# Patient Record
Sex: FEMALE | Race: WHITE | NOT HISPANIC OR LATINO
[De-identification: names, ages, dates, MRNs, and addresses within clinical notes are randomized per-mention and may not be internally consistent; named-entity substitution may affect disease eponyms.]

---

## 2017-03-07 ENCOUNTER — APPOINTMENT (OUTPATIENT)
Dept: NEPHROLOGY | Facility: CLINIC | Age: 82
End: 2017-03-07

## 2017-03-07 VITALS
SYSTOLIC BLOOD PRESSURE: 122 MMHG | RESPIRATION RATE: 14 BRPM | HEART RATE: 61 BPM | HEIGHT: 60 IN | DIASTOLIC BLOOD PRESSURE: 52 MMHG | BODY MASS INDEX: 31.61 KG/M2 | WEIGHT: 161 LBS

## 2017-03-07 RX ORDER — CALCITRIOL 0.5 UG/1
0.5 CAPSULE, LIQUID FILLED ORAL
Qty: 30 | Refills: 0 | Status: DISCONTINUED | COMMUNITY
Start: 2016-08-04 | End: 2017-03-07

## 2018-03-15 ENCOUNTER — OTHER (OUTPATIENT)
Age: 83
End: 2018-03-15

## 2018-05-01 ENCOUNTER — APPOINTMENT (OUTPATIENT)
Dept: NEPHROLOGY | Facility: CLINIC | Age: 83
End: 2018-05-01
Payer: MEDICARE

## 2018-05-01 VITALS — DIASTOLIC BLOOD PRESSURE: 60 MMHG | HEART RATE: 60 BPM | SYSTOLIC BLOOD PRESSURE: 120 MMHG

## 2018-05-01 DIAGNOSIS — E11.21 TYPE 2 DIABETES MELLITUS WITH DIABETIC NEPHROPATHY: ICD-10-CM

## 2018-05-01 PROCEDURE — 99214 OFFICE O/P EST MOD 30 MIN: CPT

## 2018-08-01 ENCOUNTER — APPOINTMENT (OUTPATIENT)
Dept: NEPHROLOGY | Facility: CLINIC | Age: 83
End: 2018-08-01
Payer: MEDICARE

## 2018-08-01 VITALS — SYSTOLIC BLOOD PRESSURE: 148 MMHG | HEART RATE: 60 BPM | DIASTOLIC BLOOD PRESSURE: 70 MMHG

## 2018-08-01 DIAGNOSIS — E55.9 VITAMIN D DEFICIENCY, UNSPECIFIED: ICD-10-CM

## 2018-08-01 DIAGNOSIS — Z00.00 ENCOUNTER FOR GENERAL ADULT MEDICAL EXAMINATION W/OUT ABNORMAL FINDINGS: ICD-10-CM

## 2018-08-01 PROCEDURE — 99214 OFFICE O/P EST MOD 30 MIN: CPT

## 2018-08-01 RX ORDER — CALCITRIOL 0.5 UG/1
0.5 CAPSULE, LIQUID FILLED ORAL
Qty: 30 | Refills: 5 | Status: DISCONTINUED | COMMUNITY
Start: 2018-05-01 | End: 2018-08-01

## 2018-11-02 ENCOUNTER — APPOINTMENT (OUTPATIENT)
Dept: NEPHROLOGY | Facility: CLINIC | Age: 83
End: 2018-11-02
Payer: MEDICARE

## 2018-11-02 VITALS
DIASTOLIC BLOOD PRESSURE: 68 MMHG | BODY MASS INDEX: 31.44 KG/M2 | HEART RATE: 64 BPM | WEIGHT: 161 LBS | SYSTOLIC BLOOD PRESSURE: 150 MMHG

## 2018-11-02 PROCEDURE — 99214 OFFICE O/P EST MOD 30 MIN: CPT

## 2019-02-04 ENCOUNTER — APPOINTMENT (OUTPATIENT)
Dept: NEPHROLOGY | Facility: CLINIC | Age: 84
End: 2019-02-04
Payer: MEDICARE

## 2019-02-04 VITALS
DIASTOLIC BLOOD PRESSURE: 64 MMHG | WEIGHT: 165 LBS | BODY MASS INDEX: 32.22 KG/M2 | SYSTOLIC BLOOD PRESSURE: 160 MMHG | HEART RATE: 64 BPM

## 2019-02-04 PROCEDURE — 99214 OFFICE O/P EST MOD 30 MIN: CPT

## 2019-02-10 NOTE — PHYSICAL EXAM
[General Appearance - Alert] : alert [General Appearance - In No Acute Distress] : in no acute distress [Sclera] : the sclera and conjunctiva were normal [Heart Rate And Rhythm] : heart rate was normal and rhythm regular [Heart Sounds Gallop] : no gallops [Murmurs] : no murmurs [Heart Sounds Pericardial Friction Rub] : no pericardial rub [___ +] : bilateral [unfilled]+ pretibial pitting edema [Abdomen Soft] : soft [Abdomen Tenderness] : non-tender [No CVA Tenderness] : no ~M costovertebral angle tenderness [Involuntary Movements] : no involuntary movements were seen [] : no rash [No Focal Deficits] : no focal deficits [Oriented To Time, Place, And Person] : oriented to person, place, and time [Affect] : the affect was normal [FreeTextEntry1] : crackles bases

## 2019-02-10 NOTE — HISTORY OF PRESENT ILLNESS
[FreeTextEntry1] : f/u CKD \par reports had fall in Dec at home  and hit head - w/u neg CNS bleed- had headaches much improved now\par no dizziness \par reports off lasix TIW - per cardiologist\par BPs can be 160s systolic in am before meds -- better after \par appetite ok, no nausea, no SOB\par missed couple dose MONISHA and hgb lower -- back on now \par PCP Dr Vincent \par

## 2019-02-10 NOTE — ASSESSMENT
[FreeTextEntry1] : off diuretics but fluid overloaded and hypertensive -- unclear why taken off but I suspect was due to increase in creatinine. However I suspect needs diuretics for volume and BP control \par consider restart lasix at least 40 qod -- or daily and may need to tolerate some increase in creatinine. will confirm first that not stopped for other cause - eg related to pts fall (though denies any dizziness or syncope) \par will d/w Dr Vincent \par back on MONISHA for anemia - follow -- consider fe IV \par check PTH, phos\par f/u 2-3 mos\par

## 2019-04-14 LAB
ALBUMIN SERPL ELPH-MCNC: 3.9 G/DL
ALP BLD-CCNC: 70 U/L
ALT SERPL-CCNC: 8 U/L
ANION GAP SERPL CALC-SCNC: 13 MMOL/L
AST SERPL-CCNC: 12 U/L
BILIRUB SERPL-MCNC: 0.2 MG/DL
BUN SERPL-MCNC: 37 MG/DL
CALCIUM SERPL-MCNC: 9 MG/DL
CALCIUM SERPL-MCNC: 9.5 MG/DL
CHLORIDE SERPL-SCNC: 107 MMOL/L
CO2 SERPL-SCNC: 22 MMOL/L
CREAT SERPL-MCNC: 2.81 MG/DL
GLUCOSE SERPL-MCNC: 82 MG/DL
PARATHYROID HORMONE INTACT: 104 PG/ML
PHOSPHATE SERPL-MCNC: 4.5 MG/DL
POTASSIUM SERPL-SCNC: 5.7 MMOL/L
PROT SERPL-MCNC: 6.2 G/DL
SODIUM SERPL-SCNC: 142 MMOL/L

## 2019-05-14 ENCOUNTER — APPOINTMENT (OUTPATIENT)
Dept: NEPHROLOGY | Facility: CLINIC | Age: 84
End: 2019-05-14
Payer: MEDICARE

## 2019-05-14 VITALS
HEART RATE: 70 BPM | BODY MASS INDEX: 32.2 KG/M2 | DIASTOLIC BLOOD PRESSURE: 60 MMHG | WEIGHT: 164 LBS | HEIGHT: 60 IN | SYSTOLIC BLOOD PRESSURE: 140 MMHG

## 2019-05-14 PROCEDURE — 99214 OFFICE O/P EST MOD 30 MIN: CPT

## 2019-05-14 RX ORDER — VALACYCLOVIR 500 MG/1
500 TABLET, FILM COATED ORAL
Qty: 6 | Refills: 0 | Status: DISCONTINUED | COMMUNITY
Start: 2017-01-31 | End: 2019-05-14

## 2019-05-14 RX ORDER — OMEGA-3S/DHA/EPA/FISH OIL 900-110 MG
1 CAPSULE ORAL DAILY
Refills: 0 | Status: DISCONTINUED | COMMUNITY
Start: 2018-05-01 | End: 2019-05-14

## 2019-05-14 RX ORDER — FOLIC ACID 1 MG/1
1 TABLET ORAL
Refills: 0 | Status: DISCONTINUED | COMMUNITY
Start: 2017-03-07 | End: 2019-05-14

## 2019-05-14 RX ORDER — HYDROCORTISONE 2.5% 25 MG/G
2.5 CREAM TOPICAL
Qty: 30 | Refills: 0 | Status: DISCONTINUED | COMMUNITY
Start: 2017-01-27 | End: 2019-05-14

## 2019-05-14 RX ORDER — GLIPIZIDE 2.5 MG/1
2.5 TABLET, EXTENDED RELEASE ORAL DAILY
Refills: 0 | Status: DISCONTINUED | COMMUNITY
Start: 2018-05-01 | End: 2019-05-14

## 2019-05-15 NOTE — ASSESSMENT
[FreeTextEntry1] : CKD 4 --stable renal fxn . volume status and BP improved on low dose diuretic\par anemia -- getting MONISHA, fe stores acceptable\par HTN-- BP acceptable\par last PTH ok \par \par would cont lasix 20 qod as she is doing \par cont coreg\par contr MONISHA and folow hgb\par f/u 3 mos -- check labs incl phos, PTH, vit D  for next visit \par \par \par \par

## 2019-05-15 NOTE — HISTORY OF PRESENT ILLNESS
[FreeTextEntry1] : f/u CKD stage IV\par Patient has been taking Furosemide 20 mg po every other day due to hypertension and leg swelling during  last visit in Feb 2019.\par \par She checks her blood pressure at home. Bp has been around 140s-160s at home which is sig improved and feels better \par She is on procrit 10,000 units q2 wkly for anemia\par \par No more  falls at home , no dizziness\par \par PCP Dr Vincent \par

## 2019-05-15 NOTE — PHYSICAL EXAM
[General Appearance - In No Acute Distress] : in no acute distress [General Appearance - Alert] : alert [Jugular Venous Distention Increased] : there was no jugular-venous distention [Auscultation Breath Sounds / Voice Sounds] : lungs were clear to auscultation bilaterally [Heart Sounds] : normal S1 and S2 [Heart Rate And Rhythm] : heart rate was normal and rhythm regular [Heart Sounds Gallop] : no gallops [Heart Sounds Pericardial Friction Rub] : no pericardial rub [Murmurs] : no murmurs [Abdomen Soft] : soft [Abdomen Tenderness] : non-tender [No CVA Tenderness] : no ~M costovertebral angle tenderness [Edema] : there was no peripheral edema [Sclera] : the sclera and conjunctiva were normal [] : no rash [Involuntary Movements] : no involuntary movements were seen [Affect] : the affect was normal [Oriented To Time, Place, And Person] : oriented to person, place, and time [No Focal Deficits] : no focal deficits

## 2019-09-17 ENCOUNTER — APPOINTMENT (OUTPATIENT)
Dept: NEPHROLOGY | Facility: CLINIC | Age: 84
End: 2019-09-17
Payer: MEDICARE

## 2019-09-17 VITALS
WEIGHT: 154 LBS | SYSTOLIC BLOOD PRESSURE: 140 MMHG | BODY MASS INDEX: 30.08 KG/M2 | DIASTOLIC BLOOD PRESSURE: 60 MMHG | HEART RATE: 60 BPM

## 2019-09-17 VITALS — HEART RATE: 64 BPM | SYSTOLIC BLOOD PRESSURE: 102 MMHG | DIASTOLIC BLOOD PRESSURE: 58 MMHG
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
## 2019-09-17 DIAGNOSIS — N19 UNSPECIFIED KIDNEY FAILURE: ICD-10-CM

## 2019-09-17 DIAGNOSIS — R42 DIZZINESS AND GIDDINESS: ICD-10-CM

## 2019-09-17 PROCEDURE — 99215 OFFICE O/P EST HI 40 MIN: CPT

## 2019-09-18 NOTE — PHYSICAL EXAM
[General Appearance - Alert] : alert [General Appearance - In No Acute Distress] : in no acute distress [Sclera] : the sclera and conjunctiva were normal [Jugular Venous Distention Increased] : there was no jugular-venous distention [Auscultation Breath Sounds / Voice Sounds] : lungs were clear to auscultation bilaterally [Edema] : there was no peripheral edema [Abdomen Soft] : soft [Abdomen Tenderness] : non-tender [No CVA Tenderness] : no ~M costovertebral angle tenderness [Involuntary Movements] : no involuntary movements were seen [] : no rash [No Focal Deficits] : no focal deficits [Oriented To Time, Place, And Person] : oriented to person, place, and time [Affect] : the affect was normal [FreeTextEntry1] : no asterixis

## 2019-09-18 NOTE — HISTORY OF PRESENT ILLNESS
[FreeTextEntry1] : f/u CKD\par co couple weeks feeling off balance when walks -- had two falls at night when going to [place]\par denies dizziness or vertigo but feels off balance \par appetitie not great - lost 10 lbs past couple mos, occ nausea (not daily) \par no sob\par occ tremor she notes \par

## 2019-09-18 NOTE — REVIEW OF SYSTEMS
[Recent Weight Loss (___ Lbs)] : recent [unfilled] ~Ulb weight loss [As Noted in HPI] : as noted in HPI [Dizziness] : dizziness [Difficulty Walking] : difficulty walking [Negative] : Heme/Lymph [Vomiting] : no vomiting

## 2019-09-18 NOTE — ASSESSMENT
[FreeTextEntry1] : advanced CKD -- fatigue/balance symptoms associated wit poor intake, nausea, weight loss-- suspect may have uremic sx. also orthostasis on exam. no edema in pt with hx edema. BP ok \par will dc lasix \par encouraged increased PO intake\par discussed with pt and her daughter (over phone--[phone])  re possibilty of uremia as dx and consider admission and dialysis--antony if no improvement. discussed risks vs benefits and expected/possible course of dialysis\par also disciussed pts falls at night and may be unsafe for her to be alone at night -- advised have someone with her (or go to hospital) \par consider neuro eval as well \par above also d/w dr Vincent \par >50% visit spent on counseling  \par \par \par

## 2019-10-15 ENCOUNTER — APPOINTMENT (OUTPATIENT)
Dept: NEUROLOGY | Facility: CLINIC | Age: 84
End: 2019-10-15
Payer: MEDICARE

## 2019-10-15 VITALS
HEART RATE: 60 BPM | TEMPERATURE: 98.1 F | SYSTOLIC BLOOD PRESSURE: 135 MMHG | HEIGHT: 60 IN | BODY MASS INDEX: 30.24 KG/M2 | OXYGEN SATURATION: 97 % | WEIGHT: 154.03 LBS | DIASTOLIC BLOOD PRESSURE: 61 MMHG

## 2019-10-15 DIAGNOSIS — R41.3 OTHER AMNESIA: ICD-10-CM

## 2019-10-15 PROCEDURE — 99204 OFFICE O/P NEW MOD 45 MIN: CPT

## 2019-10-15 NOTE — REVIEW OF SYSTEMS
[Numbness] : numbness [Memory Lapses or Loss] : memory loss [Abnormal Sensation] : an abnormal sensation [Dizziness] : dizziness [Tingling] : no tingling

## 2019-10-15 NOTE — DISCUSSION/SUMMARY
[FreeTextEntry1] : Dizziness    likely   related  to    anemia,  however     I  can  not   r/o    other   etiologies\par She   will  need    CD   to  r/o  stenosis\par MRI  and    MRA of  the   brain can  not  be     done    due   to   pacemaker    placement. 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   Would  defer  Aricept   for  now.

## 2019-10-15 NOTE — HISTORY OF PRESENT ILLNESS
[FreeTextEntry1] : Presented     for     evaluation    of  falls   and   dizziness. She    is   also   c/o   memory   issues. She   was  Rx   Aricept   5   mg,  but  not   taking  it.  She    needs  an assistance     with    all  daily  activities.   Pt   is    known  to  have   DM.  She    denies   vertigo. She    has  a   h/o   Bell's  palsy (recurrent), last    episode   5  years    ago   with   residual  L   facial   weakness.   She    reports   about   4-5  falls.   She    denies   LOC.  It  appears     that   all  falls    were    mechanical.  Pt  is   [place]i   speaking

## 2019-10-15 NOTE — PHYSICAL EXAM
[General Appearance - Alert] : alert [General Appearance - In No Acute Distress] : in no acute distress [Impaired Insight] : insight and judgment were intact [Oriented To Time, Place, And Person] : oriented to person, place, and time [Affect] : the affect was normal [Place] : oriented to place [Person] : oriented to person [Time] : oriented to time [Concentration Intact] : normal concentrating ability [Visual Intact] : visual attention was ~T not ~L decreased [Naming Objects] : no difficulty naming common objects [Repeating Phrases] : no difficulty repeating a phrase [Writing A Sentence] : no difficulty writing a sentence [Fluency] : fluency intact [Comprehension] : comprehension intact [Reading] : reading intact [Past History] : adequate knowledge of personal past history [Cranial Nerves Optic (II)] : visual acuity intact bilaterally,  visual fields full to confrontation, pupils equal round and reactive to light [Cranial Nerves Trigeminal (V)] : facial sensation intact symmetrically [Cranial Nerves Oculomotor (III)] : extraocular motion intact [Cranial Nerves Vestibulocochlear (VIII)] : hearing was intact bilaterally [Cranial Nerves Accessory (XI - Cranial And Spinal)] : head turning and shoulder shrug symmetric [Cranial Nerves Glossopharyngeal (IX)] : tongue and palate midline [Motor Tone] : muscle tone was normal in all four extremities [Cranial Nerves Hypoglossal (XII)] : there was no tongue deviation with protrusion [Motor Strength] : muscle strength was normal in all four extremities [No Muscle Atrophy] : normal bulk in all four extremities [1+] : Brachioradialis left 1+ [0] : Ankle jerk left 0 [Past-pointing] : there was no past-pointing [Tremor] : no tremor present [Plantar Reflex Right Only] : normal on the right [Plantar Reflex Left Only] : normal on the left [FreeTextEntry5] : Peripheral  facial   nerve   palsy on the    left [FreeTextEntry7] : decreased   peripherally   to  all  modalities.  [FreeTextEntry8] : Pt   uses   a  cane

## 2019-10-16 ENCOUNTER — OTHER (OUTPATIENT)
Age: 84
End: 2019-10-16

## 2019-10-29 ENCOUNTER — APPOINTMENT (OUTPATIENT)
Dept: NEUROLOGY | Facility: CLINIC | Age: 84
End: 2019-10-29

## 2019-12-02 ENCOUNTER — APPOINTMENT (OUTPATIENT)
Dept: NEPHROLOGY | Facility: CLINIC | Age: 84
End: 2019-12-02

## 2019-12-16 ENCOUNTER — APPOINTMENT (OUTPATIENT)
Dept: NEUROLOGY | Facility: CLINIC | Age: 84
End: 2019-12-16

## 2019-12-24 ENCOUNTER — APPOINTMENT (OUTPATIENT)
Dept: NEPHROLOGY | Facility: CLINIC | Age: 84
End: 2019-12-24

## 2020-02-03 ENCOUNTER — LABORATORY RESULT (OUTPATIENT)
Age: 85
End: 2020-02-03

## 2020-02-11 ENCOUNTER — APPOINTMENT (OUTPATIENT)
Dept: NEPHROLOGY | Facility: CLINIC | Age: 85
End: 2020-02-11
Payer: MEDICARE

## 2020-02-11 VITALS — HEART RATE: 62 BPM | DIASTOLIC BLOOD PRESSURE: 65 MMHG | SYSTOLIC BLOOD PRESSURE: 170 MMHG

## 2020-02-11 VITALS
HEART RATE: 60 BPM | DIASTOLIC BLOOD PRESSURE: 65 MMHG | WEIGHT: 144 LBS | BODY MASS INDEX: 28.12 KG/M2 | SYSTOLIC BLOOD PRESSURE: 165 MMHG

## 2020-02-11 DIAGNOSIS — E87.5 HYPERKALEMIA: ICD-10-CM

## 2020-02-11 DIAGNOSIS — N18.4 CHRONIC KIDNEY DISEASE, STAGE 4 (SEVERE): ICD-10-CM

## 2020-02-11 PROCEDURE — 99214 OFFICE O/P EST MOD 30 MIN: CPT

## 2020-02-11 RX ORDER — RANOLAZINE 500 MG/1
500 TABLET, FILM COATED, EXTENDED RELEASE ORAL
Refills: 0 | Status: DISCONTINUED | COMMUNITY
Start: 2018-05-01 | End: 2020-02-11

## 2020-02-12 PROBLEM — E87.5 HYPERKALEMIA: Status: ACTIVE | Noted: 2020-02-11

## 2020-02-12 LAB
ALBUMIN SERPL ELPH-MCNC: 4.1 G/DL
ALP BLD-CCNC: 67 U/L
ALT SERPL-CCNC: <5 U/L
ANION GAP SERPL CALC-SCNC: 12 MMOL/L
AST SERPL-CCNC: 11 U/L
BASOPHILS # BLD AUTO: 0.21 K/UL
BASOPHILS NFR BLD AUTO: 3.5 %
BILIRUB SERPL-MCNC: 0.2 MG/DL
BUN SERPL-MCNC: 43 MG/DL
CALCIUM SERPL-MCNC: 9.5 MG/DL
CALCIUM SERPL-MCNC: 9.5 MG/DL
CHLORIDE SERPL-SCNC: 107 MMOL/L
CO2 SERPL-SCNC: 19 MMOL/L
CREAT SERPL-MCNC: 2.97 MG/DL
EOSINOPHIL # BLD AUTO: 0.32 K/UL
EOSINOPHIL NFR BLD AUTO: 5.2 %
GLUCOSE SERPL-MCNC: 116 MG/DL
HCT VFR BLD CALC: 30.6 %
HGB BLD-MCNC: 9.2 G/DL
LYMPHOCYTES # BLD AUTO: 1.59 K/UL
LYMPHOCYTES NFR BLD AUTO: 26.1 %
MAGNESIUM SERPL-MCNC: 2.3 MG/DL
MAN DIFF?: NORMAL
MCHC RBC-ENTMCNC: 30.1 GM/DL
MCHC RBC-ENTMCNC: 36.1 PG
MCV RBC AUTO: 120 FL
MONOCYTES # BLD AUTO: 0.69 K/UL
MONOCYTES NFR BLD AUTO: 11.3 %
NEUTROPHILS # BLD AUTO: 3.29 K/UL
NEUTROPHILS NFR BLD AUTO: 53 %
PARATHYROID HORMONE INTACT: 72 PG/ML
PHOSPHATE SERPL-MCNC: 4.3 MG/DL
PLATELET # BLD AUTO: 231 K/UL
POTASSIUM SERPL-SCNC: 5.9 MMOL/L
PROT SERPL-MCNC: 6.6 G/DL
RBC # BLD: 2.55 M/UL
RBC # FLD: 19.8 %
SODIUM SERPL-SCNC: 138 MMOL/L
URATE SERPL-MCNC: 9.9 MG/DL
WBC # FLD AUTO: 6.1 K/UL

## 2020-02-12 NOTE — HISTORY OF PRESENT ILLNESS
[FreeTextEntry1] : f/u CKD, \par feeling much better since last visit , denies falls, syncopal episodes, no more dizziness, denies SOB/ dyspnea or LE edema. appetite also better (though still not great), no n/v\par was admitted in the hospital in Dec for anemia requiring blood transfusion, had about 10 lbs weight loss around that time but since then weight has been stable\par has not been taking jkayexelate for some time-- caused GI sx, constipation\par taking lasix several times a wekk only \par \par PCP Dr Vincent \par

## 2020-02-12 NOTE — HISTORY OF PRESENT ILLNESS
[FreeTextEntry1] : f/u CKD, \par feeling much better since last visit , denies falls, syncopal episodes, no more dizziness, denies SOB/ dyspnea or LE edema. appetite also better (though still not great), no n/v\par was admitted in the hospital in Dec for anemia requiring blood transfusion, had about 10 lbs weight loss around that time but since then weight has been stable\par has not been taking jkayexelate for some time-- caused GI sx, constipation\par taking lasix several times a week only \par \par PCP Dr Vincent \par

## 2020-02-12 NOTE — PHYSICAL EXAM
[General Appearance - Alert] : alert [General Appearance - In No Acute Distress] : in no acute distress [General Appearance - Well-Appearing] : healthy appearing [Sclera] : the sclera and conjunctiva were normal [Oropharynx] : the oropharynx was normal [Jugular Venous Distention Increased] : there was no jugular-venous distention [Auscultation Breath Sounds / Voice Sounds] : lungs were clear to auscultation bilaterally [Heart Rate And Rhythm] : heart rate was normal and rhythm regular [Heart Sounds Gallop] : no gallops [Murmurs] : no murmurs [Heart Sounds] : normal S1 and S2 [Edema] : there was no peripheral edema [Heart Sounds Pericardial Friction Rub] : no pericardial rub [Abdomen Tenderness] : non-tender [No CVA Tenderness] : no ~M costovertebral angle tenderness [Abdomen Soft] : soft [Abnormal Walk] : normal gait [Involuntary Movements] : no involuntary movements were seen [] : no rash [No Focal Deficits] : no focal deficits [Oriented To Time, Place, And Person] : oriented to person, place, and time [Affect] : the affect was normal [Mood] : the mood was normal [FreeTextEntry1] : except mild left basilar crackles

## 2020-02-12 NOTE — PHYSICAL EXAM
[General Appearance - In No Acute Distress] : in no acute distress [General Appearance - Well-Appearing] : healthy appearing [Oropharynx] : the oropharynx was normal [Jugular Venous Distention Increased] : there was no jugular-venous distention [Auscultation Breath Sounds / Voice Sounds] : lungs were clear to auscultation bilaterally [Heart Sounds Gallop] : no gallops [Heart Rate And Rhythm] : heart rate was normal and rhythm regular [Heart Sounds] : normal S1 and S2 [Heart Sounds Pericardial Friction Rub] : no pericardial rub [Murmurs] : no murmurs [Abdomen Tenderness] : non-tender [Abdomen Soft] : soft [Edema] : there was no peripheral edema [Abnormal Walk] : normal gait [Involuntary Movements] : no involuntary movements were seen [No Focal Deficits] : no focal deficits [Affect] : the affect was normal [Oriented To Time, Place, And Person] : oriented to person, place, and time [Mood] : the mood was normal [General Appearance - Alert] : alert [Sclera] : the sclera and conjunctiva were normal [No CVA Tenderness] : no ~M costovertebral angle tenderness [] : no rash [FreeTextEntry1] : except mild left basilar crackles

## 2020-02-12 NOTE — ASSESSMENT
[FreeTextEntry1] : CKD (chronic kidney disease), stage V improved function since last visit back to baseline\par appears non uremic, euvolemic --previous sx incl balance issues and dizziness resolved \par SBP high \par Hyperkalemia, off Kayexalate, had sx in past with it -- seems predominantly constipation\par \par - plan to order Kayexalate liquid solution 15 gram every other day\par - CKD-MBD, phos, ca, PTH noted--> at goal \par - AOCD: on Epo weekly per PCP\par HTN, bp above goal, systolic ranging in 140-165 mmhg, has same readings when checked at home \par - cw coreg 25 mg in am and 50 mg in pm\par - lasix 20 mg PO 3 times weekly \par - would consider to increase diltiazem to 240 mg daily from 180 mg for better bp control\par \par RTC in 2-3 months

## 2020-02-12 NOTE — ASSESSMENT
[FreeTextEntry1] : CKD (chronic kidney disease), stage V improved function since last visit back to baseline\par appears non uremic, euvolemic --previous sx incl balance issues and dizziness resolved \par SBP high \par Hyperkalemia, off Kayexalate, had sx in past with it -- seems predominantly constipation\par \par -will try  Kayexalate liquid solution (instead of powder ) with sorbitol 15 gram every other day to see if tolerates- can also try Veltassa if covered by insurance (and not clear would tolerate better) \par Epo weekly per PCP-- follow hgb--goal 9-11\par HTN, bp above goal, systolic ranging in 140-165 mmhg, has same readings when checked at home \par - cw coreg 25 mg in am and 50 mg in pm\par -cont  lasix 20 mg PO 3-4  times weekly \par - consider to increase diltiazem to 240 mg daily from 180 mg for better bp control\par \par f/u n 2-3 months

## 2020-04-20 ENCOUNTER — APPOINTMENT (OUTPATIENT)
Dept: NEPHROLOGY | Facility: CLINIC | Age: 85
End: 2020-04-20

## 2020-06-02 RX ORDER — SODIUM POLYSTYRENE SULFONATE 15 G/60ML
15 SUSPENSION ORAL; RECTAL
Qty: 2 | Refills: 3 | Status: DISCONTINUED | COMMUNITY
Start: 2020-02-11 | End: 2020-06-02

## 2020-06-02 RX ORDER — SODIUM POLYSTYRENE SULFONATE 4.1 MEQ/G
POWDER, FOR SUSPENSION ORAL; RECTAL DAILY
Qty: 1 | Refills: 1 | Status: DISCONTINUED | COMMUNITY
Start: 2019-09-17 | End: 2020-06-02

## 2020-06-30 ENCOUNTER — LABORATORY RESULT (OUTPATIENT)
Age: 85
End: 2020-06-30

## 2020-07-01 LAB
25(OH)D3 SERPL-MCNC: 40.8 NG/ML
ALBUMIN SERPL ELPH-MCNC: 4.3 G/DL
ALP BLD-CCNC: 44 U/L
ALT SERPL-CCNC: 10 U/L
ANION GAP SERPL CALC-SCNC: 18 MMOL/L
AST SERPL-CCNC: 9 U/L
BASOPHILS # BLD AUTO: 0.15 K/UL
BASOPHILS NFR BLD AUTO: 3 %
BILIRUB SERPL-MCNC: 0.7 MG/DL
BUN SERPL-MCNC: 68 MG/DL
CALCIUM SERPL-MCNC: 9.7 MG/DL
CALCIUM SERPL-MCNC: 9.7 MG/DL
CHLORIDE SERPL-SCNC: 97 MMOL/L
CO2 SERPL-SCNC: 24 MMOL/L
CREAT SERPL-MCNC: 3.49 MG/DL
EOSINOPHIL # BLD AUTO: 0.48 K/UL
EOSINOPHIL NFR BLD AUTO: 9.6 %
FERRITIN SERPL-MCNC: 962 NG/ML
GLUCOSE SERPL-MCNC: 124 MG/DL
HCT VFR BLD CALC: 21.6 %
HGB BLD-MCNC: 6.8 G/DL
IMM GRANULOCYTES NFR BLD AUTO: 4 %
IRON SATN MFR SERPL: 39 %
IRON SERPL-MCNC: 76 UG/DL
LYMPHOCYTES # BLD AUTO: 0.94 K/UL
LYMPHOCYTES NFR BLD AUTO: 18.8 %
MAGNESIUM SERPL-MCNC: 1.9 MG/DL
MAN DIFF?: NORMAL
MCHC RBC-ENTMCNC: 31.5 GM/DL
MCHC RBC-ENTMCNC: 37 PG
MCV RBC AUTO: 117.4 FL
MONOCYTES # BLD AUTO: 0.91 K/UL
MONOCYTES NFR BLD AUTO: 18.2 %
NEUTROPHILS # BLD AUTO: 2.32 K/UL
NEUTROPHILS NFR BLD AUTO: 46.4 %
PARATHYROID HORMONE INTACT: 95 PG/ML
PHOSPHATE SERPL-MCNC: 4.4 MG/DL
PLATELET # BLD AUTO: 238 K/UL
POTASSIUM SERPL-SCNC: 5 MMOL/L
PROT SERPL-MCNC: 6.3 G/DL
RBC # BLD: 1.84 M/UL
RBC # FLD: 20.3 %
SODIUM SERPL-SCNC: 138 MMOL/L
TIBC SERPL-MCNC: 196 UG/DL
UIBC SERPL-MCNC: 120 UG/DL
URATE SERPL-MCNC: 8.5 MG/DL
WBC # FLD AUTO: 5 K/UL

## 2021-06-10 ENCOUNTER — INPATIENT (INPATIENT)
Facility: HOSPITAL | Age: 86
LOS: 4 days | Discharge: HOME HEALTH | DRG: 253 | End: 2021-06-15
Attending: STUDENT IN AN ORGANIZED HEALTH CARE EDUCATION/TRAINING PROGRAM | Admitting: STUDENT IN AN ORGANIZED HEALTH CARE EDUCATION/TRAINING PROGRAM
Payer: MEDICARE

## 2021-06-10 ENCOUNTER — TRANSCRIPTION ENCOUNTER (OUTPATIENT)
Age: 86
End: 2021-06-10

## 2021-06-10 ENCOUNTER — APPOINTMENT (OUTPATIENT)
Dept: VASCULAR SURGERY | Facility: CLINIC | Age: 86
End: 2021-06-10
Payer: MEDICARE

## 2021-06-10 VITALS
OXYGEN SATURATION: 98 % | HEIGHT: 61 IN | DIASTOLIC BLOOD PRESSURE: 68 MMHG | HEART RATE: 60 BPM | TEMPERATURE: 98 F | WEIGHT: 132.94 LBS | RESPIRATION RATE: 18 BRPM | SYSTOLIC BLOOD PRESSURE: 145 MMHG

## 2021-06-10 DIAGNOSIS — Z95.0 PRESENCE OF CARDIAC PACEMAKER: Chronic | ICD-10-CM

## 2021-06-10 LAB
A1C WITH ESTIMATED AVERAGE GLUCOSE RESULT: 4.9 % — SIGNIFICANT CHANGE UP (ref 4–5.6)
ALBUMIN SERPL ELPH-MCNC: 3.8 G/DL — SIGNIFICANT CHANGE UP (ref 3.3–5)
ALP SERPL-CCNC: 45 U/L — SIGNIFICANT CHANGE UP (ref 40–120)
ALT FLD-CCNC: 9 U/L — LOW (ref 10–45)
ANION GAP SERPL CALC-SCNC: 17 MMOL/L — SIGNIFICANT CHANGE UP (ref 5–17)
ANISOCYTOSIS BLD QL: SLIGHT — SIGNIFICANT CHANGE UP
APTT BLD: 32.4 SEC — SIGNIFICANT CHANGE UP (ref 27.5–35.5)
AST SERPL-CCNC: 13 U/L — SIGNIFICANT CHANGE UP (ref 10–40)
BASOPHILS # BLD AUTO: 0.12 K/UL — SIGNIFICANT CHANGE UP (ref 0–0.2)
BASOPHILS NFR BLD AUTO: 4.4 % — HIGH (ref 0–2)
BILIRUB SERPL-MCNC: 0.5 MG/DL — SIGNIFICANT CHANGE UP (ref 0.2–1.2)
BLD GP AB SCN SERPL QL: POSITIVE — SIGNIFICANT CHANGE UP
BUN SERPL-MCNC: 93 MG/DL — HIGH (ref 7–23)
BURR CELLS BLD QL SMEAR: PRESENT — SIGNIFICANT CHANGE UP
CALCIUM SERPL-MCNC: 10.8 MG/DL — HIGH (ref 8.4–10.5)
CHLORIDE SERPL-SCNC: 95 MMOL/L — LOW (ref 96–108)
CO2 SERPL-SCNC: 20 MMOL/L — LOW (ref 22–31)
CREAT SERPL-MCNC: 4.32 MG/DL — HIGH (ref 0.5–1.3)
DACRYOCYTES BLD QL SMEAR: SLIGHT — SIGNIFICANT CHANGE UP
EOSINOPHIL # BLD AUTO: 0.24 K/UL — SIGNIFICANT CHANGE UP (ref 0–0.5)
EOSINOPHIL NFR BLD AUTO: 8.8 % — HIGH (ref 0–6)
ESTIMATED AVERAGE GLUCOSE: 94 MG/DL — SIGNIFICANT CHANGE UP (ref 68–114)
GIANT PLATELETS BLD QL SMEAR: PRESENT — SIGNIFICANT CHANGE UP
GLUCOSE BLDC GLUCOMTR-MCNC: 119 MG/DL — HIGH (ref 70–99)
GLUCOSE SERPL-MCNC: 87 MG/DL — SIGNIFICANT CHANGE UP (ref 70–99)
HCT VFR BLD CALC: 28 % — LOW (ref 34.5–45)
HGB BLD-MCNC: 8.9 G/DL — LOW (ref 11.5–15.5)
HYPOCHROMIA BLD QL: SLIGHT — SIGNIFICANT CHANGE UP
INR BLD: 1.33 — HIGH (ref 0.88–1.16)
LYMPHOCYTES # BLD AUTO: 1.01 K/UL — SIGNIFICANT CHANGE UP (ref 1–3.3)
LYMPHOCYTES # BLD AUTO: 37.7 % — SIGNIFICANT CHANGE UP (ref 13–44)
MACROCYTES BLD QL: SIGNIFICANT CHANGE UP
MANUAL SMEAR VERIFICATION: SIGNIFICANT CHANGE UP
MCHC RBC-ENTMCNC: 31.8 GM/DL — LOW (ref 32–36)
MCHC RBC-ENTMCNC: 34.5 PG — HIGH (ref 27–34)
MCV RBC AUTO: 108.5 FL — HIGH (ref 80–100)
MONOCYTES # BLD AUTO: 0.31 K/UL — SIGNIFICANT CHANGE UP (ref 0–0.9)
MONOCYTES NFR BLD AUTO: 11.4 % — SIGNIFICANT CHANGE UP (ref 2–14)
NEUTROPHILS # BLD AUTO: 0.94 K/UL — LOW (ref 1.8–7.4)
NEUTROPHILS NFR BLD AUTO: 34.2 % — LOW (ref 43–77)
NEUTS BAND # BLD: 0.9 % — SIGNIFICANT CHANGE UP (ref 0–8)
OVALOCYTES BLD QL SMEAR: SLIGHT — SIGNIFICANT CHANGE UP
PLAT MORPH BLD: NORMAL — SIGNIFICANT CHANGE UP
PLATELET # BLD AUTO: 84 K/UL — LOW (ref 150–400)
POIKILOCYTOSIS BLD QL AUTO: SLIGHT — SIGNIFICANT CHANGE UP
POLYCHROMASIA BLD QL SMEAR: SLIGHT — SIGNIFICANT CHANGE UP
POTASSIUM SERPL-MCNC: 4 MMOL/L — SIGNIFICANT CHANGE UP (ref 3.5–5.3)
POTASSIUM SERPL-SCNC: 4 MMOL/L — SIGNIFICANT CHANGE UP (ref 3.5–5.3)
PROT SERPL-MCNC: 7.2 G/DL — SIGNIFICANT CHANGE UP (ref 6–8.3)
PROTHROM AB SERPL-ACNC: 15.7 SEC — HIGH (ref 10.6–13.6)
RBC # BLD: 2.58 M/UL — LOW (ref 3.8–5.2)
RBC # FLD: 20 % — HIGH (ref 10.3–14.5)
RBC BLD AUTO: ABNORMAL
RH IG SCN BLD-IMP: POSITIVE — SIGNIFICANT CHANGE UP
SARS-COV-2 RNA SPEC QL NAA+PROBE: NEGATIVE — SIGNIFICANT CHANGE UP
SCHISTOCYTES BLD QL AUTO: SLIGHT — SIGNIFICANT CHANGE UP
SODIUM SERPL-SCNC: 132 MMOL/L — LOW (ref 135–145)
SPHEROCYTES BLD QL SMEAR: SLIGHT — SIGNIFICANT CHANGE UP
VARIANT LYMPHS # BLD: 2.6 % — SIGNIFICANT CHANGE UP (ref 0–6)
WBC # BLD: 2.68 K/UL — LOW (ref 3.8–10.5)
WBC # FLD AUTO: 2.68 K/UL — LOW (ref 3.8–10.5)

## 2021-06-10 PROCEDURE — 86077 PHYS BLOOD BANK SERV XMATCH: CPT

## 2021-06-10 PROCEDURE — 99203 OFFICE O/P NEW LOW 30 MIN: CPT

## 2021-06-10 PROCEDURE — 99285 EMERGENCY DEPT VISIT HI MDM: CPT

## 2021-06-10 PROCEDURE — 71045 X-RAY EXAM CHEST 1 VIEW: CPT | Mod: 26

## 2021-06-10 PROCEDURE — 93923 UPR/LXTR ART STDY 3+ LVLS: CPT

## 2021-06-10 RX ORDER — REPAGLINIDE 1 MG/1
0.5 TABLET ORAL
Refills: 0 | Status: DISCONTINUED | OUTPATIENT
Start: 2021-06-10 | End: 2021-06-13

## 2021-06-10 RX ORDER — DEXTROSE 50 % IN WATER 50 %
12.5 SYRINGE (ML) INTRAVENOUS ONCE
Refills: 0 | Status: DISCONTINUED | OUTPATIENT
Start: 2021-06-10 | End: 2021-06-15

## 2021-06-10 RX ORDER — HEPARIN SODIUM 5000 [USP'U]/ML
INJECTION INTRAVENOUS; SUBCUTANEOUS
Qty: 25000 | Refills: 0 | Status: DISCONTINUED | OUTPATIENT
Start: 2021-06-10 | End: 2021-06-10

## 2021-06-10 RX ORDER — CARVEDILOL PHOSPHATE 80 MG/1
25 CAPSULE, EXTENDED RELEASE ORAL EVERY 12 HOURS
Refills: 0 | Status: DISCONTINUED | OUTPATIENT
Start: 2021-06-11 | End: 2021-06-15

## 2021-06-10 RX ORDER — NIFEDIPINE 30 MG
60 TABLET, EXTENDED RELEASE 24 HR ORAL DAILY
Refills: 0 | Status: DISCONTINUED | OUTPATIENT
Start: 2021-06-11 | End: 2021-06-15

## 2021-06-10 RX ORDER — HEPARIN SODIUM 5000 [USP'U]/ML
1000 INJECTION INTRAVENOUS; SUBCUTANEOUS
Qty: 25000 | Refills: 0 | Status: DISCONTINUED | OUTPATIENT
Start: 2021-06-10 | End: 2021-06-11

## 2021-06-10 RX ORDER — DEXTROSE 50 % IN WATER 50 %
25 SYRINGE (ML) INTRAVENOUS ONCE
Refills: 0 | Status: DISCONTINUED | OUTPATIENT
Start: 2021-06-10 | End: 2021-06-15

## 2021-06-10 RX ORDER — CEFAZOLIN SODIUM 1 G
1000 VIAL (EA) INJECTION EVERY 8 HOURS
Refills: 0 | Status: DISCONTINUED | OUTPATIENT
Start: 2021-06-10 | End: 2021-06-15

## 2021-06-10 RX ORDER — HEPARIN SODIUM 5000 [USP'U]/ML
4000 INJECTION INTRAVENOUS; SUBCUTANEOUS ONCE
Refills: 0 | Status: COMPLETED | OUTPATIENT
Start: 2021-06-10 | End: 2021-06-10

## 2021-06-10 RX ORDER — FAMOTIDINE 10 MG/ML
20 INJECTION INTRAVENOUS DAILY
Refills: 0 | Status: DISCONTINUED | OUTPATIENT
Start: 2021-06-11 | End: 2021-06-15

## 2021-06-10 RX ORDER — ASPIRIN/CALCIUM CARB/MAGNESIUM 324 MG
81 TABLET ORAL DAILY
Refills: 0 | Status: DISCONTINUED | OUTPATIENT
Start: 2021-06-10 | End: 2021-06-15

## 2021-06-10 RX ORDER — HYDRALAZINE HCL 50 MG
10 TABLET ORAL ONCE
Refills: 0 | Status: COMPLETED | OUTPATIENT
Start: 2021-06-10 | End: 2021-06-10

## 2021-06-10 RX ORDER — SODIUM CHLORIDE 9 MG/ML
1000 INJECTION, SOLUTION INTRAVENOUS
Refills: 0 | Status: DISCONTINUED | OUTPATIENT
Start: 2021-06-10 | End: 2021-06-15

## 2021-06-10 RX ORDER — CELECOXIB 200 MG/1
200 CAPSULE ORAL AT BEDTIME
Refills: 0 | Status: DISCONTINUED | OUTPATIENT
Start: 2021-06-11 | End: 2021-06-11

## 2021-06-10 RX ORDER — CALCITRIOL 0.5 UG/1
0.25 CAPSULE ORAL DAILY
Refills: 0 | Status: DISCONTINUED | OUTPATIENT
Start: 2021-06-10 | End: 2021-06-11

## 2021-06-10 RX ORDER — SENNA PLUS 8.6 MG/1
2 TABLET ORAL AT BEDTIME
Refills: 0 | Status: DISCONTINUED | OUTPATIENT
Start: 2021-06-10 | End: 2021-06-15

## 2021-06-10 RX ORDER — DEXTROSE 50 % IN WATER 50 %
15 SYRINGE (ML) INTRAVENOUS ONCE
Refills: 0 | Status: DISCONTINUED | OUTPATIENT
Start: 2021-06-10 | End: 2021-06-15

## 2021-06-10 RX ORDER — GLUCAGON INJECTION, SOLUTION 0.5 MG/.1ML
1 INJECTION, SOLUTION SUBCUTANEOUS ONCE
Refills: 0 | Status: DISCONTINUED | OUTPATIENT
Start: 2021-06-10 | End: 2021-06-15

## 2021-06-10 RX ORDER — ATORVASTATIN CALCIUM 80 MG/1
20 TABLET, FILM COATED ORAL AT BEDTIME
Refills: 0 | Status: DISCONTINUED | OUTPATIENT
Start: 2021-06-10 | End: 2021-06-14

## 2021-06-10 RX ORDER — INSULIN LISPRO 100/ML
VIAL (ML) SUBCUTANEOUS
Refills: 0 | Status: DISCONTINUED | OUTPATIENT
Start: 2021-06-10 | End: 2021-06-15

## 2021-06-10 RX ORDER — HEPARIN SODIUM 5000 [USP'U]/ML
4500 INJECTION INTRAVENOUS; SUBCUTANEOUS ONCE
Refills: 0 | Status: DISCONTINUED | OUTPATIENT
Start: 2021-06-10 | End: 2021-06-10

## 2021-06-10 RX ADMIN — Medication 100 MILLIGRAM(S): at 22:52

## 2021-06-10 RX ADMIN — SENNA PLUS 2 TABLET(S): 8.6 TABLET ORAL at 22:53

## 2021-06-10 RX ADMIN — HEPARIN SODIUM 4000 UNIT(S): 5000 INJECTION INTRAVENOUS; SUBCUTANEOUS at 17:51

## 2021-06-10 RX ADMIN — HEPARIN SODIUM 10 UNIT(S)/HR: 5000 INJECTION INTRAVENOUS; SUBCUTANEOUS at 17:52

## 2021-06-10 RX ADMIN — Medication 10 MILLIGRAM(S): at 22:52

## 2021-06-10 NOTE — H&P ADULT - HISTORY OF PRESENT ILLNESS
This is a 88 yo Russ[place] speaking female with anemia of chronic disease, CKD stage V, Diabetes, Diabetic nephropathy, Chronic diastolic CHF, hypertension, hyperlipidemia, CAD, hypothyroidism presented to Dr. Pagan’s office complaining of right lower extremity pain.  Patient states it started 2 weeks ago while walking less than 1 block and now occurs at rest.  She states she also has cramping of right buttocks.  Patient referred to ER for admission to vascular surgery with plan for heparin drip and RLE angiogram tomorrow.

## 2021-06-10 NOTE — H&P ADULT - ASSESSMENT
This is a 88 yo Russ[place] speaking female with anemia of chronic disease, CKD stage V, Diabetes, Diabetic nephropathy, Chronic diastolic CHF, hypertension, hyperlipidemia, CAD, hypothyroidism presented to Dr. Pagan’s office complaining of right lower extremity pain.  Patient states it started 2 weeks ago while walking less than 1 block and now occurs at rest.  She states she also has cramping of right buttocks.  Patient referred to ER for admission to vascular surgery with plan for heparin drip and RLE angiogram tomorrow.    ==== Neurovascular ====  -No delirium, pain well managed on current regimen  -C/w PRNs for Pain control  -Monitor neuro status    ==== Respiratory ====  -Saturates well on RA     -Encourage IS 10x/hour while awake, Cough and deep breathing exercises  -Monitor respiratory status via SpO2    ==== Cardiovascular ====  -Monitor on Tele  -Obtain home meds  -Obtain cardiology consult    ==== GI ====   -Tolerating PO  -Prophylaxis: Protonix  -C/w bowel regimen    ==== /Renal ====  -BUN/Cr: 93/4.32  -Trend Cr on AM labs  -Replete electrolytes as needed  -Renal consult  -Daily weights  -Strict I/Os    ==== ID ====   Afebrile, asymptomatic  -WBC: 2.23  -Continue to monitor for SIRS/Sepsis syndrome while inpatient    ==== Endocrine ====   -A1C: pending , no h/o DM    ==== Hematologic ====   -H/H: 8.9  -CBC, chem in AM  -DVT ppx: HSQ 5000u q8h and SCDs    ==== Disposition Planning ====   Telemetry, pending OR tomorrow for RLE angio, patient consent in ED

## 2021-06-10 NOTE — ED PROVIDER NOTE - ATTENDING CONTRIBUTION TO CARE
89 year old f w hx of vascular disease presents to ED at vascular surgeon's request for necrotic right toe, sent in for admission.  On exam, patient with HRRR, lungs clear.  + Discoloration to right 2nd toe, no extension into other toes or foot, no palpable dorsalis pedis pulses to affected RLE.  Palpable pulses to LLE.  Will heparinize as per vascular surgery and admit to vascular, tele.

## 2021-06-10 NOTE — ED PROVIDER NOTE - OBJECTIVE STATEMENT
The pt is a 89 y/o F, who presents to ED c/o R toe pain x 2 wks, has been having intermittent pain to leg x yrs. Denies fevers, chills, trauma, fall, cp, sob, n/v/d, abd pain

## 2021-06-10 NOTE — H&P ADULT - NSICDXPASTMEDICALHX_GEN_ALL_CORE_FT
PAST MEDICAL HISTORY:  Anemia of chronic disease     CAD (coronary artery disease)     Chronic diastolic congestive heart failure     Diabetes     Diabetes mellitus with diabetic neuropathy     Hyperlipidemia     Hypertension     Hypothyroidism     Stage 5 chronic kidney disease not on chronic dialysis

## 2021-06-10 NOTE — H&P ADULT - NSHPPHYSICALEXAM_GEN_ALL_CORE
T97.7 HR 60 /68 RR 18 O2 98%  GEN: NAD, looks comfortable  Psych: Mood appropriate  Neuro: A&Ox3.  No focal deficits.  Moving all extremities.   HEENT: No obvious abnormalities  CV: S1S2, regular, no murmurs appreciated.  No carotid bruits.  No JVD  Lungs: Clear B/L.  No wheezing, rales or rhonchi  ABD: Soft, non-tender, non-distended.  +Bowel sounds  EXT: Warm and well perfused.  No peripheral edema noted  Musculoskeletal: Moving all extremities with normal ROM, no joint swelling  PV: Right: monophasic popiteal, monophasic AT, monophasic PT, unable to doppler DP  Left: Biphasic T97.7 HR 60 /68 RR 18 O2 98%  GEN: NAD, looks comfortable  Psych: Mood appropriate  Neuro: A&Ox3.  No focal deficits.  Moving all extremities.   HEENT: No obvious abnormalities  CV: S1S2, paced  Lungs: Clear B/L.  No wheezing, rales or rhonchi  ABD: Soft, non-tender, non-distended.  +Bowel sounds  EXT: Warm and well perfused.  No peripheral edema noted  Musculoskeletal: Moving all extremities with normal ROM, no joint swelling  PV: Right: monophasic popiteal, monophasic AT, monophasic PT, unable to doppler DP  Left: Biphasic popliteal, biphasic AT, biphasic DP, biphasic PT T97.7 HR 60 /68 RR 18 O2 98%  GEN: NAD, looks comfortable  Psych: Mood appropriate  Neuro: A&Ox3.  No focal deficits.  Moving all extremities.   HEENT: No obvious abnormalities  CV: S1S2, paced  Lungs: Clear B/L.  No wheezing, rales or rhonchi  ABD: Soft, non-tender, non-distended.  +Bowel sounds  EXT: Warm and well perfused.  No peripheral edema noted  Musculoskeletal: Moving all extremities with normal ROM, no joint swelling  PV: Right: monophasic popiteal, monophasic AT, monophasic PT, unable to doppler DP  Left: Biphasic popliteal, biphasic AT, unable to doppler DP, biphasic PT

## 2021-06-10 NOTE — ED PROVIDER NOTE - CLINICAL SUMMARY MEDICAL DECISION MAKING FREE TEXT BOX
pt sent for admission for ischemic toe, pre op labs and covid sent, heparin started as per vascular, will admit to vascular tele

## 2021-06-10 NOTE — PROGRESS NOTE ADULT - SUBJECTIVE AND OBJECTIVE BOX
PRE OPERATIVE NOTE    Pre-op Diagnosis: RLE rest pain  Procedure: RLE angio  Surgeon: Latasha    Consent in chart                          8.9    2.68  )-----------( 84       ( 10 Diego 2021 17:42 )             28.0     06-10    132<L>  |  95<L>  |  93<H>  ----------------------------<  87  4.0   |  20<L>  |  4.32<H>    Ca    10.8<H>      10 Diego 2021 17:42    TPro  7.2  /  Alb  3.8  /  TBili  0.5  /  DBili  x   /  AST  13  /  ALT  9<L>  /  AlkPhos  45  06-10    PT/INR - ( 10 Diego 2021 17:42 )   PT: 15.7 sec;   INR: 1.33          PTT - ( 10 Diego 2021 17:42 )  PTT:32.4 sec      Type & Screen: pending  CXR: Clear lungs  EKG: Paced        A/P: 89yFemale planned for above procedure  1. NPO past midnight, except medications  2. IVF  3. labs

## 2021-06-10 NOTE — ED ADULT TRIAGE NOTE - CHIEF COMPLAINT QUOTE
sent in by MD azeb Pagan for angiogram tomorrow due to necrotic R 1st and 2nd toes. PMH of DM, HTN, anemia, pacemaker.

## 2021-06-10 NOTE — H&P ADULT - NSHPREVIEWOFSYSTEMS_GEN_ALL_CORE
Review of Systems  CONSTITUTIONAL:  Denies Fevers / chills, sweats, fatigue, weight loss, weight gain                                      NEURO:  Denies parethesias, seizures, syncope, confusion                                                                                EYES:  Denies Blurry vision, discharge, pain, loss of vision                                                                                    ENMT:  Denies Difficulty hearing, vertigo, dysphagia, epistaxis, recent dental work                                       CV:  Denies Chest pain, palpitations, ROSE, orthopnea                                                                                          RESPIRATORY:  Denies Wheezing, SOB, cough / sputum, hemoptysis                                                                GI:  Denies Nausea, vomiting, diarrhea, constipation, melena, difficulty swallowing                                               : Denies Hematuria, dysuria, urgency, incontinence                                                                                         MUSCULOSKELETAL:  +rest pain of RLE, Denies arthritis, joint swelling, muscle weakness                                                             SKIN/BREAST:  Denies rash, itching, hair loss, masses                                                                                            PSYCH:  Denies depression, anxiety, suicidal ideation                                                                                               HEME/LYMPH:  Denies bruises easily, enlarged lymph nodes, tender lymph nodes                                        ENDOCRINE:  Denies cold intolerance, heat intolerance, polydipsia

## 2021-06-10 NOTE — ED ADULT NURSE NOTE - OBJECTIVE STATEMENT
Pt presents to ED c/o toe infection. Pt reports sent in by PCP for angiogram r/t right 1st and 2nd toe necrosis. Right 1st and 2nd toe noted to be reddened, reports hard to bend/pain with bending toes. A&Ox4, speaking in clear and full sentences, no acute distress. Vascular team at bedside on arrival.

## 2021-06-11 ENCOUNTER — TRANSCRIPTION ENCOUNTER (OUTPATIENT)
Age: 86
End: 2021-06-11

## 2021-06-11 LAB
A1C WITH ESTIMATED AVERAGE GLUCOSE RESULT: 4.9 % — SIGNIFICANT CHANGE UP (ref 4–5.6)
ALBUMIN SERPL ELPH-MCNC: 3.6 G/DL — SIGNIFICANT CHANGE UP (ref 3.3–5)
ALP SERPL-CCNC: 46 U/L — SIGNIFICANT CHANGE UP (ref 40–120)
ALT FLD-CCNC: 9 U/L — LOW (ref 10–45)
ANION GAP SERPL CALC-SCNC: 19 MMOL/L — HIGH (ref 5–17)
ANISOCYTOSIS BLD QL: SLIGHT — SIGNIFICANT CHANGE UP
APTT BLD: 175.5 SEC — CRITICAL HIGH (ref 27.5–35.5)
AST SERPL-CCNC: 14 U/L — SIGNIFICANT CHANGE UP (ref 10–40)
BASOPHILS # BLD AUTO: 0.08 K/UL — SIGNIFICANT CHANGE UP (ref 0–0.2)
BASOPHILS NFR BLD AUTO: 2.6 % — HIGH (ref 0–2)
BILIRUB SERPL-MCNC: 0.3 MG/DL — SIGNIFICANT CHANGE UP (ref 0.2–1.2)
BUN SERPL-MCNC: 91 MG/DL — HIGH (ref 7–23)
CALCIUM SERPL-MCNC: 10.6 MG/DL — HIGH (ref 8.4–10.5)
CHLORIDE SERPL-SCNC: 100 MMOL/L — SIGNIFICANT CHANGE UP (ref 96–108)
CO2 SERPL-SCNC: 19 MMOL/L — LOW (ref 22–31)
COVID-19 SPIKE DOMAIN AB INTERP: POSITIVE
COVID-19 SPIKE DOMAIN ANTIBODY RESULT: >250 U/ML — HIGH
CREAT SERPL-MCNC: 4.38 MG/DL — HIGH (ref 0.5–1.3)
DACRYOCYTES BLD QL SMEAR: SLIGHT — SIGNIFICANT CHANGE UP
EOSINOPHIL # BLD AUTO: 0.35 K/UL — SIGNIFICANT CHANGE UP (ref 0–0.5)
EOSINOPHIL NFR BLD AUTO: 11.4 % — HIGH (ref 0–6)
ESTIMATED AVERAGE GLUCOSE: 94 MG/DL — SIGNIFICANT CHANGE UP (ref 68–114)
GIANT PLATELETS BLD QL SMEAR: PRESENT — SIGNIFICANT CHANGE UP
GLUCOSE BLDC GLUCOMTR-MCNC: 100 MG/DL — HIGH (ref 70–99)
GLUCOSE BLDC GLUCOMTR-MCNC: 124 MG/DL — HIGH (ref 70–99)
GLUCOSE BLDC GLUCOMTR-MCNC: 125 MG/DL — HIGH (ref 70–99)
GLUCOSE BLDC GLUCOMTR-MCNC: 135 MG/DL — HIGH (ref 70–99)
GLUCOSE SERPL-MCNC: 131 MG/DL — HIGH (ref 70–99)
HCT VFR BLD CALC: 27.8 % — LOW (ref 34.5–45)
HGB BLD-MCNC: 8.7 G/DL — LOW (ref 11.5–15.5)
HYPOCHROMIA BLD QL: SLIGHT — SIGNIFICANT CHANGE UP
INR BLD: 1.41 — HIGH (ref 0.88–1.16)
LYMPHOCYTES # BLD AUTO: 1.25 K/UL — SIGNIFICANT CHANGE UP (ref 1–3.3)
LYMPHOCYTES # BLD AUTO: 41.2 % — SIGNIFICANT CHANGE UP (ref 13–44)
MACROCYTES BLD QL: SLIGHT — SIGNIFICANT CHANGE UP
MAGNESIUM SERPL-MCNC: 2.6 MG/DL — SIGNIFICANT CHANGE UP (ref 1.6–2.6)
MANUAL SMEAR VERIFICATION: SIGNIFICANT CHANGE UP
MCHC RBC-ENTMCNC: 31.3 GM/DL — LOW (ref 32–36)
MCHC RBC-ENTMCNC: 34.1 PG — HIGH (ref 27–34)
MCV RBC AUTO: 109 FL — HIGH (ref 80–100)
MONOCYTES # BLD AUTO: 0.21 K/UL — SIGNIFICANT CHANGE UP (ref 0–0.9)
MONOCYTES NFR BLD AUTO: 7 % — SIGNIFICANT CHANGE UP (ref 2–14)
NEUTROPHILS # BLD AUTO: 1.15 K/UL — LOW (ref 1.8–7.4)
NEUTROPHILS NFR BLD AUTO: 36.9 % — LOW (ref 43–77)
NEUTS BAND # BLD: 0.9 % — SIGNIFICANT CHANGE UP (ref 0–8)
NRBC # BLD: 1 /100 — HIGH (ref 0–0)
NRBC # BLD: SIGNIFICANT CHANGE UP /100 WBCS (ref 0–0)
OVALOCYTES BLD QL SMEAR: SLIGHT — SIGNIFICANT CHANGE UP
PLAT MORPH BLD: ABNORMAL
PLATELET # BLD AUTO: 89 K/UL — LOW (ref 150–400)
POLYCHROMASIA BLD QL SMEAR: SLIGHT — SIGNIFICANT CHANGE UP
POTASSIUM SERPL-MCNC: 3.6 MMOL/L — SIGNIFICANT CHANGE UP (ref 3.5–5.3)
POTASSIUM SERPL-SCNC: 3.6 MMOL/L — SIGNIFICANT CHANGE UP (ref 3.5–5.3)
PROT SERPL-MCNC: 7.3 G/DL — SIGNIFICANT CHANGE UP (ref 6–8.3)
PROTHROM AB SERPL-ACNC: 16.6 SEC — HIGH (ref 10.6–13.6)
RBC # BLD: 2.55 M/UL — LOW (ref 3.8–5.2)
RBC # FLD: 20.1 % — HIGH (ref 10.3–14.5)
RBC BLD AUTO: ABNORMAL
SARS-COV-2 IGG+IGM SERPL QL IA: >250 U/ML — HIGH
SARS-COV-2 IGG+IGM SERPL QL IA: POSITIVE
SMUDGE CELLS # BLD: PRESENT — SIGNIFICANT CHANGE UP
SODIUM SERPL-SCNC: 138 MMOL/L — SIGNIFICANT CHANGE UP (ref 135–145)
SPHEROCYTES BLD QL SMEAR: SLIGHT — SIGNIFICANT CHANGE UP
WBC # BLD: 3.04 K/UL — LOW (ref 3.8–10.5)
WBC # FLD AUTO: 3.04 K/UL — LOW (ref 3.8–10.5)

## 2021-06-11 PROCEDURE — 76937 US GUIDE VASCULAR ACCESS: CPT | Mod: 26,GC

## 2021-06-11 PROCEDURE — 37226: CPT | Mod: GC

## 2021-06-11 PROCEDURE — 71045 X-RAY EXAM CHEST 1 VIEW: CPT | Mod: 26

## 2021-06-11 PROCEDURE — 99223 1ST HOSP IP/OBS HIGH 75: CPT

## 2021-06-11 PROCEDURE — 99232 SBSQ HOSP IP/OBS MODERATE 35: CPT

## 2021-06-11 PROCEDURE — 99223 1ST HOSP IP/OBS HIGH 75: CPT | Mod: GC

## 2021-06-11 PROCEDURE — 93306 TTE W/DOPPLER COMPLETE: CPT | Mod: 26

## 2021-06-11 RX ORDER — ASPIRIN/CALCIUM CARB/MAGNESIUM 324 MG
81 TABLET ORAL DAILY
Refills: 0 | Status: DISCONTINUED | OUTPATIENT
Start: 2021-06-11 | End: 2021-06-11

## 2021-06-11 RX ORDER — POTASSIUM CHLORIDE 20 MEQ
10 PACKET (EA) ORAL
Refills: 0 | Status: COMPLETED | OUTPATIENT
Start: 2021-06-11 | End: 2021-06-11

## 2021-06-11 RX ORDER — CLOPIDOGREL BISULFATE 75 MG/1
75 TABLET, FILM COATED ORAL DAILY
Refills: 0 | Status: COMPLETED | OUTPATIENT
Start: 2021-06-11 | End: 2021-06-11

## 2021-06-11 RX ORDER — SODIUM CHLORIDE 9 MG/ML
1000 INJECTION INTRAMUSCULAR; INTRAVENOUS; SUBCUTANEOUS
Refills: 0 | Status: DISCONTINUED | OUTPATIENT
Start: 2021-06-11 | End: 2021-06-12

## 2021-06-11 RX ORDER — HEPARIN SODIUM 5000 [USP'U]/ML
800 INJECTION INTRAVENOUS; SUBCUTANEOUS
Qty: 25000 | Refills: 0 | Status: DISCONTINUED | OUTPATIENT
Start: 2021-06-11 | End: 2021-06-11

## 2021-06-11 RX ADMIN — FAMOTIDINE 20 MILLIGRAM(S): 10 INJECTION INTRAVENOUS at 12:19

## 2021-06-11 RX ADMIN — CARVEDILOL PHOSPHATE 25 MILLIGRAM(S): 80 CAPSULE, EXTENDED RELEASE ORAL at 12:19

## 2021-06-11 RX ADMIN — Medication 100 MILLIEQUIVALENT(S): at 21:54

## 2021-06-11 RX ADMIN — SODIUM CHLORIDE 40 MILLILITER(S): 9 INJECTION INTRAMUSCULAR; INTRAVENOUS; SUBCUTANEOUS at 06:29

## 2021-06-11 RX ADMIN — Medication 60 MILLIGRAM(S): at 02:51

## 2021-06-11 RX ADMIN — Medication 100 MILLIEQUIVALENT(S): at 12:19

## 2021-06-11 RX ADMIN — Medication 81 MILLIGRAM(S): at 12:21

## 2021-06-11 RX ADMIN — HEPARIN SODIUM 8 UNIT(S)/HR: 5000 INJECTION INTRAVENOUS; SUBCUTANEOUS at 06:30

## 2021-06-11 RX ADMIN — CLOPIDOGREL BISULFATE 75 MILLIGRAM(S): 75 TABLET, FILM COATED ORAL at 18:25

## 2021-06-11 RX ADMIN — CALCITRIOL 0.25 MICROGRAM(S): 0.5 CAPSULE ORAL at 12:19

## 2021-06-11 RX ADMIN — SENNA PLUS 2 TABLET(S): 8.6 TABLET ORAL at 21:54

## 2021-06-11 RX ADMIN — ATORVASTATIN CALCIUM 20 MILLIGRAM(S): 80 TABLET, FILM COATED ORAL at 21:54

## 2021-06-11 RX ADMIN — Medication 100 MILLIEQUIVALENT(S): at 23:03

## 2021-06-11 RX ADMIN — Medication 100 MILLIGRAM(S): at 06:58

## 2021-06-11 RX ADMIN — Medication 100 MILLIGRAM(S): at 20:46

## 2021-06-11 NOTE — DISCHARGE NOTE PROVIDER - CARE PROVIDERS DIRECT ADDRESSES
,[email or web address]

## 2021-06-11 NOTE — DISCHARGE NOTE PROVIDER - HOSPITAL COURSE
This is a 88 yo Russain speaking female with anemia of chronic disease, CKD stage V, Diabetes, Diabetic nephropathy, Chronic diastolic CHF, hypertension, hyperlipidemia, CAD, hypothyroidism presented to Dr. Pagan’s office complaining of right lower extremity pain.  Patient states it started 2 weeks ago while walking less than 1 block and now occurs at rest.  She states she also has cramping of right buttocks. This is a 88 yo Russain speaking female with anemia of chronic disease, CKD stage V, Diabetes, Diabetic nephropathy, Chronic diastolic CHF, hypertension, hyperlipidemia, CAD, hypothyroidism presented to Dr. Pagan’s office complaining of right lower extremity pain.  Patient states it started 2 weeks ago while walking less than 1 block and now occurs at rest.  She states she also has cramping of right buttocks.      Patient was admitted and started on Heparin gtt. Hematology and Nephrology were consulted. Patient underwent echocardiogram which showed an EF of 75%. IVF were started for hydration prior to angiogram. on 6/11 she underwent angiogram of RLE and balloon angioplasty. In recovery room she was noted to have a small left groin hematoma which was stable throughout. on post-op day 1 she was noted to have a rishing creatinine. She was give 1 unit PRBC for anemia and renal US was done. Her creatinine was monitored closely. at time of discharge her Creatinine had stabilized to 5.62 and she will be seeing her PCP on 6/17to recheck.  she will be discharged on sevelamer and sodium bicarb. We will hold her home Torsemide on discharge.     She was discharged on ASA and Plavix for PAD. she will go on a 5 day course of Keflex for cellulitis

## 2021-06-11 NOTE — CHART NOTE - NSCHARTNOTEFT_GEN_A_CORE
at 7:30PM. Pulled L groin CFA 6Fr sheath at 745, manual compression held for 25 minutes with hemostasis. Patient with ecchymosis but no hematoma, groin soft, confirmed by nursing. q15 groin checks for 1h, q1h groin checks for 4h.

## 2021-06-11 NOTE — PACU DISCHARGE NOTE - PAIN:
[FreeTextEntry1] : Patient with above hx\par \par left sided chest pain , with associated symptoms  started a day after physical activity  atypical for angina ,with new abnormal EKG RBBB  resolved , normal nuclear stress test \par \par Abnormal EKG   new RBBB compared old ekg done in 2013 ,Normal ventricular function , normal stress test  , stable \par \par MVP , moderate MR , mild to moderate AI clinically compensated , will continue to monitor severity with periodic echoes , \par \par \par Pure hypercholesteremia    HDL 68  would recommend diet restriction , will consider starting her on statin  if there any evidence of atherosclerosis ( carotid doppler ),\par \par will follow up after 6 months 
Controlled with current regime

## 2021-06-11 NOTE — CONSULT NOTE ADULT - ASSESSMENT
89 year old female with PMH of DM, HTN, HLD, CKD stage V, hypothyroidism, h/o pacemaker placement, CHF, CAD, CLL and MDS (w/ 5q deletion) presented with complaints of RLE pain, going for RLE angiogram. Hematology consulted for h/o MDS on Revlimid and CLL.     CLL   - Absolute lymphocyte count is normal   - currently not on treatment for CLL  - anemia is likely related to MDS   - thrombocytopenia and neutropenia is likely due to Revlimid  - follows with Dr. Marco Antonio Moore. Last seen on 6/3/21. Outpatient labs from 6/3: WBC 2.6 (, ALC 1200). Records obtained from Dr. Moore's office.     MDS with 5q deletion   - was on Revlimid, which was placed on hold on 6/3/21 for neutropenia and thrombocytopenia.   - Recommend holding Revlimid for now.   - on admission, labs with leukopenia and neutropenia (ANC 1150)     Anemia   - check iron panel, B12 and folate level   - check EPO level   - check SPEP, Serum CHRISTIANO, Free light chain assay and Quantitative immunoglobulins.     Discussed with Dr. Hanson

## 2021-06-11 NOTE — DISCHARGE NOTE PROVIDER - NSDCFUADDINST_GEN_ALL_CORE_FT
FOLLOW UP:  Dr. Pagan in 1 week. Your appointment has been made for _______.   CALL OFFICE FOR CONERNS:   [phone] with any questions.  Call the office if you experience increasing pain, redness, swelling from groin access site for angio or temperature >101.4F.    WOUND CARE:  Clean groin access site daily with soap and water or peroxide.  You may shower. Allow soap and water to run over incision sites. Do not scrub. Pat dry. No dressing needed.  ACTIVITY:  Ambulate as tolerated, but no heavy lifting (>10lbs) or strenuous exercise.  DIET:   Renal diabetic diet  NEW MEDICATIONS:    ADDITIONAL FOLLOW UP AFTER DISCHARGE:    DISCHARGE DESTINATION:   FOLLOW UP:  Dr. Pagan in 1 week. Your appointment has been made for _______.     Please follow up with your cardiologist, Dr. Newton, in 1 week after discharge for management of your heart conditions.        CALL OFFICE FOR CONERNS:   [phone] with any questions.  Call the office if you experience increasing pain, redness, swelling from groin access site for angio or temperature >101.4F.    WOUND CARE:  Clean groin access site daily with soap and water or peroxide.  You may shower. Allow soap and water to run over incision sites. Do not scrub. Pat dry. No dressing needed.  ACTIVITY:  Ambulate as tolerated, but no heavy lifting (>10lbs) or strenuous exercise.  DIET:   Renal diabetic diet  NEW MEDICATIONS:    ADDITIONAL FOLLOW UP AFTER DISCHARGE:    DISCHARGE DESTINATION:   FOLLOW UP:  Dr. Pagan in 1 week. Your appointment has been made for 6/24/21 12pm    Please follow up with your cardiologist, Dr. Newton, in 1 week after discharge for management of your heart conditions.        CALL OFFICE FOR CONERNS:   [phone] with any questions.  Call the office if you experience increasing pain, redness, swelling from groin access site for angio or temperature >101.4F.    WOUND CARE:  Clean groin access site daily with soap and water or peroxide.  You may shower. Allow soap and water to run over incision sites. Do not scrub. Pat dry. No dressing needed.  ACTIVITY:  Ambulate as tolerated, but no heavy lifting (>10lbs) or strenuous exercise.  DIET:   Renal diabetic diet    NEW MEDICATIONS:  Keflex 500mg twice daily for 5 days   Sevelamer Carbonate 800mg three times daily   Sodium Bicarbonate 1300mg twice daily     STOP:  Please stop taking Torsemide 40mg in the morning and 20mg in the evening until you are seen by Dr. Lerma   ADDITIONAL FOLLOW UP AFTER DISCHARGE:  -Please follow up with your Primary Care Doctor Dr. Vincent on 6/17/21 for blood work to check your hemoglobin and Creatinine   -Please follow up with Dr. Lerma (nephrology) in one week. Please call for an appointment     -Please follow up with Dr. Newton (Cardiologist) in one week. Please call for an appointment   DISCHARGE DESTINATION:  Home with Home PT

## 2021-06-11 NOTE — CONSULT NOTE ADULT - SUBJECTIVE AND OBJECTIVE BOX
89 yo [place]isian speaking female with PMHx CKD stage 5, diabetic nephropathy, anemia of chronic disease, chronic diastolic CHF, hypertension, hyperlipidemia, CAD, hypothyroidism admitted for right lower extremity pain with plan for heparin drip and RLE angiogram. Nephrology consulted for recommendations.       PAST MEDICAL & SURGICAL HISTORY:  Anemia of chronic disease    Stage 5 chronic kidney disease not on chronic dialysis    Diabetes    Diabetes mellitus with diabetic neuropathy    Chronic diastolic congestive heart failure    Hypertension    Hyperlipidemia    CAD (coronary artery disease)    Hypothyroidism    Pacemaker        Allergies    No Known Allergies    Intolerances        FAMILY HISTORY:  na    SOCIAL HISTORY:  denies tobacco     MEDICATIONS  (STANDING):  aspirin  chewable 81 milliGRAM(s) Oral daily  atorvastatin 20 milliGRAM(s) Oral at bedtime  calcitriol   Capsule 0.25 MICROGram(s) Oral daily  carvedilol 25 milliGRAM(s) Oral every 12 hours  ceFAZolin   IVPB 1000 milliGRAM(s) IV Intermittent every 8 hours  celecoxib 200 milliGRAM(s) Oral at bedtime  dextrose 40% Gel 15 Gram(s) Oral once  dextrose 5%. 1000 milliLiter(s) (50 mL/Hr) IV Continuous <Continuous>  dextrose 5%. 1000 milliLiter(s) (100 mL/Hr) IV Continuous <Continuous>  dextrose 50% Injectable 25 Gram(s) IV Push once  dextrose 50% Injectable 12.5 Gram(s) IV Push once  dextrose 50% Injectable 25 Gram(s) IV Push once  famotidine    Tablet 20 milliGRAM(s) Oral daily  glucagon  Injectable 1 milliGRAM(s) IntraMuscular once  insulin lispro (ADMELOG) corrective regimen sliding scale   SubCutaneous Before meals and at bedtime  NIFEdipine XL 60 milliGRAM(s) Oral daily  potassium chloride  10 mEq/100 mL IVPB 10 milliEquivalent(s) IV Intermittent every 1 hour  repaglinide 0.5 milliGRAM(s) Oral two times a day before meals  senna 2 Tablet(s) Oral at bedtime  sodium chloride 0.9%. 1000 milliLiter(s) (40 mL/Hr) IV Continuous <Continuous>  torsemide 40 milliGRAM(s) Oral every 24 hours  torsemide 20 milliGRAM(s) Oral every 24 hours    MEDICATIONS  (PRN):      Vital Signs Last 24 Hrs  T(C): 36.6 (11 Jun 2021 09:29), Max: 36.8 (10 Diego 2021 22:47)  T(F): 97.8 (11 Jun 2021 09:29), Max: 98.2 (10 Diego 2021 22:47)  HR: 73 (11 Jun 2021 08:40) (60 - 74)  BP: 124/58 (11 Jun 2021 08:40) (100/44 - 200/64)  BP(mean): --  RR: 18 (11 Jun 2021 08:40) (17 - 18)  SpO2: 98% (11 Jun 2021 08:40) (96% - 100%)      REVIEW OF SYSTEMS:  Gen: No fever  CVS: No chest pain  Resp: No shortness of breath  Abd: No nausea/ No vomiting/No abdominal pain  CNS: No headache      PHYSICAL EXAM:  GENERAL: alert, no acute distress at present  NECK: supple, No JVD  CHEST/LUNG: Clear to auscultation bilaterally  HEART: normal S1S2, RRR  ABDOMEN: Soft, Nontender, +BS, No flank tenderness  EXTREMITIES: No peripheral edema  Neurology: AAOx3, no focal neurological deficit  SKIN: No rashes    CAPILLARY BLOOD GLUCOSE      POCT Blood Glucose.: 124 mg/dL (11 Jun 2021 12:23)  POCT Blood Glucose.: 135 mg/dL (11 Jun 2021 06:34)  POCT Blood Glucose.: 119 mg/dL (10 Diego 2021 22:23)      I&O's Summary    10 Diego 2021 07:01  -  11 Jun 2021 07:00  --------------------------------------------------------  IN: 254 mL / OUT: 0 mL / NET: 254 mL    11 Jun 2021 07:01  -  11 Jun 2021 12:44  --------------------------------------------------------  IN: 48 mL / OUT: 0 mL / NET: 48 mL          LABS:                            8.7    3.04  )-----------( 89       ( 11 Jun 2021 09:12 )             27.8       06-11    138  |  100  |  91<H>  ----------------------------<  131<H>  3.6   |  19<L>  |  4.38<H>    Ca    10.6<H>      11 Jun 2021 09:12  Mg     2.6     06-11    TPro  7.3  /  Alb  3.6  /  TBili  0.3  /  DBili  x   /  AST  14  /  ALT  9<L>  /  AlkPhos  46  06-11      PT/INR - ( 11 Jun 2021 09:12 )   PT: 16.6 sec;   INR: 1.41          PTT - ( 11 Jun 2021 01:23 )  PTT:175.5 sec                  RADIOLOGY & ADDITIONAL TESTS:

## 2021-06-11 NOTE — PROGRESS NOTE ADULT - SUBJECTIVE AND OBJECTIVE BOX
Medicine Consult Initial Note:     HPI:  This is a 88 yo Russain speaking female with anemia of chronic disease, MDS and CLL, CKD stage V, Type II DM c/b nephropathy, HFpEF, hypertension, hyperlipidemia, CAD, hypothyroidism who presented to office with worsening rest pain and exertional RLE pain, admitted for angio 6/11.  Started on hepgtt in ED and cards and renal consulted     When seen by me endorsing largely exertional RLE pain but otherwise asymptomatic, no chest pain/sob, no n/v/diarrhea/f/c     12 point ROS reviewed and negative except as otherwise stated in HPI and below    PAST MEDICAL & SURGICAL HISTORY:    Anemia of chronic disease (extensive workup outpt) and CLL and MDS- followed here at [facility] by Dr. Alcantara recent visit 6/3, extensive outpt GI workup in past    Stage 5 chronic kidney disease not on chronic dialysis- follows with liss here     Diabetes    Diabetes mellitus with diabetic neuropathy    Chronic diastolic congestive heart failure    Hypertension    Hyperlipidemia    CAD (coronary artery disease)    Hypothyroidism    Pacemaker      SOCIAL HISTORY:  Lives alone  HHA 10h/d   No toxic habits    FAMILY HISTORY: no hx of CVA/MI     ALLERGIES:  No Known Allergies      HOME MEDICATIONS:  Med list obtained from patient by primary team    Meds here:   aspirin  chewable 81 milliGRAM(s) Oral daily  atorvastatin 20 milliGRAM(s) Oral at bedtime  calcitriol   Capsule 0.25 MICROGram(s) Oral daily  ceFAZolin   IVPB 1000 milliGRAM(s) IV Intermittent every 8 hours  dextrose 40% Gel 15 Gram(s) Oral once  dextrose 5%. 1000 milliLiter(s) IV Continuous <Continuous>  dextrose 5%. 1000 milliLiter(s) IV Continuous <Continuous>  dextrose 50% Injectable 25 Gram(s) IV Push once  dextrose 50% Injectable 12.5 Gram(s) IV Push once  dextrose 50% Injectable 25 Gram(s) IV Push once  glucagon  Injectable 1 milliGRAM(s) IntraMuscular once  heparin  Infusion 1000 Unit(s)/Hr IV Continuous <Continuous>  insulin lispro (ADMELOG) corrective regimen sliding scale   SubCutaneous Before meals and at bedtime  repaglinide 0.5 milliGRAM(s) Oral two times a day before meals  senna 2 Tablet(s) Oral at bedtime      Vital Signs Last 24 Hrs  T(F): 97.8 (11 Jun 2021 09:29), Max: 98.2 (10 Diego 2021 22:47)  HR: 73 (11 Jun 2021 08:40) (60 - 74)  BP: 124/58 (11 Jun 2021 08:40) (100/44 - 200/64)  RR: 18 (11 Jun 2021 08:40) (17 - 18)  SpO2: 98% (11 Jun 2021 08:40) (96% - 100%)    PHYSICAL EXAM:  GENERAL: pleasant, NAD  NEURO: AOx3, intact strength and sensation UE and LE  HEAD:  Atraumatic, Normocephalic  EYES: EOMI, conjunctiva and sclera clear  ENMT: Clear op, good dentition   CHEST/LUNG: Clear to auscultation bilaterally; No rales, rhonchi, wheezing, or rubs  HEART: Regular rate and rhythm; S1/S2, No murmurs, rubs, or gallops  ABDOMEN: Soft, Nontender, Nondistended, Normal BS  EXT: No sig le edema, wwp     LABS:                        8.7    3.04  )-----------( 89       ( 11 Jun 2021 09:12 )             27.8     06-11    138  |  100  |  91  ----------------------------<  131  3.6   |  19  |  4.38    Ca    10.6      11 Jun 2021 09:12  Mg     2.6     06-11    TPro  7.3  /  Alb  3.6  /  TBili  0.3  /  DBili  x   /  AST  14  /  ALT  9   /  AlkPhos  46  06-11    PT/INR - ( 11 Jun 2021 09:12 )   PT: 16.6 sec;   INR: 1.41          PTT - ( 11 Jun 2021 01:23 )  PTT:175.5 sec      COVID-19 PCR: Negative (06-10-21 @ 17:42)      RADIOLOGY & ADDITIONAL TESTS:  EKG NSR qtc 497  CXR largely wnl    ASSESSMENT AND PLAN:  88 yo Russain speaking female with anemia of chronic disease, MDS and CLL previously on Revlimid, CKD stage V, Type II DM c/b nephropathy, HFpEF, hypertension, hyperlipidemia, CAD, hypothyroidism who presented with worsening rest pain and exertional RLE pain, admitted for angio 6/11, renal and cards following periop  #Critical leg ischemia RLE- hepgtt per primary team, pending angio 6/11, c/w asa/statin, periop abx per vascular   #Stage IV CKD- f/u renal recs- holding torsemide today, gentle IVF to prevent contrast KYMBERLY, may also need po bicarb, monitor creat/uop postop  #CLL and MDS hx, previously on Revlimid c/b low plts in 80's- f/u any heme recs, trend Hg/plts (stable)  #Type II DM a1c 4.9- favor ISS    #HFpEF- appreciate cards recs, euvolemic today but monitor s/p fluids periop, restart diuresis postop  #HTN- softer BP s/p hydral overnight- c/w home coreg, nefidipine with hold parameters     #DVT px: hepgtt  #Diet: DM diet   #Dispo: Pending angio 6/11, monitoring creat postop, PT eval sat (though stable with walker per nursing here)- possible dc Sun/next week    Patient was seen and examined by me at bedside    Greater than 110 minutes spent on total encounter; more than 50% of the visit was spent counseling and/or coordinating care by the attending physician.    Sandro Chen MD [phone]           Medicine Consult Initial Note:     HPI:  This is a 88 yo Russain speaking female with anemia of chronic disease, MDS and CLL, CKD stage V, Type II DM c/b nephropathy, HFpEF, hypertension, hyperlipidemia, CAD, hypothyroidism who presented to office with worsening rest pain and exertional RLE pain, admitted for angio 6/11.  Started on hepgtt in ED and cards and renal consulted     When seen by me endorsing largely exertional RLE pain but otherwise asymptomatic, no chest pain/sob, no n/v/diarrhea/f/c     12 point ROS reviewed and negative except as otherwise stated in HPI and below    PAST MEDICAL & SURGICAL HISTORY:    Anemia of chronic disease (extensive workup outpt) and CLL and MDS- followed here at [facility] by Dr. Alcantara recent visit 6/3, extensive outpt GI workup in past    Stage 5 chronic kidney disease not on chronic dialysis- follows with liss here     Diabetes    Diabetes mellitus with diabetic neuropathy    Chronic diastolic congestive heart failure    Hypertension    Hyperlipidemia    CAD (coronary artery disease)    Hypothyroidism    Pacemaker      SOCIAL HISTORY:  Lives alone  HHA 10h/d   No toxic habits    FAMILY HISTORY: no hx of CVA/MI     ALLERGIES:  No Known Allergies      HOME MEDICATIONS:  Med list obtained from patient by primary team    Meds here:   aspirin  chewable 81 milliGRAM(s) Oral daily  atorvastatin 20 milliGRAM(s) Oral at bedtime  calcitriol   Capsule 0.25 MICROGram(s) Oral daily  ceFAZolin   IVPB 1000 milliGRAM(s) IV Intermittent every 8 hours  dextrose 40% Gel 15 Gram(s) Oral once  dextrose 5%. 1000 milliLiter(s) IV Continuous <Continuous>  dextrose 5%. 1000 milliLiter(s) IV Continuous <Continuous>  dextrose 50% Injectable 25 Gram(s) IV Push once  dextrose 50% Injectable 12.5 Gram(s) IV Push once  dextrose 50% Injectable 25 Gram(s) IV Push once  glucagon  Injectable 1 milliGRAM(s) IntraMuscular once  heparin  Infusion 1000 Unit(s)/Hr IV Continuous <Continuous>  insulin lispro (ADMELOG) corrective regimen sliding scale   SubCutaneous Before meals and at bedtime  repaglinide 0.5 milliGRAM(s) Oral two times a day before meals  senna 2 Tablet(s) Oral at bedtime      Vital Signs Last 24 Hrs  T(F): 97.8 (11 Jun 2021 09:29), Max: 98.2 (10 Diego 2021 22:47)  HR: 73 (11 Jun 2021 08:40) (60 - 74)  BP: 124/58 (11 Jun 2021 08:40) (100/44 - 200/64)  RR: 18 (11 Jun 2021 08:40) (17 - 18)  SpO2: 98% (11 Jun 2021 08:40) (96% - 100%)    PHYSICAL EXAM:  GENERAL: pleasant, NAD  NEURO: AOx3, intact strength and sensation UE and LE  HEAD:  Atraumatic, Normocephalic  EYES: EOMI, conjunctiva and sclera clear  ENMT: Clear op, good dentition   CHEST/LUNG: Clear to auscultation bilaterally; No rales, rhonchi, wheezing, or rubs  HEART: Regular rate and rhythm; S1/S2, No murmurs, rubs, or gallops  ABDOMEN: Soft, Nontender, Nondistended, Normal BS  EXT: No sig le edema, wwp     LABS:                        8.7    3.04  )-----------( 89       ( 11 Jun 2021 09:12 )             27.8     06-11    138  |  100  |  91  ----------------------------<  131  3.6   |  19  |  4.38    Ca    10.6      11 Jun 2021 09:12  Mg     2.6     06-11    TPro  7.3  /  Alb  3.6  /  TBili  0.3  /  DBili  x   /  AST  14  /  ALT  9   /  AlkPhos  46  06-11    PT/INR - ( 11 Jun 2021 09:12 )   PT: 16.6 sec;   INR: 1.41          PTT - ( 11 Jun 2021 01:23 )  PTT:175.5 sec      COVID-19 PCR: Negative (06-10-21 @ 17:42)      RADIOLOGY & ADDITIONAL TESTS:  EKG NSR qtc 497  CXR largely wnl    ASSESSMENT AND PLAN:  88 yo Russain speaking female with anemia of chronic disease, MDS and CLL previously on Revlimid, CKD stage V, Type II DM c/b nephropathy, HFpEF, hypertension, hyperlipidemia, CAD, hypothyroidism who presented with worsening rest pain and exertional RLE pain, admitted for angio 6/11, renal and cards following periop  #Critical leg ischemia RLE- hepgtt per primary team, pending angio 6/11, c/w asa/statin, periop abx per vascular   #Stage IV CKD- f/u renal recs- holding torsemide today, gentle IVF to prevent contrast KYMBERLY, confirm if on lokelma/patiromer for hyperk, monitor creat/uop postop  #CLL and MDS hx, previously on Revlimid c/b low plts in 80's- f/u any heme recs, trend Hg/plts (stable)  #Type II DM a1c 4.9- favor ISS    #HFpEF- appreciate cards recs, euvolemic today but monitor s/p fluids periop, restart diuresis postop  #HTN- softer BP s/p hydral overnight- c/w home coreg, nefidipine with hold parameters   Hypothyroidism- c/w home synthroid 75mcg    #DVT px: hepgtt  #Diet: DM diet   #Dispo: Pending angio 6/11, monitoring creat postop, PT eval sat (though stable with walker per nursing here)- possible dc Sun/next week    Patient was seen and examined by me at bedside    Greater than 110 minutes spent on total encounter; more than 50% of the visit was spent counseling and/or coordinating care by the attending physician.    Sandro Chen MD [phone]

## 2021-06-11 NOTE — CONSULT NOTE ADULT - SUBJECTIVE AND OBJECTIVE BOX
Hematology Oncology Consult Note (Dr. Hanson)  Discussed with Dr. Hanson and recommendations reviewed with the primary team.    HPI: 89 year old female with PMH of DM     The patient denies chest pain or SOB.  No nausea/vomiting/fevers/chills/night sweats.  No fatigue, headaches/dizziness.  Appetite is stable without weight loss.  No abdominal pain/diarrhea/constipation.  No melena or hematochezia.    No dysuria/hematuria.  No history of easy bruising/bleeding.  No gingival bleeding or epistaxis.  No leg pain or leg swelling.    ROS is otherwise negative.    HEALTH MAINTENANCE:  Breast: last mammogram was performed on ....    Cervical: last pap smear was performed on ....  Colon: last colonoscopy was performed on ....  Prostate: last PSA was ....  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    Diabetes    Diabetes mellitus with diabetic neuropathy    Chronic diastolic congestive heart failure    Hypertension    Hyperlipidemia    CAD (coronary artery disease)    Hypothyroidism    Pacemaker        Allergies:      Medications:  aspirin  chewable 81 milliGRAM(s) Oral daily        Social History:    FAMILY HISTORY:      PHYSICAL EXAM:    T(F): 97.8 (21 @ 09:29), Max: 98.2 (06-10-21 @ 22:47)  HR: 70 (21 @ 12:05) (60 - 74)  BP: 123/57 (21 @ 12:05) (100/44 - 200/64)  RR: 18 (21 @ 12:05) (17 - 18)  SpO2: 98% (21 @ 12:05) (96% - 100%)  Wt(kg): --    Daily Height in cm: 154.94 (2021 04:24)    Daily Weight in k.2 (2021 04:30)    Gen: well developed, well nourished, comfortable  HEENT: normocephalic/atraumatic, no conjunctival pallor, no scleral icterus, no oral thrush/mucosal bleeding/mucositis  Neck: supple, no masses, no JVD  Breasts: deferred  Back: nontender  Cardiovascular: RR, nl S1S2, no murmurs/rubs/gallops  Respiratory: clear air entry b/l  Gastrointestinal: BS+, soft, NT/ND, no masses, no splenomegaly, no hepatomegaly, no evidence for ascites  Genitourinary: deferred  Rectal: deferred  Extremities: no clubbing/cyanosis, no edema, no calf tenderness  Vascular:  DP/PT 2+ b/l  Neurological: CN 2-12 grossly intact, no focal deficits  Skin: no rash on visible skin  Lymph Nodes:  no cervical/supraclavicular LAD, no axillary/groin LAD  Musculoskeletal:  full ROM  Psychiatric:  mood stable            Labs:                          8.7    3.04  )-----------( 89       ( 2021 09:12 )             27.8     CBC Full  -  ( 2021 09:12 )  WBC Count : 3.04 K/uL  RBC Count : 2.55 M/uL  Hemoglobin : 8.7 g/dL  Hematocrit : 27.8 %  Platelet Count - Automated : 89 K/uL  Mean Cell Volume : 109.0 fl  Mean Cell Hemoglobin : 34.1 pg  Mean Cell Hemoglobin Concentration : 31.3 gm/dL  Auto Neutrophil # : 1.15 K/uL  Auto Lymphocyte # : 1.25 K/uL  Auto Monocyte # : 0.21 K/uL  Auto Eosinophil # : 0.35 K/uL  Auto Basophil # : 0.08 K/uL  Auto Neutrophil % : 36.9 %  Auto Lymphocyte % : 41.2 %  Auto Monocyte % : 7.0 %  Auto Eosinophil % : 11.4 %  Auto Basophil % : 2.6 %    PT/INR - ( 2021 09:12 )   PT: 16.6 sec;   INR: 1.41          PTT - ( 2021 01:23 )  PTT:175.5 sec        138  |  100  |  91<H>  ----------------------------<  131<H>  3.6   |  19<L>  |  4.38<H>    Ca    10.6<H>      2021 09:12  Mg     2.6     -    TPro  7.3  /  Alb  3.6  /  TBili  0.3  /  DBili  x   /  AST  14  /  ALT  9<L>  /  AlkPhos  46  -11          Other Labs:    Cultures:    Pathology:    Imaging Studies:       Hematology Oncology Consult Note (Dr. Hanson)  Discussed with Dr. Hanson and recommendations reviewed with the primary team.    HPI: 89 year old female with PMH of DM, HTN, HLD, CKD stage V, hypothyroidism, h/o pacemaker placement, CHF, CAD, CLL and MDS (w/ 5q deletion) presented with complaints of RLE pain, started 2 weeks ago while walking and now occurs at rest. She is going for RLE angiogram today. Hematology consulted for history of CLL and MDS. She follows with Dr. Marco Antonio Moore at [facility]. Last seen 6/3/21. She is on Revlimid for MDS with 5q deletion, which has been on hold due to neutropenia and thrombocytopenia.     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 1000 milliLiter(s) (50 mL/Hr) IV Continuous <Continuous>  dextrose 5%. 1000 milliLiter(s) (100 mL/Hr) IV Continuous <Continuous>  dextrose 50% Injectable 25 Gram(s) IV Push once  dextrose 50% Injectable 12.5 Gram(s) IV Push once  dextrose 50% Injectable 25 Gram(s) IV Push once  famotidine    Tablet 20 milliGRAM(s) Oral daily  glucagon  Injectable 1 milliGRAM(s) IntraMuscular once  insulin lispro (ADMELOG) corrective regimen sliding scale   SubCutaneous Before meals and at bedtime  NIFEdipine XL 60 milliGRAM(s) Oral daily  potassium chloride  10 mEq/100 mL IVPB 10 milliEquivalent(s) IV Intermittent every 1 hour  repaglinide 0.5 milliGRAM(s) Oral two times a day before meals  senna 2 Tablet(s) Oral at bedtime  sodium chloride 0.9%. 1000 milliLiter(s) (40 mL/Hr) IV Continuous <Continuous>    MEDICATIONS  (PRN):    PHYSICAL EXAM:    T(F): 97.8 (06-11-21 @ 09:29), Max: 98.2 (06-10-21 @ 22:47)  HR: 70 (06-11-21 @ 12:05) (60 - 74)  BP: 123/57 (06-11-21 @ 12:05) (100/44 - 200/64)  RR: 18 (06-11-21 @ 12:05) (17 - 18)  SpO2: 98% (06-11-21 @ 12:05) (96% - 100%)    Gen: comfortable  HEENT: normocephalic/atraumatic  Neck: supple  Cardiovascular: RR, nl S1S2, no murmurs  Respiratory: clear air entry b/l  Gastrointestinal: BS+, soft, NT/ND  Extremities: no edema, no calf tenderness  Neurological: no focal deficits  Skin: no rash on visible skin  Musculoskeletal:  full ROM  Psychiatric:  mood stable    Labs:                          8.7    3.04  )-----------( 89       ( 11 Jun 2021 09:12 )             27.8     CBC Full  -  ( 11 Jun 2021 09:12 )  WBC Count : 3.04 K/uL  RBC Count : 2.55 M/uL  Hemoglobin : 8.7 g/dL  Hematocrit : 27.8 %  Platelet Count - Automated : 89 K/uL  Mean Cell Volume : 109.0 fl  Mean Cell Hemoglobin : 34.1 pg  Mean Cell Hemoglobin Concentration : 31.3 gm/dL  Auto Neutrophil # : 1.15 K/uL  Auto Lymphocyte # : 1.25 K/uL  Auto Monocyte # : 0.21 K/uL  Auto Eosinophil # : 0.35 K/uL  Auto Basophil # : 0.08 K/uL  Auto Neutrophil % : 36.9 %  Auto Lymphocyte % : 41.2 %  Auto Monocyte % : 7.0 %  Auto Eosinophil % : 11.4 %  Auto Basophil % : 2.6 %    PT/INR - ( 11 Jun 2021 09:12 )   PT: 16.6 sec;   INR: 1.41        PTT - ( 11 Jun 2021 01:23 )  PTT:175.5 sec    06-11    138  |  100  |  91<H>  ----------------------------<  131<H>  3.6   |  19<L>  |  4.38<H>    Ca    10.6<H>      11 Jun 2021 09:12  Mg     2.6     06-11    TPro  7.3  /  Alb  3.6  /  TBili  0.3  /  DBili  x   /  AST  14  /  ALT  9<L>  /  AlkPhos  46  06-11    Imaging Studies:

## 2021-06-11 NOTE — CONSULT NOTE ADULT - ASSESSMENT
89 yo Chadian speaking female with anemia of chronic disease, CKD stage V, Diabetes, Diabetic nephropathy, Chronic diastolic CHF, hypertension, hyperlipidemia, CAD, hypothyroidism, PPM implantation (unknown indication) presenting for RLE angiogram ot of concern for limb ischemia. Her CAD is medically managed and denies receiving stents. her activity is limited by claudication so unable to assess true exercise tolerance. Her charts are at an outside hospital and her cardiac history was reported by the patient, will need collateral.     She is euvolemic on exam, CXR clear, ECG without evidence of conduction disease or ishcemia. She appears optimized from a cardiac standpoint.   For a RLE angiogram, a low risk minimally invasive procedure she will require no additional preoperative cardiac testing.   If vascular team feels she will need a more invasve procedure such as bypass, would obtain an echo and records from her cardiologist at [facility] to better assess her risk.     for an invascive vascular procedure her RCRI class is IV which equals ~15% 30-day risk of death MI or cardiac arrest. Her Mora would be 3.6 % Risk of myocardial infarction or cardiac arrest, intraoperatively or up to 30 days post-op.     REcommend:  - continue her home medications, including Coreg, Nifedipine  - c/w ASA and high intesnity statin (ex lipitor 80mg) given medically managed CAD and PAD  - procedure scheduled for noon tomorrow, can obtain TTE in AM  - obtain collateral medical records (prior echo, cath, ?stress testing if done, indication for PPM)         89 yo Greenlandic speaking female with anemia of chronic disease, CKD stage V, Diabetes, Diabetic nephropathy, Chronic diastolic CHF, hypertension, hyperlipidemia, CAD, hypothyroidism, PPM implantation (unknown indication) presenting for RLE angiogram ot of concern for limb ischemia. Her CAD is medically managed and denies receiving stents. her activity is limited by claudication so unable to assess true exercise tolerance. Her charts are at an outside hospital and her cardiac history was reported by the patient, will need collateral.     She is euvolemic on exam, CXR clear, ECG without evidence of conduction disease or ishcemia. She appears optimized from a cardiac standpoint.     For a RLE angiogram, a low risk minimally invasive procedure she will require no additional preoperative cardiac testing.   If vascular team feels she will need a more invasve procedure such as bypass, would obtain an echo and records from her cardiologist at [facility] to better assess her risk.     for an invascive vascular procedure her RCRI class is IV which equals ~15% 30-day risk of death MI or cardiac arrest. Her Mora would be 3.6 % Risk of myocardial infarction or cardiac arrest, intraoperatively or up to 30 days post-op.     Recommend:  - continue her home medications, including Coreg, Nifedipine, torsemide  - c/w ASA and high intesnity statin (ex lipitor 80mg) given medically managed CAD and PAD  - procedure scheduled for noon tomorrow, can obtain TTE in AM  - obtain collateral medical records (prior echo, cath, ?stress testing if done, indication for PPM)

## 2021-06-11 NOTE — PATIENT PROFILE ADULT - VISION (WITH CORRECTIVE LENSES IF THE PATIENT USUALLY WEARS THEM):
reading glasses, pt does not have them today/Partially impaired: cannot see medication labels or newsprint, but can see obstacles in path, and the surrounding layout; can count fingers at arm's length

## 2021-06-11 NOTE — DISCHARGE NOTE PROVIDER - NPI NUMBER (FOR SYSADMIN USE ONLY) :
[[id number]] [[id number]],[[id number] [[id number]] [[id number]]

## 2021-06-11 NOTE — DISCHARGE NOTE PROVIDER - CARE PROVIDER_API CALL
Edmar Pagan)  [facility] Surgery  Vascular  [address]  [place], [place] [address]  Phone: [phone]  Fax: [phone]  Follow Up Time:    Edmar Pagan)  [facility] Surgery  Vascular  [address] [place] [address]  Phone: [phone]  Fax: [phone]  Follow Up Time:     Casey Newton  CARDIOLOGY  [address] [address]  Phone: [phone]  Fax: [phone]  Follow Up Time: 1 week   Edmar Pagan)  [facility] Surgery  Vascular  [address] [place] [address]  Phone: [phone]  Fax: [phone]  Follow Up Time:     Casey Newton  CARDIOLOGY  [address] [address]  Phone: [phone]  Fax: [phone]  Follow Up Time: 1 week    ANUEL ALVAREZ  Hematology/Oncology  [address]  [place] [address]  Phone: [phone]  Fax: [phone]  Follow Up Time: 1 week   Edmar Pagan)  [facility] Surgery  Vascular  [address] [place] [address]  Phone: [phone]  Fax: [phone]  Scheduled Appointment: 06/24/2021 12:00 PM    Casey Newton  CARDIOLOGY  [address] [address]  Phone: [phone]  Fax: [phone]  Follow Up Time: 1 week    ANUEL ALVAREZ  Hematology/Oncology  [address] [facility] [address]  [place] [address]  Phone: [phone]  Fax: [phone]  Follow Up Time: 1 week    Roman Lerma)  Internal Medicine; Nephrology  [address] [address]  Phone: [phone]  Fax: [phone]  Follow Up Time: 1 week

## 2021-06-11 NOTE — DISCHARGE NOTE PROVIDER - NSDCHOSPICE_GEN_A_CORE
Viral Pharyngitis (Sore Throat)    You (or your child, if your child is the patient) have pharyngitis (sore throat). This infection is caused by a virus. It can cause throat pain that is worse when swallowing, aching all over, headache, and fever. The infection may be spread by coughing, kissing, or touching others after touching your mouth or nose. Antibiotic medications do not work against viruses, so they are not used for treating this condition.  Home care  · If your symptoms are severe, rest at home. Return to work or school when you feel well enough.   · Drink plenty of fluids to avoid dehydration.  · For children: Use acetaminophen for fever, fussiness or discomfort. In infants over six months of age, you may use ibuprofen instead of acetaminophen. (NOTE: If your child has chronic liver or kidney disease or ever had a stomach ulcer or GI bleeding, talk with your doctor before using these medicines.) (NOTE: Aspirin should never be used in anyone under 18 years of age who is ill with a fever. It may cause severe liver damage.)   · For adults: You may use acetaminophen or ibuprofen to control pain or fever, unless another medicine was prescribed for this. (NOTE: If you have chronic liver or kidney disease or ever had a stomach ulcer or GI bleeding, talk with your doctor before using these medicines.)  · Throat lozenges or numbing throat sprays can help reduce pain. Gargling with warm salt water will also help reduce throat pain. For this, dissolve 1/2 teaspoon of salt in 1 glass of warm water. To help soothe a sore throat, children can sip on juice or a popsicle. Children 5 years and older can also suck on a lollipop or hard candy.  · Avoid salty or spicy foods, which can be irritating to the throat.  Follow-up care  Follow up with your healthcare provider or our staff if you are not improving over the next week.  When to seek medical advice  Call your healthcare provider right away if any of these  occur:  · Fever as directed by your doctor.  For children, seek care if:  ¨ Your child is of any age and has repeated fevers above 104°F (40°C).  ¨ Your child is younger than 2 years of age and has a fever of 100.4°F (38°C) that continues for more than 1 day.  ¨ Your child is 2 years old or older and has a fever of 100.4°F (38°C) that continues for more than 3 days.  · New or worsening ear pain, sinus pain, or headache  · Painful lumps in the back of neck  · Stiff neck  · Lymph nodes are getting larger  · Inability to swallow liquids, excessive drooling, or inability to open mouth wide due to throat pain  · Signs of dehydration (very dark urine or no urine, sunken eyes, dizziness)  · Trouble breathing or noisy breathing  · Muffled voice  · New rash  · Child appears to be getting sicker  © 3625-2526 txtr. [address] [place] [address], [place] [address]. All rights reserved. This information is not intended as a substitute for professional medical care. Always follow your healthcare professional's instructions.         No

## 2021-06-11 NOTE — CONSULT NOTE ADULT - ATTENDING COMMENTS
CKD -- denies uremic sx, appears euvolemic  hold diuretics for angio --gentle IVF today x 12 hours CKD -- denies uremic sx, appears euvolemic  hold diuretics for angio --gentle IVF today at 50 cc/hour x 12 hours  dc calcitriol with high calcium  hold celebrex/NSAIDs  if able post angio

## 2021-06-11 NOTE — CONSULT NOTE ADULT - ASSESSMENT
89 yo Qatari speaking female with PMHx CKD stage 5, diabetic nephropathy, anemia of chronic disease, chronic diastolic CHF, hypertension, hyperlipidemia, CAD, hypothyroidism admitted for right lower extremity pain with plan for heparin drip and RLE angiogram. Nephrology consulted for recommendations.       # Nonoliguric CKD stage 5 (eGFR <15 ml/min)  - high risk EMI, however if benefits outweigh the risks, pre- and post-procedure hydration with NS at 50 ml/hr  - keep euvolemic  - hold home diuretics   - send ulytes, urinalysis  - send for renal sono/bladder scan  - avoid ACE/ARB/NSAIDS/PPI  - renally adjusted meds/ ABx  - renal diet   HTN- reconcile and restart home meds except diuretics/ ACE/ARBs   Anemia- transfusion as per primary team, obtain iron panel  Metabolic acidosis- likely due to CKD- consider Nabicarb 650mg po q12hr   CKD-MBD- check PTH, vit D, phos, ca 10.6 noted  Will follow

## 2021-06-11 NOTE — DISCHARGE NOTE PROVIDER - PROVIDER TOKENS
PROVIDER:[TOKEN:[[id number]]] PROVIDER:[TOKEN:[[id number]]],PROVIDER:[TOKEN:[[id number]],FOLLOWUP:[1 week]] PROVIDER:[TOKEN:[[id number]]],PROVIDER:[TOKEN:[[id number]],FOLLOWUP:[1 week]],PROVIDER:[TOKEN:[[id number]],FOLLOWUP:[1 week]] PROVIDER:[TOKEN:[[id number]],SCHEDULEDAPPT:[06/24/2021],SCHEDULEDAPPTTIME:[12:00 PM]],PROVIDER:[TOKEN:[[id number]],FOLLOWUP:[1 week]],PROVIDER:[TOKEN:[[id number]],FOLLOWUP:[1 week]],PROVIDER:[TOKEN:[[id number]],FOLLOWUP:[1 week]]

## 2021-06-11 NOTE — DISCHARGE NOTE PROVIDER - NSDCMRMEDTOKEN_GEN_ALL_CORE_FT
Aspirin Low Dose 81 mg oral tablet, chewable: 1 tab(s) orally once a day  cephalexin 500 mg oral capsule: 1 cap(s) orally 2 times a day   clopidogrel 75 mg oral tablet: 1 tab(s) orally once a day  Coreg 25 mg oral tablet: 1 tab(s) orally every 12 hours  levothyroxine 75 mcg (0.075 mg) oral tablet: 1 tab(s) orally once a day  Lipitor 80 mg oral tablet: 1 tab(s) orally once a day (at bedtime)  NIFEdipine 60 mg oral tablet, extended release: 1 tab(s) orally once a day  sevelamer carbonate 800 mg oral tablet: 1 tab(s) orally every 8 hours  sodium bicarbonate 650 mg oral tablet: 2 tab(s) orally every 12 hours

## 2021-06-11 NOTE — PROGRESS NOTE ADULT - SUBJECTIVE AND OBJECTIVE BOX
O/N:  SBP 200s Hydral 10mg IVP given, [id number] am Nifedipine given b/p 130s, ptt 175 decr 10->8ml did not hold, cards rec starting ASA                                    This is a 89 yo Russain speaking female with anemia of chronic disease, CKD stage V, Diabetes, Diabetic nephropathy, Chronic diastolic CHF, hypertension, hyperlipidemia, CAD, hypothyroidism presented to Dr. Pagan’s office complaining of right lower extremity pain.  Patient states it started 2 weeks ago while walking less than 1 block and now occurs at rest.  She states she also has cramping of right buttocks.  Patient referred to ER for admission to vascular surgery with plan for heparin drip and RLE angiogram tomorrow.    ==== Neurovascular ====  -No delirium, pain well managed on current regimen  -C/w PRNs for Pain control  -Monitor neuro status    ==== Respiratory ====  -Saturates well on RA     -Encourage IS 10x/hour while awake, Cough and deep breathing exercises  -Monitor respiratory status via SpO2    ==== Cardiovascular ====  -Monitor on Tele  -Obtain home meds  -Obtain cardiology consult    ==== GI ====   -Tolerating PO  -Prophylaxis: Protonix  -C/w bowel regimen    ==== /Renal ====  -BUN/Cr: 93/4.32  -Trend Cr on AM labs  -Replete electrolytes as needed  -Renal consult  -Daily weights  -Strict I/Os    ==== ID ====   Afebrile, asymptomatic  -WBC: 2.23  -Continue to monitor for SIRS/Sepsis syndrome while inpatient    ==== Endocrine ====   -A1C: pending , no h/o DM    ==== Hematologic ====   -H/H: 8.9  -CBC, chem in AM  -DVT ppx: HSQ 5000u q8h and SCDs     O/N:  SBP 200s Hydral 10mg IVP given, [id number] am Nifedipine given b/p 130s, ptt 175 decr 10->8ml did not hold, cards rec starting ASA      SUBJECTIVE: Patient seen and evaluated by vascular team this AM. Estonian  used. No complaints, comfortable. Denies any fevers, chills, worsening pain of her RLE.     aspirin  chewable 81 milliGRAM(s) Oral daily  carvedilol 25 milliGRAM(s) Oral every 12 hours  ceFAZolin   IVPB 1000 milliGRAM(s) IV Intermittent every 8 hours  heparin  Infusion 800 Unit(s)/Hr IV Continuous <Continuous>  NIFEdipine XL 60 milliGRAM(s) Oral daily  torsemide 40 milliGRAM(s) Oral every 24 hours  torsemide 20 milliGRAM(s) Oral every 24 hours      Vital Signs Last 24 Hrs  T(C): 36.6 (11 Jun 2021 09:29), Max: 36.8 (10 Diego 2021 22:47)  T(F): 97.8 (11 Jun 2021 09:29), Max: 98.2 (10 Diego 2021 22:47)  HR: 73 (11 Jun 2021 08:40) (60 - 74)  BP: 124/58 (11 Jun 2021 08:40) (100/44 - 200/64)  BP(mean): --  RR: 18 (11 Jun 2021 08:40) (17 - 18)  SpO2: 98% (11 Jun 2021 08:40) (96% - 100%)  I&O's Detail    10 Diego 2021 07:01  -  11 Jun 2021 07:00  --------------------------------------------------------  IN:    Heparin: 24 mL    IV PiggyBack: 50 mL    Oral Fluid: 100 mL    sodium chloride 0.9%: 80 mL  Total IN: 254 mL    OUT:  Total OUT: 0 mL    Total NET: 254 mL      11 Jun 2021 07:01  -  11 Jun 2021 10:50  --------------------------------------------------------  IN:    Heparin: 8 mL    sodium chloride 0.9%: 40 mL  Total IN: 48 mL    OUT:  Total OUT: 0 mL    Total NET: 48 mL          Physical Exam:  General: No acute distress, resting comfortably in bed  C/V: normal sinus rhythm  Pulm: Nonlabored breathing, no respiratory distress  Abd: soft, non-tender, non-distended  Extrem: warm and well perfused, no edema. RLE with monophasic DP and PT signals.       LABS:                        8.7    3.04  )-----------( 89       ( 11 Jun 2021 09:12 )             27.8     06-11    138  |  100  |  91<H>  ----------------------------<  131<H>  3.6   |  19<L>  |  4.38<H>    Ca    10.6<H>      11 Jun 2021 09:12  Mg     2.6     06-11    TPro  7.3  /  Alb  3.6  /  TBili  0.3  /  DBili  x   /  AST  14  /  ALT  9<L>  /  AlkPhos  46  06-11    PT/INR - ( 11 Jun 2021 09:12 )   PT: 16.6 sec;   INR: 1.41          PTT - ( 11 Jun 2021 01:23 )  PTT:175.5 sec        This is a 89 yo Russain speaking female with anemia of chronic disease, CKD stage V, Diabetes, Diabetic nephropathy, Chronic diastolic CHF, hypertension, hyperlipidemia, CAD, hypothyroidism presented to Dr. Pagan’s office complaining of right lower extremity pain.  Patient states it started 2 weeks ago while walking less than 1 block and now occurs at rest.  She states she also has cramping of right buttocks.  Patient referred to ER for admission to vascular surgery with plan for heparin drip and RLE angiogram tomorrow.    ==== Neurovascular ====  -No delirium, pain well managed on current regimen  -C/w PRNs for Pain control  -Monitor neuro status    ==== Respiratory ====  -Saturates well on RA     -Encourage IS 10x/hour while awake, Cough and deep breathing exercises  -Monitor respiratory status via SpO2    ==== Cardiovascular ====  -Monitor on Tele  -Obtain cardiology consult  -F/u ECHO    ==== GI ====   -Tolerating PO  -Prophylaxis: Protonix  -C/w bowel regimen    ==== /Renal ====  -BUN/Cr: 93/4.32  -Trend Cr on AM labs  -Replete electrolytes as needed  -Renal consulted; Dr. Lerma following   -Daily weights  -Strict I/Os    ==== ID ====   Afebrile, asymptomatic  -WBC: 2.23  -Continue to monitor for SIRS/Sepsis syndrome while inpatient    ==== Endocrine ====   -A1C: 4.9, no h/o DM    ==== Hematologic ====   -H/H: 8.9  -CBC, chem in AM  -DVT ppx: HSQ 5000u q8h and SCDs  -Heme/onc consulted

## 2021-06-11 NOTE — PACU DISCHARGE NOTE - COMMENTS
pt aao x3.  left groin dsg c/d/i; left groin swelling and ecchymosis noted.  pt denies c/o pain.  2+ b/l DP/PT pulses noted via doppler. IVF infusing.  report given to FATOU [place], on 5 uris.  pt to go to [id number] via bed on monitor with RN and transport when cleared by vascular PA

## 2021-06-11 NOTE — BRIEF OPERATIVE NOTE - OPERATION/FINDINGS
RLE Angiogram performed through L CFA access via micropuncture technique under ultrasound guidance. Tortuous aortoiliac system but patent. Patent CFA/PFA, proximal SFA. Mid SFA with reconstitution of behind the knee popliteal artery. 1 Vessel runoff to the foot via peroneal artery. Crossed occlusion and serial dilation from 2mm balloons to 4mm balloons. Stenting of SFA to below knee pop with Zilver PTX 6x80mm, Zilver 518 5x80, Zilver 518 5x80 (proximal to distal)    INCOMPLETE RLE Angiogram performed through L CFA access via micropuncture technique under ultrasound guidance. Tortuous aortoiliac system but patent. Patent CFA/PFA, proximal SFA. Mid SFA with reconstitution of behind the knee popliteal artery. 1 Vessel runoff to the foot via peroneal artery. Crossed occlusion and serial dilation from 2mm balloons to 4mm balloons. Stenting of SFA to below knee pop with Zilver PTX 6x80mm, Zilver 518 5x80, Zilver 518 5x80 (proximal to distal), Ballooned with 4mm Balloon. Completion runs show patent SFA/pop and continued peroneal runoff to the foot. 6Fr sheath sutured in place. Total 5500U heparin given, not reversed. 130cc contrast used.

## 2021-06-11 NOTE — CONSULT NOTE ADULT - SUBJECTIVE AND OBJECTIVE BOX
Interpretor ID: [id number]    HPI:  89 yo [place]guill[place] speaking female with anemia of chronic disease, CKD stage V, Diabetes, Diabetic nephropathy, Chronic diastolic CHF, hypertension, hyperlipidemia, CAD, hypothyroidism, PPM implantation (unknown indication) presented to Dr. Pagan’s office complaining of right lower extremity pain.  she is planned for a RLE angiogram and cardiology was consulted for pre op risk assessment/optimization. This is her first time at [facility]. She reports she used to follow with cardiology at [facility] but hasnt seen them in a while. She reports a cardiac cath ~15 yrs ago and denies receiving a stent, states she was put on medications. The cath was done in the setting of chest pain complaints. She report to no longer have chest pains. Also states she used to be more short of breath with activities but that hasnt been an issue for several months. She sleeps on 2 pillows for comfort, no PND. her walking is limited by claudication of RLE, ambulates with a walker.     ROS: A 10-point review of systems was otherwise negative.    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 1000 milliLiter(s) IV Continuous <Continuous>  dextrose 5%. 1000 milliLiter(s) IV Continuous <Continuous>  dextrose 50% Injectable 25 Gram(s) IV Push once  dextrose 50% Injectable 12.5 Gram(s) IV Push once  dextrose 50% Injectable 25 Gram(s) IV Push once  glucagon  Injectable 1 milliGRAM(s) IntraMuscular once  heparin  Infusion 1000 Unit(s)/Hr IV Continuous <Continuous>  insulin lispro (ADMELOG) corrective regimen sliding scale   SubCutaneous Before meals and at bedtime  repaglinide 0.5 milliGRAM(s) Oral two times a day before meals  senna 2 Tablet(s) Oral at bedtime      PHYSICAL EXAM:  T(C): 36.8 (06-10-21 @ 23:17), Max: 36.8 (06-10-21 @ 22:47)  HR: 65 (06-10-21 @ 23:17) (60 - 66)  BP: 152/60 (06-10-21 @ 23:17) (145/68 - 200/64)  RR: 18 (06-10-21 @ 23:17) (18 - 18)  SpO2: 97% (06-10-21 @ 23:17) (97% - 100%)  Wt(kg): --    GEN: Awake, comfortable. NAD. laying flat  HEENT: NCAT, PERRL, EOMI. Mucosa moist. No JVD.   RESP: CTA b/l  CV: RRR, normal s1/s2. No m/r/g.  ABD: Soft, NTND. BS+  EXT: Warm. No edema, clubbing, or cyanosis. distal LE pulses faint, hairless shiny skin on b/l LE.  NEURO: AAOx3. No focal deficits.    I&O's Summary    Height (cm): 154.9 (06-10 @ 16:46)  Weight (kg): 60.3 (06-10 @ 16:46)  BMI (kg/m2): 25.1 (06-10 @ 16:46)  BSA (m2): 1.59 (06-10 @ 16:46)  LABS:	 	                        8.9    2.68  )-----------( 84       ( 10 Diego 2021 17:42 )             28.0     06-10    132<L>  |  95<L>  |  93<H>  ----------------------------<  87  4.0   |  20<L>  |  4.32<H>    Ca    10.8<H>      10 Diego 2021 17:42    TPro  7.2  /  Alb  3.8  /  TBili  0.5  /  DBili  x   /  AST  13  /  ALT  9<L>  /  AlkPhos  45  06-10    ECG NSR, qW III, non specific STTW changes    no echo in system

## 2021-06-11 NOTE — DISCHARGE NOTE PROVIDER - NSDCCPCAREPLAN_GEN_ALL_CORE_FT
PRINCIPAL DISCHARGE DIAGNOSIS  Diagnosis: Critical lower limb ischemia  Assessment and Plan of Treatment: Right      SECONDARY DISCHARGE DIAGNOSES  Diagnosis: Hypertension  Assessment and Plan of Treatment:     Diagnosis: Hyperlipidemia  Assessment and Plan of Treatment:     Diagnosis: CAD (coronary artery disease)  Assessment and Plan of Treatment:     Diagnosis: Anemia of chronic disease  Assessment and Plan of Treatment:     Diagnosis: Stage 4 chronic kidney disease  Assessment and Plan of Treatment:     Diagnosis: Chronic diastolic congestive heart failure  Assessment and Plan of Treatment:     Diagnosis: CLL (chronic lymphocytic leukemia)  Assessment and Plan of Treatment:     Diagnosis: MDS (myelodysplastic syndrome)  Assessment and Plan of Treatment:     Diagnosis: DM (diabetes mellitus)  Assessment and Plan of Treatment:     Diagnosis: Diabetic nephropathy  Assessment and Plan of Treatment:      PRINCIPAL DISCHARGE DIAGNOSIS  Diagnosis: Critical lower limb ischemia  Assessment and Plan of Treatment: Right      SECONDARY DISCHARGE DIAGNOSES  Diagnosis: Hypertension  Assessment and Plan of Treatment:     Diagnosis: Hyperlipidemia  Assessment and Plan of Treatment:     Diagnosis: CAD (coronary artery disease)  Assessment and Plan of Treatment:     Diagnosis: Anemia of chronic disease  Assessment and Plan of Treatment:     Diagnosis: Stage 4 chronic kidney disease  Assessment and Plan of Treatment:     Diagnosis: Chronic diastolic congestive heart failure  Assessment and Plan of Treatment:     Diagnosis: CLL (chronic lymphocytic leukemia)  Assessment and Plan of Treatment:     Diagnosis: MDS (myelodysplastic syndrome)  Assessment and Plan of Treatment:     Diagnosis: DM (diabetes mellitus)  Assessment and Plan of Treatment:     Diagnosis: Diabetic nephropathy  Assessment and Plan of Treatment:     Diagnosis: Hypothyroidism  Assessment and Plan of Treatment:     Diagnosis: KYMBERLY (acute kidney injury)  Assessment and Plan of Treatment:     Diagnosis: Hyponatremia  Assessment and Plan of Treatment:     Diagnosis: Thrombocytopenia  Assessment and Plan of Treatment:     Diagnosis: Hyperphosphatemia  Assessment and Plan of Treatment:     Diagnosis: Acute blood loss anemia  Assessment and Plan of Treatment:

## 2021-06-12 ENCOUNTER — TRANSCRIPTION ENCOUNTER (OUTPATIENT)
Age: 86
End: 2021-06-12

## 2021-06-12 LAB
24R-OH-CALCIDIOL SERPL-MCNC: 38.8 NG/ML — SIGNIFICANT CHANGE UP (ref 30–80)
ANION GAP SERPL CALC-SCNC: 15 MMOL/L — SIGNIFICANT CHANGE UP (ref 5–17)
APPEARANCE UR: CLEAR — SIGNIFICANT CHANGE UP
APTT BLD: 38.3 SEC — HIGH (ref 27.5–35.5)
BILIRUB UR-MCNC: NEGATIVE — SIGNIFICANT CHANGE UP
BUN SERPL-MCNC: 89 MG/DL — HIGH (ref 7–23)
CALCIUM SERPL-MCNC: 10.3 MG/DL — SIGNIFICANT CHANGE UP (ref 8.4–10.5)
CALCIUM SERPL-MCNC: 10.3 MG/DL — SIGNIFICANT CHANGE UP (ref 8.4–10.5)
CHLORIDE SERPL-SCNC: 105 MMOL/L — SIGNIFICANT CHANGE UP (ref 96–108)
CHLORIDE UR-SCNC: 22 MMOL/L — SIGNIFICANT CHANGE UP
CO2 SERPL-SCNC: 15 MMOL/L — LOW (ref 22–31)
COLOR SPEC: YELLOW — SIGNIFICANT CHANGE UP
CREAT SERPL-MCNC: 4.28 MG/DL — HIGH (ref 0.5–1.3)
DIFF PNL FLD: NEGATIVE — SIGNIFICANT CHANGE UP
FERRITIN SERPL-MCNC: 1832 NG/ML — HIGH (ref 15–150)
FOLATE SERPL-MCNC: 5.5 NG/ML — SIGNIFICANT CHANGE UP
GLUCOSE BLDC GLUCOMTR-MCNC: 101 MG/DL — HIGH (ref 70–99)
GLUCOSE BLDC GLUCOMTR-MCNC: 113 MG/DL — HIGH (ref 70–99)
GLUCOSE BLDC GLUCOMTR-MCNC: 115 MG/DL — HIGH (ref 70–99)
GLUCOSE BLDC GLUCOMTR-MCNC: 158 MG/DL — HIGH (ref 70–99)
GLUCOSE SERPL-MCNC: 90 MG/DL — SIGNIFICANT CHANGE UP (ref 70–99)
GLUCOSE UR QL: NEGATIVE — SIGNIFICANT CHANGE UP
HCT VFR BLD CALC: 21.7 % — LOW (ref 34.5–45)
HCT VFR BLD CALC: 22.4 % — LOW (ref 34.5–45)
HCT VFR BLD CALC: 25.7 % — LOW (ref 34.5–45)
HGB BLD-MCNC: 6.7 G/DL — CRITICAL LOW (ref 11.5–15.5)
HGB BLD-MCNC: 6.7 G/DL — CRITICAL LOW (ref 11.5–15.5)
HGB BLD-MCNC: 8.1 G/DL — LOW (ref 11.5–15.5)
IGA FLD-MCNC: 125 MG/DL — SIGNIFICANT CHANGE UP (ref 84–499)
IGG FLD-MCNC: 1152 MG/DL — SIGNIFICANT CHANGE UP (ref 610–1660)
IGM SERPL-MCNC: 72 MG/DL — SIGNIFICANT CHANGE UP (ref 35–242)
IRON SATN MFR SERPL: 37 % — SIGNIFICANT CHANGE UP (ref 14–50)
IRON SATN MFR SERPL: 48 UG/DL — SIGNIFICANT CHANGE UP (ref 30–160)
KAPPA LC SER QL IFE: 9.38 MG/DL — HIGH (ref 0.33–1.94)
KAPPA/LAMBDA FREE LIGHT CHAIN RATIO, SERUM: 1.09 RATIO — SIGNIFICANT CHANGE UP (ref 0.26–1.65)
KETONES UR-MCNC: NEGATIVE — SIGNIFICANT CHANGE UP
LAMBDA LC SER QL IFE: 8.61 MG/DL — HIGH (ref 0.57–2.63)
LEUKOCYTE ESTERASE UR-ACNC: NEGATIVE — SIGNIFICANT CHANGE UP
MAGNESIUM SERPL-MCNC: 2.4 MG/DL — SIGNIFICANT CHANGE UP (ref 1.6–2.6)
MCHC RBC-ENTMCNC: 29.9 GM/DL — LOW (ref 32–36)
MCHC RBC-ENTMCNC: 30.9 GM/DL — LOW (ref 32–36)
MCHC RBC-ENTMCNC: 31.5 GM/DL — LOW (ref 32–36)
MCHC RBC-ENTMCNC: 33.1 PG — SIGNIFICANT CHANGE UP (ref 27–34)
MCHC RBC-ENTMCNC: 34 PG — SIGNIFICANT CHANGE UP (ref 27–34)
MCHC RBC-ENTMCNC: 34.9 PG — HIGH (ref 27–34)
MCV RBC AUTO: 104.9 FL — HIGH (ref 80–100)
MCV RBC AUTO: 113 FL — HIGH (ref 80–100)
MCV RBC AUTO: 113.7 FL — HIGH (ref 80–100)
NITRITE UR-MCNC: NEGATIVE — SIGNIFICANT CHANGE UP
NRBC # BLD: 0 /100 WBCS — SIGNIFICANT CHANGE UP (ref 0–0)
NRBC # BLD: 0 /100 WBCS — SIGNIFICANT CHANGE UP (ref 0–0)
OSMOLALITY UR: 385 MOSM/KG — SIGNIFICANT CHANGE UP (ref 300–900)
PH UR: 5.5 — SIGNIFICANT CHANGE UP (ref 5–8)
PHOSPHATE SERPL-MCNC: 7.5 MG/DL — HIGH (ref 2.5–4.5)
PLATELET # BLD AUTO: 69 K/UL — LOW (ref 150–400)
PLATELET # BLD AUTO: 75 K/UL — LOW (ref 150–400)
PLATELET # BLD AUTO: 75 K/UL — LOW (ref 150–400)
POTASSIUM SERPL-MCNC: 4.4 MMOL/L — SIGNIFICANT CHANGE UP (ref 3.5–5.3)
POTASSIUM SERPL-SCNC: 4.4 MMOL/L — SIGNIFICANT CHANGE UP (ref 3.5–5.3)
POTASSIUM UR-SCNC: 26 MMOL/L — SIGNIFICANT CHANGE UP
PROT UR-MCNC: NEGATIVE MG/DL — SIGNIFICANT CHANGE UP
PTH-INTACT FLD-MCNC: 20 PG/ML — SIGNIFICANT CHANGE UP (ref 15–65)
RBC # BLD: 1.92 M/UL — LOW (ref 3.8–5.2)
RBC # BLD: 1.97 M/UL — LOW (ref 3.8–5.2)
RBC # BLD: 2.45 M/UL — LOW (ref 3.8–5.2)
RBC # FLD: 20.4 % — HIGH (ref 10.3–14.5)
RBC # FLD: 20.6 % — HIGH (ref 10.3–14.5)
RBC # FLD: 21.8 % — HIGH (ref 10.3–14.5)
SODIUM SERPL-SCNC: 135 MMOL/L — SIGNIFICANT CHANGE UP (ref 135–145)
SODIUM UR-SCNC: 36 MMOL/L — SIGNIFICANT CHANGE UP
SP GR SPEC: 1.01 — SIGNIFICANT CHANGE UP (ref 1–1.03)
TIBC SERPL-MCNC: 130 UG/DL — LOW (ref 220–430)
TRANSFERRIN SERPL-MCNC: 101 MG/DL — LOW (ref 200–360)
UIBC SERPL-MCNC: 82 UG/DL — LOW (ref 110–370)
UROBILINOGEN FLD QL: 0.2 E.U./DL — SIGNIFICANT CHANGE UP
VIT B12 SERPL-MCNC: 674 PG/ML — SIGNIFICANT CHANGE UP (ref 232–1245)
WBC # BLD: 2.46 K/UL — LOW (ref 3.8–10.5)
WBC # BLD: 2.62 K/UL — LOW (ref 3.8–10.5)
WBC # BLD: 4.16 K/UL — SIGNIFICANT CHANGE UP (ref 3.8–10.5)
WBC # FLD AUTO: 2.46 K/UL — LOW (ref 3.8–10.5)
WBC # FLD AUTO: 2.62 K/UL — LOW (ref 3.8–10.5)
WBC # FLD AUTO: 4.16 K/UL — SIGNIFICANT CHANGE UP (ref 3.8–10.5)

## 2021-06-12 PROCEDURE — 99233 SBSQ HOSP IP/OBS HIGH 50: CPT | Mod: GC

## 2021-06-12 PROCEDURE — 71045 X-RAY EXAM CHEST 1 VIEW: CPT | Mod: 26

## 2021-06-12 PROCEDURE — 93926 LOWER EXTREMITY STUDY: CPT | Mod: 26,LT

## 2021-06-12 PROCEDURE — 76775 US EXAM ABDO BACK WALL LIM: CPT | Mod: 26

## 2021-06-12 RX ORDER — SODIUM BICARBONATE 1 MEQ/ML
650 SYRINGE (ML) INTRAVENOUS EVERY 12 HOURS
Refills: 0 | Status: DISCONTINUED | OUTPATIENT
Start: 2021-06-12 | End: 2021-06-14

## 2021-06-12 RX ORDER — LEVOTHYROXINE SODIUM 125 MCG
75 TABLET ORAL DAILY
Refills: 0 | Status: DISCONTINUED | OUTPATIENT
Start: 2021-06-12 | End: 2021-06-15

## 2021-06-12 RX ORDER — CLOPIDOGREL BISULFATE 75 MG/1
75 TABLET, FILM COATED ORAL DAILY
Refills: 0 | Status: DISCONTINUED | OUTPATIENT
Start: 2021-06-12 | End: 2021-06-15

## 2021-06-12 RX ORDER — HEPARIN SODIUM 5000 [USP'U]/ML
500 INJECTION INTRAVENOUS; SUBCUTANEOUS
Qty: 25000 | Refills: 0 | Status: DISCONTINUED | OUTPATIENT
Start: 2021-06-12 | End: 2021-06-12

## 2021-06-12 RX ADMIN — REPAGLINIDE 0.5 MILLIGRAM(S): 1 TABLET ORAL at 09:07

## 2021-06-12 RX ADMIN — CARVEDILOL PHOSPHATE 25 MILLIGRAM(S): 80 CAPSULE, EXTENDED RELEASE ORAL at 14:36

## 2021-06-12 RX ADMIN — SENNA PLUS 2 TABLET(S): 8.6 TABLET ORAL at 22:11

## 2021-06-12 RX ADMIN — Medication 81 MILLIGRAM(S): at 14:36

## 2021-06-12 RX ADMIN — CLOPIDOGREL BISULFATE 75 MILLIGRAM(S): 75 TABLET, FILM COATED ORAL at 14:35

## 2021-06-12 RX ADMIN — Medication 650 MILLIGRAM(S): at 18:37

## 2021-06-12 RX ADMIN — FAMOTIDINE 20 MILLIGRAM(S): 10 INJECTION INTRAVENOUS at 14:36

## 2021-06-12 RX ADMIN — Medication 100 MILLIGRAM(S): at 12:01

## 2021-06-12 RX ADMIN — Medication 40 MILLIGRAM(S): at 09:07

## 2021-06-12 RX ADMIN — HEPARIN SODIUM 5 UNIT(S)/HR: 5000 INJECTION INTRAVENOUS; SUBCUTANEOUS at 01:00

## 2021-06-12 RX ADMIN — Medication 100 MILLIGRAM(S): at 03:47

## 2021-06-12 RX ADMIN — Medication 75 MICROGRAM(S): at 18:27

## 2021-06-12 RX ADMIN — CARVEDILOL PHOSPHATE 25 MILLIGRAM(S): 80 CAPSULE, EXTENDED RELEASE ORAL at 00:07

## 2021-06-12 RX ADMIN — Medication 100 MILLIGRAM(S): at 19:14

## 2021-06-12 RX ADMIN — Medication 60 MILLIGRAM(S): at 06:57

## 2021-06-12 RX ADMIN — Medication 2: at 18:17

## 2021-06-12 RX ADMIN — REPAGLINIDE 0.5 MILLIGRAM(S): 1 TABLET ORAL at 18:27

## 2021-06-12 RX ADMIN — ATORVASTATIN CALCIUM 20 MILLIGRAM(S): 80 TABLET, FILM COATED ORAL at 22:11

## 2021-06-12 NOTE — PROGRESS NOTE ADULT - SUBJECTIVE AND OBJECTIVE BOX
O/N and interval events: pt s/p angio    HPI:  This is a 89 yo Russain speaking female with anemia of chronic disease, MDS and CLL, CKD stage V, Type II DM c/b nephropathy, HFpEF, hypertension, hyperlipidemia, CAD, hypothyroidism who presented to office with worsening rest pain and exertional RLE pain, admitted for angio 6/11.  Started on hepgtt in ED and cards and renal consulted     Subjective:  Pt reports pain in her RLE, especially at digits, no chest pain/sob, no n/v/diarrhea/f/c     12 point ROS reviewed and negative except as otherwise stated in HPI and below    PAST MEDICAL & SURGICAL HISTORY:    Anemia of chronic disease (extensive workup outpt) and CLL and MDS- followed here at [facility] by Dr. Alcantara recent visit 6/3, extensive outpt GI workup in past    Stage 5 chronic kidney disease not on chronic dialysis- follows with liss here     Diabetes    Diabetes mellitus with diabetic neuropathy    Chronic diastolic congestive heart failure    Hypertension    Hyperlipidemia    CAD (coronary artery disease)    Hypothyroidism    Pacemaker      SOCIAL HISTORY:  Lives alone  HHA 10h/d   No toxic habits    FAMILY HISTORY: no hx of CVA/MI     ALLERGIES:  No Known Allergies      HOME MEDICATIONS:  Med list obtained from patient by primary team    MEDICATIONS  (STANDING):  aspirin  chewable 81 milliGRAM(s) Oral daily  atorvastatin 20 milliGRAM(s) Oral at bedtime  carvedilol 25 milliGRAM(s) Oral every 12 hours  ceFAZolin   IVPB 1000 milliGRAM(s) IV Intermittent every 8 hours  clopidogrel Tablet 75 milliGRAM(s) Oral daily  dextrose 40% Gel 15 Gram(s) Oral once  dextrose 5%. 1000 milliLiter(s) (50 mL/Hr) IV Continuous <Continuous>  dextrose 5%. 1000 milliLiter(s) (100 mL/Hr) IV Continuous <Continuous>  dextrose 50% Injectable 25 Gram(s) IV Push once  dextrose 50% Injectable 12.5 Gram(s) IV Push once  dextrose 50% Injectable 25 Gram(s) IV Push once  famotidine    Tablet 20 milliGRAM(s) Oral daily  glucagon  Injectable 1 milliGRAM(s) IntraMuscular once  insulin lispro (ADMELOG) corrective regimen sliding scale   SubCutaneous Before meals and at bedtime  NIFEdipine XL 60 milliGRAM(s) Oral daily  repaglinide 0.5 milliGRAM(s) Oral two times a day before meals  senna 2 Tablet(s) Oral at bedtime    MEDICATIONS  (PRN):      Vital Signs Last 24 Hrs  T(C): 35.9 (12 Jun 2021 11:19), Max: 36.9 (12 Jun 2021 05:00)  T(F): 96.7 (12 Jun 2021 11:19), Max: 98.4 (12 Jun 2021 05:00)  HR: 70 (12 Jun 2021 11:19) (59 - 71)  BP: 138/60 (12 Jun 2021 11:19) (86/48 - 159/64)  BP(mean): 84 (12 Jun 2021 00:15) (65 - 106)  RR: 18 (12 Jun 2021 11:19) (10 - 24)  SpO2: 98% (12 Jun 2021 11:19) (98% - 100%)    PHYSICAL EXAM:  GENERAL: pleasant, NAD  NEURO: AOx3, intact strength and sensation UE and LE  HEAD:  Atraumatic, Normocephalic  EYES: EOMI, conjunctiva and sclera clear  ENMT: Clear op, good dentition   CHEST/LUNG: Clear to auscultation bilaterally; No rales, rhonchi, wheezing, or rubs  HEART: Regular rate and rhythm; S1/S2, No murmurs, rubs, or gallops  ABDOMEN: Soft, Nontender, Nondistended, Normal BS, ecchymoses at groin  EXT: No sig le edema, RLE TTP  Psych: normal affect    LABS:                        6.7    2.46  )-----------( 69       ( 12 Jun 2021 09:01 )             21.7   06-12    135  |  105  |  89<H>  ----------------------------<  90  4.4   |  15<L>  |  4.28<H>    Ca    10.3      12 Jun 2021 07:47  Phos  7.5     06-12  Mg     2.4     06-12    TPro  7.3  /  Alb  3.6  /  TBili  0.3  /  DBili  x   /  AST  14  /  ALT  9<L>  /  AlkPhos  46  06-11        COVID-19 PCR: Negative (06-10-21 @ 17:42)      RADIOLOGY & ADDITIONAL TESTS:  EKG NSR qtc 497  CXR largely wnl    ASSESSMENT AND PLAN:  89 yo Russain speaking female with anemia of chronic disease, MDS and CLL previously on Revlimid, CKD stage V, Type II DM c/b nephropathy, HFpEF, hypertension, hyperlipidemia, CAD, hypothyroidism who presented with worsening rest pain and exertional RLE pain, admitted for angio 6/11, renal and cards following periop  #Critical leg ischemia s/p angio on 6/11 w/ stent placement at SFA RLE-c/w asa/statin, periop abx per vascular   #Anemia - Acute decrease from 8.7 --> 6.7, vascular does not suspect bleed at this time, pt to receive 1 unit pRBC - repeat CBC  #Thrombocytopenia - baseline thrombocytopenia in setting of known CKD, acutely worsened w/ drop in Hgb; no need for transfusion at this time  #Stage IV CKD- f/u renal recs- holding torsemide today, gentle IVF to prevent contrast KYMBERLY, confirm if on lokelma/patiromer for hyperk, monitor creat/uop postop  #CLL and MDS hx, previously on Revlimid c/b low plts in 80's- f/u any heme recs, trend Hg/plts (stable)  #Type II DM a1c 4.9- favor ISS    #HFpEF- appreciate cards recs, euvolemic today but monitor s/p fluids periop, restart diuresis postop  #HTN- pt normotensive, c/w home coreg, nefidipine with hold parameters   Hypothyroidism- c/w home synthroid 75mcg    #DVT px:   #Diet: DM diet   #Dispo:     Patient was seen and examined by me at bedside               O/N and interval events: pt s/p angio    HPI:  This is a 89 yo Russain speaking female with anemia of chronic disease, MDS and CLL, CKD stage V, Type II DM c/b nephropathy, HFpEF, hypertension, hyperlipidemia, CAD, hypothyroidism who presented to office with worsening rest pain and exertional RLE pain, admitted for angio 6/11.  Started on hepgtt in ED and cards and renal consulted     Subjective:  Pt reports pain in her RLE, especially at digits, no chest pain/sob, no n/v/diarrhea/f/c     12 point ROS reviewed and negative except as otherwise stated in HPI and below    PAST MEDICAL & SURGICAL HISTORY:    Anemia of chronic disease (extensive workup outpt) and CLL and MDS- followed here at [facility] by Dr. Alcantara recent visit 6/3, extensive outpt GI workup in past    Stage 5 chronic kidney disease not on chronic dialysis- follows with liss here     Diabetes    Diabetes mellitus with diabetic neuropathy    Chronic diastolic congestive heart failure    Hypertension    Hyperlipidemia    CAD (coronary artery disease)    Hypothyroidism    Pacemaker      SOCIAL HISTORY:  Lives alone  HHA 10h/d   No toxic habits    FAMILY HISTORY: no hx of CVA/MI     ALLERGIES:  No Known Allergies      HOME MEDICATIONS:  Med list obtained from patient by primary team    MEDICATIONS  (STANDING):  aspirin  chewable 81 milliGRAM(s) Oral daily  atorvastatin 20 milliGRAM(s) Oral at bedtime  carvedilol 25 milliGRAM(s) Oral every 12 hours  ceFAZolin   IVPB 1000 milliGRAM(s) IV Intermittent every 8 hours  clopidogrel Tablet 75 milliGRAM(s) Oral daily  dextrose 40% Gel 15 Gram(s) Oral once  dextrose 5%. 1000 milliLiter(s) (50 mL/Hr) IV Continuous <Continuous>  dextrose 5%. 1000 milliLiter(s) (100 mL/Hr) IV Continuous <Continuous>  dextrose 50% Injectable 25 Gram(s) IV Push once  dextrose 50% Injectable 12.5 Gram(s) IV Push once  dextrose 50% Injectable 25 Gram(s) IV Push once  famotidine    Tablet 20 milliGRAM(s) Oral daily  glucagon  Injectable 1 milliGRAM(s) IntraMuscular once  insulin lispro (ADMELOG) corrective regimen sliding scale   SubCutaneous Before meals and at bedtime  NIFEdipine XL 60 milliGRAM(s) Oral daily  repaglinide 0.5 milliGRAM(s) Oral two times a day before meals  senna 2 Tablet(s) Oral at bedtime    MEDICATIONS  (PRN):      Vital Signs Last 24 Hrs  T(C): 35.9 (12 Jun 2021 11:19), Max: 36.9 (12 Jun 2021 05:00)  T(F): 96.7 (12 Jun 2021 11:19), Max: 98.4 (12 Jun 2021 05:00)  HR: 70 (12 Jun 2021 11:19) (59 - 71)  BP: 138/60 (12 Jun 2021 11:19) (86/48 - 159/64)  BP(mean): 84 (12 Jun 2021 00:15) (65 - 106)  RR: 18 (12 Jun 2021 11:19) (10 - 24)  SpO2: 98% (12 Jun 2021 11:19) (98% - 100%)    PHYSICAL EXAM:  GENERAL: pleasant, NAD  NEURO: AOx3, intact strength and sensation UE and LE  HEAD:  Atraumatic, Normocephalic  EYES: EOMI, conjunctiva and sclera clear  ENMT: Clear op, good dentition   CHEST/LUNG: Clear to auscultation bilaterally; No rales, rhonchi, wheezing, or rubs  HEART: Regular rate and rhythm; S1/S2, No murmurs, rubs, or gallops  ABDOMEN: Soft, Nontender, Nondistended, Normal BS, ecchymoses at groin  EXT: No sig le edema, RLE TTP  Psych: normal affect    LABS:                        6.7    2.46  )-----------( 69       ( 12 Jun 2021 09:01 )             21.7   06-12    135  |  105  |  89<H>  ----------------------------<  90  4.4   |  15<L>  |  4.28<H>    Ca    10.3      12 Jun 2021 07:47  Phos  7.5     06-12  Mg     2.4     06-12    TPro  7.3  /  Alb  3.6  /  TBili  0.3  /  DBili  x   /  AST  14  /  ALT  9<L>  /  AlkPhos  46  06-11        COVID-19 PCR: Negative (06-10-21 @ 17:42)      RADIOLOGY & ADDITIONAL TESTS:  EKG NSR qtc 497  CXR largely wnl    ASSESSMENT AND PLAN:  89 yo Russain speaking female with anemia of chronic disease, MDS and CLL previously on Revlimid, CKD stage V, Type II DM c/b nephropathy, HFpEF, hypertension, hyperlipidemia, CAD, hypothyroidism who presented with worsening rest pain and exertional RLE pain, admitted for angio 6/11, renal and cards following periop  #Critical leg ischemia s/p angio on 6/11 w/ stent placement at SFA RLE-c/w asa/statin, periop abx per vascular   #Anemia - Acute decrease from 8.7 --> 6.7, vascular does not suspect bleed at this time, pt to receive 1 unit pRBC - repeat CBC  #Thrombocytopenia - baseline thrombocytopenia in setting of known CKD, acutely worsened w/ drop in Hgb; no need for transfusion at this time  #Stage IV CKD- f/u renal recs- start bicarb q12 given AGA, torsemide restarted given 1 unit pRBC  #CLL and MDS hx, previously on Revlimid c/b low plts in 80's- f/u any heme recs, trend Hg/plts (stable)  #Type II DM a1c 4.9- favor ISS    #HFpEF- appreciate cards recs, euvolemic today but monitor s/p fluids periop, restart diuresis postop  #HTN- pt normotensive, c/w home coreg, nefidipine with hold parameters   Hypothyroidism- c/w home synthroid 75mcg    #DVT px:   #Diet: DM diet   #Dispo:     Patient was seen and examined by me at bedside

## 2021-06-12 NOTE — PROGRESS NOTE ADULT - ASSESSMENT
88 yo [place] speaking female with PMHx CKD stage 5, diabetic nephropathy, anemia of chronic disease, chronic diastolic CHF, hypertension, hyperlipidemia, CAD, hypothyroidism admitted for right lower extremity pain with plan for heparin drip now s/p RLE angiogram. Nephrology consulted for recommendations.     # Nonoliguric CKD stage 5 (eGFR <15 ml/min)- no urgent indication for dialysis  S/p angio 6/11, off IV- continue PO hydration, can resume home dose diuretics on Monday  UA bland, check renal/bladder sono    HTN- on coreg 25q12h, nifedipine 60  Anemia- transfusion as per primary team, no indication for IV iron  Metabolic acidosis- likely due to CKD- start Nabicarb 650mg po q12hr   CKD-MBD- check PTH, vit D, phos, ca 10.6 noted    Avoid nephrotoxic agents/medications, renally dose medications, renal diet  Will follow

## 2021-06-12 NOTE — PROGRESS NOTE ADULT - ASSESSMENT
88 yo Russ[place] speaking female with anemia of chronic disease, CKD stage V, Diabetes, Diabetic nephropathy, Chronic diastolic CHF, hypertension, hyperlipidemia, CAD, hypothyroidism presented to Dr. Pagan’s office complaining of right lower extremity pain.  Patient states it started 2 weeks ago while walking less than 1 block and now occurs at rest.  She states she also has cramping of right buttocks. s/p RLE Angio with L groin access POD#1        ==== Neurovascular ====  -No delirium, pain well managed on current regimen  -C/w PRNs for Pain control  -Monitor neuro status    ==== Respiratory ====  -Saturates well on RA     -Encourage IS 10x/hour while awake, Cough and deep breathing exercises  -Monitor respiratory status via SpO2    ==== Cardiovascular ====  -Monitor on Tele  -Obtain cardiology consult  -F/u ECHO    ==== GI ====   -Tolerating PO  -Prophylaxis: Protonix  -C/w bowel regimen    ==== /Renal ====  -Mcgrath in place post op retention  -Trend Cr on AM labs  -Replete electrolytes as needed  -Renal consulted; Dr. Lerma following   -Daily weights  -Strict I/Os    ==== ID ====   Afebrile, asymptomatic  -WBC: 2.23  -Continue to monitor for SIRS/Sepsis syndrome while inpatient    ==== Endocrine ====   -A1C: 4.9, no h/o DM    ==== Hematologic ====   -H/H: 8.9  -CBC, chem in AM  -DVT ppx: HSQ 5000u q8h and SCDs  -Heme/onc consulted  89 yo Russain speaking female with anemia of chronic disease, CKD stage V, Diabetes, Diabetic nephropathy, Chronic diastolic CHF, hypertension, hyperlipidemia, CAD, hypothyroidism presenting with critical limb ischemia now s/p RLE Angio with stents to SFA and below knee popliteal art. via L CFA access (6/11/21). Postop course notable for acute blood loss anemia.       Neurovascular  -No delirium, pain well managed on current regimen  -C/w PRNs for Pain control  -Monitor neuro status    Respiratory  -Saturates well on RA     -Encourage IS 10x/hour while awake, Cough and deep breathing exercises  -Monitor respiratory status via SpO2    Cardiovascular  -C/o telemetry   -Goal Hgb >8  -Cardiology c/s for preop management- rec c/w high dose statin and antihypertensives.     GI  -Tolerating PO  -Prophylaxis: Protonix  -C/w bowel regimen    ==== /Renal ====  -Mcgrath in place post op retention  -Trend Cr on AM labs  -Replete electrolytes as needed  -Renal consulted; Dr. Lerma following   -Daily weights  -Strict I/Os    ==== ID ====   Afebrile, asymptomatic  -WBC: 2.23  -Continue to monitor for SIRS/Sepsis syndrome while inpatient    ==== Endocrine ====   -A1C: 4.9, no h/o DM    ==== Hematologic ====   -H/H: 8.9  -CBC, chem in AM  -DVT ppx: HSQ 5000u q8h and SCDs  -Heme/onc consulted  88 yo Russain speaking female with anemia of chronic disease, CKD stage V, Diabetes, Diabetic nephropathy, Chronic diastolic CHF, hypertension, hyperlipidemia, CAD, hypothyroidism presenting with critical limb ischemia now s/p RLE Angio with stents to SFA and below knee popliteal art. via L CFA access (6/11/21). Postop course notable for acute blood loss anemia.       Neurovascular  -No delirium, pain well managed on current regimen  -C/w PRNs for Pain control  -Monitor neuro status    Respiratory  -Saturates well on RA     -Encourage IS 10x/hour while awake, Cough and deep breathing exercises  -Monitor respiratory status via SpO2    Cardiovascular  -C/o telemetry   -Goal Hgb >8  -Cardiology c/s for preop management- rec c/w high dose statin and antihypertensives.     GI  -C/w DASH diet  -Prophylaxis: Protonix  -C/w bowel regimen    /Renal   -Mcgrath in place post op retention  -Trend Cr on AM labs  -Replete electrolytes as needed  -Renal consulted; Dr. Lerma following   -Daily weights  -NaBicarb 650mg BID  -Strict I/Os    ID  -Afebrile, asymptomatic  -WBC 2.46  - C/w periop ancef    Endocrine  -A1C: 4.9  -no h/o DM    Hematologic  -H/H: 6.7 (8.7)  -Transfuse 1U pRBC  - Hold hep gtt  - F/u stat duplex LLE  -Trend plts  -CBC, chem in AM  -DVT ppx: HSQ 5000u q8h and SCDs  -Heme/onc consulted  88 yo Russain speaking female with anemia of chronic disease, CKD stage V, Diabetes, Diabetic nephropathy, Chronic diastolic CHF, hypertension, hyperlipidemia, CAD, hypothyroidism presenting with critical limb ischemia now s/p RLE Angio with stents to SFA and below knee popliteal art. via L CFA access (6/11/21). Postop course notable for acute blood loss anemia.       Neurovascular  -No delirium, pain well managed on current regimen  -C/w PRNs for Pain control  -Monitor neuro status    Respiratory  -Saturates well on RA     -Encourage IS 10x/hour while awake, Cough and deep breathing exercises  -Monitor respiratory status via SpO2    Cardiovascular  -C/o telemetry   -Goal Hgb >8  -Cardiology c/s for preop management- rec c/w high dose statin and antihypertensives.     GI  -C/w DASH diet  -Prophylaxis: Protonix  -C/w bowel regimen    /Renal   -Mcgrath in place post op retention  -Trend Cr on AM labs  -Replete electrolytes as needed  -Renal consulted; Dr. Lerma following   -Daily weights  -NaBicarb 650mg BID  -Strict I/Os    ID  -Afebrile, asymptomatic  -WBC 2.46  - C/w periop ancef    Endocrine  -A1C: 4.9  -no h/o DM    Hematologic  -H/H: 6.7 (8.7)  -Transfuse 1U pRBC  - Hold hep gtt  - F/u stat duplex LLE  -Trend plts  -CBC, chem in AM  - C/w ASA and Plavix  -Heme/onc consulted

## 2021-06-12 NOTE — PROVIDER CONTACT NOTE (CRITICAL VALUE NOTIFICATION) - ASSESSMENT
Pt asymptomatic, vital signs stable, AM BP of 145/67, HR 66, no hematoma noted, dressing clean and dry, +doppler pulses

## 2021-06-12 NOTE — PROGRESS NOTE ADULT - SUBJECTIVE AND OBJECTIVE BOX
O/N: Bedrest flat o/n 2/2 to L groin fullness and small  L vulvar hematoma, hepgtt @ 5ml started ho inserted 2/2 to retention >600ml                          This is a 88 yo Russ[place] speaking female with anemia of chronic disease, CKD stage V, Diabetes, Diabetic nephropathy, Chronic diastolic CHF, hypertension, hyperlipidemia, CAD, hypothyroidism presented to Dr. Pagan’s office complaining of right lower extremity pain.  Patient states it started 2 weeks ago while walking less than 1 block and now occurs at rest.  She states she also has cramping of right buttocks. s/p RLE Angio with L groin access POD#1        ==== Neurovascular ====  -No delirium, pain well managed on current regimen  -C/w PRNs for Pain control  -Monitor neuro status    ==== Respiratory ====  -Saturates well on RA     -Encourage IS 10x/hour while awake, Cough and deep breathing exercises  -Monitor respiratory status via SpO2    ==== Cardiovascular ====  -Monitor on Tele  -Obtain cardiology consult  -F/u ECHO    ==== GI ====   -Tolerating PO  -Prophylaxis: Protonix  -C/w bowel regimen    ==== /Renal ====  -Ho in place post op retention  -Trend Cr on AM labs  -Replete electrolytes as needed  -Renal consulted; Dr. Lerma following   -Daily weights  -Strict I/Os    ==== ID ====   Afebrile, asymptomatic  -WBC: 2.23  -Continue to monitor for SIRS/Sepsis syndrome while inpatient    ==== Endocrine ====   -A1C: 4.9, no h/o DM    ==== Hematologic ====   -H/H: 8.9  -CBC, chem in AM  -DVT ppx: HSQ 5000u q8h and SCDs  -Heme/onc consulted      O/N: Bedrest flat o/n 2/2 to L groin fullness and small  L vulvar hematoma, hepgtt @ 5ml started ho inserted 2/2 to retention >600ml      Subjective:    ceFAZolin   IVPB 1000  aspirin  chewable 81  carvedilol 25  ceFAZolin   IVPB 1000  clopidogrel Tablet 75  NIFEdipine XL 60      Allergies    No Known Allergies    Intolerances        Vital Signs Last 24 Hrs  T(C): 35.9 (12 Jun 2021 11:19), Max: 36.9 (12 Jun 2021 05:00)  T(F): 96.7 (12 Jun 2021 11:19), Max: 98.4 (12 Jun 2021 05:00)  HR: 70 (12 Jun 2021 11:19) (59 - 71)  BP: 138/60 (12 Jun 2021 11:19) (86/48 - 159/64)  BP(mean): 84 (12 Jun 2021 00:15) (65 - 106)  RR: 18 (12 Jun 2021 11:19) (10 - 24)  SpO2: 98% (12 Jun 2021 11:19) (98% - 100%)  I&O's Summary    11 Jun 2021 07:01  -  12 Jun 2021 07:00  --------------------------------------------------------  IN: 403 mL / OUT: 1100 mL / NET: -697 mL        Physical Exam:  General: No acute distress, resting comfortably in bed  C/V: normal sinus rhythm  Pulm: Nonlabored breathing, no respiratory distress  Abd: soft, non-tender, non-distended. Left groin with large ecchymotic changes extending to flank, soft, no appreciable pulsatile mass.   Extrem: warm and well perfused, no edema. RLE with biphasic DP and PT signals. Triphasic AT signals.       LABS:                        6.7    2.46  )-----------( 69       ( 12 Jun 2021 09:01 )             21.7     06-12    135  |  105  |  89<H>  ----------------------------<  90  4.4   |  15<L>  |  4.28<H>    Ca    10.3      12 Jun 2021 07:47  Phos  7.5     06-12  Mg     2.4     06-12    TPro  7.3  /  Alb  3.6  /  TBili  0.3  /  DBili  x   /  AST  14  /  ALT  9<L>  /  AlkPhos  46  06-11    PT/INR - ( 11 Jun 2021 09:12 )   PT: 16.6 sec;   INR: 1.41          PTT - ( 12 Jun 2021 07:45 )  PTT:38.3 sec    Radiology and Additional Studies:       O/N: Bedrest flat o/n 2/2 to L groin fullness and small  L vulvar hematoma, hepgtt @ 5ml started ho inserted 2/2 to retention >600ml    Subjective: Seen and evaluated at bedside. Resting comfortably in bed. Reporting mild left groin pain along with burning sensation in RLE toes. Otherwise denies and back pain or abdominal pain. Has no ambulated since procedure. Denies f/c, n/v, CP, SOB, palpitations, or new onset weakness in LEs.     ceFAZolin   IVPB 1000  aspirin  chewable 81  carvedilol 25  ceFAZolin   IVPB 1000  clopidogrel Tablet 75  NIFEdipine XL 60      Allergies    No Known Allergies    Intolerances      Vital Signs Last 24 Hrs  T(C): 35.9 (12 Jun 2021 11:19), Max: 36.9 (12 Jun 2021 05:00)  T(F): 96.7 (12 Jun 2021 11:19), Max: 98.4 (12 Jun 2021 05:00)  HR: 70 (12 Jun 2021 11:19) (59 - 71)  BP: 138/60 (12 Jun 2021 11:19) (86/48 - 159/64)  BP(mean): 84 (12 Jun 2021 00:15) (65 - 106)  RR: 18 (12 Jun 2021 11:19) (10 - 24)  SpO2: 98% (12 Jun 2021 11:19) (98% - 100%)  I&O's Summary    11 Jun 2021 07:01  -  12 Jun 2021 07:00  --------------------------------------------------------  IN: 403 mL / OUT: 1100 mL / NET: -697 mL        Physical Exam:  General: No acute distress, resting comfortably in bed  C/V: normal sinus rhythm  Pulm: Nonlabored breathing, no respiratory distress  Abd: soft, non-tender, non-distended. Left groin with large ecchymotic changes extending to flank, soft, no appreciable pulsatile mass.   Extrem: warm and well perfused, no edema. RLE with biphasic DP and PT signals. Triphasic AT signals.       LABS:                        6.7    2.46  )-----------( 69       ( 12 Jun 2021 09:01 )             21.7     06-12    135  |  105  |  89<H>  ----------------------------<  90  4.4   |  15<L>  |  4.28<H>    Ca    10.3      12 Jun 2021 07:47  Phos  7.5     06-12  Mg     2.4     06-12    TPro  7.3  /  Alb  3.6  /  TBili  0.3  /  DBili  x   /  AST  14  /  ALT  9<L>  /  AlkPhos  46  06-11    PT/INR - ( 11 Jun 2021 09:12 )   PT: 16.6 sec;   INR: 1.41          PTT - ( 12 Jun 2021 07:45 )  PTT:38.3 sec    Radiology and Additional Studies:

## 2021-06-13 LAB
ANION GAP SERPL CALC-SCNC: 17 MMOL/L — SIGNIFICANT CHANGE UP (ref 5–17)
BUN SERPL-MCNC: 93 MG/DL — HIGH (ref 7–23)
CALCIUM SERPL-MCNC: 10.1 MG/DL — SIGNIFICANT CHANGE UP (ref 8.4–10.5)
CHLORIDE SERPL-SCNC: 102 MMOL/L — SIGNIFICANT CHANGE UP (ref 96–108)
CO2 SERPL-SCNC: 15 MMOL/L — LOW (ref 22–31)
CREAT SERPL-MCNC: 4.73 MG/DL — HIGH (ref 0.5–1.3)
GLUCOSE BLDC GLUCOMTR-MCNC: 106 MG/DL — HIGH (ref 70–99)
GLUCOSE BLDC GLUCOMTR-MCNC: 109 MG/DL — HIGH (ref 70–99)
GLUCOSE BLDC GLUCOMTR-MCNC: 186 MG/DL — HIGH (ref 70–99)
GLUCOSE BLDC GLUCOMTR-MCNC: 68 MG/DL — LOW (ref 70–99)
GLUCOSE BLDC GLUCOMTR-MCNC: 83 MG/DL — SIGNIFICANT CHANGE UP (ref 70–99)
GLUCOSE BLDC GLUCOMTR-MCNC: 86 MG/DL — SIGNIFICANT CHANGE UP (ref 70–99)
GLUCOSE SERPL-MCNC: 53 MG/DL — CRITICAL LOW (ref 70–99)
HCT VFR BLD CALC: 23 % — LOW (ref 34.5–45)
HCT VFR BLD CALC: 26.5 % — LOW (ref 34.5–45)
HGB BLD-MCNC: 7.2 G/DL — LOW (ref 11.5–15.5)
HGB BLD-MCNC: 8.4 G/DL — LOW (ref 11.5–15.5)
MAGNESIUM SERPL-MCNC: 2.4 MG/DL — SIGNIFICANT CHANGE UP (ref 1.6–2.6)
MCHC RBC-ENTMCNC: 31.3 GM/DL — LOW (ref 32–36)
MCHC RBC-ENTMCNC: 31.7 GM/DL — LOW (ref 32–36)
MCHC RBC-ENTMCNC: 32.7 PG — SIGNIFICANT CHANGE UP (ref 27–34)
MCHC RBC-ENTMCNC: 32.9 PG — SIGNIFICANT CHANGE UP (ref 27–34)
MCV RBC AUTO: 103.9 FL — HIGH (ref 80–100)
MCV RBC AUTO: 104.5 FL — HIGH (ref 80–100)
NRBC # BLD: 0 /100 WBCS — SIGNIFICANT CHANGE UP (ref 0–0)
NRBC # BLD: 0 /100 WBCS — SIGNIFICANT CHANGE UP (ref 0–0)
PHOSPHATE SERPL-MCNC: 6.7 MG/DL — HIGH (ref 2.5–4.5)
PLATELET # BLD AUTO: 56 K/UL — LOW (ref 150–400)
PLATELET # BLD AUTO: 61 K/UL — LOW (ref 150–400)
POTASSIUM SERPL-MCNC: 4.2 MMOL/L — SIGNIFICANT CHANGE UP (ref 3.5–5.3)
POTASSIUM SERPL-SCNC: 4.2 MMOL/L — SIGNIFICANT CHANGE UP (ref 3.5–5.3)
PROT SERPL-MCNC: 5.6 G/DL — LOW (ref 6–8.3)
PROT SERPL-MCNC: 5.6 G/DL — LOW (ref 6–8.3)
RBC # BLD: 2.2 M/UL — LOW (ref 3.8–5.2)
RBC # BLD: 2.55 M/UL — LOW (ref 3.8–5.2)
RBC # FLD: 22.2 % — HIGH (ref 10.3–14.5)
RBC # FLD: 22.2 % — HIGH (ref 10.3–14.5)
SODIUM SERPL-SCNC: 134 MMOL/L — LOW (ref 135–145)
WBC # BLD: 3.66 K/UL — LOW (ref 3.8–10.5)
WBC # BLD: 4.53 K/UL — SIGNIFICANT CHANGE UP (ref 3.8–10.5)
WBC # FLD AUTO: 3.66 K/UL — LOW (ref 3.8–10.5)
WBC # FLD AUTO: 4.53 K/UL — SIGNIFICANT CHANGE UP (ref 3.8–10.5)

## 2021-06-13 PROCEDURE — 74176 CT ABD & PELVIS W/O CONTRAST: CPT | Mod: 26

## 2021-06-13 PROCEDURE — 99233 SBSQ HOSP IP/OBS HIGH 50: CPT | Mod: GC

## 2021-06-13 RX ADMIN — Medication 40 MILLIGRAM(S): at 10:23

## 2021-06-13 RX ADMIN — ATORVASTATIN CALCIUM 20 MILLIGRAM(S): 80 TABLET, FILM COATED ORAL at 21:03

## 2021-06-13 RX ADMIN — CARVEDILOL PHOSPHATE 25 MILLIGRAM(S): 80 CAPSULE, EXTENDED RELEASE ORAL at 00:45

## 2021-06-13 RX ADMIN — Medication 20 MILLIGRAM(S): at 18:58

## 2021-06-13 RX ADMIN — Medication 650 MILLIGRAM(S): at 17:21

## 2021-06-13 RX ADMIN — CLOPIDOGREL BISULFATE 75 MILLIGRAM(S): 75 TABLET, FILM COATED ORAL at 12:03

## 2021-06-13 RX ADMIN — Medication 60 MILLIGRAM(S): at 05:20

## 2021-06-13 RX ADMIN — Medication 2: at 12:03

## 2021-06-13 RX ADMIN — Medication 81 MILLIGRAM(S): at 12:03

## 2021-06-13 RX ADMIN — Medication 100 MILLIGRAM(S): at 19:00

## 2021-06-13 RX ADMIN — REPAGLINIDE 0.5 MILLIGRAM(S): 1 TABLET ORAL at 17:21

## 2021-06-13 RX ADMIN — Medication 100 MILLIGRAM(S): at 03:47

## 2021-06-13 RX ADMIN — CARVEDILOL PHOSPHATE 25 MILLIGRAM(S): 80 CAPSULE, EXTENDED RELEASE ORAL at 12:03

## 2021-06-13 RX ADMIN — SENNA PLUS 2 TABLET(S): 8.6 TABLET ORAL at 21:03

## 2021-06-13 RX ADMIN — Medication 650 MILLIGRAM(S): at 05:20

## 2021-06-13 RX ADMIN — Medication 100 MILLIGRAM(S): at 12:04

## 2021-06-13 RX ADMIN — FAMOTIDINE 20 MILLIGRAM(S): 10 INJECTION INTRAVENOUS at 12:03

## 2021-06-13 RX ADMIN — CARVEDILOL PHOSPHATE 25 MILLIGRAM(S): 80 CAPSULE, EXTENDED RELEASE ORAL at 23:33

## 2021-06-13 NOTE — PROGRESS NOTE ADULT - SUBJECTIVE AND OBJECTIVE BOX
O/N: Post-transfusion hgb 8.1; MANDY, VSS    Subjective:***** Seen and evaluated at bedside.     ceFAZolin   IVPB 1000  aspirin  chewable 81  carvedilol 25  ceFAZolin   IVPB 1000  clopidogrel Tablet 75  NIFEdipine XL 60      Allergies    No Known Allergies    Intolerances      Vital Signs Last 24 Hrs  T(C): 36.3 (13 Jun 2021 04:47), Max: 36.8 (12 Jun 2021 13:49)  T(F): 97.3 (13 Jun 2021 04:47), Max: 98.2 (12 Jun 2021 13:49)  HR: 63 (13 Jun 2021 04:43) (63 - 73)  BP: 151/65 (13 Jun 2021 04:43) (119/57 - 163/70)  BP(mean): --  RR: 18 (13 Jun 2021 04:43) (18 - 18)  SpO2: 96% (13 Jun 2021 04:43) (96% - 98%)  I&O's Summary    11 Jun 2021 07:01  -  12 Jun 2021 07:00  --------------------------------------------------------  IN: 403 mL / OUT: 1100 mL / NET: -697 mL    12 Jun 2021 07:01  -  13 Jun 2021 06:26  --------------------------------------------------------  IN: 840 mL / OUT: 500 mL / NET: 340 mL      Physical Exam:*****  General: No acute distress, resting comfortably in bed  C/V: normal sinus rhythm  Pulm: Nonlabored breathing, no respiratory distress  Abd: soft, non-tender, non-distended. Left groin with large ecchymotic changes extending to flank, soft, no appreciable pulsatile mass.   Extrem: warm and well perfused, no edema. RLE with biphasic DP and PT signals. Triphasic AT signals.       LABS:                    8.1    4.16  )-----------( 75       ( 12 Jun 2021 18:28 )             25.7     06-12    135  |  105  |  89<H>  ----------------------------<  90  4.4   |  15<L>  |  4.28<H>    Ca    10.3      12 Jun 2021 07:47  Phos  7.5     06-12  Mg     2.4     06-12    TPro  7.3  /  Alb  3.6  /  TBili  0.3  /  DBili  x   /  AST  14  /  ALT  9<L>  /  AlkPhos  46  06-11    CAPILLARY BLOOD GLUCOSE  POCT Blood Glucose.: 101 mg/dL (12 Jun 2021 21:59)  POCT Blood Glucose.: 158 mg/dL (12 Jun 2021 17:19)  POCT Blood Glucose.: 113 mg/dL (12 Jun 2021 12:14)  POCT Blood Glucose.: 115 mg/dL (12 Jun 2021 06:43)    PT/INR - ( 11 Jun 2021 09:12 )   PT: 16.6 sec;   INR: 1.41          PTT - ( 12 Jun 2021 07:45 )  PTT:38.3 sec  COVID-19 PCR: Negative (10 Diego 2021 17:42)                         O/N: Post-transfusion hgb 8.1; MANDY, VSS    Subjective: Seen and evaluated at bedside. Patient denies chest pain, shortness of breath, nausea, vomiting, pain from leg.    ceFAZolin   IVPB 1000  aspirin  chewable 81  carvedilol 25  ceFAZolin   IVPB 1000  clopidogrel Tablet 75  NIFEdipine XL 60      Allergies    No Known Allergies    Intolerances      Vital Signs Last 24 Hrs  T(C): 36.3 (13 Jun 2021 04:47), Max: 36.8 (12 Jun 2021 13:49)  T(F): 97.3 (13 Jun 2021 04:47), Max: 98.2 (12 Jun 2021 13:49)  HR: 63 (13 Jun 2021 04:43) (63 - 73)  BP: 151/65 (13 Jun 2021 04:43) (119/57 - 163/70)  BP(mean): --  RR: 18 (13 Jun 2021 04:43) (18 - 18)  SpO2: 96% (13 Jun 2021 04:43) (96% - 98%)  I&O's Summary    11 Jun 2021 07:01  -  12 Jun 2021 07:00  --------------------------------------------------------  IN: 403 mL / OUT: 1100 mL / NET: -697 mL    12 Jun 2021 07:01  -  13 Jun 2021 06:26  --------------------------------------------------------  IN: 840 mL / OUT: 500 mL / NET: 340 mL      Physical Exam:  General: No acute distress, resting comfortably in bed  C/V: normal sinus rhythm  Pulm: Nonlabored breathing, no respiratory distress  Abd: soft, non-tender, non-distended. Left groin with large ecchymotic changes extending to flank, soft, no appreciable pulsatile mass.   Extrem: warm and well perfused, no edema. RLE with biphasic DP and PT signals. Triphasic AT signals.       LABS:                    8.1    4.16  )-----------( 75       ( 12 Jun 2021 18:28 )             25.7     06-12    135  |  105  |  89<H>  ----------------------------<  90  4.4   |  15<L>  |  4.28<H>    Ca    10.3      12 Jun 2021 07:47  Phos  7.5     06-12  Mg     2.4     06-12    TPro  7.3  /  Alb  3.6  /  TBili  0.3  /  DBili  x   /  AST  14  /  ALT  9<L>  /  AlkPhos  46  06-11    CAPILLARY BLOOD GLUCOSE  POCT Blood Glucose.: 101 mg/dL (12 Jun 2021 21:59)  POCT Blood Glucose.: 158 mg/dL (12 Jun 2021 17:19)  POCT Blood Glucose.: 113 mg/dL (12 Jun 2021 12:14)  POCT Blood Glucose.: 115 mg/dL (12 Jun 2021 06:43)    PT/INR - ( 11 Jun 2021 09:12 )   PT: 16.6 sec;   INR: 1.41          PTT - ( 12 Jun 2021 07:45 )  PTT:38.3 sec  COVID-19 PCR: Negative (10 Diego 2021 17:42)

## 2021-06-13 NOTE — PROGRESS NOTE ADULT - SUBJECTIVE AND OBJECTIVE BOX
O/N and interval events: None    HPI:  This is a 88 yo Russain speaking female with anemia of chronic disease, MDS and CLL, CKD stage V, Type II DM c/b nephropathy, HFpEF, hypertension, hyperlipidemia, CAD, hypothyroidism who presented to office with worsening rest pain and exertional RLE pain, admitted for angio 6/11.  Started on hepgtt in ED and cards and renal consulted     Subjective:  Pt reports improved pain in her RLE, feels that her RLE has improved blood flow, no chest pain/sob, no n/v/diarrhea/f/c     12 point ROS reviewed and negative except as otherwise stated in HPI and below    PAST MEDICAL & SURGICAL HISTORY:    Anemia of chronic disease (extensive workup outpt) and CLL and MDS- followed here at [facility] by Dr. Alcantara recent visit 6/3, extensive outpt GI workup in past    Stage 5 chronic kidney disease not on chronic dialysis- follows with liss here     Diabetes    Diabetes mellitus with diabetic neuropathy    Chronic diastolic congestive heart failure    Hypertension    Hyperlipidemia    CAD (coronary artery disease)    Hypothyroidism    Pacemaker      SOCIAL HISTORY:  Lives alone  HHA 10h/d   No toxic habits    FAMILY HISTORY: no hx of CVA/MI     ALLERGIES:  No Known Allergies      HOME MEDICATIONS:  Med list obtained from patient by primary team    MEDICATIONS  (STANDING):  aspirin  chewable 81 milliGRAM(s) Oral daily  atorvastatin 20 milliGRAM(s) Oral at bedtime  carvedilol 25 milliGRAM(s) Oral every 12 hours  ceFAZolin   IVPB 1000 milliGRAM(s) IV Intermittent every 8 hours  clopidogrel Tablet 75 milliGRAM(s) Oral daily  dextrose 40% Gel 15 Gram(s) Oral once  dextrose 5%. 1000 milliLiter(s) (50 mL/Hr) IV Continuous <Continuous>  dextrose 5%. 1000 milliLiter(s) (100 mL/Hr) IV Continuous <Continuous>  dextrose 50% Injectable 25 Gram(s) IV Push once  dextrose 50% Injectable 12.5 Gram(s) IV Push once  dextrose 50% Injectable 25 Gram(s) IV Push once  famotidine    Tablet 20 milliGRAM(s) Oral daily  glucagon  Injectable 1 milliGRAM(s) IntraMuscular once  insulin lispro (ADMELOG) corrective regimen sliding scale   SubCutaneous Before meals and at bedtime  levothyroxine 75 MICROGram(s) Oral daily  NIFEdipine XL 60 milliGRAM(s) Oral daily  repaglinide 0.5 milliGRAM(s) Oral two times a day before meals  senna 2 Tablet(s) Oral at bedtime  sodium bicarbonate 650 milliGRAM(s) Oral every 12 hours  torsemide 20 milliGRAM(s) Oral every 24 hours  torsemide 40 milliGRAM(s) Oral every 24 hours    MEDICATIONS  (PRN):      Vital Signs Last 24 Hrs  T(C): 35.7 (13 Jun 2021 18:13), Max: 36.4 (12 Jun 2021 21:42)  T(F): 96.3 (13 Jun 2021 18:13), Max: 97.6 (12 Jun 2021 21:42)  HR: 60 (13 Jun 2021 18:35) (58 - 70)  BP: 135/60 (13 Jun 2021 18:35) (103/55 - 163/70)  BP(mean): --  RR: 18 (13 Jun 2021 18:35) (18 - 18)  SpO2: 95% (13 Jun 2021 18:35) (95% - 98%)    PHYSICAL EXAM:  GENERAL: pleasant, NAD  NEURO: AOx3, intact strength and sensation UE and LE  HEAD:  Atraumatic, Normocephalic  EYES: EOMI, conjunctiva and sclera clear  ENMT: Clear op, good dentition   CHEST/LUNG: Clear to auscultation bilaterally; No rales, rhonchi, wheezing, or rubs  HEART: Regular rate and rhythm; S1/S2, No murmurs, rubs, or gallops  ABDOMEN: Soft, Nontender, Nondistended, Normal BS, ecchymoses at groin  EXT: No sig le edema, RLE TTP  Psych: normal affect    LABS:                                   8.4    4.53  )-----------( 61       ( 13 Jun 2021 11:52 )             26.5   06-13    134<L>  |  102  |  93<H>  ----------------------------<  53<LL>  4.2   |  15<L>  |  4.73<H>    Ca    10.1      13 Jun 2021 08:10  Phos  6.7     06-13  Mg     2.4     06-13    TPro  5.6<L>  /  Alb  x   /  TBili  x   /  DBili  x   /  AST  x   /  ALT  x   /  AlkPhos  x   06-12          COVID-19 PCR: Negative (06-10-21 @ 17:42)      RADIOLOGY & ADDITIONAL TESTS:  EKG NSR qtc 497  CXR largely wnl    ASSESSMENT AND PLAN:  88 yo Russain speaking female with anemia of chronic disease, MDS and CLL previously on Revlimid, CKD stage V, Type II DM c/b nephropathy, HFpEF, hypertension, hyperlipidemia, CAD, hypothyroidism who presented with worsening rest pain and exertional RLE pain, admitted for angio 6/11, renal and cards following periop  #Critical leg ischemia s/p angio on 6/11 w/ stent placement at [facility] RLE-c/w asa/statin, periop abx per vascular   #Anemia - Acute decrease from 8.7 --> 6.7, vascular does not suspect bleed at this time, pt to receive 1 unit pRBC - repeat CBC  #Thrombocytopenia - baseline thrombocytopenia in setting of known CKD, acutely worsened w/ drop in Hgb; no need for transfusion at this time  #Stage IV CKD- f/u renal recs- start bicarb q12 given AGA, torsemide restarted given 1 unit pRBC  #CLL and MDS hx, previously on Revlimid c/b low plts in 80's- f/u any heme recs, trend Hg/plts (stable)  #Type II DM a1c 4.9- favor ISS    #HFpEF- appreciate cards recs, euvolemic today but monitor s/p fluids periop, restart diuresis postop  #HTN- pt normotensive, c/w home coreg, nefidipine with hold parameters   Hypothyroidism- c/w home synthroid 75mcg    #DVT px:   #Diet: DM diet   #Dispo:     Patient was seen and examined by me at bedside               O/N and interval events: None    HPI:  This is a 88 yo Russain speaking female with anemia of chronic disease, MDS and CLL, CKD stage V, Type II DM c/b nephropathy, HFpEF, hypertension, hyperlipidemia, CAD, hypothyroidism who presented to office with worsening rest pain and exertional RLE pain, admitted for angio 6/11.  Started on hepgtt in ED and cards and renal consulted     Subjective:  Pt reports improved pain in her RLE, feels that her RLE has improved blood flow, no chest pain/sob, no n/v/diarrhea/f/c     12 point ROS reviewed and negative except as otherwise stated in HPI and below    PAST MEDICAL & SURGICAL HISTORY:    Anemia of chronic disease (extensive workup outpt) and CLL and MDS- followed here at [facility] by Dr. Alcantara recent visit 6/3, extensive outpt GI workup in past    Stage 5 chronic kidney disease not on chronic dialysis- follows with liss here     Diabetes    Diabetes mellitus with diabetic neuropathy    Chronic diastolic congestive heart failure    Hypertension    Hyperlipidemia    CAD (coronary artery disease)    Hypothyroidism    Pacemaker      SOCIAL HISTORY:  Lives alone  HHA 10h/d   No toxic habits    FAMILY HISTORY: no hx of CVA/MI     ALLERGIES:  No Known Allergies      HOME MEDICATIONS:  Med list obtained from patient by primary team    MEDICATIONS  (STANDING):  aspirin  chewable 81 milliGRAM(s) Oral daily  atorvastatin 20 milliGRAM(s) Oral at bedtime  carvedilol 25 milliGRAM(s) Oral every 12 hours  ceFAZolin   IVPB 1000 milliGRAM(s) IV Intermittent every 8 hours  clopidogrel Tablet 75 milliGRAM(s) Oral daily  dextrose 40% Gel 15 Gram(s) Oral once  dextrose 5%. 1000 milliLiter(s) (50 mL/Hr) IV Continuous <Continuous>  dextrose 5%. 1000 milliLiter(s) (100 mL/Hr) IV Continuous <Continuous>  dextrose 50% Injectable 25 Gram(s) IV Push once  dextrose 50% Injectable 12.5 Gram(s) IV Push once  dextrose 50% Injectable 25 Gram(s) IV Push once  famotidine    Tablet 20 milliGRAM(s) Oral daily  glucagon  Injectable 1 milliGRAM(s) IntraMuscular once  insulin lispro (ADMELOG) corrective regimen sliding scale   SubCutaneous Before meals and at bedtime  levothyroxine 75 MICROGram(s) Oral daily  NIFEdipine XL 60 milliGRAM(s) Oral daily  repaglinide 0.5 milliGRAM(s) Oral two times a day before meals  senna 2 Tablet(s) Oral at bedtime  sodium bicarbonate 650 milliGRAM(s) Oral every 12 hours  torsemide 20 milliGRAM(s) Oral every 24 hours  torsemide 40 milliGRAM(s) Oral every 24 hours    MEDICATIONS  (PRN):      Vital Signs Last 24 Hrs  T(C): 35.7 (13 Jun 2021 18:13), Max: 36.4 (12 Jun 2021 21:42)  T(F): 96.3 (13 Jun 2021 18:13), Max: 97.6 (12 Jun 2021 21:42)  HR: 60 (13 Jun 2021 18:35) (58 - 70)  BP: 135/60 (13 Jun 2021 18:35) (103/55 - 163/70)  BP(mean): --  RR: 18 (13 Jun 2021 18:35) (18 - 18)  SpO2: 95% (13 Jun 2021 18:35) (95% - 98%)    PHYSICAL EXAM:  GENERAL: pleasant, NAD  NEURO: AOx3, intact strength and sensation UE and LE  HEAD:  Atraumatic, Normocephalic  EYES: EOMI, conjunctiva and sclera clear  ENMT: Clear op, good dentition   CHEST/LUNG: Clear to auscultation bilaterally; No rales, rhonchi, wheezing, or rubs  HEART: Regular rate and rhythm; S1/S2, No murmurs, rubs, or gallops  ABDOMEN: Soft, Nontender, Nondistended, Normal BS, ecchymoses at groin  EXT: No sig le edema, RLE TTP  Psych: normal affect    LABS:                                   8.4    4.53  )-----------( 61       ( 13 Jun 2021 11:52 )             26.5   06-13    134<L>  |  102  |  93<H>  ----------------------------<  53<LL>  4.2   |  15<L>  |  4.73<H>    Ca    10.1      13 Jun 2021 08:10  Phos  6.7     06-13  Mg     2.4     06-13    TPro  5.6<L>  /  Alb  x   /  TBili  x   /  DBili  x   /  AST  x   /  ALT  x   /  AlkPhos  x   06-12          COVID-19 PCR: Negative (06-10-21 @ 17:42)      RADIOLOGY & ADDITIONAL TESTS:  EKG NSR qtc 497  CXR largely wnl    ASSESSMENT AND PLAN:  88 yo Russain speaking female with anemia of chronic disease, MDS and CLL previously on Revlimid, CKD stage V, Type II DM c/b nephropathy, HFpEF, hypertension, hyperlipidemia, CAD, hypothyroidism who presented with worsening rest pain and exertional RLE pain, admitted for angio 6/11, renal and cards following periop  #Critical leg ischemia s/p angio on 6/11 w/ stent placement at S[facility] RLE-c/w asa/statin, periop abx per vascular   #Hypoglycemia - pt w/ episode of relative hypoglycemia this AM, would hold prandin - discuss need on d/c  #Anemia - Acute decrease from 8.7 --> 6.7, vascular does not suspect bleed at this time, pt to receive 1 unit pRBC - repeat CBC  #Thrombocytopenia - baseline thrombocytopenia in setting of known CKD, acutely worsened w/ drop in Hgb; no need for transfusion at this time  #Stage IV CKD- f/u renal recs- start bicarb q12 given AGA, torsemide restarted given 1 unit pRBC  #CLL and MDS hx, previously on Revlimid c/b low plts in 80's- f/u any heme recs, trend Hg/plts (stable)  #Type II DM a1c 4.9- favor ISS    #HFpEF- appreciate cards recs, euvolemic today but monitor s/p fluids periop, restart diuresis postop  #HTN- pt normotensive, c/w home coreg, nefidipine with hold parameters   Hypothyroidism- c/w home synthroid 75mcg    #DVT px:   #Diet: DM diet   #Dispo:     Patient was seen and examined by me at bedside               O/N and interval events: None    HPI:  This is a 88 yo Russain speaking female with anemia of chronic disease, MDS and CLL, CKD stage V, Type II DM c/b nephropathy, HFpEF, hypertension, hyperlipidemia, CAD, hypothyroidism who presented to office with worsening rest pain and exertional RLE pain, admitted for angio 6/11.  Started on hepgtt in ED and cards and renal consulted     Subjective:  Pt reports improved pain in her RLE, feels that her RLE has improved blood flow, no chest pain/sob, no n/v/diarrhea/f/c     12 point ROS reviewed and negative except as otherwise stated in HPI and below    PAST MEDICAL & SURGICAL HISTORY:    Anemia of chronic disease (extensive workup outpt) and CLL and MDS- followed here at [facility] by Dr. Alcantara recent visit 6/3, extensive outpt GI workup in past    Stage 5 chronic kidney disease not on chronic dialysis- follows with liss here     Diabetes    Diabetes mellitus with diabetic neuropathy    Chronic diastolic congestive heart failure    Hypertension    Hyperlipidemia    CAD (coronary artery disease)    Hypothyroidism    Pacemaker      SOCIAL HISTORY:  Lives alone  HHA 10h/d   No toxic habits    FAMILY HISTORY: no hx of CVA/MI     ALLERGIES:  No Known Allergies      HOME MEDICATIONS:  Med list obtained from patient by primary team    MEDICATIONS  (STANDING):  aspirin  chewable 81 milliGRAM(s) Oral daily  atorvastatin 20 milliGRAM(s) Oral at bedtime  carvedilol 25 milliGRAM(s) Oral every 12 hours  ceFAZolin   IVPB 1000 milliGRAM(s) IV Intermittent every 8 hours  clopidogrel Tablet 75 milliGRAM(s) Oral daily  dextrose 40% Gel 15 Gram(s) Oral once  dextrose 5%. 1000 milliLiter(s) (50 mL/Hr) IV Continuous <Continuous>  dextrose 5%. 1000 milliLiter(s) (100 mL/Hr) IV Continuous <Continuous>  dextrose 50% Injectable 25 Gram(s) IV Push once  dextrose 50% Injectable 12.5 Gram(s) IV Push once  dextrose 50% Injectable 25 Gram(s) IV Push once  famotidine    Tablet 20 milliGRAM(s) Oral daily  glucagon  Injectable 1 milliGRAM(s) IntraMuscular once  insulin lispro (ADMELOG) corrective regimen sliding scale   SubCutaneous Before meals and at bedtime  levothyroxine 75 MICROGram(s) Oral daily  NIFEdipine XL 60 milliGRAM(s) Oral daily  repaglinide 0.5 milliGRAM(s) Oral two times a day before meals  senna 2 Tablet(s) Oral at bedtime  sodium bicarbonate 650 milliGRAM(s) Oral every 12 hours  torsemide 20 milliGRAM(s) Oral every 24 hours  torsemide 40 milliGRAM(s) Oral every 24 hours    MEDICATIONS  (PRN):      Vital Signs Last 24 Hrs  T(C): 35.7 (13 Jun 2021 18:13), Max: 36.4 (12 Jun 2021 21:42)  T(F): 96.3 (13 Jun 2021 18:13), Max: 97.6 (12 Jun 2021 21:42)  HR: 60 (13 Jun 2021 18:35) (58 - 70)  BP: 135/60 (13 Jun 2021 18:35) (103/55 - 163/70)  BP(mean): --  RR: 18 (13 Jun 2021 18:35) (18 - 18)  SpO2: 95% (13 Jun 2021 18:35) (95% - 98%)    PHYSICAL EXAM:  GENERAL: pleasant, NAD  NEURO: AOx3, intact strength and sensation UE and LE  HEAD:  Atraumatic, Normocephalic  EYES: EOMI, conjunctiva and sclera clear  ENMT: Clear op, good dentition   CHEST/LUNG: Clear to auscultation bilaterally; No rales, rhonchi, wheezing, or rubs  HEART: Regular rate and rhythm; S1/S2, No murmurs, rubs, or gallops  ABDOMEN: Soft, Nontender, Nondistended, Normal BS, ecchymoses at groin  EXT: No sig le edema, RLE TTP  Psych: normal affect    LABS:                                   8.4    4.53  )-----------( 61       ( 13 Jun 2021 11:52 )             26.5   06-13    134<L>  |  102  |  93<H>  ----------------------------<  53<LL>  4.2   |  15<L>  |  4.73<H>    Ca    10.1      13 Jun 2021 08:10  Phos  6.7     06-13  Mg     2.4     06-13    TPro  5.6<L>  /  Alb  x   /  TBili  x   /  DBili  x   /  AST  x   /  ALT  x   /  AlkPhos  x   06-12          COVID-19 PCR: Negative (06-10-21 @ 17:42)      RADIOLOGY & ADDITIONAL TESTS:  EKG NSR qtc 497  CXR largely wnl    ASSESSMENT AND PLAN:  88 yo Russain speaking female with anemia of chronic disease, MDS and CLL previously on Revlimid, CKD stage V, Type II DM c/b nephropathy, HFpEF, hypertension, hyperlipidemia, CAD, hypothyroidism who presented with worsening rest pain and exertional RLE pain, admitted for angio 6/11, renal and cards following periop  #Critical leg ischemia s/p angio on 6/11 w/ stent placement at SFA RLE-c/w asa/statin, periop abx per vascular   #Hypoglycemia - pt w/ episode of relative hypoglycemia this AM, would hold prandin - discuss need on d/c  #Anemia - Decreased again today although vitals have remained stable repeat CBC showing increase back to ~ 8, CT A/P performed w/o evidence of acute bleed  #Thrombocytopenia - baseline thrombocytopenia in setting of known CKD, acutely worsened w/ drop in Hgb; no need for transfusion at this time  #Stage IV CKD- f/u renal recs- start bicarb q12 given AGA, torsemide restarted given 1 unit pRBC  #CLL and MDS hx, previously on Revlimid c/b low plts in 80's- f/u any heme recs, trend Hg/plts (stable)  #Type II DM a1c 4.9- favor ISS    #HFpEF- appreciate cards recs, euvolemic today but monitor s/p fluids periop, restart diuresis postop  #HTN- pt normotensive, c/w home coreg, nefidipine with hold parameters   Hypothyroidism- c/w home synthroid 75mcg    #DVT px:   #Diet: DM diet   #Dispo:     Patient was seen and examined by me at bedside

## 2021-06-13 NOTE — PHYSICAL THERAPY INITIAL EVALUATION ADULT - PERTINENT HX OF CURRENT PROBLEM, REHAB EVAL
Pt is an 89 yo female presenting with critical limb ischemia now s/p RLE Angio with stents to SFA and below knee popliteal art. via L CFA access (6/11/21). Postop course notable for acute blood loss anemia and dependent ecchomysis extending from L ASIS, L labial fold to L thigh proximal to knee.

## 2021-06-13 NOTE — PHYSICAL THERAPY INITIAL EVALUATION ADULT - GAIT DEVIATIONS NOTED, PT EVAL
slightly increased lateral sway, no lose of balance, decreased heel strike and hip flexion bilaterally/decreased colten/increased time in double stance/decreased step length

## 2021-06-13 NOTE — PROGRESS NOTE ADULT - SUBJECTIVE AND OBJECTIVE BOX
Patient is a 89y Female seen and evaluated at bedside. Stable CKD V, to follow with Dr. Lerma on dc. No evidence of EMI, can resume home diuretics torsemide 40mg qAM and 20mg qHS.     Meds:    aspirin  chewable 81 daily  atorvastatin 20 at bedtime  carvedilol 25 every 12 hours  ceFAZolin   IVPB 1000 every 8 hours  clopidogrel Tablet 75 daily  dextrose 40% Gel 15 once  dextrose 5%. 1000 <Continuous>  dextrose 5%. 1000 <Continuous>  dextrose 50% Injectable 25 once  dextrose 50% Injectable 12.5 once  dextrose 50% Injectable 25 once  famotidine    Tablet 20 daily  glucagon  Injectable 1 once  insulin lispro (ADMELOG) corrective regimen sliding scale  Before meals and at bedtime  levothyroxine 75 daily  NIFEdipine XL 60 daily  repaglinide 0.5 two times a day before meals  senna 2 at bedtime  sodium bicarbonate 650 every 12 hours  torsemide 20 every 24 hours  torsemide 40 every 24 hours      T(C): , Max: 36.8 (21 @ 13:49)  T(F): , Max: 98.2 (21 @ 13:49)  HR: 62 (21 @ 10:08)  BP: 112/53 (21 @ 10:08)  BP(mean): --  RR: 18 (21 @ 08:00)  SpO2: 95% (21 @ 08:00)  Wt(kg): --     @ 07:01  -   @ 07:00  --------------------------------------------------------  IN: 840 mL / OUT: 500 mL / NET: 340 mL     @ 07:01  -   @ 12:38  --------------------------------------------------------  IN: 180 mL / OUT: 150 mL / NET: 30 mL      Review of Systems:  RESPIRATORY: No shortness of breath  CARDIOVASCULAR: No chest pain  MUSCULOSKELETAL: No leg oedema    PHYSICAL EXAM:  GENERAL: well-developed, well nourished, alert, no acute distress at present on RA  CHEST/LUNG: Clear to auscultation bilaterally  HEART: normal S1S2, RRR  ABDOMEN: Soft, Nontender, non distended  EXTREMITIES: trace oedema, RLE tenderness    LABS:                        8.4    4.53  )-----------( 61       ( 2021 11:52 )             26.5     -13    134<L>  |  102  |  93<H>  ----------------------------<  53<LL>  4.2   |  15<L>  |  4.73<H>    Ca    10.1      2021 08:10  Phos  6.7       Mg     2.4           [place]/Lambda Free Light Chain Ratio, Serum: 1.09 Ratio [0.26 - 1.65] ( @ 14:43)    PTT - ( 2021 07:45 )  PTT:38.3 sec  Urinalysis Basic - ( 2021 02:48 )    Color: Yellow / Appearance: Clear / S.010 / pH: x  Gluc: x / Ketone: NEGATIVE  / Bili: Negative / Urobili: 0.2 E.U./dL   Blood: x / Protein: NEGATIVE mg/dL / Nitrite: NEGATIVE   Leuk Esterase: NEGATIVE / RBC: x / WBC x   Sq Epi: x / Non Sq Epi: x / Bacteria: x      Chloride, Random Urine: 22 mmol/L ( @ 02:47)  Sodium, Random Urine: 36 mmol/L ( @ 02:47)  Potassium, Random Urine: 26 mmol/L ( @ 02:47)  Osmolality, Random Urine: 385 mosm/kg ( @ 02:47)        RADIOLOGY & ADDITIONAL STUDIES:

## 2021-06-13 NOTE — PROVIDER CONTACT NOTE (CRITICAL VALUE NOTIFICATION) - ASSESSMENT
Pts POCT FS was 68 @6:42, OJ was given and repeat FS was 86. Possibly due to labs being drawn at this time, glucose resulted low.

## 2021-06-13 NOTE — PROGRESS NOTE ADULT - ASSESSMENT
89 yo Russain speaking female with anemia of chronic disease, CKD stage V, Diabetes, Diabetic nephropathy, Chronic diastolic CHF, hypertension, hyperlipidemia, CAD, hypothyroidism presenting with critical limb ischemia now s/p RLE Angio with stents to SFA and below knee popliteal art. via L CFA access (6/11/21). Postop course notable for acute blood loss anemia and dependent ecchomysis extending from L ASIS, L labial fold to L thigh proximal to knee.       Neurovascular  -No delirium, pain well managed on current regimen  -C/w PRNs for Pain control  -Monitor neuro status    Respiratory  -Saturates well on RA     -Encourage IS 10x/hour while awake, Cough and deep breathing exercises  -Monitor respiratory status via SpO2    Cardiovascular  -C/o telemetry   -Goal Hgb >8  -Cardiology c/s for preop management- rec c/w high dose statin and antihypertensives.     GI  -C/w DASH diet  -Prophylaxis: Protonix  -C/w bowel regimen    /Renal   -Mcgrath in place post op retention  -Trend Cr on AM labs  -Replete electrolytes as needed  -Renal consulted; Dr. Lerma following   -Daily weights  -NaBicarb 650mg BID  -Strict I/Os    ID  -Afebrile, asymptomatic  -WBC 2.46  - C/w periop ancef    Endocrine  -A1C: 4.9  -no h/o DM    Hematologic  -H/H: 6.7 (8.7)  -Transfuse 1U pRBC  - Hold hep gtt  - F/u stat duplex LLE  -Trend plts  -CBC, chem in AM  - C/w ASA and Plavix  -Heme/onc consulted  89 yo Russain speaking female with anemia of chronic disease, CKD stage V, Diabetes, Diabetic nephropathy, Chronic diastolic CHF, hypertension, hyperlipidemia, CAD, hypothyroidism presenting with critical limb ischemia now s/p RLE Angio with stents to SFA and below knee popliteal art. via L CFA access (6/11/21). Postop course notable for acute blood loss anemia and dependent ecchomysis extending from L ASIS, L labial fold to L thigh proximal to knee.       Neurovascular  -No delirium, pain well managed on current regimen  -C/w PRNs for Pain control  -Monitor neuro status    Respiratory  -Saturates well on RA     -Encourage IS 10x/hour while awake, Cough and deep breathing exercises  -Monitor respiratory status via SpO2    Cardiovascular  -C/o telemetry   -Goal Hgb >8  -Cardiology c/s rec c/w high dose statin and antihypertensives.     GI  -C/w DASH diet  -Prophylaxis: Protonix  -C/w bowel regimen    /Renal   -Mcgrath in place post op retention  -Trend Cr on AM labs  -Replete electrolytes as needed  -Renal consulted; Dr. Lerma following   -Restarting Torsemide 40mg in AM and 20mg in PM  -Daily weights  -NaBicarb 650mg BID  -Strict I/Os    ID  -Afebrile, asymptomatic  -WBC 2.46  - C/w periop ancef    Endocrine  -A1C: 3.66  -no h/o DM    Hematologic  -H/H: 7.2  -Obtain CT abdomen/pelvis noncontrast  -Trend plts  -CBC, chem in AM  - C/w ASA and Plavix  -Heme/onc consulted     PT: Home with home PT vs [facility]

## 2021-06-13 NOTE — PHYSICAL THERAPY INITIAL EVALUATION ADULT - GENERAL OBSERVATIONS, REHAB EVAL
Pt received OOB sitting in the chair, +hep-lock, +EKG, NAD. Pt left as found, NAD, call nicole in reach, FATOU reno

## 2021-06-13 NOTE — PHYSICAL THERAPY INITIAL EVALUATION ADULT - ADDITIONAL COMMENTS
Pt lives alone in an apt with elevator, denies stairs. Prior to admission, pt ambulated with rollator. Pt has [facility] from 9am -7pm x 7 days a week

## 2021-06-13 NOTE — PROVIDER CONTACT NOTE (CRITICAL VALUE NOTIFICATION) - BACKGROUND
see provider note
Pt s/p angiogram of R leg, L groin access with hematoma during recovery, resolved prior to arrival on unit

## 2021-06-13 NOTE — PROVIDER CONTACT NOTE (CRITICAL VALUE NOTIFICATION) - SITUATION
Hemoglobin reported at 6.7
Glucose level from [facility] lab draw this AM resulting 53.

## 2021-06-13 NOTE — PROVIDER CONTACT NOTE (CRITICAL VALUE NOTIFICATION) - ACTION/TREATMENT ORDERED:
STAT repeat CBC
Rechecked POCT and FS was 109 @ 9:04, pt asymptomatic. Currently, pt is eating her breakfast. Holding AM prandin dose. RENE Nieves made aware. Continue to monitor.

## 2021-06-13 NOTE — PROGRESS NOTE ADULT - NUTRITIONAL ASSESSMENT
Diet, DASH/TLC:   Sodium & Cholesterol Restricted  Consistent Carbohydrate {No Snacks} (CSTCHO) (06-10-21 @ 18:08) [Active]

## 2021-06-13 NOTE — PHYSICAL THERAPY INITIAL EVALUATION ADULT - IMPAIRED TRANSFERS: SIT/STAND, REHAB EVAL
requires VCs to lock the rollator prior Sit<--> stand/impaired balance/impaired postural control/decreased strength

## 2021-06-13 NOTE — PROGRESS NOTE ADULT - ASSESSMENT
88 yo Chadian speaking female with PMHx CKD stage 5, diabetic nephropathy, anemia of chronic disease, chronic diastolic CHF, hypertension, hyperlipidemia, CAD, hypothyroidism admitted for right lower extremity pain with plan for heparin drip now s/p RLE angiogram. Nephrology consulted for recommendations.     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

## 2021-06-14 LAB
% ALBUMIN: 50.6 % — SIGNIFICANT CHANGE UP
% ALPHA 1: 7 % — SIGNIFICANT CHANGE UP
% ALPHA 2: 14.7 % — SIGNIFICANT CHANGE UP
% BETA: 9.4 % — SIGNIFICANT CHANGE UP
% GAMMA: 18.3 % — SIGNIFICANT CHANGE UP
% M SPIKE: 3.2 % — SIGNIFICANT CHANGE UP
ALBUMIN SERPL ELPH-MCNC: 2.8 G/DL — LOW (ref 3.6–5.5)
ALBUMIN/GLOB SERPL ELPH: 1 RATIO — SIGNIFICANT CHANGE UP
ALPHA1 GLOB SERPL ELPH-MCNC: 0.4 G/DL — SIGNIFICANT CHANGE UP (ref 0.1–0.4)
ALPHA2 GLOB SERPL ELPH-MCNC: 0.8 G/DL — SIGNIFICANT CHANGE UP (ref 0.5–1)
ANION GAP SERPL CALC-SCNC: 18 MMOL/L — HIGH (ref 5–17)
B-GLOBULIN SERPL ELPH-MCNC: 0.5 G/DL — SIGNIFICANT CHANGE UP (ref 0.5–1)
BUN SERPL-MCNC: 96 MG/DL — HIGH (ref 7–23)
CALCIUM SERPL-MCNC: 10 MG/DL — SIGNIFICANT CHANGE UP (ref 8.4–10.5)
CHLORIDE SERPL-SCNC: 99 MMOL/L — SIGNIFICANT CHANGE UP (ref 96–108)
CO2 SERPL-SCNC: 15 MMOL/L — LOW (ref 22–31)
CREAT SERPL-MCNC: 5.42 MG/DL — HIGH (ref 0.5–1.3)
GAMMA GLOBULIN: 1 G/DL — SIGNIFICANT CHANGE UP (ref 0.6–1.6)
GLUCOSE BLDC GLUCOMTR-MCNC: 122 MG/DL — HIGH (ref 70–99)
GLUCOSE BLDC GLUCOMTR-MCNC: 130 MG/DL — HIGH (ref 70–99)
GLUCOSE BLDC GLUCOMTR-MCNC: 172 MG/DL — HIGH (ref 70–99)
GLUCOSE BLDC GLUCOMTR-MCNC: 82 MG/DL — SIGNIFICANT CHANGE UP (ref 70–99)
GLUCOSE SERPL-MCNC: 74 MG/DL — SIGNIFICANT CHANGE UP (ref 70–99)
HCT VFR BLD CALC: 24.5 % — LOW (ref 34.5–45)
HGB BLD-MCNC: 7.8 G/DL — LOW (ref 11.5–15.5)
IGE SERPL-ACNC: 395 KU/L — HIGH
INTERPRETATION SERPL IFE-IMP: SIGNIFICANT CHANGE UP
M-SPIKE: 0.2 G/DL — HIGH (ref 0–0)
MAGNESIUM SERPL-MCNC: 2.4 MG/DL — SIGNIFICANT CHANGE UP (ref 1.6–2.6)
MCHC RBC-ENTMCNC: 31.8 GM/DL — LOW (ref 32–36)
MCHC RBC-ENTMCNC: 33.8 PG — SIGNIFICANT CHANGE UP (ref 27–34)
MCV RBC AUTO: 106.1 FL — HIGH (ref 80–100)
NRBC # BLD: 0 /100 WBCS — SIGNIFICANT CHANGE UP (ref 0–0)
PHOSPHATE SERPL-MCNC: 6.9 MG/DL — HIGH (ref 2.5–4.5)
PLATELET # BLD AUTO: 61 K/UL — LOW (ref 150–400)
POTASSIUM SERPL-MCNC: 4.9 MMOL/L — SIGNIFICANT CHANGE UP (ref 3.5–5.3)
POTASSIUM SERPL-SCNC: 4.9 MMOL/L — SIGNIFICANT CHANGE UP (ref 3.5–5.3)
PROT PATTERN SERPL ELPH-IMP: SIGNIFICANT CHANGE UP
RBC # BLD: 2.31 M/UL — LOW (ref 3.8–5.2)
RBC # FLD: 22 % — HIGH (ref 10.3–14.5)
SODIUM SERPL-SCNC: 132 MMOL/L — LOW (ref 135–145)
WBC # BLD: 4.2 K/UL — SIGNIFICANT CHANGE UP (ref 3.8–10.5)
WBC # FLD AUTO: 4.2 K/UL — SIGNIFICANT CHANGE UP (ref 3.8–10.5)

## 2021-06-14 PROCEDURE — 99233 SBSQ HOSP IP/OBS HIGH 50: CPT

## 2021-06-14 PROCEDURE — 99232 SBSQ HOSP IP/OBS MODERATE 35: CPT

## 2021-06-14 PROCEDURE — 99233 SBSQ HOSP IP/OBS HIGH 50: CPT | Mod: GC

## 2021-06-14 RX ORDER — SODIUM BICARBONATE 1 MEQ/ML
1300 SYRINGE (ML) INTRAVENOUS EVERY 12 HOURS
Refills: 0 | Status: DISCONTINUED | OUTPATIENT
Start: 2021-06-14 | End: 2021-06-15

## 2021-06-14 RX ORDER — SEVELAMER CARBONATE 2400 MG/1
800 POWDER, FOR SUSPENSION ORAL EVERY 8 HOURS
Refills: 0 | Status: DISCONTINUED | OUTPATIENT
Start: 2021-06-14 | End: 2021-06-15

## 2021-06-14 RX ORDER — ATORVASTATIN CALCIUM 80 MG/1
80 TABLET, FILM COATED ORAL AT BEDTIME
Refills: 0 | Status: DISCONTINUED | OUTPATIENT
Start: 2021-06-14 | End: 2021-06-15

## 2021-06-14 RX ADMIN — Medication 60 MILLIGRAM(S): at 07:00

## 2021-06-14 RX ADMIN — Medication 1300 MILLIGRAM(S): at 19:33

## 2021-06-14 RX ADMIN — Medication 100 MILLIGRAM(S): at 19:33

## 2021-06-14 RX ADMIN — Medication 100 MILLIGRAM(S): at 04:23

## 2021-06-14 RX ADMIN — FAMOTIDINE 20 MILLIGRAM(S): 10 INJECTION INTRAVENOUS at 11:27

## 2021-06-14 RX ADMIN — SEVELAMER CARBONATE 800 MILLIGRAM(S): 2400 POWDER, FOR SUSPENSION ORAL at 22:02

## 2021-06-14 RX ADMIN — Medication 75 MICROGRAM(S): at 07:00

## 2021-06-14 RX ADMIN — Medication 650 MILLIGRAM(S): at 07:00

## 2021-06-14 RX ADMIN — CLOPIDOGREL BISULFATE 75 MILLIGRAM(S): 75 TABLET, FILM COATED ORAL at 11:28

## 2021-06-14 RX ADMIN — Medication 81 MILLIGRAM(S): at 11:27

## 2021-06-14 RX ADMIN — ATORVASTATIN CALCIUM 80 MILLIGRAM(S): 80 TABLET, FILM COATED ORAL at 21:52

## 2021-06-14 RX ADMIN — Medication 100 MILLIGRAM(S): at 11:29

## 2021-06-14 RX ADMIN — CARVEDILOL PHOSPHATE 25 MILLIGRAM(S): 80 CAPSULE, EXTENDED RELEASE ORAL at 11:28

## 2021-06-14 RX ADMIN — Medication 2: at 11:28

## 2021-06-14 RX ADMIN — SENNA PLUS 2 TABLET(S): 8.6 TABLET ORAL at 21:52

## 2021-06-14 NOTE — PROGRESS NOTE ADULT - SUBJECTIVE AND OBJECTIVE BOX
Patient is a 89y Female seen and evaluated at bedside. Creatinine elevation likely related to diuretics. Hold diuretics today, repeat BMP in AM. No indication for dialysis at this time.    Meds:    aspirin  chewable 81 daily  atorvastatin 20 at bedtime  carvedilol 25 every 12 hours  ceFAZolin   IVPB 1000 every 8 hours  clopidogrel Tablet 75 daily  dextrose 40% Gel 15 once  dextrose 5%. 1000 <Continuous>  dextrose 5%. 1000 <Continuous>  dextrose 50% Injectable 25 once  dextrose 50% Injectable 12.5 once  dextrose 50% Injectable 25 once  famotidine    Tablet 20 daily  glucagon  Injectable 1 once  insulin lispro (ADMELOG) corrective regimen sliding scale  Before meals and at bedtime  levothyroxine 75 daily  NIFEdipine XL 60 daily  senna 2 at bedtime  sodium bicarbonate 650 every 12 hours      T(C): , Max: 36.6 (06-13-21 @ 21:40)  T(F): , Max: 97.8 (06-13-21 @ 21:40)  HR: 68 (06-14-21 @ 11:23)  BP: 129/60 (06-14-21 @ 11:23)  BP(mean): 87 (06-14-21 @ 11:23)  RR: 18 (06-14-21 @ 11:23)  SpO2: 99% (06-14-21 @ 11:23)  Wt(kg): --    06-13 @ 07:01  -  06-14 @ 07:00  --------------------------------------------------------  IN: 410 mL / OUT: 505 mL / NET: -95 mL    06-14 @ 07:01  -  06-14 @ 13:19  --------------------------------------------------------  IN: 240 mL / OUT: 0 mL / NET: 240 mL          Review of Systems:  RESPIRATORY: No shortness of breath  CARDIOVASCULAR: No chest pain  MUSCULOSKELETAL: No leg oedema    PHYSICAL EXAM:  GENERAL: well-developed, well nourished, alert, no acute distress at present on RA  CHEST/LUNG: Clear to auscultation bilaterally  HEART: normal S1S2, RRR  ABDOMEN: Soft, Nontender, non distended  EXTREMITIES: trace oedema, RLE tenderness      LABS:                        7.8    4.20  )-----------( 61       ( 14 Jun 2021 06:21 )             24.5     06-14    132<L>  |  99  |  96<H>  ----------------------------<  74  4.9   |  15<L>  |  5.42<H>    Ca    10.0      14 Jun 2021 06:21  Phos  6.9     06-14  Mg     2.4     06-14    TPro  5.6<L>  /  Alb  2.8<L>  /  TBili  x   /  DBili  x   /  AST  x   /  ALT  x   /  AlkPhos  x   06-12                RADIOLOGY & ADDITIONAL STUDIES:

## 2021-06-14 NOTE — PROGRESS NOTE ADULT - SUBJECTIVE AND OBJECTIVE BOX
Subjective/Interval events:   -No events overnight  -This am when seen by me mild pain at L groin site where access for angio, otherwise stable, urinating well     MEDICATIONS  (STANDING):  aspirin  chewable 81 milliGRAM(s) Oral daily  atorvastatin 20 milliGRAM(s) Oral at bedtime  carvedilol 25 milliGRAM(s) Oral every 12 hours  ceFAZolin   IVPB 1000 milliGRAM(s) IV Intermittent every 8 hours  clopidogrel Tablet 75 milliGRAM(s) Oral daily  dextrose 40% Gel 15 Gram(s) Oral once  dextrose 5%. 1000 milliLiter(s) (50 mL/Hr) IV Continuous <Continuous>  dextrose 5%. 1000 milliLiter(s) (100 mL/Hr) IV Continuous <Continuous>  dextrose 50% Injectable 25 Gram(s) IV Push once  dextrose 50% Injectable 12.5 Gram(s) IV Push once  dextrose 50% Injectable 25 Gram(s) IV Push once  famotidine    Tablet 20 milliGRAM(s) Oral daily  glucagon  Injectable 1 milliGRAM(s) IntraMuscular once  insulin lispro (ADMELOG) corrective regimen sliding scale   SubCutaneous Before meals and at bedtime  levothyroxine 75 MICROGram(s) Oral daily  NIFEdipine XL 60 milliGRAM(s) Oral daily  senna 2 Tablet(s) Oral at bedtime  sodium bicarbonate 650 milliGRAM(s) Oral every 12 hours    Vital Signs Last 24 Hrs  T(C): 36.1 (14 Jun 2021 09:26), Max: 36.6 (13 Jun 2021 21:40)  T(F): 96.9 (14 Jun 2021 09:26), Max: 97.8 (13 Jun 2021 21:40)  HR: 68 (14 Jun 2021 11:23) (60 - 68)  BP: 129/60 (14 Jun 2021 11:23) (127/58 - 164/65)  BP(mean): 87 (14 Jun 2021 11:23) (87 - 104)  RR: 18 (14 Jun 2021 11:23) (16 - 18)  SpO2: 99% (14 Jun 2021 11:23) (95% - 100%)    PHYSICAL EXAM:  GENERAL: pleasant, NAD  NEURO: Aox3, intact strength/sensation all 4 ext   HEENT: clear op, mmm  NECK:  No JVD, no lymphadenopathy  CHEST/LUNG: Clear to auscultation bilaterally; No rales, rhonchi, wheezing. Normal work of breathing, not tachypneic  HEART: Regular rate and rhythm; No murmurs, rubs, or gallops  ABDOMEN: Soft, Nontender, Nondistended. Normoactive bowel sounds  EXTREMITIES:  L cath site with sig bruising noted, on sig le edema     LABS (reviewed)   -Hg stable in 7's, plts in 60's   -BMP creat to 5.4, bicarb remains at 15, fsg stable in 100's     CT 6/13- Stranding in the subcutaneous soft tissues of the anterior aspect of the pelvis, likely due to dispersed hemorrhage without a discrete measurable hematoma. No retroperitoneal hemorrhage    ASSESSMENT AND PLAN: 89 yo Russain speaking female with anemia of chronic disease, MDS and CLL previously on Revlimid, CKD stage V, Type II DM c/b nephropathy, HFpEF, hypertension, hyperlipidemia, CAD, hypothyroidism who presented with worsening rest pain and exertional RLE pain, s/p angio 6/11 s/p stent c/b hematoma L groin   #Critical leg ischemia s/p angio on 6/11 w/ stent placement at SFA RLE-c/w asa/statin, periop abx per vascular   #Post op acute blood loss anemia, hematoma L groin - trend Hg for now, no active bleed CT a/p  #Stage IV CKD- f/u renal recs today antony with rising creat- may be at baseline per outpt labs, bicarb remains at 15 may need to increase bicarb, holding home diuretic (consider when to restart)   #CLL and MDS hx, previously on Revlimid c/b low plts in 80's- f/u any heme recs, trend plts  #Type II DM a1c 4.9, hypoglycemia- ISS, consider holding home DM regimen on dc    #HFpEF- appreciate cards recs, euvolemic today   #HTN- pt normotensive, c/w home coreg, nefidipine with hold parameters   Hypothyroidism- c/w home synthroid 75mcg    #DVT px: off dvt px with bleeding  #Diet: DM diet   #Dispo: home with home PT, planned for 6/14 if stable     Patient was seen and examined by me at bedside    Greater than 35 minutes spent on total encounter; more than 50% of the visit was spent counseling and/or coordinating care by the attending physician.    Sandro Chen MD [phone]

## 2021-06-14 NOTE — PROGRESS NOTE ADULT - SUBJECTIVE AND OBJECTIVE BOX
Hematology Oncology Progress Note (Dr. Hanson)  Discussed with Dr. Hanson and recommendations reviewed with the primary team.    SUBJECTIVE: Patient seen and examined. s/p RLE angiogram with stent placement. Noted to have a drop in Hb to 6.7 s/p 1 unit PRBC. Hb stable today at 7.8. Large ecchymosis over left thigh extending to knee, but no evidence of hematoma or pseudoaneurysm on imaging.     HPI: 89 year old female with PMH of DM, HTN, HLD, CKD stage V, hypothyroidism, h/o pacemaker placement, CHF, CAD, CLL and MDS (w/ 5q deletion) presented with complaints of RLE pain, started 2 weeks ago while walking and now occurs at rest. She is going for RLE angiogram today. Hematology consulted for history of CLL and MDS. She follows with Dr. Marco Antonio Moore at [facility]. Last seen 6/3/21. She is on Revlimid for MDS with 5q deletion, which has been on hold due to neutropenia and thrombocytopenia.     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  Diabetes  Diabetes mellitus with diabetic neuropathy  Chronic diastolic congestive heart failure  Hypertension  Hyperlipidemia  CAD (coronary artery disease)  Hypothyroidism  Pacemaker    Allergies: NKDA    MEDICATIONS  (STANDING):  aspirin  chewable 81 milliGRAM(s) Oral daily  atorvastatin 20 milliGRAM(s) Oral at bedtime  carvedilol 25 milliGRAM(s) Oral every 12 hours  ceFAZolin   IVPB 1000 milliGRAM(s) IV Intermittent every 8 hours  clopidogrel Tablet 75 milliGRAM(s) Oral daily  dextrose 40% Gel 15 Gram(s) Oral once  dextrose 5%. 1000 milliLiter(s) (50 mL/Hr) IV Continuous <Continuous>  dextrose 5%. 1000 milliLiter(s) (100 mL/Hr) IV Continuous <Continuous>  dextrose 50% Injectable 25 Gram(s) IV Push once  dextrose 50% Injectable 12.5 Gram(s) IV Push once  dextrose 50% Injectable 25 Gram(s) IV Push once  famotidine    Tablet 20 milliGRAM(s) Oral daily  glucagon  Injectable 1 milliGRAM(s) IntraMuscular once  insulin lispro (ADMELOG) corrective regimen sliding scale   SubCutaneous Before meals and at bedtime  levothyroxine 75 MICROGram(s) Oral daily  NIFEdipine XL 60 milliGRAM(s) Oral daily  senna 2 Tablet(s) Oral at bedtime  sodium bicarbonate 650 milliGRAM(s) Oral every 12 hours    MEDICATIONS  (PRN):      PHYSICAL EXAM:    Vital Signs Last 24 Hrs  T(C): 36.1 (14 Jun 2021 09:26), Max: 36.6 (13 Jun 2021 21:40)  T(F): 96.9 (14 Jun 2021 09:26), Max: 97.8 (13 Jun 2021 21:40)  HR: 68 (14 Jun 2021 11:23) (58 - 68)  BP: 129/60 (14 Jun 2021 11:23) (119/58 - 164/65)  BP(mean): 87 (14 Jun 2021 11:23) (87 - 104)  RR: 18 (14 Jun 2021 11:23) (16 - 18)  SpO2: 99% (14 Jun 2021 11:23) (95% - 100%)    Gen: comfortable  HEENT: normocephalic/atraumatic  Neck: supple  Cardiovascular: RR, nl S1S2, no murmurs  Respiratory: clear air entry b/l  Gastrointestinal: BS+, soft, NT/ND  Extremities: no edema, no calf tenderness  Neurological: no focal deficits  Skin: large ecchymosis noted over left thigh extending to knee.   Musculoskeletal:  full ROM  Psychiatric:  mood stable    Labs:                          7.8    4.20  )-----------( 61       ( 14 Jun 2021 06:21 )             24.5     06-14    132<L>  |  99  |  96<H>  ----------------------------<  74  4.9   |  15<L>  |  5.42<H>    Ca    10.0      14 Jun 2021 06:21  Phos  6.9     06-14  Mg     2.4     06-14    TPro  5.6<L>  /  Alb  x   /  TBili  x   /  DBili  x   /  AST  x   /  ALT  x   /  AlkPhos  x   06-12      Imaging Studies:    CT Abdomen and Pelvis No Cont (06.13.21 @ 11:51)     IMPRESSION:  Stranding in the subcutaneous soft tissues of the anterior aspect of the pelvis, likely due to dispersed hemorrhage without a discrete measurable hematoma. No retroperitoneal hemorrhage.    < end of copied text >    VA Duplex Low Ext Arterial, Ltd, Left (06.12.21 @ 13:27)     IMPRESSION:  No pseudoaneurysm.    < end of copied text >

## 2021-06-14 NOTE — PROGRESS NOTE ADULT - ASSESSMENT
89 year old female with PMH of DM, HTN, HLD, CKD stage V, hypothyroidism, h/o pacemaker placement, CHF, CAD, CLL and MDS (w/ 5q deletion) presented with complaints of RLE pain, going for RLE angiogram. Hematology consulted for h/o MDS on Revlimid and CLL.     CLL   - Absolute lymphocyte count is normal   - currently not on treatment for CLL  - anemia is likely related to MDS   - thrombocytopenia and neutropenia is likely due to Revlimid  - follows with Dr. Marco Antonio Moore. Last seen on 6/3/21. Outpatient labs from 6/3: WBC 2.6 (, ALC 1200). Records obtained from Dr. Moore's office.     MDS with 5q deletion   - was on Revlimid, which was placed on hold on 6/3/21 for neutropenia and thrombocytopenia.   - Recommend holding Revlimid for now.   - Leukopenia resolved. Anemia and thrombocytopenia stable.   - On discharge, have the patient follow up with Dr. Moore within 1 week.     Anemia   - likely due to underlying MDS, blood loss post op   - Iron studies c/w anemia of chronic disease. Normal B12 and folate.   - Free light chains mildly elevated, but ratio is normal. Quantitative immunoglobulins WNL.   - Pending EPO level   - Pending SPEP and Serum CHRISTIANO    Upon discharge, please have patient follow up with Dr. Marco Antonio Moore within 1 week.   Will sign off, please reconsult as necessary.     Discussed with Dr. Hanson  89 year old female with PMH of DM, HTN, HLD, CKD stage V, hypothyroidism, h/o pacemaker placement, CHF, CAD, CLL and MDS (w/ 5q deletion) presented with complaints of RLE pain, going for RLE angiogram. Hematology consulted for h/o MDS on Revlimid and CLL.     CLL   - Absolute lymphocyte count is normal   - currently not on treatment for CLL  - anemia is likely related to MDS   - thrombocytopenia and neutropenia is likely due to Revlimid  - follows with Dr. Marco Antonio Moore. Last seen on 6/3/21. Outpatient labs from 6/3: WBC 2.6 (, ALC 1200). Records obtained from Dr. Moore's office.     MDS with 5q deletion   - was on Revlimid, which was placed on hold on 6/3/21 for neutropenia and thrombocytopenia.   - Recommend holding Revlimid for now.   - Leukopenia resolved. Anemia and thrombocytopenia stable.   - On discharge, have the patient follow up with Dr. Moore within 1 week.     Anemia   - likely due to underlying MDS, blood loss post op   - Iron studies c/w anemia of chronic disease. Normal B12 and folate.   - Free light chains mildly elevated, but ratio is normal. Quantitative immunoglobulins WNL.   - Serum immunofixation with IgG kappa band, SPEP with gamma migrating protein, M - spike is 0.2.   - Pending EPO level       Upon discharge, please have patient follow up with Dr. Marco Antonio Moore within 1 week.   Will sign off, please reconsult as necessary.     Discussed with Dr. Hanson

## 2021-06-14 NOTE — PROGRESS NOTE ADULT - SUBJECTIVE AND OBJECTIVE BOX
24hr Events:  O/N: Voided 175ml, PVR 50ml, VSS  6/13: torsemide restarted per renal, ho removed, CT abd/pelvis with no obvious hematoma          Assessment/Plan:  88 yo Russain speaking female with anemia of chronic disease, CKD stage V, Diabetes, Diabetic nephropathy, Chronic diastolic CHF, hypertension, hyperlipidemia, CAD, hypothyroidism presenting with critical limb ischemia now s/p RLE Angio with stents to SFA and below knee popliteal art. via L CFA access (6/11/21). Postop course notable for acute blood loss anemia and dependent ecchomysis extending from L ASIS, L labial fold to L thigh proximal to knee.       Neurovascular  -No delirium, pain well managed on current regimen  -C/w PRNs for Pain control  -Monitor neuro status    Respiratory  -Saturates well on RA     -Encourage IS 10x/hour while awake, Cough and deep breathing exercises  -Monitor respiratory status via SpO2    Cardiovascular  -C/o telemetry   -Goal Hgb >8  -Cardiology c/s rec c/w high dose statin and antihypertensives.     GI  -C/w DASH diet  -Prophylaxis: Protonix  -C/w bowel regimen    /Renal   -Ho in place post op retention  -Trend Cr on AM labs  -Replete electrolytes as needed  -Renal consulted; Dr. Lerma following   -Restarting Torsemide 40mg in AM and 20mg in PM  -Daily weights  -NaBicarb 650mg BID  -Strict I/Os    ID  -Afebrile, asymptomatic  -WBC 2.46  - C/w periop ancef    Endocrine  -A1C: 3.66  -no h/o DM    Hematologic  -H/H: 7.2  -Obtain CT abdomen/pelvis noncontrast  -Trend plts  -CBC, chem in AM  - C/w ASA and Plavix  -Heme/onc consulted     PT: Home with home PT vs [facility]     24hr Events:  O/N: Voided 175ml, PVR 50ml, VSS  6/13: torsemide restarted per renal, ho removed, CT abd/pelvis with no obvious hematoma       SUBJECTIVE: Patient seen and examined bedside by vascular team. Has no complaints. Reports she feels well with good pain control.     aspirin  chewable 81 milliGRAM(s) Oral daily  carvedilol 25 milliGRAM(s) Oral every 12 hours  ceFAZolin   IVPB 1000 milliGRAM(s) IV Intermittent every 8 hours  clopidogrel Tablet 75 milliGRAM(s) Oral daily  NIFEdipine XL 60 milliGRAM(s) Oral daily      Vital Signs Last 24 Hrs  T(C): 36.4 (15 Diego 2021 06:25), Max: 36.4 (14 Jun 2021 18:18)  T(F): 97.5 (15 Diego 2021 06:25), Max: 97.5 (14 Jun 2021 18:18)  HR: 65 (15 Diego 2021 05:53) (62 - 68)  BP: 145/60 (15 Diego 2021 05:53) (129/60 - 160/62)  BP(mean): 93 (14 Jun 2021 19:22) (87 - 93)  RR: 15 (15 Diego 2021 05:53) (15 - 18)  SpO2: 96% (15 Diego 2021 05:53) (96% - 100%)  I&O's Detail    14 Jun 2021 07:01  -  15 Diego 2021 07:00  --------------------------------------------------------  IN:    Oral Fluid: 480 mL  Total IN: 480 mL    OUT:    Voided (mL): 300 mL  Total OUT: 300 mL    Total NET: 180 mL          Physical Exam:  General: No acute distress, resting comfortably in bed  C/V: normal sinus rhythm  Pulm: Nonlabored breathing, no respiratory distress  Abd: soft, non-tender, non-distended  Extrem: warm and well perfused, no edema. Ecchymosis of groins present bilaterally with no appreciable groin hematoma. R DP is triphasic and PT is biphasic. Normal motor and sensory function.     LABS:                        7.8    4.20  )-----------( 61       ( 14 Jun 2021 06:21 )             24.5     06-14    132<L>  |  99  |  96<H>  ----------------------------<  74  4.9   |  15<L>  |  5.42<H>    Ca    10.0      14 Jun 2021 06:21  Phos  6.9     06-14  Mg     2.4     06-14            RADIOLOGY & ADDITIONAL STUDIES:            Assessment/Plan:  88 yo Russain speaking female with anemia of chronic disease, CKD stage V, Diabetes, Diabetic nephropathy, Chronic diastolic CHF, hypertension, hyperlipidemia, CAD, hypothyroidism presenting with critical limb ischemia now s/p RLE Angio with stents to SFA and below knee popliteal art. via L CFA access (6/11/21). Postop course notable for acute blood loss anemia and dependent ecchomysis extending from L ASIS, L labial fold to L thigh proximal to knee.       Neurovascular  -No delirium, pain well managed on current regimen  -C/w PRNs for Pain control  -Monitor neuro status    Respiratory  -Saturates well on RA     -Encourage IS 10x/hour while awake, Cough and deep breathing exercises  -Monitor respiratory status via SpO2    Cardiovascular  -C/o telemetry   -Goal Hgb >8  -Cardiology c/s rec c/w high dose statin and antihypertensives.     GI  -C/w DASH diet  -Prophylaxis: Protonix  -C/w bowel regimen    /Renal   -Ho in place post op retention  -Trend Cr on AM labs  -Replete electrolytes as needed  -Renal consulted; Dr. Lerma following.  -AM Cr uptrending; torsemide held per Dr. Marshall  -Daily weights  -NaBicarb 650mg BID; increased to 1300 BID per renal  -Strict I/Os    ID  -Afebrile, asymptomatic  - C/w periop ancef    Endocrine  -A1C: 3.66  -no h/o DM    Hematologic  -H/H: 7.2  -Obtain CT abdomen/pelvis noncontrast  -Trend plts  -CBC, chem in AM  - C/w ASA and Plavix  -Heme/onc consulted  -Per heme onc, hold Revlimid on dc and f/u in 1 week as outpatient     PT: Home with home PT vs [facility]

## 2021-06-14 NOTE — PROGRESS NOTE ADULT - SUBJECTIVE AND OBJECTIVE BOX
INTERVAL EVENTS:        MEDICATIONS  (STANDING):  aspirin  chewable 81 milliGRAM(s) Oral daily  atorvastatin 20 milliGRAM(s) Oral at bedtime  carvedilol 25 milliGRAM(s) Oral every 12 hours  ceFAZolin   IVPB 1000 milliGRAM(s) IV Intermittent every 8 hours  clopidogrel Tablet 75 milliGRAM(s) Oral daily  dextrose 40% Gel 15 Gram(s) Oral once  dextrose 5%. 1000 milliLiter(s) (50 mL/Hr) IV Continuous <Continuous>  dextrose 5%. 1000 milliLiter(s) (100 mL/Hr) IV Continuous <Continuous>  dextrose 50% Injectable 25 Gram(s) IV Push once  dextrose 50% Injectable 12.5 Gram(s) IV Push once  dextrose 50% Injectable 25 Gram(s) IV Push once  famotidine    Tablet 20 milliGRAM(s) Oral daily  glucagon  Injectable 1 milliGRAM(s) IntraMuscular once  insulin lispro (ADMELOG) corrective regimen sliding scale   SubCutaneous Before meals and at bedtime  levothyroxine 75 MICROGram(s) Oral daily  NIFEdipine XL 60 milliGRAM(s) Oral daily  senna 2 Tablet(s) Oral at bedtime  sodium bicarbonate 650 milliGRAM(s) Oral every 12 hours  torsemide 20 milliGRAM(s) Oral every 24 hours  torsemide 40 milliGRAM(s) Oral every 24 hours    MEDICATIONS  (PRN):      Home Medications:      Vital Signs Last 24 Hrs  T(C): 36.1 (14 Jun 2021 06:35), Max: 36.6 (13 Jun 2021 21:40)  T(F): 97 (14 Jun 2021 06:35), Max: 97.8 (13 Jun 2021 21:40)  HR: 64 (14 Jun 2021 06:15) (58 - 64)  BP: 140/62 (14 Jun 2021 08:36) (108/53 - 164/65)  BP(mean): 89 (14 Jun 2021 08:36) (89 - 104)  RR: 16 (14 Jun 2021 08:36) (16 - 18)  SpO2: 100% (14 Jun 2021 08:36) (95% - 100%)     PHYSICAL EXAM:  GEN: Awake, alert. NAD.   HEENT: NCAT, PERRL, EOMI. Mucosa moist. No JVD.  RESP: CTA b/l  CV: RRR. Normal S1/S2. No m/r/g.  ABD: Soft. NT/ND. BS+  EXT: Warm. No edema, clubbing, or cyanosis.   NEURO: AAOx3. No focal deficits.     LABS:                        7.8    4.20  )-----------( 61       ( 14 Jun 2021 06:21 )             24.5     06-14    132<L>  |  99  |  96<H>  ----------------------------<  74  4.9   |  15<L>  |  5.42<H>    Ca    10.0      14 Jun 2021 06:21  Phos  6.9     06-14  Mg     2.4     06-14    TPro  5.6<L>  /  Alb  x   /  TBili  x   /  DBili  x   /  AST  x   /  ALT  x   /  AlkPhos  x   06-12            I&O's Summary    13 Jun 2021 07:01  -  14 Jun 2021 07:00  --------------------------------------------------------  IN: 410 mL / OUT: 505 mL / NET: -95 mL      BNP  RADIOLOGY & ADDITIONAL STUDIES:    TELEMETRY:    EKG:         INTERVAL EVENTS:  NAEO      MEDICATIONS  (STANDING):  aspirin  chewable 81 milliGRAM(s) Oral daily  atorvastatin 20 milliGRAM(s) Oral at bedtime  carvedilol 25 milliGRAM(s) Oral every 12 hours  ceFAZolin   IVPB 1000 milliGRAM(s) IV Intermittent every 8 hours  clopidogrel Tablet 75 milliGRAM(s) Oral daily  dextrose 40% Gel 15 Gram(s) Oral once  dextrose 5%. 1000 milliLiter(s) (50 mL/Hr) IV Continuous <Continuous>  dextrose 5%. 1000 milliLiter(s) (100 mL/Hr) IV Continuous <Continuous>  dextrose 50% Injectable 25 Gram(s) IV Push once  dextrose 50% Injectable 12.5 Gram(s) IV Push once  dextrose 50% Injectable 25 Gram(s) IV Push once  famotidine    Tablet 20 milliGRAM(s) Oral daily  glucagon  Injectable 1 milliGRAM(s) IntraMuscular once  insulin lispro (ADMELOG) corrective regimen sliding scale   SubCutaneous Before meals and at bedtime  levothyroxine 75 MICROGram(s) Oral daily  NIFEdipine XL 60 milliGRAM(s) Oral daily  senna 2 Tablet(s) Oral at bedtime  sodium bicarbonate 650 milliGRAM(s) Oral every 12 hours  torsemide 20 milliGRAM(s) Oral every 24 hours  torsemide 40 milliGRAM(s) Oral every 24 hours    MEDICATIONS  (PRN):      Home Medications:      Vital Signs Last 24 Hrs  T(C): 36.1 (14 Jun 2021 06:35), Max: 36.6 (13 Jun 2021 21:40)  T(F): 97 (14 Jun 2021 06:35), Max: 97.8 (13 Jun 2021 21:40)  HR: 64 (14 Jun 2021 06:15) (58 - 64)  BP: 140/62 (14 Jun 2021 08:36) (108/53 - 164/65)  BP(mean): 89 (14 Jun 2021 08:36) (89 - 104)  RR: 16 (14 Jun 2021 08:36) (16 - 18)  SpO2: 100% (14 Jun 2021 08:36) (95% - 100%)     PHYSICAL EXAM:  GEN: Awake, alert. NAD.   HEENT: NCAT, PERRL, EOMI. Mucosa moist. No JVD.  RESP: CTA b/l  CV: RRR. Normal S1/S2. No m/r/g.  ABD: Soft. NT/ND. BS+  EXT: Warm. No edema, clubbing, or cyanosis.   NEURO: AAOx3. No focal deficits.     LABS:                        7.8    4.20  )-----------( 61       ( 14 Jun 2021 06:21 )             24.5     06-14    132<L>  |  99  |  96<H>  ----------------------------<  74  4.9   |  15<L>  |  5.42<H>    Ca    10.0      14 Jun 2021 06:21  Phos  6.9     06-14  Mg     2.4     06-14    TPro  5.6<L>  /  Alb  x   /  TBili  x   /  DBili  x   /  AST  x   /  ALT  x   /  AlkPhos  x   06-12      89 yo [place] speaking female with anemia of chronic disease, CKD stage V, Diabetes, Diabetic nephropathy, Chronic diastolic CHF, hypertension, hyperlipidemia, CAD, hypothyroidism, PPM implantation (unknown indication) presenting for RLE angiogram ot of concern for limb ischemia. Her CAD is medically managed and denies receiving stents. her activity is limited by claudication so unable to assess true exercise tolerance. Her charts are at an outside hospital and her cardiac history was reported by the patient, will need collateral.     #Periop  - uncomplicated hospital course but will need new cardiologist as she was lost to f/u. Please make appt with Dr. Newton     Cardiology will sign off, please call back if questions arise  D/w cardiology attending  Eduardo Alva MD [id number]

## 2021-06-15 ENCOUNTER — TRANSCRIPTION ENCOUNTER (OUTPATIENT)
Age: 86
End: 2021-06-15

## 2021-06-15 VITALS — TEMPERATURE: 97 F

## 2021-06-15 PROBLEM — E11.9 TYPE 2 DIABETES MELLITUS WITHOUT COMPLICATIONS: Chronic | Status: ACTIVE | Noted: 2021-06-10

## 2021-06-15 PROBLEM — N18.5 CHRONIC KIDNEY DISEASE, STAGE 5: Chronic | Status: ACTIVE | Noted: 2021-06-10

## 2021-06-15 PROBLEM — E11.40 TYPE 2 DIABETES MELLITUS WITH DIABETIC NEUROPATHY, UNSPECIFIED: Chronic | Status: ACTIVE | Noted: 2021-06-10

## 2021-06-15 PROBLEM — E78.5 HYPERLIPIDEMIA, UNSPECIFIED: Chronic | Status: ACTIVE | Noted: 2021-06-10

## 2021-06-15 PROBLEM — I10 ESSENTIAL (PRIMARY) HYPERTENSION: Chronic | Status: ACTIVE | Noted: 2021-06-10

## 2021-06-15 PROBLEM — I25.10 ATHEROSCLEROTIC HEART DISEASE OF NATIVE CORONARY ARTERY WITHOUT ANGINA PECTORIS: Chronic | Status: ACTIVE | Noted: 2021-06-10

## 2021-06-15 PROBLEM — I50.32 CHRONIC DIASTOLIC (CONGESTIVE) HEART FAILURE: Chronic | Status: ACTIVE | Noted: 2021-06-10

## 2021-06-15 PROBLEM — E03.9 HYPOTHYROIDISM, UNSPECIFIED: Chronic | Status: ACTIVE | Noted: 2021-06-10

## 2021-06-15 PROBLEM — D63.8 ANEMIA IN OTHER CHRONIC DISEASES CLASSIFIED ELSEWHERE: Chronic | Status: ACTIVE | Noted: 2021-06-10

## 2021-06-15 LAB
ANION GAP SERPL CALC-SCNC: 17 MMOL/L — SIGNIFICANT CHANGE UP (ref 5–17)
BUN SERPL-MCNC: 99 MG/DL — HIGH (ref 7–23)
CALCIUM SERPL-MCNC: 10.3 MG/DL — SIGNIFICANT CHANGE UP (ref 8.4–10.5)
CHLORIDE SERPL-SCNC: 98 MMOL/L — SIGNIFICANT CHANGE UP (ref 96–108)
CO2 SERPL-SCNC: 17 MMOL/L — LOW (ref 22–31)
CREAT SERPL-MCNC: 5.62 MG/DL — HIGH (ref 0.5–1.3)
EPO SERPL-MCNC: 233.6 MIU/ML — HIGH (ref 2.6–18.5)
GLUCOSE BLDC GLUCOMTR-MCNC: 103 MG/DL — HIGH (ref 70–99)
GLUCOSE BLDC GLUCOMTR-MCNC: 134 MG/DL — HIGH (ref 70–99)
GLUCOSE SERPL-MCNC: 83 MG/DL — SIGNIFICANT CHANGE UP (ref 70–99)
HCT VFR BLD CALC: 22.8 % — LOW (ref 34.5–45)
HGB BLD-MCNC: 7.4 G/DL — LOW (ref 11.5–15.5)
MAGNESIUM SERPL-MCNC: 2.5 MG/DL — SIGNIFICANT CHANGE UP (ref 1.6–2.6)
MCHC RBC-ENTMCNC: 32.5 GM/DL — SIGNIFICANT CHANGE UP (ref 32–36)
MCHC RBC-ENTMCNC: 33.5 PG — SIGNIFICANT CHANGE UP (ref 27–34)
MCV RBC AUTO: 103.2 FL — HIGH (ref 80–100)
NRBC # BLD: 0 /100 WBCS — SIGNIFICANT CHANGE UP (ref 0–0)
PHOSPHATE SERPL-MCNC: 6.8 MG/DL — HIGH (ref 2.5–4.5)
PLATELET # BLD AUTO: 54 K/UL — LOW (ref 150–400)
POTASSIUM SERPL-MCNC: 5 MMOL/L — SIGNIFICANT CHANGE UP (ref 3.5–5.3)
POTASSIUM SERPL-SCNC: 5 MMOL/L — SIGNIFICANT CHANGE UP (ref 3.5–5.3)
RBC # BLD: 2.21 M/UL — LOW (ref 3.8–5.2)
RBC # FLD: 21.4 % — HIGH (ref 10.3–14.5)
SODIUM SERPL-SCNC: 132 MMOL/L — LOW (ref 135–145)
WBC # BLD: 4.08 K/UL — SIGNIFICANT CHANGE UP (ref 3.8–10.5)
WBC # FLD AUTO: 4.08 K/UL — SIGNIFICANT CHANGE UP (ref 3.8–10.5)

## 2021-06-15 PROCEDURE — 99232 SBSQ HOSP IP/OBS MODERATE 35: CPT

## 2021-06-15 PROCEDURE — C1725: CPT

## 2021-06-15 PROCEDURE — 76775 US EXAM ABDO BACK WALL LIM: CPT

## 2021-06-15 PROCEDURE — 99239 HOSP IP/OBS DSCHRG MGMT >30: CPT

## 2021-06-15 PROCEDURE — 84300 ASSAY OF URINE SODIUM: CPT

## 2021-06-15 PROCEDURE — 74176 CT ABD & PELVIS W/O CONTRAST: CPT

## 2021-06-15 PROCEDURE — 93926 LOWER EXTREMITY STUDY: CPT

## 2021-06-15 PROCEDURE — 99285 EMERGENCY DEPT VISIT HI MDM: CPT | Mod: 25

## 2021-06-15 PROCEDURE — 83521 IG LIGHT CHAINS FREE EACH: CPT

## 2021-06-15 PROCEDURE — 86901 BLOOD TYPING SEROLOGIC RH(D): CPT

## 2021-06-15 PROCEDURE — 93306 TTE W/DOPPLER COMPLETE: CPT

## 2021-06-15 PROCEDURE — 86900 BLOOD TYPING SEROLOGIC ABO: CPT

## 2021-06-15 PROCEDURE — 76000 FLUOROSCOPY <1 HR PHYS/QHP: CPT

## 2021-06-15 PROCEDURE — 86902 BLOOD TYPE ANTIGEN DONOR EA: CPT

## 2021-06-15 PROCEDURE — 96374 THER/PROPH/DIAG INJ IV PUSH: CPT

## 2021-06-15 PROCEDURE — 87635 SARS-COV-2 COVID-19 AMP PRB: CPT

## 2021-06-15 PROCEDURE — 86334 IMMUNOFIX E-PHORESIS SERUM: CPT

## 2021-06-15 PROCEDURE — 36415 COLL VENOUS BLD VENIPUNCTURE: CPT

## 2021-06-15 PROCEDURE — 97162 PT EVAL MOD COMPLEX 30 MIN: CPT

## 2021-06-15 PROCEDURE — 84133 ASSAY OF URINE POTASSIUM: CPT

## 2021-06-15 PROCEDURE — 83970 ASSAY OF PARATHORMONE: CPT

## 2021-06-15 PROCEDURE — 82306 VITAMIN D 25 HYDROXY: CPT

## 2021-06-15 PROCEDURE — 85025 COMPLETE CBC W/AUTO DIFF WBC: CPT

## 2021-06-15 PROCEDURE — C1874: CPT

## 2021-06-15 PROCEDURE — 36430 TRANSFUSION BLD/BLD COMPNT: CPT

## 2021-06-15 PROCEDURE — 83036 HEMOGLOBIN GLYCOSYLATED A1C: CPT

## 2021-06-15 PROCEDURE — 82746 ASSAY OF FOLIC ACID SERUM: CPT

## 2021-06-15 PROCEDURE — 81003 URINALYSIS AUTO W/O SCOPE: CPT

## 2021-06-15 PROCEDURE — 84466 ASSAY OF TRANSFERRIN: CPT

## 2021-06-15 PROCEDURE — 83735 ASSAY OF MAGNESIUM: CPT

## 2021-06-15 PROCEDURE — 84100 ASSAY OF PHOSPHORUS: CPT

## 2021-06-15 PROCEDURE — 71045 X-RAY EXAM CHEST 1 VIEW: CPT

## 2021-06-15 PROCEDURE — C1894: CPT

## 2021-06-15 PROCEDURE — 82962 GLUCOSE BLOOD TEST: CPT

## 2021-06-15 PROCEDURE — 82728 ASSAY OF FERRITIN: CPT

## 2021-06-15 PROCEDURE — 82607 VITAMIN B-12: CPT

## 2021-06-15 PROCEDURE — 86922 COMPATIBILITY TEST ANTIGLOB: CPT

## 2021-06-15 PROCEDURE — 82784 ASSAY IGA/IGD/IGG/IGM EACH: CPT

## 2021-06-15 PROCEDURE — 86880 COOMBS TEST DIRECT: CPT

## 2021-06-15 PROCEDURE — 84165 PROTEIN E-PHORESIS SERUM: CPT

## 2021-06-15 PROCEDURE — 85027 COMPLETE CBC AUTOMATED: CPT

## 2021-06-15 PROCEDURE — 84155 ASSAY OF PROTEIN SERUM: CPT

## 2021-06-15 PROCEDURE — 97116 GAIT TRAINING THERAPY: CPT

## 2021-06-15 PROCEDURE — 82668 ASSAY OF ERYTHROPOIETIN: CPT

## 2021-06-15 PROCEDURE — 86850 RBC ANTIBODY SCREEN: CPT

## 2021-06-15 PROCEDURE — 86769 SARS-COV-2 COVID-19 ANTIBODY: CPT

## 2021-06-15 PROCEDURE — C1876: CPT

## 2021-06-15 PROCEDURE — C1769: CPT

## 2021-06-15 PROCEDURE — C1887: CPT

## 2021-06-15 PROCEDURE — 83935 ASSAY OF URINE OSMOLALITY: CPT

## 2021-06-15 PROCEDURE — 82436 ASSAY OF URINE CHLORIDE: CPT

## 2021-06-15 PROCEDURE — 82785 ASSAY OF IGE: CPT

## 2021-06-15 PROCEDURE — P9040: CPT

## 2021-06-15 PROCEDURE — 83540 ASSAY OF IRON: CPT

## 2021-06-15 PROCEDURE — 85610 PROTHROMBIN TIME: CPT

## 2021-06-15 PROCEDURE — 86870 RBC ANTIBODY IDENTIFICATION: CPT

## 2021-06-15 PROCEDURE — 83550 IRON BINDING TEST: CPT

## 2021-06-15 PROCEDURE — 80048 BASIC METABOLIC PNL TOTAL CA: CPT

## 2021-06-15 PROCEDURE — 85730 THROMBOPLASTIN TIME PARTIAL: CPT

## 2021-06-15 PROCEDURE — 80053 COMPREHEN METABOLIC PANEL: CPT

## 2021-06-15 PROCEDURE — 82310 ASSAY OF CALCIUM: CPT

## 2021-06-15 RX ORDER — NIFEDIPINE 30 MG
1 TABLET, EXTENDED RELEASE 24 HR ORAL
Qty: 0 | Refills: 0 | DISCHARGE
Start: 2021-06-15

## 2021-06-15 RX ORDER — LEVOTHYROXINE SODIUM 125 MCG
1 TABLET ORAL
Qty: 0 | Refills: 0 | DISCHARGE
Start: 2021-06-15

## 2021-06-15 RX ORDER — CARVEDILOL PHOSPHATE 80 MG/1
1 CAPSULE, EXTENDED RELEASE ORAL
Qty: 0 | Refills: 0 | DISCHARGE
Start: 2021-06-15

## 2021-06-15 RX ORDER — SEVELAMER CARBONATE 2400 MG/1
1 POWDER, FOR SUSPENSION ORAL
Qty: 90 | Refills: 0
Start: 2021-06-15 | End: 2021-07-14

## 2021-06-15 RX ORDER — ASPIRIN/CALCIUM CARB/MAGNESIUM 324 MG
1 TABLET ORAL
Qty: 30 | Refills: 0
Start: 2021-06-15 | End: 2021-07-14

## 2021-06-15 RX ORDER — ATORVASTATIN CALCIUM 80 MG/1
1 TABLET, FILM COATED ORAL
Qty: 0 | Refills: 0 | DISCHARGE
Start: 2021-06-15

## 2021-06-15 RX ORDER — CLOPIDOGREL BISULFATE 75 MG/1
1 TABLET, FILM COATED ORAL
Qty: 30 | Refills: 3
Start: 2021-06-15 | End: 2021-10-12

## 2021-06-15 RX ORDER — SODIUM BICARBONATE 1 MEQ/ML
2 SYRINGE (ML) INTRAVENOUS
Qty: 120 | Refills: 0
Start: 2021-06-15 | End: 2021-07-14

## 2021-06-15 RX ORDER — CEPHALEXIN 500 MG
1 CAPSULE ORAL
Qty: 10 | Refills: 0
Start: 2021-06-15 | End: 2021-06-19

## 2021-06-15 RX ADMIN — Medication 100 MILLIGRAM(S): at 05:00

## 2021-06-15 RX ADMIN — Medication 81 MILLIGRAM(S): at 12:25

## 2021-06-15 RX ADMIN — Medication 75 MICROGRAM(S): at 06:21

## 2021-06-15 RX ADMIN — FAMOTIDINE 20 MILLIGRAM(S): 10 INJECTION INTRAVENOUS at 12:25

## 2021-06-15 RX ADMIN — CLOPIDOGREL BISULFATE 75 MILLIGRAM(S): 75 TABLET, FILM COATED ORAL at 12:25

## 2021-06-15 RX ADMIN — CARVEDILOL PHOSPHATE 25 MILLIGRAM(S): 80 CAPSULE, EXTENDED RELEASE ORAL at 06:23

## 2021-06-15 RX ADMIN — Medication 1300 MILLIGRAM(S): at 06:21

## 2021-06-15 RX ADMIN — SEVELAMER CARBONATE 800 MILLIGRAM(S): 2400 POWDER, FOR SUSPENSION ORAL at 06:25

## 2021-06-15 RX ADMIN — Medication 60 MILLIGRAM(S): at 06:21

## 2021-06-15 NOTE — PROGRESS NOTE ADULT - SUBJECTIVE AND OBJECTIVE BOX
24hr Events:  O/N: MANDY, VSS, voided x3, 150ml recorded  6/14: Cr 5.4 from 4.7, holding diuretics per Dr. Lerma.         Assessment/Plan:  Assessment/Plan:  88 yo Russain speaking female with anemia of chronic disease, CKD stage V, Diabetes, Diabetic nephropathy, Chronic diastolic CHF, hypertension, hyperlipidemia, CAD, hypothyroidism presenting with critical limb ischemia now s/p RLE Angio with stents to SFA and below knee popliteal art. via L CFA access (6/11/21). Postop course notable for acute blood loss anemia and dependent ecchomysis extending from L ASIS, L labial fold to L thigh proximal to knee.       Neurovascular  -No delirium, pain well managed on current regimen  -C/w PRNs for Pain control  -Monitor neuro status    Respiratory  -Saturates well on RA     -Encourage IS 10x/hour while awake, Cough and deep breathing exercises  -Monitor respiratory status via SpO2    Cardiovascular  -C/o telemetry   -Goal Hgb >8  -Cardiology c/s rec c/w high dose statin and antihypertensives.     GI  -C/w DASH diet  -Prophylaxis: Protonix  -C/w bowel regimen    /Renal   -Mcgrath in place post op retention  -Trend Cr on AM labs  -Replete electrolytes as needed  -Renal consulted; Dr. Lerma following   -Restarting Torsemide 40mg in AM and 20mg in PM  -Daily weights  -NaBicarb 650mg BID  -Strict I/Os    ID  -Afebrile, asymptomatic  -WBC 2.46  - C/w periop ancef    Endocrine  -A1C: 3.66  -no h/o DM    Hematologic  -H/H: 7.2  -Obtain CT abdomen/pelvis noncontrast  -Trend plts  -CBC, chem in AM  - C/w ASA and Plavix  -Heme/onc consulted     PT: Home with home PT vs [facility]     24hr Events:  O/N: MANDY, VSS, voided x3, 150ml recorded  6/14: Cr 5.4 from 4.7, holding diuretics per Dr. Lerma.       Subjective: sitting a side of bed without discomfort. Denies Right leg weakness or numbness. Denies Left groin pain or swelling. states she has been voiding well ON. Denies SOB, CP     ROS:   Denies Headache, blurred vision, Chest Pain, SOB, Abdominal pain, nausea or vomiting     Social   ceFAZolin   IVPB 1000  aspirin  chewable 81  carvedilol 25  ceFAZolin   IVPB 1000  clopidogrel Tablet 75  NIFEdipine XL 60      Allergies    No Known Allergies    Intolerances        Vital Signs Last 24 Hrs  T(C): 36.4 (15 Diego 2021 06:25), Max: 36.4 (14 Jun 2021 18:18)  T(F): 97.5 (15 Diego 2021 06:25), Max: 97.5 (14 Jun 2021 18:18)  HR: 65 (15 Diego 2021 05:53) (62 - 68)  BP: 145/60 (15 Diego 2021 05:53) (129/60 - 160/62)  BP(mean): 93 (14 Jun 2021 19:22) (87 - 93)  RR: 15 (15 Diego 2021 05:53) (15 - 18)  SpO2: 96% (15 Diego 2021 05:53) (96% - 100%)  I&O's Summary    14 Jun 2021 07:01  -  15 Diego 2021 07:00  --------------------------------------------------------  IN: 480 mL / OUT: 300 mL / NET: 180 mL        Physical Exam:  General: NAD  Pulmonary: b/l Breath sounds   Cardiovascular: S1s2 RRR   Abdominal: soft NT/ND  Extremities: Left groin soft, no hematoma noted   Pulses:   Right foot Dp/PT signals noted   right foot motor 5/5  and sensation intact to light touch     LABS:                        7.8    4.20  )-----------( 61       ( 14 Jun 2021 06:21 )             24.5     06-14    132<L>  |  99  |  96<H>  ----------------------------<  74  4.9   |  15<L>  |  5.42<H>    Ca    10.0      14 Jun 2021 06:21  Phos  6.9     06-14  Mg     2.4     06-14          Radiology and Additional Studies:        ---------------------------------------------------------------------------  PLEASE CHECK WHEN PRESENT:     [  ]Heart Failure     [  ] Acute     [  ] Acute on Chronic     [ x ] Chronic  -------------------------------------------------------------------     [ x ]Diastolic [HFpEF]     [  ]Systolic [HFrEF]     [  ]Combined [HFpEF & HFrEF]  .................................................................................     [  ]Other:     [ ] Pulmonary Hypertension     [ ] Afib     [ ] Hypertensive Heart Disease  -------------------------------------------------------------------  [ ] Respiratory failure  [ ] Acute cor pulmonale  [ ] Asthma/COPD Exacerbation  [ ] Pleural effusion  [ ] Aspiration pneumonia  [ ] Obstructive Sleep Apnea  -------------------------------------------------------------------  [ x]KYMBERLY     [  ]ATN     [  ]Reneal Medullary Necrosis     [  ]Renal Cortical Necrosis     [  ]Other Pathological Lesions:    [  ]CKD 1  [  ]CKD 2  [  ]CKD 3  [  ]CKD 4  [ x ]CKD 5  [  ]Other  -------------------------------------------------------------------  [x  ]Diabetes  [  ] Diabetic PVD Ulcer  [  ] Neuropathic ulcer to DM  [ x ] Diabetes with Nephropathy  [  ] Osteomyelitis due to diabetes  [  ] Hyperglycemia   [  ]hypoglycemia   --------------------------------------------------------------------  [  ]Malnutrition: See Nutrition Note  [  ]Cachexia  [  ]Other:   [  ]Supplement Ordered:  [  ]Morbid Obesity (BMI >=40]  ---------------------------------------------------------------------  [ ] Sepsis/severe sepsis/septic shock  [ ] Noninfectious SIRS  [ ] UTI  [ ] Pneumonia  -----------------------------------------------------------------------  [ ] Acidosis/alkalosis  [ ] Fluid overload  [ ] Hypokalemia  [ ] Hyperkalemia  [ ] Hypomagnesemia  [ ] Hypophosphatemia  [ ] Hyperphosphatemia  ------------------------------------------------------------------------  [ ] Acute blood loss anemia  [ ] Post op blood loss anemia  [ ] Iron deficiency anemia  [x ] Anemia due to chronic disease  [ ] Hypercoagulable state  [x ] Thrombocytopenia  ----------------------------------------------------------------------  [ ] Cerebral infarction  [ ] Transient ischemia attack  [ ] Encephalopathy - Toxic or Metabolic                      Assessment/Plan:  Assessment/Plan:  88 yo Russain speaking female with anemia of chronic disease, CKD stage V, Diabetes, Diabetic nephropathy, Chronic diastolic CHF, hypertension, hyperlipidemia, CAD, hypothyroidism presenting with critical limb ischemia now s/p RLE Angio with stents to SFA and below knee popliteal art. via L CFA access (6/11/21). Postop course notable for acute blood loss anemia and dependent ecchomysis extending from L ASIS, L labial fold to L thigh proximal to knee.       Neurovascular  -No delirium, pain well managed on current regimen  -C/w PRNs for Pain control  -Monitor neuro status    Respiratory  -Saturates well on RA     -Encourage IS 10x/hour while awake, Cough and deep breathing exercises  -Monitor respiratory status via SpO2    Cardiovascular  -C/o telemetry   -Goal Hgb >8  -Cardiology c/s rec c/w high dose statin and antihypertensives.     GI  -C/w DASH diet  -Prophylaxis: Protonix  - bowel regimen on hold for Diarrhea     /Renal   -Trend Cr on AM labs  -Replete electrolytes as needed  -Renal consulted; Dr. Lerma following   -Holding Torsemide 40mg in AM and 20mg in PM  -Daily weights  -NaBicarb 650mg BID  -Strict I/Os    ID  -Afebrile, asymptomatic  -WBC 2.46  - C/w periop ancef  -plan to d.c on keflex x5 days for knee erythema     Endocrine  -A1C: 3.66  -no h/o DM    Hematologic  -H/H: stable  -Trend plts  -CBC, chem in AM  - C/w ASA and Plavix  -Heme/onc consulted -holding Revlimid     DVTPPX:   -Holding SQH for thrombocytopenia     PT: Home with home PT vs [facility]  -d/c home today with Family

## 2021-06-15 NOTE — PROGRESS NOTE ADULT - SUBJECTIVE AND OBJECTIVE BOX
Subjective/Interval events:  -Pt reported to have some diarrhea yest evening, otherwise well   -urinary well and no n/v and eating well, minimal pain at L hip hematoma site, breathing well with no sob and no le edema  -Wants to be discharged home today     MEDICATIONS  (STANDING):  aspirin  chewable 81 milliGRAM(s) Oral daily  atorvastatin 80 milliGRAM(s) Oral at bedtime  carvedilol 25 milliGRAM(s) Oral every 12 hours  ceFAZolin   IVPB 1000 milliGRAM(s) IV Intermittent every 8 hours  clopidogrel Tablet 75 milliGRAM(s) Oral daily  dextrose 40% Gel 15 Gram(s) Oral once  dextrose 5%. 1000 milliLiter(s) (50 mL/Hr) IV Continuous <Continuous>  dextrose 5%. 1000 milliLiter(s) (100 mL/Hr) IV Continuous <Continuous>  dextrose 50% Injectable 25 Gram(s) IV Push once  dextrose 50% Injectable 12.5 Gram(s) IV Push once  dextrose 50% Injectable 25 Gram(s) IV Push once  famotidine    Tablet 20 milliGRAM(s) Oral daily  glucagon  Injectable 1 milliGRAM(s) IntraMuscular once  insulin lispro (ADMELOG) corrective regimen sliding scale   SubCutaneous Before meals and at bedtime  levothyroxine 75 MICROGram(s) Oral daily  NIFEdipine XL 60 milliGRAM(s) Oral daily  sevelamer carbonate 800 milliGRAM(s) Oral every 8 hours  sodium bicarbonate 1300 milliGRAM(s) Oral every 12 hours    MEDICATIONS  (PRN):      Vital Signs Last 24 Hrs  T(C): 36 (15 Diego 2021 09:00), Max: 36.4 (14 Jun 2021 18:18)  T(F): 96.8 (15 Diego 2021 09:00), Max: 97.5 (14 Jun 2021 18:18)  HR: 83 (15 Diego 2021 08:27) (63 - 83)  BP: 118/57 (15 Diego 2021 08:27) (118/57 - 160/62)  BP(mean): 93 (14 Jun 2021 19:22) (87 - 93)  RR: 18 (15 Diego 2021 08:27) (15 - 18)  SpO2: 96% (15 Diego 2021 08:27) (96% - 99%)    PHYSICAL EXAM:  GENERAL: pleasant, NAD  NEURO: Aox3, intact strength/sensation all 4 ext   HEENT: clear op, mmm  NECK:  No JVD, no lymphadenopathy  CHEST/LUNG: Clear to auscultation bilaterally; No rales, rhonchi, wheezing. Normal work of breathing, not tachypneic  HEART: Regular rate and rhythm; No murmurs, rubs, or gallops  ABDOMEN: Soft, Nontender, Nondistended. Normoactive bowel sounds  EXTREMITIES:  LLE groin site bruised stable from day before, no asterixes     LABS (reviewed)   CBC with Hg stable in 7's, plts slightly lower but all labs down  BMP with creat stabilizing 5.6 from 5.4 bun stable, bicarb up to 17     ASSESSMENT AND PLAN: 89 yo Russain speaking female with anemia of chronic disease, MDS and CLL previously on Revlimid, CKD stage V, Type II DM c/b nephropathy, HFpEF, hypertension, hyperlipidemia, CAD, hypothyroidism who presented with worsening rest pain and exertional RLE pain, s/p angio 6/11 s/p stent c/b hematoma L groin and mild KYMBERLY stabilizing   #Critical leg ischemia s/p angio on 6/11 w/ stent placement at S[facility] RLE-c/w asa/statin, periop abx on dc per vascular   #Post op acute blood loss anemia, hematoma L groin - Hg stable, no active bleed CT a/p  #Stage IV CKD- speak with renal attending (Maria Guadalupe who follows as outpt) to ensure stable for dc and close outpt f/u to monitor labs, if dc on diuretic or hold, otherwise will need phosphate binder and bicarb on discharge   #CLL and MDS hx, previously on Revlimid c/b low plts in 80's- outpt close f/u  #Type II DM a1c 4.9, episode of hypoglycemia- ISS here- resume    #HFpEF- appreciate cards recs, euvolemic here, diuretics as per renal above   #HTN- pt normotensive- c/w home oral regimen on dc   Hypothyroidism- c/w home synthroid 75mcg    #Diet: DM diet   #Dispo: home with home PT and HHA reinstated      Patient was seen and examined by me at bedside    Greater than 35 minutes spent on total encounter; more than 50% of the visit was spent counseling and/or coordinating care by the attending physician.    Sandro Chen MD [phone]  Subjective/Interval events:  -Pt reported to have some diarrhea yest evening, otherwise well   -urinary well and no n/v and eating well, minimal pain at L hip hematoma site, breathing well with no sob and no le edema  -Wants to be discharged home today     MEDICATIONS  (STANDING):  aspirin  chewable 81 milliGRAM(s) Oral daily  atorvastatin 80 milliGRAM(s) Oral at bedtime  carvedilol 25 milliGRAM(s) Oral every 12 hours  ceFAZolin   IVPB 1000 milliGRAM(s) IV Intermittent every 8 hours  clopidogrel Tablet 75 milliGRAM(s) Oral daily  dextrose 40% Gel 15 Gram(s) Oral once  dextrose 5%. 1000 milliLiter(s) (50 mL/Hr) IV Continuous <Continuous>  dextrose 5%. 1000 milliLiter(s) (100 mL/Hr) IV Continuous <Continuous>  dextrose 50% Injectable 25 Gram(s) IV Push once  dextrose 50% Injectable 12.5 Gram(s) IV Push once  dextrose 50% Injectable 25 Gram(s) IV Push once  famotidine    Tablet 20 milliGRAM(s) Oral daily  glucagon  Injectable 1 milliGRAM(s) IntraMuscular once  insulin lispro (ADMELOG) corrective regimen sliding scale   SubCutaneous Before meals and at bedtime  levothyroxine 75 MICROGram(s) Oral daily  NIFEdipine XL 60 milliGRAM(s) Oral daily  sevelamer carbonate 800 milliGRAM(s) Oral every 8 hours  sodium bicarbonate 1300 milliGRAM(s) Oral every 12 hours    MEDICATIONS  (PRN):      Vital Signs Last 24 Hrs  T(C): 36 (15 Diego 2021 09:00), Max: 36.4 (14 Jun 2021 18:18)  T(F): 96.8 (15 Diego 2021 09:00), Max: 97.5 (14 Jun 2021 18:18)  HR: 83 (15 Diego 2021 08:27) (63 - 83)  BP: 118/57 (15 Diego 2021 08:27) (118/57 - 160/62)  BP(mean): 93 (14 Jun 2021 19:22) (87 - 93)  RR: 18 (15 Diego 2021 08:27) (15 - 18)  SpO2: 96% (15 Diego 2021 08:27) (96% - 99%)    PHYSICAL EXAM:  GENERAL: pleasant, NAD  NEURO: Aox3, intact strength/sensation all 4 ext   HEENT: clear op, mmm  NECK:  No JVD, no lymphadenopathy  CHEST/LUNG: Clear to auscultation bilaterally; No rales, rhonchi, wheezing. Normal work of breathing, not tachypneic  HEART: Regular rate and rhythm; No murmurs, rubs, or gallops  ABDOMEN: Soft, Nontender, Nondistended. Normoactive bowel sounds  EXTREMITIES:  LLE groin site bruised stable from day before, no asterixes     LABS (reviewed)   CBC with Hg stable in 7's, plts slightly lower but all labs down  BMP with creat stabilizing 5.6 from 5.4 bun stable, bicarb up to 17     ASSESSMENT AND PLAN: 88 yo Russain speaking female with anemia of chronic disease, MDS and CLL previously on Revlimid, CKD stage V, Type II DM c/b nephropathy, HFpEF, hypertension, hyperlipidemia, CAD, hypothyroidism who presented with worsening rest pain and exertional RLE pain, s/p angio 6/11 s/p stent c/b hematoma L groin and mild KYMBERLY stabilizing   #Critical leg ischemia s/p angio on 6/11 w/ stent placement at [facility] RLE-c/w asa/statin, periop abx on dc per vascular   #Post op acute blood loss anemia, hematoma L groin - Hg stable, no active bleed CT a/p  #Stage IV CKD with acute kidney injury post angio- urinating well, no uremic symptoms, no urgent HD need. speak with renal attending (Maria Guadalupe who follows as outpt) to ensure stable from their standpoint for dc and he can setup close outpt f/u to monitor labs, per renal if to hold diuretic on dc or not, otherwise will need scripts for phosphate binder and bicarb on discharge   #CLL and MDS hx, previously on Revlimid c/b low plts in 80's- outpt close f/u  #Type II DM a1c 4.9, episode of hypoglycemia- ISS here- resume    #HFpEF- appreciate cards recs, euvolemic here, diuretics as per renal above   #HTN- pt normotensive- c/w home oral regimen on dc   Hypothyroidism- c/w home synthroid 75mcg    #Diet: DM diet   #Dispo: home with home PT and HHA reinstated      Patient was seen and examined by me at bedside    Greater than 35 minutes spent on total encounter; more than 50% of the visit was spent counseling and/or coordinating care by the attending physician.    Sandro Chen MD [phone]

## 2021-06-15 NOTE — PROGRESS NOTE ADULT - REASON FOR ADMISSION
RLE rest pain

## 2021-06-15 NOTE — PROGRESS NOTE ADULT - SUBJECTIVE AND OBJECTIVE BOX
Patient is a 89y Female seen and evaluated at bedside. Plan for dc home today. Hold diuretics. Repeat labs on Thursday.    Meds:    aspirin  chewable 81 daily  atorvastatin 80 at bedtime  carvedilol 25 every 12 hours  ceFAZolin   IVPB 1000 every 8 hours  clopidogrel Tablet 75 daily  dextrose 40% Gel 15 once  dextrose 5%. 1000 <Continuous>  dextrose 5%. 1000 <Continuous>  dextrose 50% Injectable 25 once  dextrose 50% Injectable 12.5 once  dextrose 50% Injectable 25 once  famotidine    Tablet 20 daily  glucagon  Injectable 1 once  insulin lispro (ADMELOG) corrective regimen sliding scale  Before meals and at bedtime  levothyroxine 75 daily  NIFEdipine XL 60 daily  sevelamer carbonate 800 every 8 hours  sodium bicarbonate 1300 every 12 hours      T(C): , Max: 36.4 (06-14-21 @ 18:18)  T(F): , Max: 97.5 (06-14-21 @ 18:18)  HR: 83 (06-15-21 @ 08:27)  BP: 118/57 (06-15-21 @ 08:27)  BP(mean): 93 (06-14-21 @ 19:22)  RR: 18 (06-15-21 @ 08:27)  SpO2: 96% (06-15-21 @ 08:27)  Wt(kg): --    06-14 @ 07:01  -  06-15 @ 07:00  --------------------------------------------------------  IN: 480 mL / OUT: 300 mL / NET: 180 mL    Review of Systems:  RESPIRATORY: No shortness of breath  CARDIOVASCULAR: No chest pain  MUSCULOSKELETAL: No leg oedema    PHYSICAL EXAM:  GENERAL: well-developed, well nourished, alert, no acute distress at present on RA  CHEST/LUNG: Clear to auscultation bilaterally  HEART: normal S1S2, RRR  ABDOMEN: Soft, Nontender, non distended  EXTREMITIES: trace oedema, RLE tenderness    LABS:                        7.4    4.08  )-----------( 54       ( 15 Diego 2021 08:18 )             22.8     06-15    132<L>  |  98  |  99<H>  ----------------------------<  83  5.0   |  17<L>  |  5.62<H>    Ca    10.3      15 Diego 2021 08:18  Phos  6.8     06-15  Mg     2.5     06-15                  RADIOLOGY & ADDITIONAL STUDIES:

## 2021-06-15 NOTE — PROGRESS NOTE ADULT - PROVIDER SPECIALTY LIST ADULT
Hospitalist
Nephrology
Vascular Surgery
Nephrology
Nephrology
Vascular Surgery
Heme/Onc
Hospitalist
Hospitalist
Vascular Surgery
Cardiology
Hospitalist
Hospitalist
Vascular Surgery
Nephrology

## 2021-06-15 NOTE — PROGRESS NOTE ADULT - NSICDXPILOT_GEN_ALL_CORE
[place]

## 2021-06-15 NOTE — PROGRESS NOTE ADULT - ASSESSMENT
88 yo [place] speaking female with PMHx CKD stage 5, diabetic nephropathy, anemia of chronic disease, chronic diastolic CHF, hypertension, hyperlipidemia, CAD, hypothyroidism admitted for right lower extremity pain with plan for heparin drip now s/p RLE angiogram. Nephrology consulted for recommendations.     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

## 2021-06-15 NOTE — DISCHARGE NOTE NURSING/CASE MANAGEMENT/SOCIAL WORK - PATIENT PORTAL LINK FT
You can access the FollowMyHealth Patient Portal offered by [facility] by registering at the following website: [email or web address]. By joining [employer]’s FollowMyHealth portal, you will also be able to view your health information using other applications (apps) compatible with our system.

## 2021-06-15 NOTE — PROGRESS NOTE ADULT - ATTENDING COMMENTS
creat slightly up -- no symptoms and appears well, urinating well   -lilely slt dry side still  ok to dc off diuretic with close f/u this week
creat up --perhaps slt dry but r/o EMI-- hold diuretics today and recheck to assure stable prior to dc   no need for dialysis yet
Initial attending contact date 6/14/21     . See fellow note written above for details. I reviewed the fellow documentation. I have personally seen and examined this patient. I reviewed vitals, labs, medications, cardiac studies, and additional imaging. I agree with the above fellow's findings and plans as written above with the following additions/statements.    -89 yo Pakistani speaking female with anemia of chronic disease, CKD stage V, Diabetes, Diabetic nephropathy, Chronic diastolic CHF, hypertension, hyperlipidemia, CAD, hypothyroidism, PPM implantation (unknown indication) presenting for RLE angiogram. Cardiology consulted for pre-op risk assessment  -pt currently s/p angio s/p R SFA and popliteal stent. Cont ASA/plavix/carvedilol per primary team. Would increase atorva 80 mg qd   -BP controlled. Cont current regimen  -CAD status stable, no active issues  -Pt states she has not seen cardiologist in years  -Can fu with Dr Casey Newton for cardiology fu - [phone]  -Pls reconsult as needed

## 2021-06-16 NOTE — PHYSICAL EXAM
[Respiratory Effort] : normal respiratory effort [Normal Heart Sounds] : normal heart sounds [2+] : left 2+ [0] : right 0 [1+] : left 1+ [Calm] : calm [Ankle Swelling (On Exam)] : not present [Varicose Veins Of Lower Extremities] : not present [] : not present [de-identified] : appears older than stated age, using walker [de-identified] : dependent rubor on the right foot, no open ulcers

## 2021-06-16 NOTE — HISTORY OF PRESENT ILLNESS
[FreeTextEntry1] : 89 yo Armenian speaking female with anemia of chronic disease, CKD stage V, Diabetes, Diabetic nephropathy, Chronic diastolic CHF, hypertension, hyperlipidemia, CAD, hypothyroidism complaining of right lower extremity pain. She states it started about 2 weeks ago. She noticed it while walking less than 1 block but it has progressed and now occurs at rest.  She states she also has cramping of right buttock.

## 2021-06-16 NOTE — PROCEDURE
[FreeTextEntry1] : ANISA/PVR done showing blunted waveforms at the low thigh and flat from the calf down. Left ANISA 0.72, right non compressible

## 2021-06-16 NOTE — ASSESSMENT
[FreeTextEntry1] : 88 yo Hong Konger speaking female with anemia of chronic disease, CKD stage V, Diabetes, Diabetic nephropathy, Chronic diastolic CHF, hypertension, hyperlipidemia, CAD, hypothyroidism complaining of right lower extremity pain. She states it started about 2 weeks ago. She noticed it while walking less than 1 block but it has progressed and now occurs at rest.  She states she also has cramping of right buttock. Patient with flat waveforms and rest pain, recommend hospital admission for angiogram/PTA/stent patient and family member agree. Risk, complications and alternatives were discussed, Discussed risks of procedure including access site complications (infection/pseudoaneursyms/hematoma) contrast nephropathy, deterioration in renal function - all questions were answered.

## 2021-06-18 ENCOUNTER — TRANSCRIPTION ENCOUNTER (OUTPATIENT)
Age: 86
End: 2021-06-18

## 2021-06-21 DIAGNOSIS — E11.22 TYPE 2 DIABETES MELLITUS WITH DIABETIC CHRONIC KIDNEY DISEASE: ICD-10-CM

## 2021-06-21 DIAGNOSIS — D63.8 ANEMIA IN OTHER CHRONIC DISEASES CLASSIFIED ELSEWHERE: ICD-10-CM

## 2021-06-21 DIAGNOSIS — D62 ACUTE POSTHEMORRHAGIC ANEMIA: ICD-10-CM

## 2021-06-21 DIAGNOSIS — E78.5 HYPERLIPIDEMIA, UNSPECIFIED: ICD-10-CM

## 2021-06-21 DIAGNOSIS — I70.221 ATHEROSCLEROSIS OF NATIVE ARTERIES OF EXTREMITIES WITH REST PAIN, RIGHT LEG: ICD-10-CM

## 2021-06-21 DIAGNOSIS — Z95.0 PRESENCE OF CARDIAC PACEMAKER: ICD-10-CM

## 2021-06-21 DIAGNOSIS — E11.51 TYPE 2 DIABETES MELLITUS WITH DIABETIC PERIPHERAL ANGIOPATHY WITHOUT GANGRENE: ICD-10-CM

## 2021-06-21 DIAGNOSIS — I13.2 HYPERTENSIVE HEART AND CHRONIC KIDNEY DISEASE WITH HEART FAILURE AND WITH STAGE 5 CHRONIC KIDNEY DISEASE, OR END STAGE RENAL DISEASE: ICD-10-CM

## 2021-06-21 DIAGNOSIS — I50.32 CHRONIC DIASTOLIC (CONGESTIVE) HEART FAILURE: ICD-10-CM

## 2021-06-21 DIAGNOSIS — E83.39 OTHER DISORDERS OF PHOSPHORUS METABOLISM: ICD-10-CM

## 2021-06-21 DIAGNOSIS — E03.9 HYPOTHYROIDISM, UNSPECIFIED: ICD-10-CM

## 2021-06-21 DIAGNOSIS — C91.10 CHRONIC LYMPHOCYTIC LEUKEMIA OF B-CELL TYPE NOT HAVING ACHIEVED REMISSION: ICD-10-CM

## 2021-06-21 DIAGNOSIS — N18.5 CHRONIC KIDNEY DISEASE, STAGE 5: ICD-10-CM

## 2021-06-21 DIAGNOSIS — E11.21 TYPE 2 DIABETES MELLITUS WITH DIABETIC NEPHROPATHY: ICD-10-CM

## 2021-06-21 DIAGNOSIS — I70.229 ATHEROSCLEROSIS OF NATIVE ARTERIES OF EXTREMITIES WITH REST PAIN, UNSPECIFIED EXTREMITY: ICD-10-CM

## 2021-06-21 DIAGNOSIS — D46.9 MYELODYSPLASTIC SYNDROME, UNSPECIFIED: ICD-10-CM

## 2021-06-21 DIAGNOSIS — D69.6 THROMBOCYTOPENIA, UNSPECIFIED: ICD-10-CM

## 2021-06-21 DIAGNOSIS — D69.1 QUALITATIVE PLATELET DEFECTS: ICD-10-CM

## 2021-06-21 DIAGNOSIS — I73.9 PERIPHERAL VASCULAR DISEASE, UNSPECIFIED: ICD-10-CM

## 2021-06-21 DIAGNOSIS — E87.2 ACIDOSIS: ICD-10-CM

## 2021-06-24 ENCOUNTER — APPOINTMENT (OUTPATIENT)
Dept: VASCULAR SURGERY | Facility: CLINIC | Age: 86
End: 2021-06-24
Payer: MEDICARE

## 2021-06-24 PROCEDURE — 99213 OFFICE O/P EST LOW 20 MIN: CPT

## 2021-06-24 PROCEDURE — 93926 LOWER EXTREMITY STUDY: CPT

## 2021-06-28 NOTE — DISCUSSION/SUMMARY
[FreeTextEntry1] : 88 yo Iraqi speaking female with anemia of chronic disease, CKD, stage V, Diabetes, Diabetic nephropathy, Chronic diastolic CHF, hypertension, hyperlipidemia, CAD, hypothyroidism. 6/11 she underwent angiogram of RLE and stent of SFA/pop artery. She was discharged on ASA and Plavix for PAD. \par \par Plan\par - Continue Plavix for now, advised to hold on aspirin for now.\par - OK to start PT\par - FU in 3 months or sooner with any issues.

## 2021-06-28 NOTE — REASON FOR VISIT
[de-identified] : 6/11/21 [de-identified] : 88 yo Polish speaking female with anemia of chronic disease, CKD, stage V, Diabetes, Diabetic nephropathy, Chronic diastolic CHF, hypertension, hyperlipidemia, CAD, hypothyroidism, presenting to the office for right lower extremity pain and was admitted to the hospital Nona 10, 2021.  Patient states it started 2 weeks ago while walking less than 1 block and started occuring at rest and right buttock. \par \par 6/11 she underwent angiogram of RLE and stent of SFA/pop artery. She was discharged on ASA and Plavix for PAD. She went to the hospital twice for nose bleeds since discharge. She was advised to stop the aspirin but she needed to continue the plavix due to recent stents.

## 2021-06-28 NOTE — ADDENDUM
[FreeTextEntry1] : I, Edmar Pagan MD  have read and attest that all the information, medical decision making and discharge instructions within are true and accurate. I personally performed the evaluation and management (E/M) services for this established patient who presents today with (a) chronic problem(s) of (an) existing condition(s).  That E/M includes conducting the examination, assessing all conditions, and establishing a plan of care. Today, my ACP, Emilie uDrant PA-C, was here to observe my evaluation and management services for this condition(s) to be followed going forward.

## 2021-06-28 NOTE — PHYSICAL EXAM
[Respiratory Effort] : normal respiratory effort [Normal Heart Sounds] : normal heart sounds [2+] : right 2+ [0] : right 0 [de-identified] : well appearing

## 2021-07-20 ENCOUNTER — APPOINTMENT (OUTPATIENT)
Dept: HEART AND VASCULAR | Facility: CLINIC | Age: 86
End: 2021-07-20
Payer: MEDICARE

## 2021-07-20 ENCOUNTER — NON-APPOINTMENT (OUTPATIENT)
Age: 86
End: 2021-07-20

## 2021-07-20 VITALS
TEMPERATURE: 98 F | HEIGHT: 60 IN | SYSTOLIC BLOOD PRESSURE: 137 MMHG | OXYGEN SATURATION: 98 % | BODY MASS INDEX: 28.47 KG/M2 | HEART RATE: 65 BPM | DIASTOLIC BLOOD PRESSURE: 62 MMHG | WEIGHT: 145 LBS

## 2021-07-20 PROCEDURE — 99214 OFFICE O/P EST MOD 30 MIN: CPT

## 2021-07-20 PROCEDURE — 93000 ELECTROCARDIOGRAM COMPLETE: CPT

## 2021-07-20 RX ORDER — PATIROMER 8.4 G/1
8.4 POWDER, FOR SUSPENSION ORAL
Qty: 15 | Refills: 5 | Status: DISCONTINUED | COMMUNITY
Start: 2020-02-14 | End: 2021-07-20

## 2021-07-20 RX ORDER — TRAMADOL HYDROCHLORIDE AND ACETAMINOPHEN 37.5; 325 MG/1; MG/1
37.5-325 TABLET, FILM COATED ORAL 3 TIMES DAILY
Refills: 0 | Status: DISCONTINUED | COMMUNITY
Start: 2018-05-01 | End: 2021-07-20

## 2021-07-20 RX ORDER — CLOPIDOGREL BISULFATE 75 MG/1
75 TABLET, FILM COATED ORAL DAILY
Qty: 90 | Refills: 3 | Status: ACTIVE | COMMUNITY

## 2021-07-20 RX ORDER — PRAVASTATIN SODIUM 10 MG/1
10 TABLET ORAL DAILY
Refills: 0 | Status: DISCONTINUED | COMMUNITY
End: 2021-07-20

## 2021-07-20 RX ORDER — TORSEMIDE 20 MG/1
20 TABLET ORAL
Refills: 0 | Status: ACTIVE | COMMUNITY

## 2021-07-20 RX ORDER — CHLORHEXIDINE GLUCONATE 4 %
5 LIQUID (ML) TOPICAL
Refills: 0 | Status: ACTIVE | COMMUNITY

## 2021-07-20 RX ORDER — LINAGLIPTIN 5 MG/1
5 TABLET, FILM COATED ORAL DAILY
Refills: 0 | Status: ACTIVE | COMMUNITY

## 2021-07-20 RX ORDER — NITROGLYCERIN 40 MG/1
0.2 PATCH TRANSDERMAL
Refills: 0 | Status: DISCONTINUED | COMMUNITY
Start: 2018-05-01 | End: 2021-07-20

## 2021-07-20 RX ORDER — SODIUM BICARBONATE 650 MG/1
650 TABLET ORAL
Refills: 0 | Status: DISCONTINUED | COMMUNITY
End: 2021-07-20

## 2021-07-20 NOTE — ASSESSMENT
[FreeTextEntry1] : PAD on asa plavix will reach out to vascular regarding duration of DAPT given low plts stop pravastatin start atorvastatin 80 mg once a day \par HTN controlled on nifedipine\par HFpEF euvolemic on torsemide\par fu in one month

## 2021-07-20 NOTE — HISTORY OF PRESENT ILLNESS
[FreeTextEntry1] : 89 F Tristanian speaking ESRD CKD stage V, Diabetic nephropathy HTN HFpEF, Hypothyroid, Anemia, Thrombocytopenia, PPM followed elsewhere, PAD CLI s/p Stent to SFA and Popliteal she currently is home bound uses a walker takes NTG at night for chest pain never has exertional symptoms she had a cardiac cath 15 years ago no intervention\par \par  [id number]\par \par ecg apaced

## 2021-07-30 ENCOUNTER — APPOINTMENT (OUTPATIENT)
Dept: NEPHROLOGY | Facility: CLINIC | Age: 86
End: 2021-07-30
Payer: MEDICARE

## 2021-07-30 VITALS
BODY MASS INDEX: 24.8 KG/M2 | DIASTOLIC BLOOD PRESSURE: 54 MMHG | SYSTOLIC BLOOD PRESSURE: 153 MMHG | HEART RATE: 59 BPM | WEIGHT: 127 LBS

## 2021-07-30 PROCEDURE — 99214 OFFICE O/P EST MOD 30 MIN: CPT

## 2021-08-01 NOTE — HISTORY OF PRESENT ILLNESS
[FreeTextEntry1] : f/u CKD\par s/p admit with June with ischemic toe -- s/p angio with SFA/ pop stent -- ischemia resolved \par creat was in 5s in hospital --above baseline mid 3s in 2020 -- felt to be progression possibly \par reports has been well\par no GI sx, good appetite , no SOB , no edema\par ran out of nahco3 and sevelamer couple of days agp\par getting EPO q 2 wk fro MDS and CKD anemia -- intermittent PRBC- last in June -- with heme\par has been off calcitriol since admit as  had  high calcium \par takinf celebrex daily but no sig pain

## 2021-08-01 NOTE — ASSESSMENT
[FreeTextEntry1] : advanced CKD -- creat improved from recent hospitalization and overall appears quite stable-- no evidence uremia, no fluid overload \par lytes ok\par cont regimen\par keep off calcitriol for now s/p hypercalcemia and follow ca,PTH\par cont phos binder (non calcium) \par nahco3 \par f/u 3 mos -- check labs

## 2021-08-02 ENCOUNTER — RX CHANGE (OUTPATIENT)
Age: 86
End: 2021-08-02

## 2021-08-02 RX ORDER — SEVELAMER CARBONATE 800 MG/1
800 TABLET, FILM COATED ORAL 3 TIMES DAILY
Qty: 180 | Refills: 3 | Status: DISCONTINUED | COMMUNITY
Start: 2021-08-02 | End: 2021-08-02

## 2021-08-16 ENCOUNTER — RX CHANGE (OUTPATIENT)
Age: 86
End: 2021-08-16

## 2021-08-16 RX ORDER — SODIUM BICARBONATE 650 MG/1
650 TABLET ORAL
Qty: 120 | Refills: 4 | Status: DISCONTINUED | COMMUNITY
Start: 2021-08-16 | End: 2021-08-16

## 2021-09-29 ENCOUNTER — RX CHANGE (OUTPATIENT)
Age: 86
End: 2021-09-29

## 2021-09-30 ENCOUNTER — APPOINTMENT (OUTPATIENT)
Dept: VASCULAR SURGERY | Facility: CLINIC | Age: 86
End: 2021-09-30
Payer: MEDICARE

## 2021-09-30 VITALS
BODY MASS INDEX: 24.94 KG/M2 | DIASTOLIC BLOOD PRESSURE: 66 MMHG | SYSTOLIC BLOOD PRESSURE: 139 MMHG | HEART RATE: 60 BPM | WEIGHT: 127 LBS | HEIGHT: 60 IN

## 2021-09-30 PROCEDURE — 99213 OFFICE O/P EST LOW 20 MIN: CPT

## 2021-09-30 PROCEDURE — 93926 LOWER EXTREMITY STUDY: CPT

## 2021-10-01 ENCOUNTER — RX CHANGE (OUTPATIENT)
Age: 86
End: 2021-10-01

## 2021-10-01 NOTE — HISTORY OF PRESENT ILLNESS
[FreeTextEntry1] : 89 yo Greenlandic speaking female with anemia of chronic disease, CKD, stage V, Diabetes, Diabetic nephropathy, Chronic diastolic CHF, hypertension, hyperlipidemia, CAD, hypothyroidism, presented to the office for right lower extremity pain and was admitted to the hospital Nona 10, 2021, for walking less than 1 block, occurring rest pain and right buttock. \par \par 6/11 she underwent angiogram of RLE and stent of SFA/pop artery. She was discharged on ASA and Plavix for PAD. She went to the hospital twice for nose bleeds since discharge. And now only taking the Plavix. She is doing well complaining of bilateral cramping in the feet at night and also both feet are very cold.

## 2021-10-01 NOTE — ADDENDUM
[FreeTextEntry1] : I, Edmar Pagan MD  have read and attest that all the information, medical decision making and discharge instructions within are true and accurate. I personally performed the evaluation and management (E/M) services for this established patient who presents today with (a) chronic problem(s) of (an) existing condition(s).  That E/M includes conducting the examination, assessing all conditions, and establishing a plan of care. Today, my ACP, Emilie Durant PA-C, was here to observe my evaluation and management services for this condition(s) to be followed going forward.

## 2021-10-01 NOTE — PHYSICAL EXAM
[Respiratory Effort] : normal respiratory effort [Normal Heart Sounds] : normal heart sounds [2+] : right 2+ [0] : right 0 [de-identified] : well appearing

## 2021-10-01 NOTE — PROCEDURE
MRN:[id number]                      After Visit Summary   11/27/2018    Rocio Patiño    MRN: [id number]           Thank you!     Thank you for choosing [facility] for your care. Our goal is always to provide you with excellent care.        Patient Information     Date Of Birth          1979        Designated Caregiver       Most Recent Value    Caregiver    Will someone help with your care after discharge? no      About your hospital stay     You were admitted on:  November 27, 2018 You last received care in the:  [facility]    You were discharged on:  November 28, 2018       Who to Call     For medical emergencies, please call 911.  For non-urgent questions about your medical care, please call your primary care provider or clinic, [phone]          Attending Provider     Provider Specialty    Pinky Bone MD Psychiatry       Primary Care Provider Office Phone # Fax #    Tracy Estes [phone]      Your next 10 appointments already scheduled     Dec 03, 2018  5:45 PM CST   (Arrive by 5:30 PM)   NEW RHINOLOGY with July Toro MD   [facility] ([facility])    [address] [place] [id number] [phone]              Further instructions from your care team        Behavioral Discharge Planning and Instructions      Summary:  You were admitted on 11/27/2018 due to Suicidal Ideations.  You were treated by Dr. Pinky Bone MD and discharged on 11/28/18 from [facility] to home to follow up with your outpatient providers.    Principal Diagnosis:   Bipolar 1 disorder, MDD, ADHD, unspecified anxiety, alcohol use disorder    Health Care Follow-up Appointments:   Medication management: Sydney Fraser, MSN, PsychNurse , [phone]. You have an appointment in place    Therapy: Robert: S[facility]. Every Thursday  Attend all scheduled appointments with your outpatient providers. Call at least 24 hours  in advance if you need to reschedule an appointment to ensure continued access to your outpatient providers.   Major Treatments, Procedures and Findings:  You were provided with: a psychiatric assessment, assessed for medical stability, medication evaluation and/or management and milieu management    Symptoms to Report: feeling more aggressive, increased confusion, losing more sleep, mood getting worse or thoughts of suicide    Early warning signs can include: increased depression or anxiety sleep disturbances increased thoughts or behaviors of suicide or self-harm  increased unusual thinking, such as paranoia or hearing voices    Safety and Wellness:  Take all medicines as directed.  Make no changes unless your doctor suggests them.  Follow treatment recommendations.  Refrain from alcohol and non-prescribed drugs.  Ask your support system to help you reduce your access to items that could harm yourself or others. If there is a concern for safety, call 911.    Resources:   Crisis Intervention: 717.831.1390 or 281-361-1445 (TTY: 951.227.6620).  Call anytime for help.  National [place] on Mental Illness (www.mn.dayna.org): 722.468.6693 or 059-090-0203.  Alcoholics Anonymous (www.alcoholics-anonymous.org): Check your phone book for your local chapter.  Suicide Awareness Voices of Education (SAVE) (www.save.org): 864-190-MPBV (9985)  National Suicide Prevention Line (www.mentalhealthmn.org): 653-056-GUZD (3039)  Mental Health Consumer/Survivor Network of [place] (www.mhcsn.net): 565.461.6813 or 650-374-2749  Mental Health Association of [place] (www.mentalhealth.org): 840.636.1359 or 771-048-8573  Self- Management and Recovery Training., SMART-- Toll free: 757.695.6747  [email or web address]."[employer].org    The treatment team has appreciated the opportunity to work with you.     If you have any questions or concerns our unit number is [phone]  You may be receiving a follow-up phone call within the next three days from a representative  "from behavioral health.              Pending Results     No orders found for last 3 day(s).            Admission Information     Date & Time Provider Department Dept. Phone    11/27/2018 Pinky Bone MD [address] [phone]      Your Vitals Were     Height Weight Last Period BMI (Body Mass Index)          1.626 m (5' 4\") 74.6 kg (164 lb 8 oz) 12/16/2015 28.24 kg/m2        MyChart Information     [facility] gives you secure access to your electronic health record. If you see a primary care provider, you can also send messages to your care team and make appointments. If you have questions, please call your primary care clinic.  If you do not have a primary care provider, please call [phone] and they will assist you.        Care EveryWhere ID     This is your Care EveryWhere ID. This could be used by other organizations to access your [facility] medical records  [id number]        Equal Access to Services     TRISHA CARR : Carolynn Terrell, wamadison chen, qajordyta kaalmada jimmy, mary brown . So [facility] [phone].    ATENCIÓN: Si habla español, tiene a lowe disposición servicios gratuitos de asistencia lingüística. Llame al [phone].    We comply with applicable federal civil rights laws and [place] laws. We do not discriminate on the basis of race, color, national origin, age, disability, sex, sexual orientation, or gender identity.               Review of your medicines      CONTINUE these medicines which have NOT CHANGED        Dose / Directions    amphetamine-dextroamphetamine 20 MG tablet   Commonly known as:  ADDERALL        Dose:  20 mg   20 mg 2 times daily @ 06:00 and 14:00   Refills:  0       clonazePAM 1 MG tablet   Commonly known as:  klonoPIN        Dose:  1 mg   Take 1 mg by mouth 2 times daily as needed for anxiety   Refills:  0       fluticasone 50 MCG/ACT nasal spray   Commonly known as:  FLONASE        Dose:  1 spray   Spray 1 spray into both " nostrils daily as needed   Refills:  0       ibuprofen 200 MG tablet   Commonly known as:  ADVIL/MOTRIN        Dose:  800 mg   Take 800 mg by mouth daily as needed for mild pain (headache)   Refills:  0       traZODone 50 MG tablet   Commonly known as:  DESYREL        TAKE ONE TO TWO TABLETS BY MOUTH AT BEDTIME AS NEEDED   Refills:  0       venlafaxine 150 MG 24 hr capsule   Commonly known as:  EFFEXOR-XR        Dose:  300 mg   Take 300 mg by mouth every morning 150 mg plus 2 x 75 mg per [facility]   Refills:  0                Protect others around you: Learn how to safely use, store and throw away your medicines at www.disposemymeds.org.             Medication List: This is a list of all your medications and when to take them. Check marks below indicate your daily home schedule. Keep this list as a reference.      Medications           Morning Afternoon Evening Bedtime As Needed    amphetamine-dextroamphetamine 20 MG tablet   Commonly known as:  ADDERALL   20 mg 2 times daily @ 06:00 and 14:00                                clonazePAM 1 MG tablet   Commonly known as:  klonoPIN   Take 1 mg by mouth 2 times daily as needed for anxiety   Last time this was given:  1 mg on 11/28/2018 10:52 AM                                fluticasone 50 MCG/ACT nasal spray   Commonly known as:  FLONASE   Spray 1 spray into both nostrils daily as needed                                ibuprofen 200 MG tablet   Commonly known as:  ADVIL/MOTRIN   Take 800 mg by mouth daily as needed for mild pain (headache)   Last time this was given:  800 mg on 11/28/2018  1:01 AM                                traZODone 50 MG tablet   Commonly known as:  DESYREL   TAKE ONE TO TWO TABLETS BY MOUTH AT BEDTIME AS NEEDED                                venlafaxine 150 MG 24 hr capsule   Commonly known as:  EFFEXOR-XR   Take 300 mg by mouth every morning 150 mg plus 2 x 75 mg per [facility]                                   [FreeTextEntry1] : Arterial US done today to evaluate SFA/pop stent showing pop stent with 75% narrowing.

## 2021-10-01 NOTE — ASSESSMENT
[FreeTextEntry1] : 88 yo Chilean speaking female with anemia of chronic disease, CKD, stage V, Diabetes, Diabetic nephropathy, Chronic diastolic CHF, hypertension, hyperlipidemia, CAD, hypothyroidism, presented to the office for right lower extremity pain and was admitted to the hospital Nona 10, 2021, for walking less than 1 block, occurring rest pain and right buttock. 6/11 she underwent angiogram of RLE and stent of SFA/pop artery. She was discharged on ASA and Plavix for PAD. She went to the hospital twice for nose bleeds since discharge. And now only taking the Plavix. She is doing well complaining of bilateral cramping in the feet at night and also both feet are very cold. \par \par Plan:\par - US shows 75% narrowing in the pop stent. At this time she is asymptomatic, will continue the plavix and monitor the stent with a repeat US in 3 months. Will call sooner should she have increase in leg pain with walking or rest pain.

## 2021-10-19 ENCOUNTER — APPOINTMENT (OUTPATIENT)
Dept: NEPHROLOGY | Facility: CLINIC | Age: 86
End: 2021-10-19

## 2022-01-20 ENCOUNTER — APPOINTMENT (OUTPATIENT)
Dept: VASCULAR SURGERY | Facility: CLINIC | Age: 87
End: 2022-01-20
Payer: MEDICARE

## 2022-01-20 VITALS
WEIGHT: 127 LBS | SYSTOLIC BLOOD PRESSURE: 120 MMHG | DIASTOLIC BLOOD PRESSURE: 64 MMHG | HEART RATE: 67 BPM | BODY MASS INDEX: 24.94 KG/M2 | HEIGHT: 60 IN

## 2022-01-20 PROCEDURE — 99213 OFFICE O/P EST LOW 20 MIN: CPT

## 2022-01-20 PROCEDURE — 93922 UPR/L XTREMITY ART 2 LEVELS: CPT

## 2022-01-20 PROCEDURE — 93926 LOWER EXTREMITY STUDY: CPT

## 2022-01-21 NOTE — END OF VISIT
[FreeTextEntry3] : I, Dr. Edmar Pagan have read and attest that all the information, medical decision making and discharge instructions within are true and accurate. I personally performed the evaluation and management (E/M) services for this established patient who presents today with (a) new problem(s)/exacerbation of (an) existing condition(s).  That E/M includes conducting the examination, assessing all new/exacerbated conditions, and establishing a new plan of care.  Today, my ACP, Emilie Durant PA-C, was here to observe my evaluation and management services for this new problem/exacerbated condition to be followed going forward.\par

## 2022-01-21 NOTE — PROCEDURE
[FreeTextEntry1] : Arterial US done today to evaluate SFA/pop stent showing pop stent with 75% narrowing.

## 2022-01-21 NOTE — PHYSICAL EXAM
[Respiratory Effort] : normal respiratory effort [Normal Heart Sounds] : normal heart sounds [2+] : right 2+ [0] : right 0 [de-identified] : well appearing [de-identified] : right first and second toe slightly red. good capillary refill. no gangrene, slightly cooler than left leg. FROM of all toes and foot.

## 2022-01-21 NOTE — ASSESSMENT
[FreeTextEntry1] : 88 yo Syrian speaking female with anemia of chronic disease, CKD, stage V, Diabetes, Diabetic nephropathy, Chronic diastolic CHF, hypertension, hyperlipidemia, CAD, hypothyroidism,who we see for PAD. 6/11/21 she underwent angiogram of RLE and stent of SFA/pop artery, taking Plavix. \par \par Plan:\par - US done today showing occluded distal pop stent and occluded AT artery, she is also having worsening symptoms of rest pain and toe discoloration. Discussed options with her. best option would be to proceed with angiogram/plasty/stent however patient does not want to proceed with surgery at this time. Explained to her the risk of decreased circulation including limb loss, patient understands. Will see her back in one month, will call sooner should symptoms worsen. Everything explained to her with Syrian .

## 2022-01-21 NOTE — HISTORY OF PRESENT ILLNESS
[FreeTextEntry1] : 89 yo South Korean speaking female with anemia of chronic disease, CKD, stage V, Diabetes, Diabetic nephropathy, Chronic diastolic CHF, hypertension, hyperlipidemia, CAD, hypothyroidism,who we see for PAD. 6/11/21 she underwent angiogram of RLE and stent of SFA/pop artery, taking Plavix. \par \par 3 months ago she was in the office and she was found to have 75% in stent stenosis. We discussed intervention at that time however patient was not having a lot of pain and did not want to have procedure. She comes in today stating that since her last visit she has been having increase in foot pain on the right, cramping in the calves with walking. Rest pain at night, on and off and the first and second toe on the right is colder, painful and slightly red. \par \par South Korean  used.

## 2022-01-22 ENCOUNTER — TRANSCRIPTION ENCOUNTER (OUTPATIENT)
Age: 87
End: 2022-01-22

## 2022-02-09 ENCOUNTER — TRANSCRIPTION ENCOUNTER (OUTPATIENT)
Age: 87
End: 2022-02-09

## 2022-02-10 ENCOUNTER — INPATIENT (INPATIENT)
Facility: HOSPITAL | Age: 87
LOS: 1 days | Discharge: HOME | DRG: 253 | End: 2022-02-12
Attending: STUDENT IN AN ORGANIZED HEALTH CARE EDUCATION/TRAINING PROGRAM | Admitting: STUDENT IN AN ORGANIZED HEALTH CARE EDUCATION/TRAINING PROGRAM
Payer: MEDICARE

## 2022-02-10 ENCOUNTER — APPOINTMENT (OUTPATIENT)
Dept: VASCULAR SURGERY | Facility: CLINIC | Age: 87
End: 2022-02-10
Payer: MEDICARE

## 2022-02-10 VITALS
DIASTOLIC BLOOD PRESSURE: 67 MMHG | BODY MASS INDEX: 24.94 KG/M2 | HEART RATE: 74 BPM | SYSTOLIC BLOOD PRESSURE: 115 MMHG | WEIGHT: 127 LBS | HEIGHT: 60 IN

## 2022-02-10 VITALS
OXYGEN SATURATION: 99 % | WEIGHT: 134.92 LBS | SYSTOLIC BLOOD PRESSURE: 128 MMHG | HEART RATE: 75 BPM | DIASTOLIC BLOOD PRESSURE: 63 MMHG | TEMPERATURE: 98 F | RESPIRATION RATE: 18 BRPM | HEIGHT: 61 IN

## 2022-02-10 DIAGNOSIS — Z95.0 PRESENCE OF CARDIAC PACEMAKER: Chronic | ICD-10-CM

## 2022-02-10 LAB
ALBUMIN SERPL ELPH-MCNC: 4.5 G/DL — SIGNIFICANT CHANGE UP (ref 3.3–5)
ALP SERPL-CCNC: 110 U/L — SIGNIFICANT CHANGE UP (ref 40–120)
ALT FLD-CCNC: 20 U/L — SIGNIFICANT CHANGE UP (ref 10–45)
ANION GAP SERPL CALC-SCNC: 18 MMOL/L — HIGH (ref 5–17)
ANISOCYTOSIS BLD QL: SLIGHT — SIGNIFICANT CHANGE UP
APTT BLD: 32.3 SEC — SIGNIFICANT CHANGE UP (ref 27.5–35.5)
AST SERPL-CCNC: 20 U/L — SIGNIFICANT CHANGE UP (ref 10–40)
BASOPHILS # BLD AUTO: 0 K/UL — SIGNIFICANT CHANGE UP (ref 0–0.2)
BASOPHILS NFR BLD AUTO: 0 % — SIGNIFICANT CHANGE UP (ref 0–2)
BILIRUB SERPL-MCNC: 0.4 MG/DL — SIGNIFICANT CHANGE UP (ref 0.2–1.2)
BUN SERPL-MCNC: 85 MG/DL — HIGH (ref 7–23)
CALCIUM SERPL-MCNC: 10.7 MG/DL — HIGH (ref 8.4–10.5)
CHLORIDE SERPL-SCNC: 96 MMOL/L — SIGNIFICANT CHANGE UP (ref 96–108)
CO2 SERPL-SCNC: 22 MMOL/L — SIGNIFICANT CHANGE UP (ref 22–31)
CREAT SERPL-MCNC: 3.38 MG/DL — HIGH (ref 0.5–1.3)
EOSINOPHIL # BLD AUTO: 0.48 K/UL — SIGNIFICANT CHANGE UP (ref 0–0.5)
EOSINOPHIL NFR BLD AUTO: 11.2 % — HIGH (ref 0–6)
GIANT PLATELETS BLD QL SMEAR: PRESENT — SIGNIFICANT CHANGE UP
GLUCOSE BLDC GLUCOMTR-MCNC: 100 MG/DL — HIGH (ref 70–99)
GLUCOSE BLDC GLUCOMTR-MCNC: 123 MG/DL — HIGH (ref 70–99)
GLUCOSE SERPL-MCNC: 108 MG/DL — HIGH (ref 70–99)
HCT VFR BLD CALC: 31.1 % — LOW (ref 34.5–45)
HGB BLD-MCNC: 10.2 G/DL — LOW (ref 11.5–15.5)
INR BLD: 1.22 — HIGH (ref 0.88–1.16)
LYMPHOCYTES # BLD AUTO: 1.73 K/UL — SIGNIFICANT CHANGE UP (ref 1–3.3)
LYMPHOCYTES # BLD AUTO: 40.2 % — SIGNIFICANT CHANGE UP (ref 13–44)
MACROCYTES BLD QL: SLIGHT — SIGNIFICANT CHANGE UP
MANUAL SMEAR VERIFICATION: SIGNIFICANT CHANGE UP
MCHC RBC-ENTMCNC: 32.8 GM/DL — SIGNIFICANT CHANGE UP (ref 32–36)
MCHC RBC-ENTMCNC: 36 PG — HIGH (ref 27–34)
MCV RBC AUTO: 109.9 FL — HIGH (ref 80–100)
MONOCYTES # BLD AUTO: 0.16 K/UL — SIGNIFICANT CHANGE UP (ref 0–0.9)
MONOCYTES NFR BLD AUTO: 3.7 % — SIGNIFICANT CHANGE UP (ref 2–14)
NEUTROPHILS # BLD AUTO: 1.93 K/UL — SIGNIFICANT CHANGE UP (ref 1.8–7.4)
NEUTROPHILS NFR BLD AUTO: 44.9 % — SIGNIFICANT CHANGE UP (ref 43–77)
OVALOCYTES BLD QL SMEAR: SIGNIFICANT CHANGE UP
PLAT MORPH BLD: ABNORMAL
PLATELET # BLD AUTO: 84 K/UL — LOW (ref 150–400)
POLYCHROMASIA BLD QL SMEAR: SLIGHT — SIGNIFICANT CHANGE UP
POTASSIUM SERPL-MCNC: 4.4 MMOL/L — SIGNIFICANT CHANGE UP (ref 3.5–5.3)
POTASSIUM SERPL-SCNC: 4.4 MMOL/L — SIGNIFICANT CHANGE UP (ref 3.5–5.3)
PROT SERPL-MCNC: 7.7 G/DL — SIGNIFICANT CHANGE UP (ref 6–8.3)
PROTHROM AB SERPL-ACNC: 14.5 SEC — HIGH (ref 10.6–13.6)
RBC # BLD: 2.83 M/UL — LOW (ref 3.8–5.2)
RBC # FLD: 13.8 % — SIGNIFICANT CHANGE UP (ref 10.3–14.5)
RBC BLD AUTO: ABNORMAL
RH IG SCN BLD-IMP: POSITIVE — SIGNIFICANT CHANGE UP
SARS-COV-2 RNA SPEC QL NAA+PROBE: SIGNIFICANT CHANGE UP
SMUDGE CELLS # BLD: PRESENT — SIGNIFICANT CHANGE UP
SODIUM SERPL-SCNC: 136 MMOL/L — SIGNIFICANT CHANGE UP (ref 135–145)
WBC # BLD: 4.3 K/UL — SIGNIFICANT CHANGE UP (ref 3.8–10.5)
WBC # FLD AUTO: 4.3 K/UL — SIGNIFICANT CHANGE UP (ref 3.8–10.5)

## 2022-02-10 PROCEDURE — 99285 EMERGENCY DEPT VISIT HI MDM: CPT

## 2022-02-10 PROCEDURE — 86077 PHYS BLOOD BANK SERV XMATCH: CPT

## 2022-02-10 PROCEDURE — 99213 OFFICE O/P EST LOW 20 MIN: CPT

## 2022-02-10 PROCEDURE — 71045 X-RAY EXAM CHEST 1 VIEW: CPT | Mod: 26

## 2022-02-10 RX ORDER — GLUCAGON INJECTION, SOLUTION 0.5 MG/.1ML
1 INJECTION, SOLUTION SUBCUTANEOUS ONCE
Refills: 0 | Status: DISCONTINUED | OUTPATIENT
Start: 2022-02-10 | End: 2022-02-11

## 2022-02-10 RX ORDER — OXYCODONE AND ACETAMINOPHEN 5; 325 MG/1; MG/1
1 TABLET ORAL EVERY 4 HOURS
Refills: 0 | Status: DISCONTINUED | OUTPATIENT
Start: 2022-02-10 | End: 2022-02-11

## 2022-02-10 RX ORDER — OXYCODONE AND ACETAMINOPHEN 5; 325 MG/1; MG/1
2 TABLET ORAL EVERY 6 HOURS
Refills: 0 | Status: DISCONTINUED | OUTPATIENT
Start: 2022-02-10 | End: 2022-02-11

## 2022-02-10 RX ORDER — INSULIN LISPRO 100/ML
VIAL (ML) SUBCUTANEOUS
Refills: 0 | Status: DISCONTINUED | OUTPATIENT
Start: 2022-02-10 | End: 2022-02-11

## 2022-02-10 RX ORDER — SODIUM CHLORIDE 9 MG/ML
1000 INJECTION, SOLUTION INTRAVENOUS
Refills: 0 | Status: DISCONTINUED | OUTPATIENT
Start: 2022-02-10 | End: 2022-02-11

## 2022-02-10 RX ORDER — ATORVASTATIN CALCIUM 80 MG/1
80 TABLET, FILM COATED ORAL AT BEDTIME
Refills: 0 | Status: DISCONTINUED | OUTPATIENT
Start: 2022-02-10 | End: 2022-02-11

## 2022-02-10 RX ORDER — COLCHICINE 0.6 MG
0.6 TABLET ORAL DAILY
Refills: 0 | Status: DISCONTINUED | OUTPATIENT
Start: 2022-02-10 | End: 2022-02-11

## 2022-02-10 RX ORDER — SEVELAMER CARBONATE 2400 MG/1
800 POWDER, FOR SUSPENSION ORAL
Refills: 0 | Status: DISCONTINUED | OUTPATIENT
Start: 2022-02-10 | End: 2022-02-11

## 2022-02-10 RX ORDER — CALCIUM CARBONATE 500(1250)
1 TABLET ORAL THREE TIMES A DAY
Refills: 0 | Status: DISCONTINUED | OUTPATIENT
Start: 2022-02-10 | End: 2022-02-11

## 2022-02-10 RX ORDER — DEXTROSE 50 % IN WATER 50 %
25 SYRINGE (ML) INTRAVENOUS ONCE
Refills: 0 | Status: DISCONTINUED | OUTPATIENT
Start: 2022-02-10 | End: 2022-02-11

## 2022-02-10 RX ORDER — CLOPIDOGREL BISULFATE 75 MG/1
75 TABLET, FILM COATED ORAL DAILY
Refills: 0 | Status: DISCONTINUED | OUTPATIENT
Start: 2022-02-10 | End: 2022-02-11

## 2022-02-10 RX ORDER — SODIUM CHLORIDE 9 MG/ML
1000 INJECTION INTRAMUSCULAR; INTRAVENOUS; SUBCUTANEOUS
Refills: 0 | Status: DISCONTINUED | OUTPATIENT
Start: 2022-02-10 | End: 2022-02-11

## 2022-02-10 RX ORDER — LEVOTHYROXINE SODIUM 125 MCG
100 TABLET ORAL DAILY
Refills: 0 | Status: DISCONTINUED | OUTPATIENT
Start: 2022-02-10 | End: 2022-02-11

## 2022-02-10 RX ORDER — CARVEDILOL PHOSPHATE 80 MG/1
25 CAPSULE, EXTENDED RELEASE ORAL EVERY 12 HOURS
Refills: 0 | Status: DISCONTINUED | OUTPATIENT
Start: 2022-02-10 | End: 2022-02-11

## 2022-02-10 RX ORDER — HEPARIN SODIUM 5000 [USP'U]/ML
5000 INJECTION INTRAVENOUS; SUBCUTANEOUS EVERY 8 HOURS
Refills: 0 | Status: DISCONTINUED | OUTPATIENT
Start: 2022-02-10 | End: 2022-02-11

## 2022-02-10 RX ORDER — DEXTROSE 50 % IN WATER 50 %
12.5 SYRINGE (ML) INTRAVENOUS ONCE
Refills: 0 | Status: DISCONTINUED | OUTPATIENT
Start: 2022-02-10 | End: 2022-02-11

## 2022-02-10 RX ORDER — DEXTROSE 50 % IN WATER 50 %
15 SYRINGE (ML) INTRAVENOUS ONCE
Refills: 0 | Status: DISCONTINUED | OUTPATIENT
Start: 2022-02-10 | End: 2022-02-11

## 2022-02-10 RX ADMIN — CARVEDILOL PHOSPHATE 25 MILLIGRAM(S): 80 CAPSULE, EXTENDED RELEASE ORAL at 18:20

## 2022-02-10 RX ADMIN — Medication 1 TABLET(S): at 23:42

## 2022-02-10 RX ADMIN — Medication 0.6 MILLIGRAM(S): at 18:20

## 2022-02-10 RX ADMIN — ATORVASTATIN CALCIUM 80 MILLIGRAM(S): 80 TABLET, FILM COATED ORAL at 23:42

## 2022-02-10 RX ADMIN — SEVELAMER CARBONATE 800 MILLIGRAM(S): 2400 POWDER, FOR SUSPENSION ORAL at 18:24

## 2022-02-10 RX ADMIN — HEPARIN SODIUM 5000 UNIT(S): 5000 INJECTION INTRAVENOUS; SUBCUTANEOUS at 23:42

## 2022-02-10 NOTE — H&P ADULT - ASSESSMENT
89F with PMH of CKD V, DM, dCHF, HTN, HLD, CAD, hypothyroidism, PAD (h/o R SFA/pop artery stents) presents from the office after complaing of RLE 2 block claudication and rest pain, here for RLE angiogram tomorrow.    - Admit to Tele, Dr. Pagan, 5 [address]  - Restart home meds  - Dash diet, NPO after midnight  - IVF after midnight  - pre-op for OR tomorrow

## 2022-02-10 NOTE — ED PROVIDER NOTE - CLINICAL SUMMARY MEDICAL DECISION MAKING FREE TEXT BOX
89 y/o f hx CKD (stage V), DM, diastolic CHF, HTN, HLD, CAD, hypothyroidism presents sent from Dr. Pagan's office for right leg pain.  VSS in ED, pre op labs sent.  Pt seen by vascular who will admit, plan for stent as inpatient.

## 2022-02-10 NOTE — ED PROVIDER NOTE - ATTENDING CONTRIBUTION TO CARE
Attending Statement: I have personally performed a face to face diagnostic evaluation on this patient. I have reviewed the ACP note and agree with the history, exam and plan of care, except as noted.     Attending Contribution to Care:  89F with PMH of CKD V, DM, dCHF, HTN, HLD, CAD, hypothyroidism, PAD (h/o R SFA/pop artery stents) with c/o RLE 2 block claudication and rest pain, VSS< preop labs sent and pt admitted to Dr. Pagan for likely RLE angiogram tomorrow.

## 2022-02-10 NOTE — ED PROVIDER NOTE - OBJECTIVE STATEMENT
89 y/o f hx CKD (stage V), DM, diastolic CHF, HTN, HLD, CAD, hypothyroidism presents sent from Dr. Pagan's office for right leg pain.  Pt stating right leg has been painful for several months, she saw Dr. Pagan in office today who she states is going to fix the stent in her leg.  Pt denies fever, chills, numbness/tingling to ext, all other ROS negative.

## 2022-02-10 NOTE — ED ADULT NURSE NOTE - OBJECTIVE STATEMENT
Pt is a 89 y/o F, arriving to ED for pre op workup- to have vascular surgery under Dr. Pagan tomorrow. PMH hypothyroid, CAD, HLD, HTN, CHF, DM, CKD, Anemia, arterial insufficiency. Pt arriving with home aid, states pt has RLE "spasm" pain that is worsened with ambulation. Denies n/v/d, fever, chills, cough, cp or sob.

## 2022-02-10 NOTE — ED ADULT TRIAGE NOTE - CHIEF COMPLAINT QUOTE
Pt presents to ED c/o pre-op. Sent in by Dr. Pagan for scheduled vascular surgery on the right leg tomorrow.

## 2022-02-10 NOTE — H&P ADULT - HISTORY OF PRESENT ILLNESS
89F with PMH of CKD V, DM, dCHF, HTN, HLD, CAD, hypothyroidism, PAD (h/o R SFA/pop artery stents) presents from the office after complaing of RLE 2 block claudication and rest pain. Patient's stents were placed on 6/11/21, and it was known on follow-up visits that she had a 75% in-stent stenosis of the SFA. The patient was not ready to proceed with intervention at that time but is now ready to proceed. No CP/SOB, N/V. ROS otherwise negative.

## 2022-02-10 NOTE — ED PROVIDER NOTE - MUSCULOSKELETAL, MLM
right leg no open wounds, no tenderness to palpation, +FROM hip/knee/ankle, pulses to foot not palpable, +doippler signals, +darkening of 2nd, 3rd and 4th toes, sensation grossly intact distally

## 2022-02-10 NOTE — PATIENT PROFILE ADULT - FALL HARM RISK - HARM RISK INTERVENTIONS

## 2022-02-10 NOTE — H&P ADULT - NSHPPHYSICALEXAM_GEN_ALL_CORE
Gen: NAD  Neuro: A&O3  CV: NSR  Resp: Normal respiratory effort  GI: Soft, NT/ND  MSK: Neuromuscular intact; no wounds  Pulses: R Fem 2+, Pop Tri, DP mono, no PT; L Fem/Pop, DP bi, PT 1+

## 2022-02-10 NOTE — H&P ADULT - NSHPLABSRESULTS_GEN_ALL_CORE
10.2   4.30  )-----------( 84       ( 10 Feb 2022 14:11 )             31.1   02-10    136  |  96  |  85<H>  ----------------------------<  108<H>  4.4   |  22  |  3.38<H>    Ca    10.7<H>      10 Feb 2022 14:11    TPro  7.7  /  Alb  4.5  /  TBili  0.4  /  DBili  x   /  AST  20  /  ALT  20  /  AlkPhos  110  02-10    PT/INR - ( 10 Feb 2022 14:11 )   PT: 14.5 sec;   INR: 1.22      PTT - ( 10 Feb 2022 14:11 )  PTT:32.3 sec

## 2022-02-10 NOTE — ED ADULT NURSE NOTE - NS ED NURSE TRANSPORT WITH
Cardiac Monitor/Defib/ACLS/Rescue Kit/O2/BVM/oxygen/pulse ox/ACLS Rescue Kit Cardiac Monitor/Defib/ACLS/Rescue Kit/O2/BVM/pulse ox/ACLS Rescue Kit

## 2022-02-11 LAB
A1C WITH ESTIMATED AVERAGE GLUCOSE RESULT: 4.9 % — SIGNIFICANT CHANGE UP (ref 4–5.6)
ANION GAP SERPL CALC-SCNC: 15 MMOL/L — SIGNIFICANT CHANGE UP (ref 5–17)
BLD GP AB SCN SERPL QL: POSITIVE — SIGNIFICANT CHANGE UP
BUN SERPL-MCNC: 73 MG/DL — HIGH (ref 7–23)
CALCIUM SERPL-MCNC: 10.5 MG/DL — SIGNIFICANT CHANGE UP (ref 8.4–10.5)
CHLORIDE SERPL-SCNC: 101 MMOL/L — SIGNIFICANT CHANGE UP (ref 96–108)
CO2 SERPL-SCNC: 20 MMOL/L — LOW (ref 22–31)
CREAT SERPL-MCNC: 3.61 MG/DL — HIGH (ref 0.5–1.3)
ESTIMATED AVERAGE GLUCOSE: 94 MG/DL — SIGNIFICANT CHANGE UP (ref 68–114)
GLUCOSE BLDC GLUCOMTR-MCNC: 105 MG/DL — HIGH (ref 70–99)
GLUCOSE BLDC GLUCOMTR-MCNC: 110 MG/DL — HIGH (ref 70–99)
GLUCOSE BLDC GLUCOMTR-MCNC: 113 MG/DL — HIGH (ref 70–99)
GLUCOSE BLDC GLUCOMTR-MCNC: 95 MG/DL — SIGNIFICANT CHANGE UP (ref 70–99)
GLUCOSE SERPL-MCNC: 100 MG/DL — HIGH (ref 70–99)
HCT VFR BLD CALC: 28.7 % — LOW (ref 34.5–45)
HGB BLD-MCNC: 8.8 G/DL — LOW (ref 11.5–15.5)
MAGNESIUM SERPL-MCNC: 2.5 MG/DL — SIGNIFICANT CHANGE UP (ref 1.6–2.6)
MCHC RBC-ENTMCNC: 30.7 GM/DL — LOW (ref 32–36)
MCHC RBC-ENTMCNC: 34.5 PG — HIGH (ref 27–34)
MCV RBC AUTO: 112.5 FL — HIGH (ref 80–100)
NRBC # BLD: 0 /100 WBCS — SIGNIFICANT CHANGE UP (ref 0–0)
PHOSPHATE SERPL-MCNC: 6.9 MG/DL — HIGH (ref 2.5–4.5)
PLATELET # BLD AUTO: 74 K/UL — LOW (ref 150–400)
POTASSIUM SERPL-MCNC: 3.8 MMOL/L — SIGNIFICANT CHANGE UP (ref 3.5–5.3)
POTASSIUM SERPL-SCNC: 3.8 MMOL/L — SIGNIFICANT CHANGE UP (ref 3.5–5.3)
RBC # BLD: 2.55 M/UL — LOW (ref 3.8–5.2)
RBC # FLD: 13.6 % — SIGNIFICANT CHANGE UP (ref 10.3–14.5)
RH IG SCN BLD-IMP: POSITIVE — SIGNIFICANT CHANGE UP
SODIUM SERPL-SCNC: 136 MMOL/L — SIGNIFICANT CHANGE UP (ref 135–145)
WBC # BLD: 3.37 K/UL — LOW (ref 3.8–10.5)
WBC # FLD AUTO: 3.37 K/UL — LOW (ref 3.8–10.5)

## 2022-02-11 PROCEDURE — 37224: CPT | Mod: GC

## 2022-02-11 DEVICE — WIRES GUIDE PT2 MODERATE SUPPORT 300CM STR: Type: IMPLANTABLE DEVICE | Status: FUNCTIONAL

## 2022-02-11 DEVICE — CATH QUICK CROSS .014X135CM: Type: IMPLANTABLE DEVICE | Status: FUNCTIONAL

## 2022-02-11 DEVICE — CATH ANG BERENSTN 5FRX65CM: Type: IMPLANTABLE DEVICE | Status: FUNCTIONAL

## 2022-02-11 DEVICE — SHEATH INTRODUCER TERUMO PINNACLE CORONARY 5FR X 10CM X 0.038" MINI WIRE: Type: IMPLANTABLE DEVICE | Status: FUNCTIONAL

## 2022-02-11 DEVICE — GWIRE SUPRACORE .035X370: Type: IMPLANTABLE DEVICE | Status: FUNCTIONAL

## 2022-02-11 DEVICE — CATH OMNI FLSH 0.035IN 5FRX65: Type: IMPLANTABLE DEVICE | Status: FUNCTIONAL

## 2022-02-11 DEVICE — SHEATH INTRODUCER TERUMO PINNACLE CORONARY 6FR X 10CM X 0.038" MINI WIRE: Type: IMPLANTABLE DEVICE | Status: FUNCTIONAL

## 2022-02-11 DEVICE — SUT PERCLOSE PROGLIDE 6FR: Type: IMPLANTABLE DEVICE | Status: FUNCTIONAL

## 2022-02-11 DEVICE — INTRO MICROPUNC 5FRX10CM SS: Type: IMPLANTABLE DEVICE | Status: FUNCTIONAL

## 2022-02-11 DEVICE — GUIDEWIRE RADIFOCUS GLIDEWIRE ANGLED 0.035" X 260CM STIFF: Type: IMPLANTABLE DEVICE | Status: FUNCTIONAL

## 2022-02-11 DEVICE — GWIRE BENT STRT 0.035INX150CM: Type: IMPLANTABLE DEVICE | Status: FUNCTIONAL

## 2022-02-11 DEVICE — IMPLANTABLE DEVICE: Type: IMPLANTABLE DEVICE | Status: FUNCTIONAL

## 2022-02-11 DEVICE — STABILIZER .014 X 300 STR  MIN 5 STEERABLE GUIDEWIRE: Type: IMPLANTABLE DEVICE | Status: FUNCTIONAL

## 2022-02-11 DEVICE — CATH QUICK CROSS .035X135CM: Type: IMPLANTABLE DEVICE | Status: FUNCTIONAL

## 2022-02-11 DEVICE — CATH ANGIO GLIDECATH ANGLE TAPER 5FR X 65CM: Type: IMPLANTABLE DEVICE | Status: FUNCTIONAL

## 2022-02-11 RX ORDER — SODIUM CHLORIDE 9 MG/ML
1000 INJECTION INTRAMUSCULAR; INTRAVENOUS; SUBCUTANEOUS
Refills: 0 | Status: DISCONTINUED | OUTPATIENT
Start: 2022-02-11 | End: 2022-02-12

## 2022-02-11 RX ORDER — ASPIRIN/CALCIUM CARB/MAGNESIUM 324 MG
81 TABLET ORAL DAILY
Refills: 0 | Status: DISCONTINUED | OUTPATIENT
Start: 2022-02-11 | End: 2022-02-12

## 2022-02-11 RX ORDER — OXYCODONE AND ACETAMINOPHEN 5; 325 MG/1; MG/1
1 TABLET ORAL EVERY 4 HOURS
Refills: 0 | Status: DISCONTINUED | OUTPATIENT
Start: 2022-02-11 | End: 2022-02-12

## 2022-02-11 RX ORDER — SODIUM CHLORIDE 9 MG/ML
1000 INJECTION, SOLUTION INTRAVENOUS
Refills: 0 | Status: DISCONTINUED | OUTPATIENT
Start: 2022-02-11 | End: 2022-02-12

## 2022-02-11 RX ORDER — ATORVASTATIN CALCIUM 80 MG/1
80 TABLET, FILM COATED ORAL AT BEDTIME
Refills: 0 | Status: DISCONTINUED | OUTPATIENT
Start: 2022-02-11 | End: 2022-02-12

## 2022-02-11 RX ORDER — LABETALOL HCL 100 MG
10 TABLET ORAL ONCE
Refills: 0 | Status: COMPLETED | OUTPATIENT
Start: 2022-02-11 | End: 2022-02-11

## 2022-02-11 RX ORDER — INSULIN LISPRO 100/ML
VIAL (ML) SUBCUTANEOUS
Refills: 0 | Status: DISCONTINUED | OUTPATIENT
Start: 2022-02-11 | End: 2022-02-12

## 2022-02-11 RX ORDER — DEXTROSE 50 % IN WATER 50 %
12.5 SYRINGE (ML) INTRAVENOUS ONCE
Refills: 0 | Status: DISCONTINUED | OUTPATIENT
Start: 2022-02-11 | End: 2022-02-12

## 2022-02-11 RX ORDER — LEVOTHYROXINE SODIUM 125 MCG
100 TABLET ORAL DAILY
Refills: 0 | Status: DISCONTINUED | OUTPATIENT
Start: 2022-02-11 | End: 2022-02-12

## 2022-02-11 RX ORDER — DEXTROSE 50 % IN WATER 50 %
15 SYRINGE (ML) INTRAVENOUS ONCE
Refills: 0 | Status: DISCONTINUED | OUTPATIENT
Start: 2022-02-11 | End: 2022-02-12

## 2022-02-11 RX ORDER — GLUCAGON INJECTION, SOLUTION 0.5 MG/.1ML
1 INJECTION, SOLUTION SUBCUTANEOUS ONCE
Refills: 0 | Status: DISCONTINUED | OUTPATIENT
Start: 2022-02-11 | End: 2022-02-12

## 2022-02-11 RX ORDER — DEXTROSE 50 % IN WATER 50 %
25 SYRINGE (ML) INTRAVENOUS ONCE
Refills: 0 | Status: DISCONTINUED | OUTPATIENT
Start: 2022-02-11 | End: 2022-02-12

## 2022-02-11 RX ORDER — SEVELAMER CARBONATE 2400 MG/1
800 POWDER, FOR SUSPENSION ORAL
Refills: 0 | Status: DISCONTINUED | OUTPATIENT
Start: 2022-02-11 | End: 2022-02-12

## 2022-02-11 RX ORDER — COLCHICINE 0.6 MG
0.6 TABLET ORAL DAILY
Refills: 0 | Status: DISCONTINUED | OUTPATIENT
Start: 2022-02-11 | End: 2022-02-12

## 2022-02-11 RX ORDER — NIFEDIPINE 30 MG
60 TABLET, EXTENDED RELEASE 24 HR ORAL DAILY
Refills: 0 | Status: DISCONTINUED | OUTPATIENT
Start: 2022-02-11 | End: 2022-02-11

## 2022-02-11 RX ORDER — CARVEDILOL PHOSPHATE 80 MG/1
25 CAPSULE, EXTENDED RELEASE ORAL EVERY 12 HOURS
Refills: 0 | Status: DISCONTINUED | OUTPATIENT
Start: 2022-02-11 | End: 2022-02-12

## 2022-02-11 RX ORDER — CLOPIDOGREL BISULFATE 75 MG/1
75 TABLET, FILM COATED ORAL DAILY
Refills: 0 | Status: DISCONTINUED | OUTPATIENT
Start: 2022-02-12 | End: 2022-02-12

## 2022-02-11 RX ORDER — OXYCODONE AND ACETAMINOPHEN 5; 325 MG/1; MG/1
2 TABLET ORAL EVERY 6 HOURS
Refills: 0 | Status: DISCONTINUED | OUTPATIENT
Start: 2022-02-11 | End: 2022-02-12

## 2022-02-11 RX ADMIN — CARVEDILOL PHOSPHATE 25 MILLIGRAM(S): 80 CAPSULE, EXTENDED RELEASE ORAL at 06:06

## 2022-02-11 RX ADMIN — Medication 0.6 MILLIGRAM(S): at 12:05

## 2022-02-11 RX ADMIN — SODIUM CHLORIDE 40 MILLILITER(S): 9 INJECTION INTRAMUSCULAR; INTRAVENOUS; SUBCUTANEOUS at 16:35

## 2022-02-11 RX ADMIN — HEPARIN SODIUM 5000 UNIT(S): 5000 INJECTION INTRAVENOUS; SUBCUTANEOUS at 06:05

## 2022-02-11 RX ADMIN — Medication 100 MICROGRAM(S): at 06:05

## 2022-02-11 RX ADMIN — Medication 10 MILLIGRAM(S): at 16:35

## 2022-02-11 RX ADMIN — SODIUM CHLORIDE 30 MILLILITER(S): 9 INJECTION INTRAMUSCULAR; INTRAVENOUS; SUBCUTANEOUS at 00:00

## 2022-02-11 RX ADMIN — CLOPIDOGREL BISULFATE 75 MILLIGRAM(S): 75 TABLET, FILM COATED ORAL at 12:05

## 2022-02-11 RX ADMIN — CARVEDILOL PHOSPHATE 25 MILLIGRAM(S): 80 CAPSULE, EXTENDED RELEASE ORAL at 17:03

## 2022-02-11 RX ADMIN — Medication 1 TABLET(S): at 06:06

## 2022-02-11 RX ADMIN — Medication 81 MILLIGRAM(S): at 16:35

## 2022-02-11 NOTE — PROGRESS NOTE ADULT - SUBJECTIVE AND OBJECTIVE BOX
O/N: MANDY< VSS consented covid neg                                            PLEASE CHECK WHEN PRESENT:     [  ]Heart Failure     [  ] Acute     [  ] Acute on Chronic     [ x ] Chronic  -------------------------------------------------------------------     [ x ]Diastolic [HFpEF]     [  ]Systolic [HFrEF]     [  ]Combined [HFpEF & HFrEF]  .................................................................................     [  ]Other:     [ ] Pulmonary Hypertension     [ ] Afib     [ ] Hypertensive Heart Disease  -------------------------------------------------------------------  [ ] Respiratory failure  [ ] Acute cor pulmonale  [ ] Asthma/COPD Exacerbation  [ ] Pleural effusion  [ ] Aspiration pneumonia  [ ] Obstructive Sleep Apnea  -------------------------------------------------------------------  [ x]KYMBERLY     [  ]ATN     [  ]Reneal Medullary Necrosis     [  ]Renal Cortical Necrosis     [  ]Other Pathological Lesions:    [  ]CKD 1  [  ]CKD 2  [  ]CKD 3  [  ]CKD 4  [ x ]CKD 5  [  ]Other  -------------------------------------------------------------------  [x  ]Diabetes  [  ] Diabetic PVD Ulcer  [  ] Neuropathic ulcer to DM  [ x ] Diabetes with Nephropathy  [  ] Osteomyelitis due to diabetes  [  ] Hyperglycemia   [  ]hypoglycemia   --------------------------------------------------------------------  [  ]Malnutrition: See Nutrition Note  [  ]Cachexia  [  ]Other:   [  ]Supplement Ordered:  [  ]Morbid Obesity (BMI >=40]  ---------------------------------------------------------------------  [ ] Sepsis/severe sepsis/septic shock  [ ] Noninfectious SIRS  [ ] UTI  [ ] Pneumonia  -----------------------------------------------------------------------  [ ] Acidosis/alkalosis  [ ] Fluid overload  [ ] Hypokalemia  [ ] Hyperkalemia  [ ] Hypomagnesemia  [ ] Hypophosphatemia  [ ] Hyperphosphatemia  ------------------------------------------------------------------------  [ ] Acute blood loss anemia  [ ] Post op blood loss anemia  [ ] Iron deficiency anemia[x ] Anemia due to chronic disease  [ ] Hypercoagulable state  [x ] Thrombocytopenia  ----------------------------------------------------------------------  [ ] Cerebral infarction  [ ] Transient ischemia attack  [ ] Encephalopathy - Toxic or Metabolic                      Assessment/Plan:  Assessment/Plan:  88 yo Russain speaking female with anemia of chronic disease, CKD stage V, Diabetes, Diabetic nephropathy, Chronic diastolic CHF, hypertension, hyperlipidemia, CAD, hypothyroidism presenting with critical limb ischemia now s/p RLE Angio with stents to SFA and below knee popliteal art. via L CFA access (6/11/21). Postop course notable for acute blood loss anemia and dependent ecchomysis extending from L ASIS, L labial fold to L thigh proximal to knee.       Neurovascular  -No delirium, pain well managed on current regimen  -C/w PRNs for Pain control  -Monitor neuro status    Respiratory  -Saturates well on RA     -Encourage IS 10x/hour while awake, Cough and deep breathing exercises  -Monitor respiratory status via SpO2    Cardiovascular  -C/o telemetry   -Goal Hgb >8  -Cardiology c/s rec c/w high dose statin and antihypertensives.     GI  -C/w DASH diet  -Prophylaxis: Protonix  - bowel regimen on hold for Diarrhea     /Renal   -Trend Cr on AM labs  -Replete electrolytes as needed  -Renal consulted; Dr. Lerma following   -Holding Torsemide 40mg in AM and 20mg in PM  -Daily weights  -NaBicarb 650mg BID  -Strict I/Os    ID  -Afebrile, asymptomatic  -WBC 2.46  - C/w periop ancef  -plan to d.c on keflex x5 days for knee erythema     Endocrine  -A1C: 3.66  -no h/o DM    Hematologic  -H/H: stable  -Trend plts  -CBC, chem in AM  - C/w ASA and Plavix  -Heme/onc consulted -holding Revlimid     DVTPPX:   -Holding SQH for thrombocytopenia     PT: Home with home PT vs [facility]  -d/c home today with Family  O/N: MANDY< VSS consented covid neg    Subjective: Patient has no complaints.    ROS:   Denies Headache, blurred vision, Chest Pain, SOB, Abdominal pain, nausea or vomiting     Social   carvedilol 25  clopidogrel Tablet 75  heparin   Injectable 5000      Allergies    No Known Allergies    Intolerances        Vital Signs Last 24 Hrs  T(C): 36.4 (11 Feb 2022 08:46), Max: 36.7 (10 Feb 2022 22:51)  T(F): 97.6 (11 Feb 2022 08:46), Max: 98 (10 Feb 2022 22:51)  HR: 64 (11 Feb 2022 12:14) (60 - 81)  BP: 157/79 (11 Feb 2022 12:14) (128/63 - 174/77)  BP(mean): --  RR: 18 (11 Feb 2022 12:14) (18 - 18)  SpO2: 99% (11 Feb 2022 12:14) (97% - 100%)  I&O's Summary    11 Feb 2022 07:01  -  11 Feb 2022 12:22  --------------------------------------------------------  IN: 120 mL / OUT: 150 mL / NET: -30 mL        Physical Exam:  Gen: NAD  Neuro: A&O3  CV: NSR  Resp: Normal respiratory effort  GI: Soft, NT/ND  MSK: Neuromuscular intact; no wounds  Pulses: R Fem 2+, Pop Tri, DP mono, no PT; L Fem/Pop, DP bi, PT 1+      LABS:                        8.8    3.37  )-----------( 74       ( 11 Feb 2022 06:53 )             28.7     02-11    136  |  101  |  73<H>  ----------------------------<  100<H>  3.8   |  20<L>  |  3.61<H>    Ca    10.5      11 Feb 2022 06:53  Phos  6.9     02-11  Mg     2.5     02-11    TPro  7.7  /  Alb  4.5  /  TBili  0.4  /  DBili  x   /  AST  20  /  ALT  20  /  AlkPhos  110  02-10    PT/INR - ( 10 Feb 2022 14:11 )   PT: 14.5 sec;   INR: 1.22          PTT - ( 10 Feb 2022 14:11 )  PTT:32.3 sec      PLEASE CHECK WHEN PRESENT:     [  ]Heart Failure     [  ] Acute     [  ] Acute on Chronic     [ x ] Chronic  -------------------------------------------------------------------     [ x ]Diastolic [HFpEF]     [  ]Systolic [HFrEF]     [  ]Combined [HFpEF & HFrEF]  .................................................................................     [  ]Other:     [ ] Pulmonary Hypertension     [ ] Afib     [ ] Hypertensive Heart Disease  -------------------------------------------------------------------  [ ] Respiratory failure  [ ] Acute cor pulmonale  [ ] Asthma/COPD Exacerbation  [ ] Pleural effusion  [ ] Aspiration pneumonia  [ ] Obstructive Sleep Apnea  -------------------------------------------------------------------  [ x]KYMBERLY     [  ]ATN     [  ]Reneal Medullary Necrosis     [  ]Renal Cortical Necrosis     [  ]Other Pathological Lesions:    [  ]CKD 1  [  ]CKD 2  [  ]CKD 3  [  ]CKD 4  [ x ]CKD 5  [  ]Other  -------------------------------------------------------------------  [x  ]Diabetes  [  ] Diabetic PVD Ulcer  [  ] Neuropathic ulcer to DM  [ x ] Diabetes with Nephropathy  [  ] Osteomyelitis due to diabetes  [  ] Hyperglycemia   [  ]hypoglycemia   --------------------------------------------------------------------  [  ]Malnutrition: See Nutrition Note  [  ]Cachexia  [  ]Other:   [  ]Supplement Ordered:  [  ]Morbid Obesity (BMI >=40]  ---------------------------------------------------------------------  [ ] Sepsis/severe sepsis/septic shock  [ ] Noninfectious SIRS  [ ] UTI  [ ] Pneumonia  -----------------------------------------------------------------------  [ ] Acidosis/alkalosis  [ ] Fluid overload  [ ] Hypokalemia  [ ] Hyperkalemia  [ ] Hypomagnesemia  [ ] Hypophosphatemia  [ ] Hyperphosphatemia  ------------------------------------------------------------------------  [ ] Acute blood loss anemia  [ ] Post op blood loss anemia  [ ] Iron deficiency anemia[x ] Anemia due to chronic disease  [ ] Hypercoagulable state  [x ] Thrombocytopenia  ----------------------------------------------------------------------  [ ] Cerebral infarction  [ ] Transient ischemia attack  [ ] Encephalopathy - Toxic or Metabolic        Assessment/Plan:  89F with PMH of CKD V, DM, dCHF, HTN, HLD, CAD, hypothyroidism, PAD (h/o R SFA/pop artery stents) presents from the office after complaing of RLE 2 block claudication and rest pain, here for RLE angiogram tomorrow.    Vascular/PAD:  plan for OR today      HTN/HLD:  atorvastatin, nifedipine    CAD/CHF:   plavix, coreg  hollding torsemide    DM:  ISS    hypothyroidism:  synthroid    CKD:  renvela    Diet: NPO    Activity: BR    DVTPPx: hsq    Dispo: OR today

## 2022-02-11 NOTE — ASSESSMENT
[FreeTextEntry1] : 89 yo Mosotho speaking female with anemia of chronic disease, CKD, stage V, Diabetes, Diabetic nephropathy, Chronic diastolic CHF, hypertension, hyperlipidemia, CAD, hypothyroidism,who we see for PAD. 6/11/21 she underwent angiogram of RLE and stent of SFA/pop artery, taking Plavix. \par \par Plan:\par - US done last visit showed occluded distal pop stent and occluded AT artery, she is also having worsening symptoms of rest pain and toe discoloration. Discussed options with her. best option would be to proceed with angiogram/plasty/stent. She is very uncomfortable, will go to ED today and plan on procedure tomorrow.

## 2022-02-11 NOTE — PHYSICAL EXAM
[Respiratory Effort] : normal respiratory effort [Normal Heart Sounds] : normal heart sounds [2+] : right 2+ [0] : right 0 [de-identified] : well appearing [de-identified] : right first and second toe slightly red. good capillary refill. no gangrene, slightly cooler than left leg. FROM of all toes and foot.

## 2022-02-11 NOTE — HISTORY OF PRESENT ILLNESS
[FreeTextEntry1] : 89 yo Ugandan speaking female with anemia of chronic disease, CKD, stage V, Diabetes, Diabetic nephropathy, Chronic diastolic CHF, hypertension, hyperlipidemia, CAD, hypothyroidism,who we see for PAD. 6/11/21 she underwent angiogram of RLE and stent of SFA/pop artery, taking Plavix. \par \par She has known 75% in stent stenosis of the right SFA. We discussed intervention 3 months ago and again last month however at that time she did not want to proceed with treatment. Her toes are cold and painful and she wanted to come in sooner because she feels ready to proceed with intervention. \par \par Ug[place]an  used.

## 2022-02-12 ENCOUNTER — TRANSCRIPTION ENCOUNTER (OUTPATIENT)
Age: 87
End: 2022-02-12

## 2022-02-12 VITALS — TEMPERATURE: 98 F

## 2022-02-12 LAB
ANION GAP SERPL CALC-SCNC: 14 MMOL/L — SIGNIFICANT CHANGE UP (ref 5–17)
BUN SERPL-MCNC: 71 MG/DL — HIGH (ref 7–23)
CALCIUM SERPL-MCNC: 9.5 MG/DL — SIGNIFICANT CHANGE UP (ref 8.4–10.5)
CHLORIDE SERPL-SCNC: 105 MMOL/L — SIGNIFICANT CHANGE UP (ref 96–108)
CO2 SERPL-SCNC: 19 MMOL/L — LOW (ref 22–31)
CREAT SERPL-MCNC: 3.5 MG/DL — HIGH (ref 0.5–1.3)
GLUCOSE BLDC GLUCOMTR-MCNC: 113 MG/DL — HIGH (ref 70–99)
GLUCOSE BLDC GLUCOMTR-MCNC: 242 MG/DL — HIGH (ref 70–99)
GLUCOSE SERPL-MCNC: 112 MG/DL — HIGH (ref 70–99)
HCT VFR BLD CALC: 28 % — LOW (ref 34.5–45)
HGB BLD-MCNC: 8.4 G/DL — LOW (ref 11.5–15.5)
MAGNESIUM SERPL-MCNC: 2.4 MG/DL — SIGNIFICANT CHANGE UP (ref 1.6–2.6)
MCHC RBC-ENTMCNC: 30 GM/DL — LOW (ref 32–36)
MCHC RBC-ENTMCNC: 34.1 PG — HIGH (ref 27–34)
MCV RBC AUTO: 113.8 FL — HIGH (ref 80–100)
NRBC # BLD: 0 /100 WBCS — SIGNIFICANT CHANGE UP (ref 0–0)
PHOSPHATE SERPL-MCNC: 6.7 MG/DL — HIGH (ref 2.5–4.5)
PLATELET # BLD AUTO: 68 K/UL — LOW (ref 150–400)
POTASSIUM SERPL-MCNC: 4 MMOL/L — SIGNIFICANT CHANGE UP (ref 3.5–5.3)
POTASSIUM SERPL-SCNC: 4 MMOL/L — SIGNIFICANT CHANGE UP (ref 3.5–5.3)
RBC # BLD: 2.46 M/UL — LOW (ref 3.8–5.2)
RBC # FLD: 13.8 % — SIGNIFICANT CHANGE UP (ref 10.3–14.5)
SODIUM SERPL-SCNC: 138 MMOL/L — SIGNIFICANT CHANGE UP (ref 135–145)
WBC # BLD: 3.21 K/UL — LOW (ref 3.8–10.5)
WBC # FLD AUTO: 3.21 K/UL — LOW (ref 3.8–10.5)

## 2022-02-12 RX ORDER — HEPARIN SODIUM 5000 [USP'U]/ML
5000 INJECTION INTRAVENOUS; SUBCUTANEOUS EVERY 8 HOURS
Refills: 0 | Status: DISCONTINUED | OUTPATIENT
Start: 2022-02-12 | End: 2022-02-12

## 2022-02-12 RX ADMIN — Medication 100 MICROGRAM(S): at 06:51

## 2022-02-12 RX ADMIN — CLOPIDOGREL BISULFATE 75 MILLIGRAM(S): 75 TABLET, FILM COATED ORAL at 11:43

## 2022-02-12 RX ADMIN — Medication 0.6 MILLIGRAM(S): at 11:49

## 2022-02-12 RX ADMIN — SEVELAMER CARBONATE 800 MILLIGRAM(S): 2400 POWDER, FOR SUSPENSION ORAL at 07:10

## 2022-02-12 RX ADMIN — Medication 81 MILLIGRAM(S): at 11:44

## 2022-02-12 RX ADMIN — CARVEDILOL PHOSPHATE 25 MILLIGRAM(S): 80 CAPSULE, EXTENDED RELEASE ORAL at 06:51

## 2022-02-12 RX ADMIN — Medication 4: at 12:23

## 2022-02-12 NOTE — PROGRESS NOTE ADULT - SUBJECTIVE AND OBJECTIVE BOX
O/N: anitra check good, platelet count 74, no HSQ given  2/11: s/p RLE angio            PLEASE CHECK WHEN PRESENT:     [  ]Heart Failure     [  ] Acute     [  ] Acute on Chronic     [ x ] Chronic  -------------------------------------------------------------------     [ x ]Diastolic [HFpEF]     [  ]Systolic [HFrEF]     [  ]Combined [HFpEF & HFrEF]  .................................................................................     [  ]Other:     [ ] Pulmonary Hypertension     [ ] Afib     [ ] Hypertensive Heart Disease  -------------------------------------------------------------------  [ ] Respiratory failure  [ ] Acute cor pulmonale  [ ] Asthma/COPD Exacerbation  [ ] Pleural effusion  [ ] Aspiration pneumonia  [ ] Obstructive Sleep Apnea  -------------------------------------------------------------------  [ x]KYMBERLY     [  ]ATN     [  ]Reneal Medullary Necrosis     [  ]Renal Cortical Necrosis     [  ]Other Pathological Lesions:    [  ]CKD 1  [  ]CKD 2  [  ]CKD 3  [  ]CKD 4  [ x ]CKD 5  [  ]Other  -------------------------------------------------------------------  [x  ]Diabetes  [  ] Diabetic PVD Ulcer  [  ] Neuropathic ulcer to DM  [ x ] Diabetes with Nephropathy  [  ] Osteomyelitis due to diabetes  [  ] Hyperglycemia   [  ]hypoglycemia   --------------------------------------------------------------------  [  ]Malnutrition: See Nutrition Note  [  ]Cachexia  [  ]Other:   [  ]Supplement Ordered:  [  ]Morbid Obesity (BMI >=40]  ---------------------------------------------------------------------  [ ] Sepsis/severe sepsis/septic shock  [ ] Noninfectious SIRS  [ ] UTI  [ ] Pneumonia  -----------------------------------------------------------------------  [ ] Acidosis/alkalosis  [ ] Fluid overload  [ ] Hypokalemia  [ ] Hyperkalemia  [ ] Hypomagnesemia  [ ] Hypophosphatemia  [ ] Hyperphosphatemia  ------------------------------------------------------------------------  [ ] Acute blood loss anemia  [ ] Post op blood loss anemia  [ ] Iron deficiency anemia[x ] Anemia due to chronic disease  [ ] Hypercoagulable state  [x ] Thrombocytopenia  ----------------------------------------------------------------------  [ ] Cerebral infarction  [ ] Transient ischemia attack  [ ] Encephalopathy - Toxic or Metabolic        Assessment/Plan:  89F with PMH of CKD V, DM, dCHF, HTN, HLD, CAD, hypothyroidism, PAD (h/o R SFA/pop artery stents) presents from the office after complaing of RLE 2 block claudication and rest pain, here for RLE angiogram tomorrow.    Vascular/PAD:  s/p RLE angiogram      HTN/HLD:  atorvastatin, nifedipine    CAD/CHF:   plavix, coreg  hollding torsemide    DM:  ISS    hypothyroidism:  synthroid    CKD:  renvela    Diet: NPO    Activity: BR    DVTPPx: hsq    Dispo: pending improvement

## 2022-02-12 NOTE — DISCHARGE NOTE PROVIDER - NSDCCPTREATMENT_GEN_ALL_CORE_FT
PRINCIPAL PROCEDURE  Procedure: Angiogram, extremity, right  Findings and Treatment:       SECONDARY PROCEDURE  Procedure: Angiogram, extremity, right  Findings and Treatment:

## 2022-02-12 NOTE — DISCHARGE NOTE PROVIDER - NSDCFUADDINST_GEN_ALL_CORE_FT
Ms. Gaytan,    You underwent a procedure called a right leg angiogram with stent placement and balloon angioplasty. Please follow up in the office with Dr. Pagan in 2 weeks. The number for the office is [phone].    For wound care: You can shower normally and allow soapy water to come into contact with your groin puncture.    For medications: Please see your medication reconciliation form. No new medications are started.    For return precautions: Please return to the ED or call 911 if you experience sudden or worsening back pain, leg pain, fevers, or chills.

## 2022-02-12 NOTE — DISCHARGE NOTE PROVIDER - CARE PROVIDER_API CALL
Edmar Pagan)  [facility] Surgery  Vascular  [address] [place] [address]  Phone: [phone]  Fax: [phone]  Follow Up Time: 2 weeks

## 2022-02-12 NOTE — DISCHARGE NOTE NURSING/CASE MANAGEMENT/SOCIAL WORK - PATIENT PORTAL LINK FT
You can access the FollowMyHealth Patient Portal offered by [facility] by registering at the following website: [email or web address]. By joining [facility]’s FollowMyHealth portal, you will also be able to view your health information using other applications (apps) compatible with our system.

## 2022-02-12 NOTE — DISCHARGE NOTE NURSING/CASE MANAGEMENT/SOCIAL WORK - NSDCPEFALRISK_GEN_ALL_CORE
For information on Fall & Injury Prevention, visit: [email or web address] OR  [email or web address] OR  https://www.cdc.gov/steadi/patient.html

## 2022-02-12 NOTE — DISCHARGE NOTE PROVIDER - NSDCCPCAREPLAN_GEN_ALL_CORE_FT
PRINCIPAL DISCHARGE DIAGNOSIS  Diagnosis: PAD (peripheral artery disease)  Assessment and Plan of Treatment:       SECONDARY DISCHARGE DIAGNOSES  Diagnosis: Stage 5 chronic kidney disease not on chronic dialysis  Assessment and Plan of Treatment:     Diagnosis: Hypertension  Assessment and Plan of Treatment:     Diagnosis: Diabetes  Assessment and Plan of Treatment:     Diagnosis: Chronic diastolic congestive heart failure  Assessment and Plan of Treatment:     Diagnosis: Hyperlipidemia  Assessment and Plan of Treatment:     Diagnosis: CAD (coronary artery disease)  Assessment and Plan of Treatment:     Diagnosis: Hypothyroid  Assessment and Plan of Treatment:

## 2022-02-12 NOTE — DISCHARGE NOTE PROVIDER - HOSPITAL COURSE
89F with PMH of CKD V, DM, dCHF, HTN, HLD, CAD, hypothyroidism, PAD (h/o R SFA/pop artery stents) presents from the office after complaing of RLE 2 block claudication and rest pain. Patient's stents were placed on 6/11/21, and it was known on follow-up visits that she had a 75% in-stent stenosis of the SFA. The patient was not ready to proceed with intervention at that time but is now ready to proceed. On 2/11/22, underwent RLE angio showing ISR of prior distal SFA/pop stents with distal occlusion and runoff to the foot via the DP from peroneal crossover. She underwent balloon angioplasty using [place] 5x150 DCB, with completion angio showing patent stents. She was evaluated the next day, doing well, and did not have any complaints. Her groin was soft and nontender. She was discharged home with her private homecare.

## 2022-02-12 NOTE — DISCHARGE NOTE PROVIDER - NSDCMRMEDTOKEN_GEN_ALL_CORE_FT
Aspirin Low Dose 81 mg oral tablet, chewable: 1 tab(s) orally once a day  calcium carbonate 1250 mg (500 mg elemental calcium) oral tablet: 1 tab(s) orally 3 times a day  clopidogrel 75 mg oral tablet: 1 tab(s) orally once a day  colchicine 0.6 mg oral tablet: 1 tab(s) orally once a day  Coreg 25 mg oral tablet: 1 tab(s) orally every 12 hours  levothyroxine 100 mcg (0.1 mg) oral tablet: 1 tab(s) orally once a day  Lipitor 80 mg oral tablet: 1 tab(s) orally once a day (at bedtime)  NIFEdipine 60 mg oral tablet, extended release: 1 tab(s) orally once a day  oxycodone-acetaminophen 5 mg-325 mg oral tablet: 1 tab(s) orally every 4 hours, As needed, Moderate Pain (4 - 6)  oxycodone-acetaminophen 5 mg-325 mg oral tablet: 2 tab(s) orally every 6 hours, As needed, Severe Pain (7 - 10)  sevelamer carbonate 800 mg oral tablet: 1 tab(s) orally every 8 hours  sodium bicarbonate 650 mg oral tablet: 2 tab(s) orally every 12 hours  torsemide 20 mg oral tablet: 1 tab(s) orally once a day  Tradjenta 5 mg oral tablet: 1 tab(s) orally once a day

## 2022-02-16 ENCOUNTER — RX RENEWAL (OUTPATIENT)
Age: 87
End: 2022-02-16

## 2022-02-16 DIAGNOSIS — M79.604 PAIN IN RIGHT LEG: ICD-10-CM

## 2022-02-16 DIAGNOSIS — E11.22 TYPE 2 DIABETES MELLITUS WITH DIABETIC CHRONIC KIDNEY DISEASE: ICD-10-CM

## 2022-02-16 DIAGNOSIS — E11.51 TYPE 2 DIABETES MELLITUS WITH DIABETIC PERIPHERAL ANGIOPATHY WITHOUT GANGRENE: ICD-10-CM

## 2022-02-16 DIAGNOSIS — E03.9 HYPOTHYROIDISM, UNSPECIFIED: ICD-10-CM

## 2022-02-16 DIAGNOSIS — N18.5 CHRONIC KIDNEY DISEASE, STAGE 5: ICD-10-CM

## 2022-02-16 DIAGNOSIS — Z79.02 LONG TERM (CURRENT) USE OF ANTITHROMBOTICS/ANTIPLATELETS: ICD-10-CM

## 2022-02-16 DIAGNOSIS — I13.2 HYPERTENSIVE HEART AND CHRONIC KIDNEY DISEASE WITH HEART FAILURE AND WITH STAGE 5 CHRONIC KIDNEY DISEASE, OR END STAGE RENAL DISEASE: ICD-10-CM

## 2022-02-16 DIAGNOSIS — T82.856A STENOSIS OF PERIPHERAL VASCULAR STENT, INITIAL ENCOUNTER: ICD-10-CM

## 2022-02-16 DIAGNOSIS — I25.10 ATHEROSCLEROTIC HEART DISEASE OF NATIVE CORONARY ARTERY WITHOUT ANGINA PECTORIS: ICD-10-CM

## 2022-02-16 DIAGNOSIS — D69.6 THROMBOCYTOPENIA, UNSPECIFIED: ICD-10-CM

## 2022-02-16 DIAGNOSIS — E78.5 HYPERLIPIDEMIA, UNSPECIFIED: ICD-10-CM

## 2022-02-16 DIAGNOSIS — Z79.82 LONG TERM (CURRENT) USE OF ASPIRIN: ICD-10-CM

## 2022-02-16 DIAGNOSIS — I50.32 CHRONIC DIASTOLIC (CONGESTIVE) HEART FAILURE: ICD-10-CM
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
## 2022-02-16 RX ORDER — ATORVASTATIN CALCIUM 80 MG/1
80 TABLET, FILM COATED ORAL
Qty: 90 | Refills: 2 | Status: ACTIVE | COMMUNITY
Start: 2022-02-16 | End: 1900-01-01

## 2022-02-22 PROCEDURE — 85610 PROTHROMBIN TIME: CPT

## 2022-02-22 PROCEDURE — 85027 COMPLETE CBC AUTOMATED: CPT

## 2022-02-22 PROCEDURE — 86870 RBC ANTIBODY IDENTIFICATION: CPT

## 2022-02-22 PROCEDURE — 80048 BASIC METABOLIC PNL TOTAL CA: CPT

## 2022-02-22 PROCEDURE — C2623: CPT

## 2022-02-22 PROCEDURE — 86901 BLOOD TYPING SEROLOGIC RH(D): CPT

## 2022-02-22 PROCEDURE — 86850 RBC ANTIBODY SCREEN: CPT

## 2022-02-22 PROCEDURE — C1769: CPT

## 2022-02-22 PROCEDURE — C1894: CPT

## 2022-02-22 PROCEDURE — C1760: CPT

## 2022-02-22 PROCEDURE — 85025 COMPLETE CBC W/AUTO DIFF WBC: CPT

## 2022-02-22 PROCEDURE — C1725: CPT

## 2022-02-22 PROCEDURE — 86880 COOMBS TEST DIRECT: CPT

## 2022-02-22 PROCEDURE — 83036 HEMOGLOBIN GLYCOSYLATED A1C: CPT

## 2022-02-22 PROCEDURE — 93005 ELECTROCARDIOGRAM TRACING: CPT

## 2022-02-22 PROCEDURE — 85730 THROMBOPLASTIN TIME PARTIAL: CPT

## 2022-02-22 PROCEDURE — 36415 COLL VENOUS BLD VENIPUNCTURE: CPT

## 2022-02-22 PROCEDURE — 99285 EMERGENCY DEPT VISIT HI MDM: CPT | Mod: 25

## 2022-02-22 PROCEDURE — 71045 X-RAY EXAM CHEST 1 VIEW: CPT

## 2022-02-22 PROCEDURE — 83735 ASSAY OF MAGNESIUM: CPT

## 2022-02-22 PROCEDURE — C1887: CPT

## 2022-02-22 PROCEDURE — 86900 BLOOD TYPING SEROLOGIC ABO: CPT

## 2022-02-22 PROCEDURE — U0005: CPT

## 2022-02-22 PROCEDURE — 82962 GLUCOSE BLOOD TEST: CPT

## 2022-02-22 PROCEDURE — U0003: CPT

## 2022-02-22 PROCEDURE — 80053 COMPREHEN METABOLIC PANEL: CPT

## 2022-02-22 PROCEDURE — 84100 ASSAY OF PHOSPHORUS: CPT

## 2022-02-22 PROCEDURE — 76000 FLUOROSCOPY <1 HR PHYS/QHP: CPT

## 2022-03-10 ENCOUNTER — APPOINTMENT (OUTPATIENT)
Dept: VASCULAR SURGERY | Facility: CLINIC | Age: 87
End: 2022-03-10
Payer: MEDICARE

## 2022-03-10 PROCEDURE — 93926 LOWER EXTREMITY STUDY: CPT

## 2022-03-10 PROCEDURE — 99213 OFFICE O/P EST LOW 20 MIN: CPT

## 2022-03-11 NOTE — PROCEDURE
[FreeTextEntry1] : Arterial US done today to evaluate SFA/pop stent showing patent  stent with good flow

## 2022-03-11 NOTE — PHYSICAL EXAM
[Respiratory Effort] : normal respiratory effort [Normal Heart Sounds] : normal heart sounds [2+] : right 2+ [1+] : right 1+ [Ankle Swelling (On Exam)] : not present [No Rash or Lesion] : No rash or lesion [Calm] : calm [de-identified] : well appearing [de-identified] : RLE: good capillary refill. no gangrene,  FROM of all toes and foot.  [de-identified] : grossly intact

## 2022-03-11 NOTE — HISTORY OF PRESENT ILLNESS
[FreeTextEntry1] : 88 yo F with PMH of CKD V, DM, dCHF, HTN, HLD, CAD, hypothyroidism, PAD (h/o R SFA/pop artery stents) with recent hospitalization due to RLE 2 block claudication and rest pain. Patient's stents were placed on 6/11/21, and it was known on follow-up visits that she had a 75% in-stent stenosis of the SFA. The patient was not ready to proceed with intervention at that time however her symptoms became worse. On 2/11/22, underwent RLE angio showing ISR of prior distal SFA/pop stents with distal occlusion and runoff to the foot via the DP from\par peroneal crossover. She underwent balloon angioplasty using [place] 5x150 DCB, with completion angio showing patent stents. She returns today for a follow up stating that her symptoms of claudication completely resolved and she feels good. She is compliant with Plavix/ASA. Denies rest pain, skin changes, fever, chills.

## 2022-03-11 NOTE — ASSESSMENT
[FreeTextEntry1] : 89 yo F with PMH of CKD V, DM, dCHF, HTN, HLD, CAD, hypothyroidism, PAD (h/o R SFA/pop artery stents) with recent hospitalization due to RLE 2 block claudication and rest pain. Patient's stents were placed on 6/11/21, and it was known on follow-up visits that she had a 75% in-stent stenosis of the SFA. The patient was not ready to proceed with intervention at that time however her symptoms became worse. On 2/11/22, underwent RLE angio showing ISR of prior distal SFA/pop stents with distal occlusion and runoff to the foot via the DP from\par peroneal crossover. She underwent balloon angioplasty using [place] 5x150 DCB, with completion angio showing patent stents. She returns today for a follow up stating that her symptoms of claudication completely resolved and she feels good. She is compliant with Plavix/ASA. \par Patient with palpable peripheral pulses, no skin breakdown.\par Arterial US done today to evaluate SFA/pop stent showing patent  stent with good flow.\par Pt was recommended to stay active, walk and c/w Plavix and Aspirin.\par She will f/u in 6 months or sooner if needed. [Arterial/Venous Disease] : arterial/venous disease

## 2022-03-11 NOTE — ADDENDUM
[FreeTextEntry1] : I, Dr. Edmar Pagan, personally performed the evaluation and management (E/M) services for this established patient who presents today with (an) existing condition(s).  That E/M includes conducting the examination, assessing all conditions, and (re)establishing/reinforcing a plan of care.  Today, my ACP, Lenora SEN, was here to observe my evaluation and management services for this condition to be followed going forward.\par \par \par

## 2022-03-15 ENCOUNTER — LABORATORY RESULT (OUTPATIENT)
Age: 87
End: 2022-03-15

## 2022-03-21 ENCOUNTER — APPOINTMENT (OUTPATIENT)
Dept: NEPHROLOGY | Facility: CLINIC | Age: 87
End: 2022-03-21
Payer: MEDICARE

## 2022-03-21 ENCOUNTER — NON-APPOINTMENT (OUTPATIENT)
Age: 87
End: 2022-03-21

## 2022-03-21 VITALS
HEART RATE: 63 BPM | SYSTOLIC BLOOD PRESSURE: 148 MMHG | WEIGHT: 133 LBS | DIASTOLIC BLOOD PRESSURE: 69 MMHG | BODY MASS INDEX: 25.98 KG/M2

## 2022-03-21 DIAGNOSIS — E83.39 OTHER DISORDERS OF PHOSPHORUS METABOLISM: ICD-10-CM

## 2022-03-21 DIAGNOSIS — I10 ESSENTIAL (PRIMARY) HYPERTENSION: ICD-10-CM

## 2022-03-21 DIAGNOSIS — E79.0 HYPERURICEMIA W/OUT SIGNS OF INFLAMMATORY ARTHRITIS AND TOPHACEOUS DISEASE: ICD-10-CM

## 2022-03-21 PROCEDURE — 99215 OFFICE O/P EST HI 40 MIN: CPT

## 2022-03-21 RX ORDER — CYCLOBENZAPRINE HYDROCHLORIDE 10 MG/1
10 TABLET, FILM COATED ORAL
Refills: 0 | Status: ACTIVE | COMMUNITY
Start: 2022-03-21

## 2022-03-21 RX ORDER — NIFEDIPINE 90 MG/1
90 TABLET, EXTENDED RELEASE ORAL
Qty: 90 | Refills: 3 | Status: ACTIVE | COMMUNITY

## 2022-03-21 RX ORDER — DOCUSATE SODIUM 100 MG/1
100 CAPSULE, LIQUID FILLED ORAL
Qty: 180 | Refills: 0 | Status: ACTIVE | COMMUNITY
Start: 2021-11-09

## 2022-03-21 RX ORDER — ASPIRIN ENTERIC COATED TABLETS 81 MG 81 MG/1
81 TABLET, DELAYED RELEASE ORAL
Refills: 5 | Status: ACTIVE | COMMUNITY
Start: 2022-03-21

## 2022-03-23 NOTE — ASSESSMENT
[FreeTextEntry1] : CKD 5 stable function in baseline range --improved since admit for LE stent \par appears well w/u uremic sx\par BP prob acceptable and reports better at home, volume controlled \par anemia --on regular MONISHA with heme for MDS and CKD anemia-- ? revlimid use recently --? why?\par sig hyperuricemia with hx gout-- not on xanthine oxidase-on colchicine but dose high for CKD 5\par advised lower colchicine to 1/2 tab /d \par add  allopurinol low dose eg 50 g /d to start and titrate to 100 mg\par for hyperphosphatemia -- discussed lower phos in diet (eats a lot of dailry) -- try to get sevelamer or add caco3 with dinner as well  \par f/u 2-3 mos\par \par \par \par

## 2022-03-23 NOTE — HISTORY OF PRESENT ILLNESS
[FreeTextEntry1] : f/u CKD \par has been well "not bad" , breathing and energy ok , appetite good\par meds reviewed with pt and list updated--pharmacy out of sevelamer - taking caco3 with breakfast and lunch\par may have taken revlimid course ? not on now \par getting MONISHA \par home BP around 135 systolic\par sugar high\par PCP Dr Vincent\par heme Dr Garza

## 2022-04-01 LAB
25(OH)D3 SERPL-MCNC: 25.8 NG/ML
ALBUMIN SERPL ELPH-MCNC: 4.7 G/DL
ALP BLD-CCNC: 95 U/L
ALT SERPL-CCNC: 17 U/L
ANION GAP SERPL CALC-SCNC: 17 MMOL/L
AST SERPL-CCNC: 16 U/L
BASOPHILS # BLD AUTO: 0.02 K/UL
BASOPHILS NFR BLD AUTO: 0.6 %
BILIRUB SERPL-MCNC: 0.4 MG/DL
BUN SERPL-MCNC: 84 MG/DL
CALCIUM SERPL-MCNC: 10.3 MG/DL
CALCIUM SERPL-MCNC: 10.3 MG/DL
CHLORIDE SERPL-SCNC: 95 MMOL/L
CO2 SERPL-SCNC: 21 MMOL/L
CREAT SERPL-MCNC: 3.58 MG/DL
EGFR: 12 ML/MIN/1.73M2
EOSINOPHIL # BLD AUTO: 0.32 K/UL
EOSINOPHIL NFR BLD AUTO: 9.5 %
GLUCOSE SERPL-MCNC: 138 MG/DL
HCT VFR BLD CALC: 27.8 %
HGB BLD-MCNC: 8.8 G/DL
IMM GRANULOCYTES NFR BLD AUTO: 0 %
LYMPHOCYTES # BLD AUTO: 1.43 K/UL
LYMPHOCYTES NFR BLD AUTO: 42.6 %
MAN DIFF?: NORMAL
MCHC RBC-ENTMCNC: 31.7 GM/DL
MCHC RBC-ENTMCNC: 35.5 PG
MCV RBC AUTO: 112.1 FL
MONOCYTES # BLD AUTO: 0.4 K/UL
MONOCYTES NFR BLD AUTO: 11.9 %
NEUTROPHILS # BLD AUTO: 1.19 K/UL
NEUTROPHILS NFR BLD AUTO: 35.4 %
PARATHYROID HORMONE INTACT: 96 PG/ML
PHOSPHATE SERPL-MCNC: 6.2 MG/DL
PLATELET # BLD AUTO: 97 K/UL
POTASSIUM SERPL-SCNC: 4.2 MMOL/L
PROT SERPL-MCNC: 7.3 G/DL
RBC # BLD: 2.48 M/UL
RBC # FLD: 15.8 %
SODIUM SERPL-SCNC: 133 MMOL/L
URATE SERPL-MCNC: 11.2 MG/DL
WBC # FLD AUTO: 3.36 K/UL

## 2022-05-17 ENCOUNTER — RX CHANGE (OUTPATIENT)
Age: 87
End: 2022-05-17

## 2022-06-09 ENCOUNTER — APPOINTMENT (OUTPATIENT)
Dept: VASCULAR SURGERY | Facility: CLINIC | Age: 87
End: 2022-06-09
Payer: MEDICARE

## 2022-06-09 PROCEDURE — 99213 OFFICE O/P EST LOW 20 MIN: CPT

## 2022-06-10 NOTE — PROCEDURE
[FreeTextEntry1] : Unofficial RLE arterial duplex was done in the office demonstrating patent SFA stent with ~50% in stent restenosis of pop artery

## 2022-06-10 NOTE — ADDENDUM
[FreeTextEntry1] : I, Dr. Edmar Pagan, personally performed the evaluation and management (E/M) services for this established patient who presents today with (an) existing condition(s).  That E/M includes conducting the examination, assessing all conditions, and (re)establishing/reinforcing a plan of care.  Today, my ACP, Lenora SEN, was here to observe my evaluation and management services for this condition to be followed going forward.\par \par \par The documentation for this encounter was entered by Hamzah Christianson acting as scribe for Dr. Edmar Pagan.\par

## 2022-06-10 NOTE — PHYSICAL EXAM
[Respiratory Effort] : normal respiratory effort [Normal Heart Sounds] : normal heart sounds [2+] : right 2+ [1+] : right 1+ [No Rash or Lesion] : No rash or lesion [Calm] : calm [Ankle Swelling (On Exam)] : not present [de-identified] : well appearing [de-identified] : RLE: good capillary refill. no gangrene,  FROM of all toes and foot.  [de-identified] : grossly intact

## 2022-06-10 NOTE — ASSESSMENT
[Arterial/Venous Disease] : arterial/venous disease [FreeTextEntry1] : 89 yo F with PMH of CKD V, DM, dCHF, HTN, HLD, CAD, hypothyroidism, PAD (h/o R SFA/pop artery stents) with recent hospitalization due to RLE 2 block claudication and rest pain. Patient's stents were placed on 6/11/21, and it was known on follow-up visits that she had a 75% in-stent stenosis of the SFA, s/p  RLE angio on 2/10/22 showing ISR of prior distal SFA/pop stents with distal occlusion and runoff to the foot via the DP from peroneal crossover. She underwent balloon angioplasty. She returns today for a follow up stating that she developed cramps in her feet at night and worries about her circulation.  She is compliant with Plavix/ASA. \par Patient with palpable peripheral pulses, no skin breakdown, good capillary refill. \par Unofficial RLE arterial duplex was done in the office demonstrating patent SFA stent with ~50% in stent restenosis of pop artery\par Pt was explained that no intervention is necessary at this time. She is recommended to stay active, walk and c/w Plavix and Aspirin.\par We recommended tonic water for leg cramps.\par She will f/u if symptoms persist.

## 2022-06-10 NOTE — HISTORY OF PRESENT ILLNESS
[FreeTextEntry1] : 89 yo F with PMH of CKD V, DM, dCHF, HTN, HLD, CAD, hypothyroidism, PAD (h/o R SFA/pop artery stents) with recent hospitalization due to RLE 2 block claudication and rest pain. Patient's stents were placed on 6/11/21, and it was known on follow-up visits that she had a 75% in-stent stenosis of the SFA, s/p  RLE angio on 2/10/22 showing ISR of prior distal SFA/pop stents with distal occlusion and runoff to the foot via the DP from peroneal crossover. She underwent balloon angioplasty. She returns today for a follow up stating that she developed cramps in her feet at night and worries about her circulation.  She is compliant with Plavix/ASA. Denies rest pain, skin changes, fever, chills.\par

## 2022-06-15 ENCOUNTER — APPOINTMENT (OUTPATIENT)
Dept: NEPHROLOGY | Facility: CLINIC | Age: 87
End: 2022-06-15
Payer: MEDICARE

## 2022-06-15 VITALS
WEIGHT: 137 LBS | DIASTOLIC BLOOD PRESSURE: 58 MMHG | HEART RATE: 60 BPM | SYSTOLIC BLOOD PRESSURE: 133 MMHG | BODY MASS INDEX: 26.76 KG/M2

## 2022-06-15 DIAGNOSIS — I50.32 CHRONIC DIASTOLIC (CONGESTIVE) HEART FAILURE: ICD-10-CM

## 2022-06-15 DIAGNOSIS — E83.52 HYPERCALCEMIA: ICD-10-CM

## 2022-06-15 DIAGNOSIS — N17.9 ACUTE KIDNEY FAILURE, UNSPECIFIED: ICD-10-CM

## 2022-06-15 DIAGNOSIS — N18.9 ACUTE KIDNEY FAILURE, UNSPECIFIED: ICD-10-CM

## 2022-06-15 PROCEDURE — 99214 OFFICE O/P EST MOD 30 MIN: CPT

## 2022-06-15 RX ORDER — CALCIUM CARBONATE 1250 MG/5ML
1250 SUSPENSION ORAL
Refills: 0 | Status: ACTIVE | COMMUNITY
Start: 2021-10-18

## 2022-06-15 NOTE — PHYSICAL EXAM
[General Appearance - Alert] : alert [General Appearance - In No Acute Distress] : in no acute distress [Sclera] : the sclera and conjunctiva were normal [Jugular Venous Distention Increased] : there was no jugular-venous distention [Auscultation Breath Sounds / Voice Sounds] : lungs were clear to auscultation bilaterally [Heart Rate And Rhythm] : heart rate was normal and rhythm regular [Heart Sounds Pericardial Friction Rub] : no pericardial rub [Heart Sounds Gallop] : no gallops [Systolic grade ___/6] : A grade [unfilled]/6 systolic murmur was heard. [Edema] : there was no peripheral edema [Abdomen Soft] : soft [Abdomen Tenderness] : non-tender [No CVA Tenderness] : no ~M costovertebral angle tenderness [Involuntary Movements] : no involuntary movements were seen [] : no rash [No Focal Deficits] : no focal deficits [Oriented To Time, Place, And Person] : oriented to person, place, and time [Affect] : the affect was normal

## 2022-06-16 PROBLEM — E83.52 HYPERCALCEMIA: Status: ACTIVE | Noted: 2021-08-01

## 2022-06-16 PROBLEM — N17.9 RENAL FAILURE (ARF), ACUTE ON CHRONIC: Status: ACTIVE | Noted: 2022-06-16

## 2022-06-16 PROBLEM — I50.32 DIASTOLIC CHF, CHRONIC: Status: ACTIVE | Noted: 2019-02-10

## 2022-06-16 NOTE — HISTORY OF PRESENT ILLNESS
[FreeTextEntry1] : f/u CKD \par no complaints -- reports feeling " very well" thoguh anxious abt kidney numbers \par appetite good, no dyspnea\par does get cramping legs at night \par meds reviewed with pt -- on low dose colcys and allopurinol\par labs done and reviewed - see below\par s/p PRBC 2 weeks ago \par PCP Dr Vincent

## 2022-06-16 NOTE — ASSESSMENT
[FreeTextEntry1] : CKD 5 -- some progression it seems\par denies uremic sx and appears well \par Volume well controlled and could be a bit dry even--leg cramping possiblly diuretic related?\par anemia-- gets PRBC, MONISHA with heme\par calcium on high side--taking PO calcium- not vit D\par \par suggest lower torsemide to one pill a day or can  alternate one and 2 pills each day  if needs --hopefully renal fxn may improve a bit with lower dose (if tolerates) \par hold on PO calcium -- will recheck lytes and check PTH, vit D prior to next visit \par no need for dialysis yet -- discussed sx uremia\par f/u 1- 2 mos\par

## 2022-07-14 ENCOUNTER — APPOINTMENT (OUTPATIENT)
Dept: VASCULAR SURGERY | Facility: CLINIC | Age: 87
End: 2022-07-14

## 2022-07-14 ENCOUNTER — LABORATORY RESULT (OUTPATIENT)
Age: 87
End: 2022-07-14

## 2022-07-14 LAB
ALBUMIN SERPL ELPH-MCNC: 4.4 G/DL
ALP BLD-CCNC: 73 U/L
ALT SERPL-CCNC: 14 U/L
ANION GAP SERPL CALC-SCNC: 18 MMOL/L
AST SERPL-CCNC: 17 U/L
BASOPHILS # BLD AUTO: 0 K/UL
BASOPHILS NFR BLD AUTO: 0 %
BILIRUB SERPL-MCNC: 0.4 MG/DL
BUN SERPL-MCNC: 86 MG/DL
CALCIUM SERPL-MCNC: 9.4 MG/DL
CHLORIDE SERPL-SCNC: 98 MMOL/L
CO2 SERPL-SCNC: 14 MMOL/L
CREAT SERPL-MCNC: 4.31 MG/DL
EGFR: 9 ML/MIN/1.73M2
EOSINOPHIL # BLD AUTO: 0.2 K/UL
EOSINOPHIL NFR BLD AUTO: 6.1 %
GLUCOSE SERPL-MCNC: 125 MG/DL
HCT VFR BLD CALC: 27.6 %
HGB BLD-MCNC: 9.1 G/DL
INR PPP: 1.17 RATIO
LYMPHOCYTES # BLD AUTO: 1.45 K/UL
LYMPHOCYTES NFR BLD AUTO: 44.3 %
MAN DIFF?: NORMAL
MCHC RBC-ENTMCNC: 33 GM/DL
MCHC RBC-ENTMCNC: 33.8 PG
MCV RBC AUTO: 102.6 FL
MONOCYTES # BLD AUTO: 0.37 K/UL
MONOCYTES NFR BLD AUTO: 11.3 %
NEUTROPHILS # BLD AUTO: 1.26 K/UL
NEUTROPHILS NFR BLD AUTO: 38.3 %
PLATELET # BLD AUTO: 92 K/UL
POTASSIUM SERPL-SCNC: 3.9 MMOL/L
PROT SERPL-MCNC: 6.9 G/DL
PT BLD: 13.7 SEC
RBC # BLD: 2.69 M/UL
RBC # FLD: 21.4 %
SARS-COV-2 N GENE NPH QL NAA+PROBE: NOT DETECTED
SODIUM SERPL-SCNC: 130 MMOL/L
WBC # FLD AUTO: 3.28 K/UL

## 2022-07-14 PROCEDURE — 99213 OFFICE O/P EST LOW 20 MIN: CPT

## 2022-07-14 NOTE — PROCEDURE
[FreeTextEntry1] : US done today of the right leg showing SFA stent stenosis and 50% pop stent stenosis.

## 2022-07-14 NOTE — ASSESSMENT
[Arterial/Venous Disease] : arterial/venous disease [FreeTextEntry1] : 89 yo F with PMH of CKD V, DM, dCHF, HTN, HLD, CAD, hypothyroidism, PAD (h/o R SFA/pop artery stents) with recent hospitalization due to RLE 2 block claudication and rest pain. Patient's stents were placed on 6/11/21, and it was known on follow-up visits that she had a 75% in-stent stenosis of the SFA, s/p  RLE angio on 2/10/22 showing ISR of prior distal SFA/pop stents with distal occlusion and runoff to the foot via the DP from peroneal crossover. She underwent balloon angioplasty. She returns today for a follow up stating that she developed cramps in her feet at night and worries about her circulation.  She is compliant with Plavix/ASA. \par Patient with palpable peripheral pulses, no skin breakdown, good capillary refill. \par \par Plan:\par - Unofficial RLE arterial duplex was done in the office demonstrating very narrow SFA stent. We discussed findings with patient. Due to her new leg pain and narrow SFA stent recommend angiogram/PTA/stent. Risk, complications and alternatives were discussed, including but not limited to access site complications (infection/pseudoaneursym/hematoma) contrast nephropathy, thrombosis, risk of embolization, risk of MI - all questions were answered. Will schedule for Tuesday. \par

## 2022-07-14 NOTE — HISTORY OF PRESENT ILLNESS
[FreeTextEntry1] : 89 yo F with PMH of CKD V, DM, dCHF, HTN, HLD, CAD, hypothyroidism, PAD (h/o R SFA/pop artery stents) with recent hospitalization due to RLE 2 block claudication and rest pain. Patient's stents were placed on 6/11/21, and it was known on follow-up visits that she had a 75% in-stent stenosis of the SFA, s/p  RLE angio on 2/10/22 showing ISR of prior distal SFA/pop stents with distal occlusion and runoff to the foot via the DP from peroneal crossover. She underwent balloon angioplasty.\par \par  She returns today for a follow up stating that she has been having R leg pain at night. Feels better when she gets up and walks or hangs her feet over the bed at night. Has worsened over the last few weeks.\par She is compliant with Plavix/ASA. Denies, skin changes, fever, chills.\par

## 2022-07-14 NOTE — PHYSICAL EXAM
[Respiratory Effort] : normal respiratory effort [Normal Heart Sounds] : normal heart sounds [2+] : right 2+ [1+] : right 1+ [No Rash or Lesion] : No rash or lesion [Calm] : calm [Ankle Swelling (On Exam)] : not present [de-identified] : well appearing [FreeTextEntry1] : PT and DP pulses weak pulses with handheld doppler.  [de-identified] : RLE: good capillary refill. no gangrene,  FROM of all toes and foot.  [de-identified] : grossly intact

## 2022-07-15 DIAGNOSIS — Z01.818 ENCOUNTER FOR OTHER PREPROCEDURAL EXAMINATION: ICD-10-CM

## 2022-07-20 ENCOUNTER — INPATIENT (INPATIENT)
Facility: HOSPITAL | Age: 87
LOS: 1 days | Discharge: HOME | DRG: 300 | End: 2022-07-22
Attending: STUDENT IN AN ORGANIZED HEALTH CARE EDUCATION/TRAINING PROGRAM | Admitting: STUDENT IN AN ORGANIZED HEALTH CARE EDUCATION/TRAINING PROGRAM
Payer: MEDICARE

## 2022-07-20 VITALS
WEIGHT: 142.2 LBS | HEART RATE: 68 BPM | OXYGEN SATURATION: 97 % | HEIGHT: 61 IN | DIASTOLIC BLOOD PRESSURE: 65 MMHG | SYSTOLIC BLOOD PRESSURE: 150 MMHG | RESPIRATION RATE: 16 BRPM

## 2022-07-20 DIAGNOSIS — Z95.820 PERIPHERAL VASCULAR ANGIOPLASTY STATUS WITH IMPLANTS AND GRAFTS: Chronic | ICD-10-CM

## 2022-07-20 DIAGNOSIS — Z95.0 PRESENCE OF CARDIAC PACEMAKER: Chronic | ICD-10-CM

## 2022-07-20 LAB
A1C WITH ESTIMATED AVERAGE GLUCOSE RESULT: 5.7 % — HIGH (ref 4–5.6)
ANION GAP SERPL CALC-SCNC: 17 MMOL/L — SIGNIFICANT CHANGE UP (ref 5–17)
ANISOCYTOSIS BLD QL: SLIGHT — SIGNIFICANT CHANGE UP
APPEARANCE UR: CLEAR — SIGNIFICANT CHANGE UP
APTT BLD: 26.9 SEC — LOW (ref 27.5–35.5)
BACTERIA # UR AUTO: PRESENT /HPF
BASOPHILS # BLD AUTO: 0.02 K/UL — SIGNIFICANT CHANGE UP (ref 0–0.2)
BASOPHILS NFR BLD AUTO: 0.9 % — SIGNIFICANT CHANGE UP (ref 0–2)
BILIRUB UR-MCNC: NEGATIVE — SIGNIFICANT CHANGE UP
BLD GP AB SCN SERPL QL: POSITIVE — SIGNIFICANT CHANGE UP
BUN SERPL-MCNC: 95 MG/DL — HIGH (ref 7–23)
CALCIUM SERPL-MCNC: 9.2 MG/DL — SIGNIFICANT CHANGE UP (ref 8.4–10.5)
CHLORIDE SERPL-SCNC: 96 MMOL/L — SIGNIFICANT CHANGE UP (ref 96–108)
CO2 SERPL-SCNC: 18 MMOL/L — LOW (ref 22–31)
COLOR SPEC: YELLOW — SIGNIFICANT CHANGE UP
CREAT SERPL-MCNC: 4.45 MG/DL — HIGH (ref 0.5–1.3)
DIFF PNL FLD: ABNORMAL
EGFR: 9 ML/MIN/1.73M2 — LOW
EOSINOPHIL # BLD AUTO: 0.07 K/UL — SIGNIFICANT CHANGE UP (ref 0–0.5)
EOSINOPHIL NFR BLD AUTO: 2.7 % — SIGNIFICANT CHANGE UP (ref 0–6)
EPI CELLS # UR: SIGNIFICANT CHANGE UP /HPF (ref 0–5)
ESTIMATED AVERAGE GLUCOSE: 117 MG/DL — HIGH (ref 68–114)
GIANT PLATELETS BLD QL SMEAR: PRESENT — SIGNIFICANT CHANGE UP
GLUCOSE BLDC GLUCOMTR-MCNC: 119 MG/DL — HIGH (ref 70–99)
GLUCOSE BLDC GLUCOMTR-MCNC: 147 MG/DL — HIGH (ref 70–99)
GLUCOSE SERPL-MCNC: 110 MG/DL — HIGH (ref 70–99)
GLUCOSE UR QL: 100
HCT VFR BLD CALC: 24.4 % — LOW (ref 34.5–45)
HGB BLD-MCNC: 8.1 G/DL — LOW (ref 11.5–15.5)
INR BLD: 1.24 — HIGH (ref 0.88–1.16)
KETONES UR-MCNC: NEGATIVE — SIGNIFICANT CHANGE UP
LEUKOCYTE ESTERASE UR-ACNC: NEGATIVE — SIGNIFICANT CHANGE UP
LYMPHOCYTES # BLD AUTO: 1.68 K/UL — SIGNIFICANT CHANGE UP (ref 1–3.3)
LYMPHOCYTES # BLD AUTO: 60.7 % — HIGH (ref 13–44)
MACROCYTES BLD QL: SLIGHT — SIGNIFICANT CHANGE UP
MAGNESIUM SERPL-MCNC: 1.9 MG/DL — SIGNIFICANT CHANGE UP (ref 1.6–2.6)
MANUAL SMEAR VERIFICATION: SIGNIFICANT CHANGE UP
MCHC RBC-ENTMCNC: 33.2 GM/DL — SIGNIFICANT CHANGE UP (ref 32–36)
MCHC RBC-ENTMCNC: 33.5 PG — SIGNIFICANT CHANGE UP (ref 27–34)
MCV RBC AUTO: 100.8 FL — HIGH (ref 80–100)
MONOCYTES # BLD AUTO: 0.15 K/UL — SIGNIFICANT CHANGE UP (ref 0–0.9)
MONOCYTES NFR BLD AUTO: 5.4 % — SIGNIFICANT CHANGE UP (ref 2–14)
NEUTROPHILS # BLD AUTO: 0.84 K/UL — LOW (ref 1.8–7.4)
NEUTROPHILS NFR BLD AUTO: 30.3 % — LOW (ref 43–77)
NITRITE UR-MCNC: NEGATIVE — SIGNIFICANT CHANGE UP
NRBC # BLD: 2 /100 — HIGH (ref 0–0)
NRBC # BLD: SIGNIFICANT CHANGE UP /100 WBCS (ref 0–0)
PH UR: 5.5 — SIGNIFICANT CHANGE UP (ref 5–8)
PHOSPHATE SERPL-MCNC: 6.4 MG/DL — HIGH (ref 2.5–4.5)
PLAT MORPH BLD: NORMAL — SIGNIFICANT CHANGE UP
PLATELET # BLD AUTO: 109 K/UL — LOW (ref 150–400)
POTASSIUM SERPL-MCNC: 3.8 MMOL/L — SIGNIFICANT CHANGE UP (ref 3.5–5.3)
POTASSIUM SERPL-SCNC: 3.8 MMOL/L — SIGNIFICANT CHANGE UP (ref 3.5–5.3)
PROT UR-MCNC: NEGATIVE MG/DL — SIGNIFICANT CHANGE UP
PROTHROM AB SERPL-ACNC: 14.8 SEC — HIGH (ref 10.5–13.4)
RBC # BLD: 2.42 M/UL — LOW (ref 3.8–5.2)
RBC # FLD: 20.4 % — HIGH (ref 10.3–14.5)
RBC BLD AUTO: ABNORMAL
RBC CASTS # UR COMP ASSIST: < 5 /HPF — SIGNIFICANT CHANGE UP
RH IG SCN BLD-IMP: POSITIVE — SIGNIFICANT CHANGE UP
SMUDGE CELLS # BLD: PRESENT — SIGNIFICANT CHANGE UP
SODIUM SERPL-SCNC: 131 MMOL/L — LOW (ref 135–145)
SP GR SPEC: <=1.005 — SIGNIFICANT CHANGE UP (ref 1–1.03)
UROBILINOGEN FLD QL: 0.2 E.U./DL — SIGNIFICANT CHANGE UP
WBC # BLD: 2.76 K/UL — LOW (ref 3.8–10.5)
WBC # FLD AUTO: 2.76 K/UL — LOW (ref 3.8–10.5)
WBC UR QL: < 5 /HPF — SIGNIFICANT CHANGE UP

## 2022-07-20 PROCEDURE — 93010 ELECTROCARDIOGRAM REPORT: CPT

## 2022-07-20 PROCEDURE — 99223 1ST HOSP IP/OBS HIGH 75: CPT

## 2022-07-20 PROCEDURE — 86077 PHYS BLOOD BANK SERV XMATCH: CPT

## 2022-07-20 PROCEDURE — 76775 US EXAM ABDO BACK WALL LIM: CPT | Mod: 26

## 2022-07-20 PROCEDURE — 71045 X-RAY EXAM CHEST 1 VIEW: CPT | Mod: 26

## 2022-07-20 RX ORDER — COLCHICINE 0.6 MG
0.6 TABLET ORAL DAILY
Refills: 0 | Status: DISCONTINUED | OUTPATIENT
Start: 2022-07-20 | End: 2022-07-21

## 2022-07-20 RX ORDER — FOLIC ACID 0.8 MG
1 TABLET ORAL DAILY
Refills: 0 | Status: DISCONTINUED | OUTPATIENT
Start: 2022-07-20 | End: 2022-07-22

## 2022-07-20 RX ORDER — ACETAMINOPHEN 500 MG
650 TABLET ORAL EVERY 6 HOURS
Refills: 0 | Status: DISCONTINUED | OUTPATIENT
Start: 2022-07-20 | End: 2022-07-22

## 2022-07-20 RX ORDER — SODIUM CHLORIDE 9 MG/ML
1000 INJECTION, SOLUTION INTRAVENOUS
Refills: 0 | Status: DISCONTINUED | OUTPATIENT
Start: 2022-07-20 | End: 2022-07-22

## 2022-07-20 RX ORDER — SODIUM CHLORIDE 9 MG/ML
1000 INJECTION INTRAMUSCULAR; INTRAVENOUS; SUBCUTANEOUS
Refills: 0 | Status: DISCONTINUED | OUTPATIENT
Start: 2022-07-20 | End: 2022-07-22

## 2022-07-20 RX ORDER — DEXTROSE 50 % IN WATER 50 %
15 SYRINGE (ML) INTRAVENOUS ONCE
Refills: 0 | Status: DISCONTINUED | OUTPATIENT
Start: 2022-07-20 | End: 2022-07-22

## 2022-07-20 RX ORDER — HEPARIN SODIUM 5000 [USP'U]/ML
5000 INJECTION INTRAVENOUS; SUBCUTANEOUS EVERY 8 HOURS
Refills: 0 | Status: DISCONTINUED | OUTPATIENT
Start: 2022-07-20 | End: 2022-07-22

## 2022-07-20 RX ORDER — ZOLPIDEM TARTRATE 10 MG/1
5 TABLET ORAL AT BEDTIME
Refills: 0 | Status: DISCONTINUED | OUTPATIENT
Start: 2022-07-20 | End: 2022-07-22

## 2022-07-20 RX ORDER — OXYCODONE AND ACETAMINOPHEN 5; 325 MG/1; MG/1
1 TABLET ORAL EVERY 6 HOURS
Refills: 0 | Status: DISCONTINUED | OUTPATIENT
Start: 2022-07-20 | End: 2022-07-22

## 2022-07-20 RX ORDER — ATORVASTATIN CALCIUM 80 MG/1
80 TABLET, FILM COATED ORAL AT BEDTIME
Refills: 0 | Status: DISCONTINUED | OUTPATIENT
Start: 2022-07-20 | End: 2022-07-22

## 2022-07-20 RX ORDER — GLUCAGON INJECTION, SOLUTION 0.5 MG/.1ML
1 INJECTION, SOLUTION SUBCUTANEOUS ONCE
Refills: 0 | Status: DISCONTINUED | OUTPATIENT
Start: 2022-07-20 | End: 2022-07-22

## 2022-07-20 RX ORDER — DEXTROSE 50 % IN WATER 50 %
25 SYRINGE (ML) INTRAVENOUS ONCE
Refills: 0 | Status: DISCONTINUED | OUTPATIENT
Start: 2022-07-20 | End: 2022-07-22

## 2022-07-20 RX ORDER — SEVELAMER CARBONATE 2400 MG/1
800 POWDER, FOR SUSPENSION ORAL EVERY 8 HOURS
Refills: 0 | Status: DISCONTINUED | OUTPATIENT
Start: 2022-07-20 | End: 2022-07-22

## 2022-07-20 RX ORDER — INSULIN LISPRO 100/ML
VIAL (ML) SUBCUTANEOUS
Refills: 0 | Status: DISCONTINUED | OUTPATIENT
Start: 2022-07-20 | End: 2022-07-22

## 2022-07-20 RX ORDER — PANTOPRAZOLE SODIUM 20 MG/1
40 TABLET, DELAYED RELEASE ORAL
Refills: 0 | Status: DISCONTINUED | OUTPATIENT
Start: 2022-07-20 | End: 2022-07-22

## 2022-07-20 RX ORDER — DEXTROSE 50 % IN WATER 50 %
12.5 SYRINGE (ML) INTRAVENOUS ONCE
Refills: 0 | Status: DISCONTINUED | OUTPATIENT
Start: 2022-07-20 | End: 2022-07-22

## 2022-07-20 RX ORDER — LEVOTHYROXINE SODIUM 125 MCG
100 TABLET ORAL DAILY
Refills: 0 | Status: DISCONTINUED | OUTPATIENT
Start: 2022-07-20 | End: 2022-07-22

## 2022-07-20 RX ORDER — CALCIUM CARBONATE 500(1250)
1 TABLET ORAL THREE TIMES A DAY
Refills: 0 | Status: DISCONTINUED | OUTPATIENT
Start: 2022-07-20 | End: 2022-07-22

## 2022-07-20 RX ORDER — ASPIRIN/CALCIUM CARB/MAGNESIUM 324 MG
81 TABLET ORAL DAILY
Refills: 0 | Status: DISCONTINUED | OUTPATIENT
Start: 2022-07-20 | End: 2022-07-22

## 2022-07-20 RX ORDER — CARVEDILOL PHOSPHATE 80 MG/1
25 CAPSULE, EXTENDED RELEASE ORAL EVERY 12 HOURS
Refills: 0 | Status: DISCONTINUED | OUTPATIENT
Start: 2022-07-20 | End: 2022-07-22

## 2022-07-20 RX ORDER — LINAGLIPTIN 5 MG/1
1 TABLET, FILM COATED ORAL
Qty: 0 | Refills: 0 | DISCHARGE

## 2022-07-20 RX ORDER — CLOPIDOGREL BISULFATE 75 MG/1
75 TABLET, FILM COATED ORAL DAILY
Refills: 0 | Status: DISCONTINUED | OUTPATIENT
Start: 2022-07-20 | End: 2022-07-22

## 2022-07-20 RX ORDER — NIFEDIPINE 30 MG
60 TABLET, EXTENDED RELEASE 24 HR ORAL DAILY
Refills: 0 | Status: DISCONTINUED | OUTPATIENT
Start: 2022-07-20 | End: 2022-07-22

## 2022-07-20 RX ORDER — CYCLOBENZAPRINE HYDROCHLORIDE 10 MG/1
10 TABLET, FILM COATED ORAL THREE TIMES A DAY
Refills: 0 | Status: DISCONTINUED | OUTPATIENT
Start: 2022-07-20 | End: 2022-07-22

## 2022-07-20 RX ORDER — CALCITRIOL 0.5 UG/1
0.5 CAPSULE ORAL DAILY
Refills: 0 | Status: DISCONTINUED | OUTPATIENT
Start: 2022-07-20 | End: 2022-07-22

## 2022-07-20 RX ADMIN — Medication 0.6 MILLIGRAM(S): at 18:04

## 2022-07-20 RX ADMIN — Medication 60 MILLIGRAM(S): at 18:04

## 2022-07-20 RX ADMIN — CARVEDILOL PHOSPHATE 25 MILLIGRAM(S): 80 CAPSULE, EXTENDED RELEASE ORAL at 18:03

## 2022-07-20 RX ADMIN — Medication 81 MILLIGRAM(S): at 18:03

## 2022-07-20 RX ADMIN — ATORVASTATIN CALCIUM 80 MILLIGRAM(S): 80 TABLET, FILM COATED ORAL at 21:11

## 2022-07-20 RX ADMIN — Medication 1 MILLIGRAM(S): at 18:03

## 2022-07-20 RX ADMIN — HEPARIN SODIUM 5000 UNIT(S): 5000 INJECTION INTRAVENOUS; SUBCUTANEOUS at 21:11

## 2022-07-20 RX ADMIN — CLOPIDOGREL BISULFATE 75 MILLIGRAM(S): 75 TABLET, FILM COATED ORAL at 18:04

## 2022-07-20 RX ADMIN — SODIUM CHLORIDE 80 MILLILITER(S): 9 INJECTION INTRAMUSCULAR; INTRAVENOUS; SUBCUTANEOUS at 18:06

## 2022-07-20 RX ADMIN — CALCITRIOL 0.5 MICROGRAM(S): 0.5 CAPSULE ORAL at 18:04

## 2022-07-20 RX ADMIN — SEVELAMER CARBONATE 800 MILLIGRAM(S): 2400 POWDER, FOR SUSPENSION ORAL at 22:38

## 2022-07-20 RX ADMIN — Medication 1 TABLET(S): at 21:11

## 2022-07-20 RX ADMIN — Medication 100 MICROGRAM(S): at 18:04

## 2022-07-20 NOTE — H&P ADULT - NSHPREVIEWOFSYSTEMS_GEN_ALL_CORE
ROS:    - CONSTITUTIONAL: Denies weight loss, fever and chills.    - HEENT: Denies changes in vision and hearing.    - ?RESPIRATORY: Denies SOB and cough.?    - CV: Denies palpitations and CP. ?    - GI: Denies abdominal pain, nausea, vomiting and diarrhea.?    - : Denies dysuria and urinary frequency.?    - MSK: As above.     - SKIN: Denies rash and pruritus.    - ?NEUROLOGICAL: Denies headache and syncope.?    - PSYCHIATRIC: Denies recent changes in mood. Denies anxiety and depression.

## 2022-07-20 NOTE — CONSULT NOTE ADULT - SUBJECTIVE AND OBJECTIVE BOX
HPI:  89F with pmhx of CKD V (Baseline Cr 3.5), HTN, HLD, Hypothyroidism, PAD who presents to the hospital in the setting of pain from her RLE at rest. Has a known history of having stents put in the RLE before and requiring angioplasty. She had been using NSAID's for her pain at home  89F with PMH of CKD V, DM, HFpEF (EF 70% 2021, grade 1 diastolic dysfunction), HTN, HLD, CAD, hypothyroidism, PAD presents from the office after complaing of RLE rest pain. The patient is known to the service after initially presenting with rest pain known to the service with h/o R YOL-pc-foihx-knee-Pop stenting 6/2021 and balloon angioplasty of 75% in-stent stenosis 2/2022 with biphasic pedal signals at conclusion of procedure. The patient reports that about two weeks ago she has had worsening of RLE pain and decrease in sensation in her right toes. She denies weakness or any ulcerations. At clinic follow up, SCr noted to be 4, up from recent baseline of 3.5. She states that over the past week she has been taking Advil twice a day. No CP/SOB, N/V. ROS otherwise negative.    (20 Jul 2022 14:08)      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      Diabetes      Diabetes mellitus with diabetic neuropathy      Chronic diastolic congestive heart failure      Hypertension      Hyperlipidemia      CAD (coronary artery disease)      Hypothyroidism      Pacemaker      S/P peripheral artery angioplasty with stent placement  6/2021 - SFA to BK Pop stenting (Zilver PTX 6x80mm, Zilver 518 5x80, Zilver 518 5x80 (proximal to distal)); 2/2022 - balloon angioplasty for in-stent stenosis            Allergies:  No Known Allergies      Home Medications:   acetaminophen     Tablet .. 650 milliGRAM(s) Oral every 6 hours PRN  aspirin  chewable 81 milliGRAM(s) Oral daily  atorvastatin 80 milliGRAM(s) Oral at bedtime  calcitriol   Capsule 0.5 MICROGram(s) Oral daily  calcium carbonate   1250 mG (OsCal) 1 Tablet(s) Oral three times a day  carvedilol 25 milliGRAM(s) Oral every 12 hours  clopidogrel Tablet 75 milliGRAM(s) Oral daily  colchicine 0.6 milliGRAM(s) Oral daily  cyclobenzaprine 10 milliGRAM(s) Oral three times a day PRN  dextrose 5%. 1000 milliLiter(s) IV Continuous <Continuous>  dextrose 5%. 1000 milliLiter(s) IV Continuous <Continuous>  dextrose 50% Injectable 25 Gram(s) IV Push once  dextrose 50% Injectable 12.5 Gram(s) IV Push once  dextrose 50% Injectable 25 Gram(s) IV Push once  dextrose Oral Gel 15 Gram(s) Oral once PRN  folic acid 1 milliGRAM(s) Oral daily  glucagon  Injectable 1 milliGRAM(s) IntraMuscular once  heparin   Injectable 5000 Unit(s) SubCutaneous every 8 hours  insulin lispro (ADMELOG) corrective regimen sliding scale   SubCutaneous Before meals and at bedtime  levothyroxine 100 MICROGram(s) Oral daily  NIFEdipine XL 60 milliGRAM(s) Oral daily  oxycodone    5 mG/acetaminophen 325 mG 1 Tablet(s) Oral every 6 hours PRN  pantoprazole    Tablet 40 milliGRAM(s) Oral before breakfast  sevelamer carbonate 800 milliGRAM(s) Oral every 8 hours  sodium chloride 0.9%. 1000 milliLiter(s) IV Continuous <Continuous>  zolpidem 5 milliGRAM(s) Oral at bedtime PRN      Hospital Medications:   MEDICATIONS  (STANDING):  aspirin  chewable 81 milliGRAM(s) Oral daily  atorvastatin 80 milliGRAM(s) Oral at bedtime  calcitriol   Capsule 0.5 MICROGram(s) Oral daily  calcium carbonate   1250 mG (OsCal) 1 Tablet(s) Oral three times a day  carvedilol 25 milliGRAM(s) Oral every 12 hours  clopidogrel Tablet 75 milliGRAM(s) Oral daily  colchicine 0.6 milliGRAM(s) Oral daily  dextrose 5%. 1000 milliLiter(s) (100 mL/Hr) IV Continuous <Continuous>  dextrose 5%. 1000 milliLiter(s) (50 mL/Hr) IV Continuous <Continuous>  dextrose 50% Injectable 25 Gram(s) IV Push once  dextrose 50% Injectable 12.5 Gram(s) IV Push once  dextrose 50% Injectable 25 Gram(s) IV Push once  folic acid 1 milliGRAM(s) Oral daily  glucagon  Injectable 1 milliGRAM(s) IntraMuscular once  heparin   Injectable 5000 Unit(s) SubCutaneous every 8 hours  insulin lispro (ADMELOG) corrective regimen sliding scale   SubCutaneous Before meals and at bedtime  levothyroxine 100 MICROGram(s) Oral daily  NIFEdipine XL 60 milliGRAM(s) Oral daily  pantoprazole    Tablet 40 milliGRAM(s) Oral before breakfast  sevelamer carbonate 800 milliGRAM(s) Oral every 8 hours  sodium chloride 0.9%. 1000 milliLiter(s) (80 mL/Hr) IV Continuous <Continuous>      SOCIAL HISTORY:  Denies ETOh, Smoking    Family History:  FAMILY HISTORY:        VITALS:  T(F): 97.4 (07-20-22 @ 14:26), Max: 97.4 (07-20-22 @ 14:26)  HR: 68 (07-20-22 @ 13:37)  BP: 150/65 (07-20-22 @ 13:37)  RR: 16 (07-20-22 @ 13:37)  SpO2: 97% (07-20-22 @ 13:37)  Wt(kg): --    Height (cm): 154.9 (07-20 @ 13:37)  Weight (kg): 64.501 (07-20 @ 13:37)  BMI (kg/m2): 26.9 (07-20 @ 13:37)  BSA (m2): 1.63 (07-20 @ 13:37)  CAPILLARY BLOOD GLUCOSE      POCT Blood Glucose.: 119 mg/dL (20 Jul 2022 17:18)      Review of Systems:  CONSTITUTIONAL: No fever or chills.  RESPIRATORY: No shortness of breath, cough  CARDIOVASCULAR: No Chest pain, shortness of breath, palpitations  GASTROINTESTINAL: No abdominal pain, nausea, vomiting, diarrhea  GENITOURINARY: No urinary frequency, gross hematuria, dysuria  NEUROLOGICAL: No headache, weakness  SKIN: No rash or skin lesion  MUSCULOSKELETAL: No swelling  Psych: Denies suicidal or homicidal ideation    PHYSICAL EXAM:  GENERAL: Alert, awake, oriented x3   HEENT: FABRICIO, EOMI, neck supple, no JVP  CHEST/LUNG: Bilateral clear breath sounds  HEART: Regular rate and rhythm, no murmur, no gallops, no rub   ABDOMEN: Soft, nontender, non distended  : No flank or supra pubic tenderness.  EXTREMITIES: no pedal edema  Neurology: AAOx3, no focal neurological deficit  SKIN: No rash or skin lesion     LABS:        Creatinine Trend:     Urine Studies:            Assessment/Plan:     #KYMBERLY   normal baseline creatinine ~1, likely peaking ~2.3   likely etiology toxic ATN vs AIN secondary to combination vanco/zosyn or contrast, less likely ischemic ATN given no documented hypotensive episodes, less likely pre-renal given FeNa suggestive of intrinsic injury     Recommend:   repeat CXR   commence 40cc/h NS IVF   BID BMP + urine lytes   renal sono   strict I/Os   renal diet   avoid nephrotoxic meds     Thank you for the opportunity to participate in the care of your patient. The nephrology service remains available to assist with any questions or concerns. Please feel free to reach us by paging the on-call nephrology fellow for urgent issues or as below.     Constanza Dowling D.O.  PGY 4 - Nephrology Fellow  [phone] HPI:  89F with pmhx of CKD V (Baseline Cr 3.5), HTN, HLD, Hypothyroidism, PAD who presents to the hospital in the setting of pain from her RLE at rest. Has a known history of having stents put in the RLE before and requiring angioplasty. She had been using NSAID's for her pain at home. On most recent labs found to have Cr 4.31 on HIE. As per vascular, patient to like go to to the OR on friday for acute limb ischemia, but has dopplerable pulses. Nephrology consulted for KYMBERLY on CKD.    (20 Jul 2022 14:08)    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      Diabetes      Diabetes mellitus with diabetic neuropathy      Chronic diastolic congestive heart failure      Hypertension      Hyperlipidemia      CAD (coronary artery disease)      Hypothyroidism      Pacemaker      S/P peripheral artery angioplasty with stent placement  6/2021 - SFA to BK Pop stenting (Zilver PTX 6x80mm, Zilver 518 5x80, Zilver 518 5x80 (proximal to distal)); 2/2022 - balloon angioplasty for in-stent stenosis      Allergies:  No Known Allergies      Home Medications:   acetaminophen     Tablet .. 650 milliGRAM(s) Oral every 6 hours PRN  aspirin  chewable 81 milliGRAM(s) Oral daily  atorvastatin 80 milliGRAM(s) Oral at bedtime  calcitriol   Capsule 0.5 MICROGram(s) Oral daily  calcium carbonate   1250 mG (OsCal) 1 Tablet(s) Oral three times a day  carvedilol 25 milliGRAM(s) Oral every 12 hours  clopidogrel Tablet 75 milliGRAM(s) Oral daily  colchicine 0.6 milliGRAM(s) Oral daily  cyclobenzaprine 10 milliGRAM(s) Oral three times a day PRN  dextrose 5%. 1000 milliLiter(s) IV Continuous <Continuous>  dextrose 5%. 1000 milliLiter(s) IV Continuous <Continuous>  dextrose 50% Injectable 25 Gram(s) IV Push once  dextrose 50% Injectable 12.5 Gram(s) IV Push once  dextrose 50% Injectable 25 Gram(s) IV Push once  dextrose Oral Gel 15 Gram(s) Oral once PRN  folic acid 1 milliGRAM(s) Oral daily  glucagon  Injectable 1 milliGRAM(s) IntraMuscular once  heparin   Injectable 5000 Unit(s) SubCutaneous every 8 hours  insulin lispro (ADMELOG) corrective regimen sliding scale   SubCutaneous Before meals and at bedtime  levothyroxine 100 MICROGram(s) Oral daily  NIFEdipine XL 60 milliGRAM(s) Oral daily  oxycodone    5 mG/acetaminophen 325 mG 1 Tablet(s) Oral every 6 hours PRN  pantoprazole    Tablet 40 milliGRAM(s) Oral before breakfast  sevelamer carbonate 800 milliGRAM(s) Oral every 8 hours  sodium chloride 0.9%. 1000 milliLiter(s) IV Continuous <Continuous>  zolpidem 5 milliGRAM(s) Oral at bedtime PRN      Hospital Medications:   MEDICATIONS  (STANDING):  aspirin  chewable 81 milliGRAM(s) Oral daily  atorvastatin 80 milliGRAM(s) Oral at bedtime  calcitriol   Capsule 0.5 MICROGram(s) Oral daily  calcium carbonate   1250 mG (OsCal) 1 Tablet(s) Oral three times a day  carvedilol 25 milliGRAM(s) Oral every 12 hours  clopidogrel Tablet 75 milliGRAM(s) Oral daily  colchicine 0.6 milliGRAM(s) Oral daily  dextrose 5%. 1000 milliLiter(s) (100 mL/Hr) IV Continuous <Continuous>  dextrose 5%. 1000 milliLiter(s) (50 mL/Hr) IV Continuous <Continuous>  dextrose 50% Injectable 25 Gram(s) IV Push once  dextrose 50% Injectable 12.5 Gram(s) IV Push once  dextrose 50% Injectable 25 Gram(s) IV Push once  folic acid 1 milliGRAM(s) Oral daily  glucagon  Injectable 1 milliGRAM(s) IntraMuscular once  heparin   Injectable 5000 Unit(s) SubCutaneous every 8 hours  insulin lispro (ADMELOG) corrective regimen sliding scale   SubCutaneous Before meals and at bedtime  levothyroxine 100 MICROGram(s) Oral daily  NIFEdipine XL 60 milliGRAM(s) Oral daily  pantoprazole    Tablet 40 milliGRAM(s) Oral before breakfast  sevelamer carbonate 800 milliGRAM(s) Oral every 8 hours  sodium chloride 0.9%. 1000 milliLiter(s) (80 mL/Hr) IV Continuous <Continuous>      SOCIAL HISTORY:  Denies ETOh, Smoking    Family History:  FAMILY HISTORY:  non-contributory    VITALS:  T(F): 97.4 (07-20-22 @ 14:26), Max: 97.4 (07-20-22 @ 14:26)  HR: 68 (07-20-22 @ 13:37)  BP: 150/65 (07-20-22 @ 13:37)  RR: 16 (07-20-22 @ 13:37)  SpO2: 97% (07-20-22 @ 13:37)  Wt(kg): --    Height (cm): 154.9 (07-20 @ 13:37)  Weight (kg): 64.501 (07-20 @ 13:37)  BMI (kg/m2): 26.9 (07-20 @ 13:37)  BSA (m2): 1.63 (07-20 @ 13:37)  CAPILLARY BLOOD GLUCOSE      POCT Blood Glucose.: 119 mg/dL (20 Jul 2022 17:18)      Review of Systems:  ROS negative except as per HPI    PHYSICAL EXAM:  GENERAL: Alert, awake, oriented, NAD  HEENT: FABRICIO, EOMI, neck supple, no JVP  CHEST/LUNG: Bilateral clear breath sounds  HEART: Regular rate and rhythm, no murmur, no gallops, no rub   ABDOMEN: Soft, nontender, non distended  EXTREMITIES: R leg colder than Left leg  Neurology: AAOx3, no focal neurological deficit  SKIN: No rash or skin lesion     LABS:      Creatinine Trend:     Urine Studies:         HPI:  89F with pmhx of CKD V (Baseline Cr 3.5), HTN, HLD, Hypothyroidism, PAD who presents to the hospital in the setting of pain from her RLE at rest. Has a known history of having stents put in the RLE before and requiring angioplasty. She had been using NSAID's for her pain at home. On most recent labs found to have Cr 4.31 on HIE. As per vascular, patient to like go to to the OR on friday for acute limb ischemia, but has dopplerable pulses. Nephrology consulted for KYMBERLY on CKD.    (20 Jul 2022 14:08)    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      Diabetes      Diabetes mellitus with diabetic neuropathy      Chronic diastolic congestive heart failure      Hypertension      Hyperlipidemia      CAD (coronary artery disease)      Hypothyroidism      Pacemaker      S/P peripheral artery angioplasty with stent placement  6/2021 - SFA to BK Pop stenting (Zilver PTX 6x80mm, Zilver 518 5x80, Zilver 518 5x80 (proximal to distal)); 2/2022 - balloon angioplasty for in-stent stenosis      Allergies:  No Known Allergies      Home Medications:   acetaminophen     Tablet .. 650 milliGRAM(s) Oral every 6 hours PRN  aspirin  chewable 81 milliGRAM(s) Oral daily  atorvastatin 80 milliGRAM(s) Oral at bedtime  calcitriol   Capsule 0.5 MICROGram(s) Oral daily  calcium carbonate   1250 mG (OsCal) 1 Tablet(s) Oral three times a day  carvedilol 25 milliGRAM(s) Oral every 12 hours  clopidogrel Tablet 75 milliGRAM(s) Oral daily  colchicine 0.6 milliGRAM(s) Oral daily  cyclobenzaprine 10 milliGRAM(s) Oral three times a day PRN  dextrose 5%. 1000 milliLiter(s) IV Continuous <Continuous>  dextrose 5%. 1000 milliLiter(s) IV Continuous <Continuous>  dextrose 50% Injectable 25 Gram(s) IV Push once  dextrose 50% Injectable 12.5 Gram(s) IV Push once  dextrose 50% Injectable 25 Gram(s) IV Push once  dextrose Oral Gel 15 Gram(s) Oral once PRN  folic acid 1 milliGRAM(s) Oral daily  glucagon  Injectable 1 milliGRAM(s) IntraMuscular once  heparin   Injectable 5000 Unit(s) SubCutaneous every 8 hours  insulin lispro (ADMELOG) corrective regimen sliding scale   SubCutaneous Before meals and at bedtime  levothyroxine 100 MICROGram(s) Oral daily  NIFEdipine XL 60 milliGRAM(s) Oral daily  oxycodone    5 mG/acetaminophen 325 mG 1 Tablet(s) Oral every 6 hours PRN  pantoprazole    Tablet 40 milliGRAM(s) Oral before breakfast  sevelamer carbonate 800 milliGRAM(s) Oral every 8 hours  sodium chloride 0.9%. 1000 milliLiter(s) IV Continuous <Continuous>  zolpidem 5 milliGRAM(s) Oral at bedtime PRN      Hospital Medications:   MEDICATIONS  (STANDING):  aspirin  chewable 81 milliGRAM(s) Oral daily  atorvastatin 80 milliGRAM(s) Oral at bedtime  calcitriol   Capsule 0.5 MICROGram(s) Oral daily  calcium carbonate   1250 mG (OsCal) 1 Tablet(s) Oral three times a day  carvedilol 25 milliGRAM(s) Oral every 12 hours  clopidogrel Tablet 75 milliGRAM(s) Oral daily  colchicine 0.6 milliGRAM(s) Oral daily  dextrose 5%. 1000 milliLiter(s) (100 mL/Hr) IV Continuous <Continuous>  dextrose 5%. 1000 milliLiter(s) (50 mL/Hr) IV Continuous <Continuous>  dextrose 50% Injectable 25 Gram(s) IV Push once  dextrose 50% Injectable 12.5 Gram(s) IV Push once  dextrose 50% Injectable 25 Gram(s) IV Push once  folic acid 1 milliGRAM(s) Oral daily  glucagon  Injectable 1 milliGRAM(s) IntraMuscular once  heparin   Injectable 5000 Unit(s) SubCutaneous every 8 hours  insulin lispro (ADMELOG) corrective regimen sliding scale   SubCutaneous Before meals and at bedtime  levothyroxine 100 MICROGram(s) Oral daily  NIFEdipine XL 60 milliGRAM(s) Oral daily  pantoprazole    Tablet 40 milliGRAM(s) Oral before breakfast  sevelamer carbonate 800 milliGRAM(s) Oral every 8 hours  sodium chloride 0.9%. 1000 milliLiter(s) (80 mL/Hr) IV Continuous <Continuous>      SOCIAL HISTORY:  Denies ETOh, Smoking    Family History:  FAMILY HISTORY:  non-contributory to current admission    VITALS:  T(F): 97.4 (07-20-22 @ 14:26), Max: 97.4 (07-20-22 @ 14:26)  HR: 68 (07-20-22 @ 13:37)  BP: 150/65 (07-20-22 @ 13:37)  RR: 16 (07-20-22 @ 13:37)  SpO2: 97% (07-20-22 @ 13:37)  Wt(kg): --    Height (cm): 154.9 (07-20 @ 13:37)  Weight (kg): 64.501 (07-20 @ 13:37)  BMI (kg/m2): 26.9 (07-20 @ 13:37)  BSA (m2): 1.63 (07-20 @ 13:37)  CAPILLARY BLOOD GLUCOSE      POCT Blood Glucose.: 119 mg/dL (20 Jul 2022 17:18)      Review of Systems:  ROS negative except as per HPI    PHYSICAL EXAM:  GENERAL: Alert, awake, oriented, NAD  HEENT: FABRICIO, EOMI, neck supple, no JVP  CHEST/LUNG: Bilateral clear breath sounds  HEART: Regular rate and rhythm, no murmur, no gallops, no rub   ABDOMEN: Soft, nontender, non distended  EXTREMITIES: R leg colder than Left leg, no edema  Neurology: awake, no focal neurological deficit  SKIN: No rash or skin lesion     LABS:      Creatinine Trend:     Urine Studies:    Creatinine, Serum: 4.45 mg/dL (07-20-22 @ 17:10)  Creatinine, Serum: 3.50 mg/dL (02-12-22 @ 07:51)  Creatinine, Serum: 3.61 mg/dL (02-11-22 @ 06:53)  Creatinine, Serum: 3.38 mg/dL (02-10-22 @ 14:11)  Creatinine, Serum: 5.62 mg/dL (06-15-21 @ 08:18)  Creatinine, Serum: 5.42 mg/dL (06-14-21 @ 06:21)  Creatinine, Serum: 4.73 mg/dL (06-13-21 @ 08:10)  Creatinine, Serum: 4.28 mg/dL (06-12-21 @ 07:47)  Creatinine, Serum: 4.38 mg/dL (06-11-21 @ 09:12)  Creatinine, Serum: 4.32 mg/dL (06-10-21 @ 17:42)

## 2022-07-20 NOTE — H&P ADULT - NSHPPHYSICALEXAM_GEN_ALL_CORE
Physical Exam:  General: Well appearing elderly female. No acute distress.   Pulmonary: Breathing comfortably on room air.   Cardiovascular: Hemodynamically stable.   Abdominal: S/NT/ND  Extremities: No appreciable edema. Strength equal bilaterally to toes. Diminished sensation of right toes.   Pulses:   Right:                                                                          Left:  FEM [x]2+ [ ]1+ [ ]doppler                                             FEM [x]2+ [ ]1+ [ ]doppler    POP [ ]2+ [ ]1+ [B]doppler                                             POP [ ]2+ [ ]1+ [B]doppler    AT  - monophasic  DP [ ]2+ [ ]1+ [x]doppler                                                DP [ ]2+ [ ]1+ [B]doppler  PT[ ]2+ [ ]1+ [x]doppler                                                  PT [ ]2+ [ ]1+ [B]doppler Physical Exam:  General: Well appearing elderly female. No acute distress.   Pulmonary: Breathing comfortably on room air.   Cardiovascular: Hemodynamically stable.   Abdominal: S/NT/ND  Extremities: No appreciable edema. Strength equal bilaterally to toes. Diminished sensation of right toes.   Pulses:   Right:                                                                          Left:  FEM [x]2+ [ ]1+ [ ]doppler                                             FEM [x]2+ [ ]1+ [ ]doppler    POP [ ]2+ [ ]1+ [B]doppler                                             POP [ ]2+ [ ]1+ [B]doppler    AT  - faint monophasic  DP [ ]2+ [ ]1+ [0]doppler                                                DP [ ]2+ [ ]1+ [T]doppler  PT[ ]2+ [ ]1+ [0]doppler                                                  PT [ ]2+ [ ]1+ [T]doppler

## 2022-07-20 NOTE — H&P ADULT - NSHPADDITIONALINFOADULT_GEN_ALL_CORE
A/P: 89y YO Female PMH PAD, CKD V who presents with Shawn's 4 chronic ischemia of the RLE. Case is complicated by acute on chronic kidney disease.     Vascular/PAD:  - Admit to Tele, Dr. Pagan, 5 Uris  - OR/Angio plan pending Nephrology input  - Continue plavix 75mg QD   - Continue ASA 81 QD     HTN/HLD:  - Continue home coreg 25 BID  - Continue home nifedipine 60 mg ER QD    CAD/dCHF (EF 70%):   - Holding home torsemide 20mg x 2 QD @ 5pm    CKD:   - Nephrology consultation for acute on chronic kidney disease w/ anticipation of angiography this admission    Anemia:   - F/u labs    ID:  - None    Diet:   - Dash diet    Activity:   - WBAT    DVTPPx:   - SQH    Dispo:   - Pending progress

## 2022-07-20 NOTE — H&P ADULT - HISTORY OF PRESENT ILLNESS
89F with PMH of CKD V, DM, HFpEF (EF 70% 2021, grade 1 diastolic dysfunction), HTN, HLD, CAD, hypothyroidism, PAD presents from the office after complaing of RLE rest pain. The patient is known to the service after initially presenting with rest pain known to the service with h/o R QES-uq-opokz-knee-Pop stenting 6/2021 and balloon angioplasty of 75% in-stent stenosis 2/2022 with biphasic pedal signals at conclusion of procedure. The patient reports that about two weeks ago she has had worsening of RLE pain and decrease in sensation in her right toes. She denies weakness or any ulcerations. At clinic follow up, SCr noted to be 4, up from recent baseline of 3.5. She states that over the past week she has been taking Advil twice a day. No CP/SOB, N/V. ROS otherwise negative.

## 2022-07-20 NOTE — CONSULT NOTE ADULT - ASSESSMENT
89F with pmhx of CKD V (Baseline Cr 3.5), HTN, HLD, Hypothyroidism, PAD who presents to the hospital in the setting of pain from her RLE at rest.     Assessment/Plan:   #non-oliguric KYMBERLY on CKD V  Baseline Creatinine 3.5, Creatinine peak 4.31   Pt presenting with creatinine of 4.31 on HIE, baseline 3.5 per records. Likely cause of the increase in her creatinine is related to NSAID use, but will obtain other labs and try a trial of fluids to see if any improvement.   -Please obtain (UA, urine sodium, urine creatinine, UPCR)   -Start IV fluids NS @ 80cc/hr  -Trend BMP daily  -Obtain renal sono  -strict I/Os   -renal diet   -avoid nephrotoxic meds     #HTN  BP noted 150; acceptable for age  -Continue to monitor on nifedipine 60mg  Qday    Thank you for the opportunity to participate in the care of your patient. The nephrology service remains available to assist with any questions or concerns. Please feel free to reach us by paging the on-call nephrology fellow for urgent issues or as below.     Constanza Dowling D.O.  PGY 5 - Nephrology Fellow  [phone] 89F with pmhx of CKD V (Baseline Cr 3.5), HTN, HLD, Hypothyroidism, PAD who presents to the hospital in the setting of pain from her RLE at rest.     Assessment/Plan:   #non-oliguric KYMBERLY on CKD V  Baseline Creatinine 3.5, Creatinine peak 4.31   Pt presenting with creatinine of 4.31 on HIE, baseline 3.5 per records. Likely cause of the increase in her creatinine is related to NSAID use, but will obtain other labs and try a trial of fluids to see if any improvement.   -Please obtain (UA, urine sodium, urine creatinine, urine osm UPCR) please use urine studies order set   -Start IV fluids NS @ 80cc/hr  -Trend BMP daily  -Obtain renal sono  -strict I/Os   -renal diet   -avoid nephrotoxic meds     #HTN  BP noted 150; acceptable for age  -Continue to monitor on nifedipine 60mg  Qday    Thank you for the opportunity to participate in the care of your patient. The nephrology service remains available to assist with any questions or concerns. Please feel free to reach us by paging the on-call nephrology fellow for urgent issues or as below.     Constanza Dowling D.O.  PGY 5 - Nephrology Fellow  [phone]

## 2022-07-20 NOTE — PATIENT PROFILE ADULT - FALL HARM RISK - HARM RISK INTERVENTIONS

## 2022-07-20 NOTE — H&P ADULT - NSICDXPASTSURGICALHX_GEN_ALL_CORE_FT
PAST SURGICAL HISTORY:  Pacemaker     S/P peripheral artery angioplasty with stent placement 6/2021 - SFA to BK Pop stenting (Zilver PTX 6x80mm, Zilver 518 5x80, Zilver 518 5x80 (proximal to distal)); 2/2022 - balloon angioplasty for in-stent stenosis

## 2022-07-21 LAB
ANION GAP SERPL CALC-SCNC: 18 MMOL/L — HIGH (ref 5–17)
APTT BLD: 35.2 SEC — SIGNIFICANT CHANGE UP (ref 27.5–35.5)
BUN SERPL-MCNC: 96 MG/DL — HIGH (ref 7–23)
CALCIUM SERPL-MCNC: 9.5 MG/DL — SIGNIFICANT CHANGE UP (ref 8.4–10.5)
CHLORIDE SERPL-SCNC: 100 MMOL/L — SIGNIFICANT CHANGE UP (ref 96–108)
CO2 SERPL-SCNC: 18 MMOL/L — LOW (ref 22–31)
CREAT ?TM UR-MCNC: 59 MG/DL — SIGNIFICANT CHANGE UP
CREAT SERPL-MCNC: 4.52 MG/DL — HIGH (ref 0.5–1.3)
EGFR: 9 ML/MIN/1.73M2 — LOW
GLUCOSE BLDC GLUCOMTR-MCNC: 118 MG/DL — HIGH (ref 70–99)
GLUCOSE BLDC GLUCOMTR-MCNC: 136 MG/DL — HIGH (ref 70–99)
GLUCOSE BLDC GLUCOMTR-MCNC: 141 MG/DL — HIGH (ref 70–99)
GLUCOSE BLDC GLUCOMTR-MCNC: 164 MG/DL — HIGH (ref 70–99)
GLUCOSE SERPL-MCNC: 113 MG/DL — HIGH (ref 70–99)
HCT VFR BLD CALC: 24.2 % — LOW (ref 34.5–45)
HGB BLD-MCNC: 8 G/DL — LOW (ref 11.5–15.5)
MAGNESIUM SERPL-MCNC: 2 MG/DL — SIGNIFICANT CHANGE UP (ref 1.6–2.6)
MCHC RBC-ENTMCNC: 33.1 GM/DL — SIGNIFICANT CHANGE UP (ref 32–36)
MCHC RBC-ENTMCNC: 33.5 PG — SIGNIFICANT CHANGE UP (ref 27–34)
MCV RBC AUTO: 101.3 FL — HIGH (ref 80–100)
NRBC # BLD: 0 /100 WBCS — SIGNIFICANT CHANGE UP (ref 0–0)
PHOSPHATE SERPL-MCNC: 6.8 MG/DL — HIGH (ref 2.5–4.5)
PLATELET # BLD AUTO: 112 K/UL — LOW (ref 150–400)
POTASSIUM SERPL-MCNC: 3.8 MMOL/L — SIGNIFICANT CHANGE UP (ref 3.5–5.3)
POTASSIUM SERPL-SCNC: 3.8 MMOL/L — SIGNIFICANT CHANGE UP (ref 3.5–5.3)
PROT ?TM UR-MCNC: 45 MG/DL — HIGH (ref 0–12)
PROT/CREAT UR-RTO: 0.8 RATIO — HIGH (ref 0–0.2)
RBC # BLD: 2.39 M/UL — LOW (ref 3.8–5.2)
RBC # FLD: 20.3 % — HIGH (ref 10.3–14.5)
SODIUM SERPL-SCNC: 136 MMOL/L — SIGNIFICANT CHANGE UP (ref 135–145)
SODIUM UR-SCNC: 36 MMOL/L — SIGNIFICANT CHANGE UP
WBC # BLD: 2.32 K/UL — LOW (ref 3.8–10.5)
WBC # FLD AUTO: 2.32 K/UL — LOW (ref 3.8–10.5)

## 2022-07-21 PROCEDURE — 99233 SBSQ HOSP IP/OBS HIGH 50: CPT

## 2022-07-21 PROCEDURE — 99231 SBSQ HOSP IP/OBS SF/LOW 25: CPT

## 2022-07-21 PROCEDURE — 76937 US GUIDE VASCULAR ACCESS: CPT | Mod: 26

## 2022-07-21 PROCEDURE — 99223 1ST HOSP IP/OBS HIGH 75: CPT

## 2022-07-21 PROCEDURE — 36000 PLACE NEEDLE IN VEIN: CPT

## 2022-07-21 PROCEDURE — 93306 TTE W/DOPPLER COMPLETE: CPT | Mod: 26

## 2022-07-21 RX ORDER — COLCHICINE 0.6 MG
0.3 TABLET ORAL DAILY
Refills: 0 | Status: DISCONTINUED | OUTPATIENT
Start: 2022-07-22 | End: 2022-07-22

## 2022-07-21 RX ADMIN — Medication 1 TABLET(S): at 06:40

## 2022-07-21 RX ADMIN — CALCITRIOL 0.5 MICROGRAM(S): 0.5 CAPSULE ORAL at 11:42

## 2022-07-21 RX ADMIN — Medication 81 MILLIGRAM(S): at 11:41

## 2022-07-21 RX ADMIN — Medication 1 MILLIGRAM(S): at 11:41

## 2022-07-21 RX ADMIN — Medication 0.6 MILLIGRAM(S): at 11:41

## 2022-07-21 RX ADMIN — ATORVASTATIN CALCIUM 80 MILLIGRAM(S): 80 TABLET, FILM COATED ORAL at 21:38

## 2022-07-21 RX ADMIN — SEVELAMER CARBONATE 800 MILLIGRAM(S): 2400 POWDER, FOR SUSPENSION ORAL at 14:10

## 2022-07-21 RX ADMIN — Medication 1 TABLET(S): at 21:47

## 2022-07-21 RX ADMIN — SEVELAMER CARBONATE 800 MILLIGRAM(S): 2400 POWDER, FOR SUSPENSION ORAL at 06:43

## 2022-07-21 RX ADMIN — SEVELAMER CARBONATE 800 MILLIGRAM(S): 2400 POWDER, FOR SUSPENSION ORAL at 21:39

## 2022-07-21 RX ADMIN — Medication 100 MICROGRAM(S): at 06:41

## 2022-07-21 RX ADMIN — CARVEDILOL PHOSPHATE 25 MILLIGRAM(S): 80 CAPSULE, EXTENDED RELEASE ORAL at 17:49

## 2022-07-21 RX ADMIN — Medication 650 MILLIGRAM(S): at 00:54

## 2022-07-21 RX ADMIN — Medication 2: at 21:50

## 2022-07-21 RX ADMIN — CLOPIDOGREL BISULFATE 75 MILLIGRAM(S): 75 TABLET, FILM COATED ORAL at 11:41

## 2022-07-21 RX ADMIN — HEPARIN SODIUM 5000 UNIT(S): 5000 INJECTION INTRAVENOUS; SUBCUTANEOUS at 14:10

## 2022-07-21 RX ADMIN — CARVEDILOL PHOSPHATE 25 MILLIGRAM(S): 80 CAPSULE, EXTENDED RELEASE ORAL at 06:41

## 2022-07-21 RX ADMIN — SODIUM CHLORIDE 80 MILLILITER(S): 9 INJECTION INTRAMUSCULAR; INTRAVENOUS; SUBCUTANEOUS at 15:08

## 2022-07-21 RX ADMIN — Medication 1 TABLET(S): at 14:10

## 2022-07-21 RX ADMIN — HEPARIN SODIUM 5000 UNIT(S): 5000 INJECTION INTRAVENOUS; SUBCUTANEOUS at 21:40

## 2022-07-21 RX ADMIN — HEPARIN SODIUM 5000 UNIT(S): 5000 INJECTION INTRAVENOUS; SUBCUTANEOUS at 06:42

## 2022-07-21 RX ADMIN — Medication 60 MILLIGRAM(S): at 06:41

## 2022-07-21 RX ADMIN — Medication 650 MILLIGRAM(S): at 00:24

## 2022-07-21 RX ADMIN — PANTOPRAZOLE SODIUM 40 MILLIGRAM(S): 20 TABLET, DELAYED RELEASE ORAL at 06:41

## 2022-07-21 NOTE — CONSULT NOTE ADULT - ASSESSMENT
This is an 88yo woman, mostly [place]-speaking, with a PMH of stage V CKD (Dr. 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 Pagan's office c/o RLE rest pain, referred to the hospital for an inpatient angiogram and concurrent management of stage V CKD.  Potential OR on 7/22 pending Cr and Renal clearance.     #Pre-op examination  -- RCRI at least class 4; Mora score 5.4%; high risk for low risk procedure  -- TTE pending   -- c/w statin and Coreg    #CKD stage 5  -- Renal following, appreciate recs  -- on Calcitriol, Oscal and Sevelamer     #Severe PAD  -- potential angiogram tomorrow pending renal function   -- on DAPT     #Chronic HFpEF  #Essential HTN  #CAD  -- previously seen by our inpatient Cardiology service and Dr. Newton   -- c/w Coreg and Nifedepine     #NIDDM2 c/b neuropathy and PAD  -- c/w mISS for FS goal 140-180    #Hx of Gout   -- decrease Colchicine to 0.3mg daily for CrCl adjustment    #Hypothyroidism   -- c/w Levothyroxine 100mcg daily     #Diet - Consistent Carbohydrate Renal  #DVT PPx - SQH  #Dispo - TBD    Catherine Steward  Attending Hospitalist  [phone]

## 2022-07-21 NOTE — CONSULT NOTE ADULT - SUBJECTIVE AND OBJECTIVE BOX
Vascular Co-Management Initial Consult Note    HPI:  This is an 88yo woman, mostly [place]-speaking, with a PMH of stage V CKD (Dr. 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 Pagan's office c/o RLE rest pain, referred to the hospital for an inpatient angiogram and concurrent management of stage V CKD.  The patient is known to the service after initially presenting with rest pain, requiring R PTE-hd-[id number]-knee-pop stenting in 6/2021, as well as balloon angioplasty of 75% in-stent stenosis in 2/2022 with biphasic pedal signals at the conclusion of her procedure.  The patient reports that about two weeks ago she had worsening of her RLE pain and decrease in sensation in her right toes.  She had a remote cardiac work-up at [place] (>15yrs).  At present she denies CP with and without exertion, palpitations, SOB, ROSE, PND, near syncope, syncope and LE swelling.  She sleeps with 2 pillows but not because of breathing discomfort while in bed.  Her ET is limited by her leg pain; cannot climb stairs and ambulates 1 block at best.  Remainder of 12-point ROS otherwise negative.    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  Diabetes  Diabetes mellitus with diabetic neuropathy  Chronic diastolic congestive heart failure  Hypertension  Hyperlipidemia  CAD (coronary artery disease)  Hypothyroidism  Pacemaker  S/P peripheral artery angioplasty with stent placement  6/2021 - SFA to BK Pop stenting (Zilver PTX 6x80mm, Zilver 518 5x80, Zilver 518 5x80 (proximal to distal)); 2/2022 - balloon angioplasty for in-stent stenosis    Home Medications:  calcitriol 0.5 mcg oral capsule: 1 cap(s) orally once a day (20 Jul 2022 14:53)  calcium carbonate 1250 mg (500 mg elemental calcium) oral tablet: 1 tab(s) orally 3 times a day (10 Feb 2022 15:59)  colchicine 0.6 mg oral tablet: 1 tab(s) orally once a day (10 Feb 2022 15:58)  Coreg 25 mg oral tablet: 1 tab(s) orally every 12 hours (15 Diego 2021 10:37)  cyclobenzaprine 10 mg oral tablet: 1 tab(s) orally 3 times a day (20 Jul 2022 14:50)  folic acid 1 mg oral tablet: 1 tab(s) orally once a day (20 Jul 2022 14:50)  levothyroxine 100 mcg (0.1 mg) oral tablet: 1 tab(s) orally once a day (10 Feb 2022 15:57)  Lipitor 80 mg oral tablet: 1 tab(s) orally once a day (at bedtime) (15 Diego 2021 10:37)  NIFEdipine 60 mg oral tablet, extended release: 1 tab(s) orally once a day (15 Diego 2021 10:37)  omeprazole 20 mg oral delayed release capsule: 1 cap(s) orally once a day (20 Jul 2022 14:51)  oxycodone-acetaminophen 5 mg-325 mg oral tablet: 1 tab(s) orally every 4 hours, As needed, Moderate Pain (4 - 6) (12 Feb 2022 12:53)  oxycodone-acetaminophen 5 mg-325 mg oral tablet: 2 tab(s) orally every 6 hours, As needed, Severe Pain (7 - 10) (12 Feb 2022 12:53)  torsemide 20 mg oral tablet: 2 tab(s) orally once a day in the morning  (20 Jul 2022 14:44)  torsemide 20 mg oral tablet: 1 tab(s) orally once a day at bedside  (20 Jul 2022 14:46)  zolpidem 5 mg oral tablet: 1 tab(s) orally once a day (at bedtime) (20 Jul 2022 14:51)    Allergies  No Known Allergies    FAMILY HISTORY:  No pertinent family history to this current presentation.     Social History:  Lives alone in [place] but has 2 HHA's.  Her son is her emergency contact.  Needs assistance iADL's and some ADL's.   No toxic habits.     CURRENT MEDICATIONS:   acetaminophen     Tablet .. 650 milliGRAM(s) Oral every 6 hours PRN  aspirin  chewable 81 milliGRAM(s) Oral daily  atorvastatin 80 milliGRAM(s) Oral at bedtime  calcitriol   Capsule 0.5 MICROGram(s) Oral daily  calcium carbonate   1250 mG (OsCal) 1 Tablet(s) Oral three times a day  carvedilol 25 milliGRAM(s) Oral every 12 hours  clopidogrel Tablet 75 milliGRAM(s) Oral daily  colchicine 0.6 milliGRAM(s) Oral daily  cyclobenzaprine 10 milliGRAM(s) Oral three times a day PRN  dextrose 5%. 1000 milliLiter(s) IV Continuous <Continuous>  dextrose 5%. 1000 milliLiter(s) IV Continuous <Continuous>  dextrose 50% Injectable 25 Gram(s) IV Push once  dextrose 50% Injectable 12.5 Gram(s) IV Push once  dextrose 50% Injectable 25 Gram(s) IV Push once  dextrose Oral Gel 15 Gram(s) Oral once PRN  folic acid 1 milliGRAM(s) Oral daily  glucagon  Injectable 1 milliGRAM(s) IntraMuscular once  heparin   Injectable 5000 Unit(s) SubCutaneous every 8 hours  insulin lispro (ADMELOG) corrective regimen sliding scale   SubCutaneous Before meals and at bedtime  levothyroxine 100 MICROGram(s) Oral daily  NIFEdipine XL 60 milliGRAM(s) Oral daily  oxycodone    5 mG/acetaminophen 325 mG 1 Tablet(s) Oral every 6 hours PRN  pantoprazole    Tablet 40 milliGRAM(s) Oral before breakfast  sevelamer carbonate 800 milliGRAM(s) Oral every 8 hours  sodium chloride 0.9%. 1000 milliLiter(s) IV Continuous <Continuous>  zolpidem 5 milliGRAM(s) Oral at bedtime PRN      VITAL SIGNS, INS/OUTS (last 24 hours):  Vital Signs Last 24 Hrs  T(C): 36.4 (21 Jul 2022 11:04), Max: 36.4 (21 Jul 2022 11:04)  T(F): 97.5 (21 Jul 2022 11:04), Max: 97.5 (21 Jul 2022 11:04)  HR: 60 (21 Jul 2022 08:44) (60 - 68)  BP: 135/97 (21 Jul 2022 08:44) (135/97 - 168/73)  BP(mean): --  RR: 16 (21 Jul 2022 08:44) (16 - 19)  SpO2: 98% (21 Jul 2022 08:44) (95% - 98%)    Parameters below as of 21 Jul 2022 08:44  Patient On (Oxygen Delivery Method): room air    I&O's Summary    20 Jul 2022 07:01  -  21 Jul 2022 07:00  --------------------------------------------------------  IN: 720 mL / OUT: 525 mL / NET: 195 mL    21 Jul 2022 07:01  -  21 Jul 2022 11:11  --------------------------------------------------------  IN: 0 mL / OUT: 0 mL / NET: 0 mL    EXAM:  Gen: NAD, sitting upright on the edge of bed, kyphotic  HEENT: NCAT, MMM, clear OP  Neck: supple, trachea at midline  CV: RRR, no m/g/r appreciated  Pulm: CTA B, no w/r/r; no increase in WOB  Abd: normoactive BS, soft, NTND  Ext: WWP, no c/c/e  Neuro: AOx3, nonfocal  Psych: pleasant, conversant and appropriate    BASIC LABS:                        8.0    2.32  )-----------( 112      ( 21 Jul 2022 06:35 )             24.2     07-21    136  |  100  |  96<H>  ----------------------------<  113<H>  3.8   |  18<L>  |  4.52<H>    Ca    9.5      21 Jul 2022 06:35  Phos  6.8     07-21  Mg     2.0     07-21    PT/INR - ( 20 Jul 2022 17:11 )   PT: 14.8 sec;   INR: 1.24     PTT - ( 21 Jul 2022 06:35 )  PTT:35.2 sec    Urinalysis Basic - ( 20 Jul 2022 18:20 )  Color: Yellow / Appearance: Clear / SG: <=1.005 / pH: x  Gluc: x / Ketone: NEGATIVE  / Bili: Negative / Urobili: 0.2 E.U./dL   Blood: x / Protein: NEGATIVE mg/dL / Nitrite: NEGATIVE   Leuk Esterase: NEGATIVE / RBC: < 5 /HPF / WBC < 5 /HPF   Sq Epi: x / Non Sq Epi: 0-5 /HPF / Bacteria: Present /HPF    CAPILLARY BLOOD GLUCOSE  POCT Blood Glucose.: 118 mg/dL (21 Jul 2022 08:30)  POCT Blood Glucose.: 147 mg/dL (20 Jul 2022 21:13)  POCT Blood Glucose.: 119 mg/dL (20 Jul 2022 17:18)    MICRODATA:  -- No new microdata.    IMAGING:  -- CXR (my read, 7/20) - clear lung fields, no effusions or congestion; +PPM    EKG: a-paced w/prolonged QT, TWI V5

## 2022-07-21 NOTE — CONSULT NOTE ADULT - ATTENDING COMMENTS
Initial attending contact date   7/21/22   . See fellow note written above for details. I reviewed the fellow documentation. I have personally seen and examined this patient. I reviewed vitals, labs, medications, cardiac studies, and additional imaging. I agree with the above fellow's findings and plans as written above with the following additions/statements.    89F with PMH of CKD V, DM, HFpEF (EF 70% 2021, grade 1 diastolic dysfunction), HTN, HLD, CAD, hypothyroidism, PAD presents from the office after complaing of RLE rest pain. Cardiology consulted for pre op risk assessment  -ECHO reviewed; EF normal, Unable to completely visualize inferior wall to state inferior wall is hypokinetic  -Pt is without anginal equivalents  -EKG NSR remains unchanged without new ischemic changes  -No need for further work up   -Euvolemic on exam  -Pt considered int risk for low risk procedure   -Cont betablocker
I agree with the fellow's findings and plans as written above with the following additions/amendments:    Seen and examined at bedside. Discussed KYMBERLY, need for close monitoring, and high risk procedure. Patient understanding but will review again and discuss with family given high risk for further kidney disease progression and dialysis with contrast load, would include this in consent for angiography. Otherwise appears clinically dry, will see if IVF improves sCr as above. Further recs and workup as above.

## 2022-07-21 NOTE — PROGRESS NOTE ADULT - SUBJECTIVE AND OBJECTIVE BOX
Patient is a 89y Female seen and evaluated at bedside. Patient in no acute distress, does not offer any complaints at this time.       Meds:    acetaminophen     Tablet .. 650 every 6 hours PRN  aspirin  chewable 81 daily  atorvastatin 80 at bedtime  calcitriol   Capsule 0.5 daily  calcium carbonate   1250 mG (OsCal) 1 three times a day  carvedilol 25 every 12 hours  clopidogrel Tablet 75 daily  colchicine 0.6 daily  cyclobenzaprine 10 three times a day PRN  dextrose 5%. 1000 <Continuous>  dextrose 5%. 1000 <Continuous>  dextrose 50% Injectable 25 once  dextrose 50% Injectable 12.5 once  dextrose 50% Injectable 25 once  dextrose Oral Gel 15 once PRN  folic acid 1 daily  glucagon  Injectable 1 once  heparin   Injectable 5000 every 8 hours  insulin lispro (ADMELOG) corrective regimen sliding scale  Before meals and at bedtime  levothyroxine 100 daily  NIFEdipine XL 60 daily  oxycodone    5 mG/acetaminophen 325 mG 1 every 6 hours PRN  pantoprazole    Tablet 40 before breakfast  sevelamer carbonate 800 every 8 hours  sodium chloride 0.9%. 1000 <Continuous>  zolpidem 5 at bedtime PRN      T(C): , Max: 36.3 (07-20-22 @ 14:26)  T(F): , Max: 97.4 (07-20-22 @ 14:26)  HR: 60 (07-21-22 @ 08:44)  BP: 135/97 (07-21-22 @ 08:44)  BP(mean): --  RR: 16 (07-21-22 @ 08:44)  SpO2: 98% (07-21-22 @ 08:44)  Wt(kg): --    07-20 @ 07:01  -  07-21 @ 07:00  --------------------------------------------------------  IN: 720 mL / OUT: 525 mL / NET: 195 mL      Height (cm): 154.9 (07-20 @ 13:37)  Weight (kg): 64.501 (07-20 @ 13:37)  BMI (kg/m2): 26.9 (07-20 @ 13:37)  BSA (m2): 1.63 (07-20 @ 13:37)    Review of Systems:  ROS negative except as per HPI      PHYSICAL EXAM:  GENERAL: NAD, resting comfortably in bed   NECK: supple, No JVD  CHEST/LUNG: Clear to auscultation bilaterally, no crackles, wheezing or rales appreciated   HEART: normal S1S2, RRR  ABDOMEN: Soft, Nontender, +BS, No flank tenderness bilateral  EXTREMITIES: No clubbing, cyanosis, or edema , RLE cold to touch and decreased sensation on right.    NEUROLOGY: Awake, following commands,  no focal neurological deficit  Skin: No rashes     LABS:                        8.0    2.32  )-----------( 112      ( 21 Jul 2022 06:35 )             24.2     07-21    136  |  100  |  96<H>  ----------------------------<  113<H>  3.8   |  18<L>  |  4.52<H>    Ca    9.5      21 Jul 2022 06:35  Phos  6.8     07-21  Mg     2.0     07-21        PT/INR - ( 20 Jul 2022 17:11 )   PT: 14.8 sec;   INR: 1.24          PTT - ( 21 Jul 2022 06:35 )  PTT:35.2 sec  Urinalysis Basic - ( 20 Jul 2022 18:20 )    Color: Yellow / Appearance: Clear / SG: <=1.005 / pH: x  Gluc: x / Ketone: NEGATIVE  / Bili: Negative / Urobili: 0.2 E.U./dL   Blood: x / Protein: NEGATIVE mg/dL / Nitrite: NEGATIVE   Leuk Esterase: NEGATIVE / RBC: < 5 /HPF / WBC < 5 /HPF   Sq Epi: x / Non Sq Epi: 0-5 /HPF / Bacteria: Present /HPF            RADIOLOGY & ADDITIONAL STUDIES:           Patient is a 89y Female seen and evaluated at bedside. Patient in no acute distress, does not offer any complaints at this time.       Meds:    acetaminophen     Tablet .. 650 every 6 hours PRN  aspirin  chewable 81 daily  atorvastatin 80 at bedtime  calcitriol   Capsule 0.5 daily  calcium carbonate   1250 mG (OsCal) 1 three times a day  carvedilol 25 every 12 hours  clopidogrel Tablet 75 daily  colchicine 0.6 daily  cyclobenzaprine 10 three times a day PRN  dextrose 5%. 1000 <Continuous>  dextrose 5%. 1000 <Continuous>  dextrose 50% Injectable 25 once  dextrose 50% Injectable 12.5 once  dextrose 50% Injectable 25 once  dextrose Oral Gel 15 once PRN  folic acid 1 daily  glucagon  Injectable 1 once  heparin   Injectable 5000 every 8 hours  insulin lispro (ADMELOG) corrective regimen sliding scale  Before meals and at bedtime  levothyroxine 100 daily  NIFEdipine XL 60 daily  oxycodone    5 mG/acetaminophen 325 mG 1 every 6 hours PRN  pantoprazole    Tablet 40 before breakfast  sevelamer carbonate 800 every 8 hours  sodium chloride 0.9%. 1000 <Continuous>  zolpidem 5 at bedtime PRN      T(C): , Max: 36.3 (07-20-22 @ 14:26)  T(F): , Max: 97.4 (07-20-22 @ 14:26)  HR: 60 (07-21-22 @ 08:44)  BP: 135/97 (07-21-22 @ 08:44)  BP(mean): --  RR: 16 (07-21-22 @ 08:44)  SpO2: 98% (07-21-22 @ 08:44)  Wt(kg): --    07-20 @ 07:01  -  07-21 @ 07:00  --------------------------------------------------------  IN: 720 mL / OUT: 525 mL / NET: 195 mL      Height (cm): 154.9 (07-20 @ 13:37)  Weight (kg): 64.501 (07-20 @ 13:37)  BMI (kg/m2): 26.9 (07-20 @ 13:37)  BSA (m2): 1.63 (07-20 @ 13:37)    Review of Systems:  ROS negative except as per HPI      PHYSICAL EXAM:  GENERAL: NAD, resting comfortably in bed   NECK: supple, No JVD  CHEST/LUNG: Clear to auscultation bilaterally, no crackles, wheezing or rales appreciated   HEART: normal S1S2, RRR  ABDOMEN: Soft, Nontender, +BS, No flank tenderness bilateral  EXTREMITIES: No clubbing, cyanosis, or edema , RLE cold to touch and decreased sensation on right.    NEUROLOGY: Awake, following commands,  no focal neurological deficit  Skin: No rashes or wounds visible    LABS:                        8.0    2.32  )-----------( 112      ( 21 Jul 2022 06:35 )             24.2     07-21    136  |  100  |  96<H>  ----------------------------<  113<H>  3.8   |  18<L>  |  4.52<H>    Ca    9.5      21 Jul 2022 06:35  Phos  6.8     07-21  Mg     2.0     07-21        PT/INR - ( 20 Jul 2022 17:11 )   PT: 14.8 sec;   INR: 1.24          PTT - ( 21 Jul 2022 06:35 )  PTT:35.2 sec  Urinalysis Basic - ( 20 Jul 2022 18:20 )    Color: Yellow / Appearance: Clear / SG: <=1.005 / pH: x  Gluc: x / Ketone: NEGATIVE  / Bili: Negative / Urobili: 0.2 E.U./dL   Blood: x / Protein: NEGATIVE mg/dL / Nitrite: NEGATIVE   Leuk Esterase: NEGATIVE / RBC: < 5 /HPF / WBC < 5 /HPF   Sq Epi: x / Non Sq Epi: 0-5 /HPF / Bacteria: Present /HPF            RADIOLOGY & ADDITIONAL STUDIES:      Creatinine, Serum: 4.52 mg/dL (07-21-22 @ 06:35)  Creatinine, Serum: 4.45 mg/dL (07-20-22 @ 17:10)  Creatinine, Serum: 3.50 mg/dL (02-12-22 @ 07:51)  Creatinine, Serum: 3.61 mg/dL (02-11-22 @ 06:53)  Creatinine, Serum: 3.38 mg/dL (02-10-22 @ 14:11)  Creatinine, Serum: 5.62 mg/dL (06-15-21 @ 08:18)  Creatinine, Serum: 5.42 mg/dL (06-14-21 @ 06:21)  Creatinine, Serum: 4.73 mg/dL (06-13-21 @ 08:10)  Creatinine, Serum: 4.28 mg/dL (06-12-21 @ 07:47)  Creatinine, Serum: 4.38 mg/dL (06-11-21 @ 09:12)

## 2022-07-21 NOTE — PROGRESS NOTE ADULT - ASSESSMENT
89F with pmhx of CKD V (Baseline Cr 3.5), HTN, HLD, Hypothyroidism, PAD who presents to the hospital in the setting of pain from her RLE at rest.     Assessment/Plan:   #non-oliguric KYMBERLY on CKD V  Pt presenting with creatinine of 4.31 on HIE, baseline 3.5 per records. Likely cause of the increase in her creatinine is related to NSAID use, but will obtain other labs and try a trial of fluids to see if any improvement.   Baseline Creatinine 3.5, Creatinine peak 4.31  Renal sono- no hydronephrosis   UA: unremarkable   Given current kidney function progressing to ESRD, receiving contrast for angio may push patient over where she may need to be started on HD      Plan   -Continue with IV fluids NS @ 80cc/hr  -Trend BMP daily with magnesium and phosphorus   -Please obtain Urine Sodium, Urine Creatinine, Urine Protein/Cr ratio    -strict I/Os   -renal diet   -avoid nephrotoxic meds     #HTN  BP noted 150; acceptable for age  -Continue to monitor on nifedipine 60mg  Qday   89F with pmhx of CKD V (Baseline Cr 3.5), HTN, HLD, Hypothyroidism, PAD who presents to the hospital in the setting of pain from her RLE at rest.     Assessment/Plan:   #non-oliguric KYMBERLY on CKD V  Pt presenting with creatinine of 4.31 on HIE, baseline 3.5 per records. Likely cause of the increase in her creatinine is related to NSAID use, but will obtain other labs and try a trial of fluids to see if any improvement.   Baseline Creatinine 3.5, Creatinine continuing to rise today to 4.52, slower rate may be in plateau phase  Renal sono- no hydronephrosis; images reviewed, small and echogenic consistent with progressive CKD, no sequalae of specific KYMBERLY  UA: unremarkable   Given current kidney function progressing to ESRD, receiving contrast for angio may push patient over where she may need to be started on HD, please ensure discussion with consent for procedure    Plan   -Continue with IV fluids NS @ 80cc/hr, monitor for fluid overload  -Trend BMP daily with magnesium and phosphorus   -Please obtain Urine Sodium, Urine Creatinine, Urine Protein/Cr ratio    -strict I/Os   -renal diet   -avoid nephrotoxic meds     #HTN  BP noted 150; acceptable for age  -Continue to monitor on nifedipine 60mg  Qday    Acidosis - monitor if improves with fluids, if not will likely need bicarobonate supplementation    MBD-CKD - Elevated phos - 2/2 KYMBERLY, monitor for now; check PTH, vitamin D with next lab draw    Anemia - likely 2/2 CKD, would obtain ferritin, iron panel

## 2022-07-21 NOTE — PROCEDURE NOTE - NSICDXPROCEDURE_GEN_ALL_CORE_FT
PROCEDURES:  Insertion of intravenous catheter with ultrasound guidance 21-Jul-2022 16:03:29  Neela Cummings

## 2022-07-21 NOTE — PROGRESS NOTE ADULT - SUBJECTIVE AND OBJECTIVE BOX
O/N: renal US done, WAGNER GALVAN  7/20: admitted for rest pain, CKD                            A/P: 89y YO Female PMH PAD, CKD V who presents with Kleberg's 4 chronic ischemia of the RLE. Case is complicated by acute on chronic kidney disease.     Vascular/PAD:  - Admit to Tele, Dr. Pgaan, 5 Uris  - OR/Angio plan pending Nephrology input  - Continue plavix 75mg QD   - Continue ASA 81 QD     HTN/HLD:  - Continue home coreg 25 BID  - Continue home nifedipine 60 mg ER QD    CAD/dCHF (EF 70%):   - Holding home torsemide 20mg x 2 QD @ 5pm    CKD:   - Nephrology consultation for acute on chronic kidney disease w/ anticipation of angiography this admission    Anemia:   - F/u labs    ID:  - None    Diet:   - Dash diet    Activity:   - WBAT    DVTPPx:   - SQH    Dispo:   - Pending progress O/N: renal US done, WAGNER GALVAN  7/20: admitted for rest pain, CKD    S: Patient does not have any complaints    O: Examined in bed resting comfortably     ROS: Denies headache, blurred vision, chest pain, SOB, abdominal pain, nausea or vomiting.         aspirin  chewable 81  carvedilol 25  clopidogrel Tablet 75  heparin   Injectable 5000  NIFEdipine XL 60      Allergies  No Known Allergies    Intolerances        Vital Signs Last 24 Hrs  T(C): 35.8 (21 Jul 2022 04:53), Max: 36.3 (20 Jul 2022 14:26)  T(F): 96.5 (21 Jul 2022 04:53), Max: 97.4 (20 Jul 2022 14:26)  HR: 60 (21 Jul 2022 00:18) (60 - 68)  BP: 141/66 (21 Jul 2022 00:18) (141/66 - 168/73)  BP(mean): --  RR: 19 (21 Jul 2022 00:18) (16 - 19)  SpO2: 96% (21 Jul 2022 00:18) (96% - 97%)    Parameters below as of 21 Jul 2022 00:18  Patient On (Oxygen Delivery Method): room air      I&O's Summary    20 Jul 2022 07:01  -  21 Jul 2022 07:00  --------------------------------------------------------  IN: 0 mL / OUT: 300 mL / NET: -300 mL    General: Well appearing elderly female. No acute distress.   Pulmonary: Breathing comfortably on room air.   Cardiovascular: Hemodynamically stable.   Abdominal: S/NT/ND  Extremities: No appreciable edema. Strength equal bilaterally to toes. Diminished sensation of right toes.   Pulses:   Right:                                                                          Left:  FEM [x]2+ [ ]1+ [ ]doppler                                             FEM [x]2+ [ ]1+ [ ]doppler    POP [ ]2+ [ ]1+ [B]doppler                                             POP [ ]2+ [ ]1+ [B]doppler    AT  - faint monophasic  DP [ ]2+ [ ]1+ [0]doppler                                                DP [ ]2+ [ ]1+ [T]doppler  PT[ ]2+ [ ]1+ [0]doppler                                                  PT [ ]2+ [ ]1+ [T]doppler      LABS:                        8.0    2.32  )-----------( 112      ( 21 Jul 2022 06:35 )             24.2     07-20    131<L>  |  96  |  95<H>  ----------------------------<  110<H>  3.8   |  18<L>  |  4.45<H>    Ca    9.2      20 Jul 2022 17:10  Phos  6.4     07-20  Mg     1.9     07-20      PT/INR - ( 20 Jul 2022 17:11 )   PT: 14.8 sec;   INR: 1.24          PTT - ( 21 Jul 2022 06:35 )  PTT:35.2 sec    Radiology and Additional Studies:        A/P: 89y YO Female PMH PAD, CKD V who presents with chronic ischemia of the RLE. Case is complicated by acute on chronic kidney disease.     Vascular/PAD:  - OR/Angio plan pending   - Continue plavix 75mg QD   - Continue ASA 81 QD     HTN/HLD:  - Continue home coreg 25 BID  - Continue home nifedipine 60 mg ER QD    CAD/dCHF (EF 70%):   - Holding home torsemide 40mg in AM and 20mg in PM    CKD:   - Nephrology following for acute on chronic kidney disease w/ anticipation of angiography this admission    Anemia:   - F/u labs    ID:  - None    Diet:   - Dash diet    Activity:   - WBAT    DVTPPx:   - SQH    Dispo:   - Pending progress/angiogram    I have personally seen and examined the patient. I have reviewed all pertinent imaging and laboratory findings.  O/N: renal US done, WAGNER GALVAN  7/20: admitted for rest pain, CKD    S: Patient does not have any complaints    O: Examined in bed resting comfortably     ROS: Denies headache, blurred vision, chest pain, SOB, abdominal pain, nausea or vomiting.         aspirin  chewable 81  carvedilol 25  clopidogrel Tablet 75  heparin   Injectable 5000  NIFEdipine XL 60      Allergies  No Known Allergies    Intolerances        Vital Signs Last 24 Hrs  T(C): 35.8 (21 Jul 2022 04:53), Max: 36.3 (20 Jul 2022 14:26)  T(F): 96.5 (21 Jul 2022 04:53), Max: 97.4 (20 Jul 2022 14:26)  HR: 60 (21 Jul 2022 00:18) (60 - 68)  BP: 141/66 (21 Jul 2022 00:18) (141/66 - 168/73)  BP(mean): --  RR: 19 (21 Jul 2022 00:18) (16 - 19)  SpO2: 96% (21 Jul 2022 00:18) (96% - 97%)    Parameters below as of 21 Jul 2022 00:18  Patient On (Oxygen Delivery Method): room air      I&O's Summary    20 Jul 2022 07:01  -  21 Jul 2022 07:00  --------------------------------------------------------  IN: 0 mL / OUT: 300 mL / NET: -300 mL    General: Well appearing elderly female. No acute distress.   Pulmonary: Breathing comfortably on room air.   Cardiovascular: Hemodynamically stable.   Abdominal: S/NT/ND  Extremities: No appreciable edema. Strength equal bilaterally to toes. Diminished sensation of right toes.   Pulses:   Right:                                                                          Left:  FEM [x]2+ [ ]1+ [ ]doppler                                             FEM [x]2+ [ ]1+ [ ]doppler    POP [ ]2+ [ ]1+ [B]doppler                                             POP [ ]2+ [ ]1+ [B]doppler    AT  - faint monophasic  DP [ ]2+ [ ]1+ [0]doppler                                                DP [ ]2+ [ ]1+ [T]doppler  PT[ ]2+ [ ]1+ [0]doppler                                                  PT [ ]2+ [ ]1+ [T]doppler      LABS:                        8.0    2.32  )-----------( 112      ( 21 Jul 2022 06:35 )             24.2     07-20    131<L>  |  96  |  95<H>  ----------------------------<  110<H>  3.8   |  18<L>  |  4.45<H>    Ca    9.2      20 Jul 2022 17:10  Phos  6.4     07-20  Mg     1.9     07-20      PT/INR - ( 20 Jul 2022 17:11 )   PT: 14.8 sec;   INR: 1.24          PTT - ( 21 Jul 2022 06:35 )  PTT:35.2 sec    Radiology and Additional Studies:        A/P: 89y YO Female PMH PAD, CKD V who presents with chronic ischemia of the RLE. Case is complicated by acute on chronic kidney disease.     Vascular/PAD:  - OR/Angio plan pending   - Continue plavix 75mg QD   - Continue ASA 81 QD     HTN/HLD:  - Continue home coreg 25 BID  - Continue home nifedipine 60 mg ER QD    CAD/dCHF (EF 70%):   - Holding home torsemide 40mg in AM and 20mg in PM    CKD:   - Nephrology following for acute on chronic kidney disease w/ anticipation of angiography this admission  -NS @80cc/hr  -Trend daily Cr  -Renal US, urine lytes    Anemia:   - F/u labs    ID:  - None    Diet:   - Dash diet    Activity:   - WBAT    DVTPPx:   - SQH    Dispo:   - Pending progress/angiogram    I have personally seen and examined the patient. I have reviewed all pertinent imaging and laboratory findings.

## 2022-07-21 NOTE — CONSULT NOTE ADULT - ASSESSMENT
89F with PMH of CKD V, DM, HFpEF (EF 70% 2021, grade 1 diastolic dysfunction), HTN, HLD, CAD, hypothyroidism, PAD presents from the office after complaing of RLE rest pain. Cardiology consulted for pre op and some ECHO abnormalities.       #Pre operative evaluation  Patient with RLE pain, plan for bypass 7/22  EKG: NSR with isolated TWI in V2  TTE (7/21/22): EF nml, mild symmetric LVH, basal inferior segment appearing hypokinetic, G1DD  METS ~4, largely limited by RLE pain and not cardiopulmonary complaints  Recommendations:  - Patient is intermediate risk for intermediate risk procedure. No further cardiac work up needed at this time.   - In terms of documented basal inferior hypokinesis seen on 7/21/22 TTE, when compared to prior TTE, there is poor study quality and unable to reality quantify wall motion abnormality. Given absence of symptoms, likely not true hypokinesis but unable to further characterize. No further cardiac work up.   - Can continue ASA/Plavix per primary team    #HFpEF  Patient with history of HFpEF, TTE as above. On Coreg 25 BID and Torsemide 20 daily. Euvolemic on exam.   - Continue beta blocker, can restart home torsemide after surgery      Case d/w attending

## 2022-07-21 NOTE — CONSULT NOTE ADULT - REASON FOR ADMISSION
RLE pain, direct admission from clinic

## 2022-07-22 ENCOUNTER — TRANSCRIPTION ENCOUNTER (OUTPATIENT)
Age: 87
End: 2022-07-22

## 2022-07-22 VITALS — TEMPERATURE: 97 F

## 2022-07-22 DIAGNOSIS — M79.604 PAIN IN RIGHT LEG: ICD-10-CM

## 2022-07-22 LAB
ANION GAP SERPL CALC-SCNC: 15 MMOL/L — SIGNIFICANT CHANGE UP (ref 5–17)
BUN SERPL-MCNC: 94 MG/DL — HIGH (ref 7–23)
CALCIUM SERPL-MCNC: 9.6 MG/DL — SIGNIFICANT CHANGE UP (ref 8.4–10.5)
CHLORIDE SERPL-SCNC: 104 MMOL/L — SIGNIFICANT CHANGE UP (ref 96–108)
CO2 SERPL-SCNC: 18 MMOL/L — LOW (ref 22–31)
CREAT SERPL-MCNC: 4.62 MG/DL — HIGH (ref 0.5–1.3)
EGFR: 9 ML/MIN/1.73M2 — LOW
FERRITIN SERPL-MCNC: 1598 NG/ML — HIGH (ref 15–150)
GLUCOSE BLDC GLUCOMTR-MCNC: 125 MG/DL — HIGH (ref 70–99)
GLUCOSE BLDC GLUCOMTR-MCNC: 137 MG/DL — HIGH (ref 70–99)
GLUCOSE SERPL-MCNC: 118 MG/DL — HIGH (ref 70–99)
IRON SATN MFR SERPL: 68 % — HIGH (ref 14–50)
IRON SATN MFR SERPL: 93 UG/DL — SIGNIFICANT CHANGE UP (ref 30–160)
MAGNESIUM SERPL-MCNC: 2 MG/DL — SIGNIFICANT CHANGE UP (ref 1.6–2.6)
PHOSPHATE SERPL-MCNC: 6.1 MG/DL — HIGH (ref 2.5–4.5)
POTASSIUM SERPL-MCNC: 4 MMOL/L — SIGNIFICANT CHANGE UP (ref 3.5–5.3)
POTASSIUM SERPL-SCNC: 4 MMOL/L — SIGNIFICANT CHANGE UP (ref 3.5–5.3)
SODIUM SERPL-SCNC: 137 MMOL/L — SIGNIFICANT CHANGE UP (ref 135–145)
TIBC SERPL-MCNC: 137 UG/DL — LOW (ref 220–430)
TRANSFERRIN SERPL-MCNC: 116 MG/DL — LOW (ref 200–360)
UIBC SERPL-MCNC: 44 UG/DL — LOW (ref 110–370)

## 2022-07-22 PROCEDURE — 99221 1ST HOSP IP/OBS SF/LOW 40: CPT

## 2022-07-22 PROCEDURE — 99233 SBSQ HOSP IP/OBS HIGH 50: CPT

## 2022-07-22 PROCEDURE — 83540 ASSAY OF IRON: CPT

## 2022-07-22 PROCEDURE — 82962 GLUCOSE BLOOD TEST: CPT

## 2022-07-22 PROCEDURE — 84156 ASSAY OF PROTEIN URINE: CPT

## 2022-07-22 PROCEDURE — 80048 BASIC METABOLIC PNL TOTAL CA: CPT

## 2022-07-22 PROCEDURE — 86900 BLOOD TYPING SEROLOGIC ABO: CPT

## 2022-07-22 PROCEDURE — 84466 ASSAY OF TRANSFERRIN: CPT

## 2022-07-22 PROCEDURE — 93005 ELECTROCARDIOGRAM TRACING: CPT

## 2022-07-22 PROCEDURE — 85025 COMPLETE CBC W/AUTO DIFF WBC: CPT

## 2022-07-22 PROCEDURE — 84300 ASSAY OF URINE SODIUM: CPT

## 2022-07-22 PROCEDURE — 83036 HEMOGLOBIN GLYCOSYLATED A1C: CPT

## 2022-07-22 PROCEDURE — 85027 COMPLETE CBC AUTOMATED: CPT

## 2022-07-22 PROCEDURE — 84100 ASSAY OF PHOSPHORUS: CPT

## 2022-07-22 PROCEDURE — 86901 BLOOD TYPING SEROLOGIC RH(D): CPT

## 2022-07-22 PROCEDURE — 83735 ASSAY OF MAGNESIUM: CPT

## 2022-07-22 PROCEDURE — 85610 PROTHROMBIN TIME: CPT

## 2022-07-22 PROCEDURE — 71045 X-RAY EXAM CHEST 1 VIEW: CPT

## 2022-07-22 PROCEDURE — 82728 ASSAY OF FERRITIN: CPT

## 2022-07-22 PROCEDURE — 81001 URINALYSIS AUTO W/SCOPE: CPT

## 2022-07-22 PROCEDURE — 86870 RBC ANTIBODY IDENTIFICATION: CPT

## 2022-07-22 PROCEDURE — 99231 SBSQ HOSP IP/OBS SF/LOW 25: CPT

## 2022-07-22 PROCEDURE — 85730 THROMBOPLASTIN TIME PARTIAL: CPT

## 2022-07-22 PROCEDURE — 76775 US EXAM ABDO BACK WALL LIM: CPT

## 2022-07-22 PROCEDURE — 86880 COOMBS TEST DIRECT: CPT

## 2022-07-22 PROCEDURE — 86850 RBC ANTIBODY SCREEN: CPT

## 2022-07-22 PROCEDURE — 36415 COLL VENOUS BLD VENIPUNCTURE: CPT

## 2022-07-22 PROCEDURE — 93306 TTE W/DOPPLER COMPLETE: CPT

## 2022-07-22 PROCEDURE — 82570 ASSAY OF URINE CREATININE: CPT

## 2022-07-22 PROCEDURE — 83550 IRON BINDING TEST: CPT

## 2022-07-22 RX ORDER — ACETAMINOPHEN 500 MG
2 TABLET ORAL
Qty: 0 | Refills: 0 | DISCHARGE
Start: 2022-07-22

## 2022-07-22 RX ORDER — CALCIUM CARBONATE 500(1250)
1 TABLET ORAL
Qty: 0 | Refills: 0 | DISCHARGE

## 2022-07-22 RX ORDER — SODIUM CHLORIDE 9 MG/ML
1000 INJECTION INTRAMUSCULAR; INTRAVENOUS; SUBCUTANEOUS
Refills: 0 | Status: DISCONTINUED | OUTPATIENT
Start: 2022-07-22 | End: 2022-07-22

## 2022-07-22 RX ORDER — COLCHICINE 0.6 MG
1 TABLET ORAL
Qty: 0 | Refills: 0 | DISCHARGE

## 2022-07-22 RX ADMIN — Medication 60 MILLIGRAM(S): at 10:16

## 2022-07-22 RX ADMIN — PANTOPRAZOLE SODIUM 40 MILLIGRAM(S): 20 TABLET, DELAYED RELEASE ORAL at 06:16

## 2022-07-22 RX ADMIN — Medication 1 TABLET(S): at 06:15

## 2022-07-22 RX ADMIN — Medication 1 MILLIGRAM(S): at 11:49

## 2022-07-22 RX ADMIN — CARVEDILOL PHOSPHATE 25 MILLIGRAM(S): 80 CAPSULE, EXTENDED RELEASE ORAL at 06:15

## 2022-07-22 RX ADMIN — Medication 100 MICROGRAM(S): at 06:16

## 2022-07-22 RX ADMIN — CLOPIDOGREL BISULFATE 75 MILLIGRAM(S): 75 TABLET, FILM COATED ORAL at 11:50

## 2022-07-22 RX ADMIN — SEVELAMER CARBONATE 800 MILLIGRAM(S): 2400 POWDER, FOR SUSPENSION ORAL at 14:32

## 2022-07-22 RX ADMIN — CALCITRIOL 0.5 MICROGRAM(S): 0.5 CAPSULE ORAL at 11:50

## 2022-07-22 RX ADMIN — Medication 1 TABLET(S): at 14:32

## 2022-07-22 RX ADMIN — Medication 81 MILLIGRAM(S): at 11:50

## 2022-07-22 RX ADMIN — HEPARIN SODIUM 5000 UNIT(S): 5000 INJECTION INTRAVENOUS; SUBCUTANEOUS at 06:14

## 2022-07-22 RX ADMIN — Medication 0.3 MILLIGRAM(S): at 11:49

## 2022-07-22 RX ADMIN — SEVELAMER CARBONATE 800 MILLIGRAM(S): 2400 POWDER, FOR SUSPENSION ORAL at 06:16

## 2022-07-22 NOTE — DISCHARGE NOTE PROVIDER - NSDCCPCAREPLAN_GEN_ALL_CORE_FT
PRINCIPAL DISCHARGE DIAGNOSIS  Diagnosis: Peripheral artery disease  Assessment and Plan of Treatment:       SECONDARY DISCHARGE DIAGNOSES  Diagnosis: Stage 5 chronic kidney disease not on chronic dialysis  Assessment and Plan of Treatment:      PRINCIPAL DISCHARGE DIAGNOSIS  Diagnosis: Peripheral artery disease  Assessment and Plan of Treatment:       SECONDARY DISCHARGE DIAGNOSES  Diagnosis: Stage 5 chronic kidney disease not on chronic dialysis  Assessment and Plan of Treatment:     Diagnosis: CAD (coronary artery disease)  Assessment and Plan of Treatment:     Diagnosis: HLD (hyperlipidemia)  Assessment and Plan of Treatment:     Diagnosis: HTN (hypertension)  Assessment and Plan of Treatment:     Diagnosis: DM (diabetes mellitus)  Assessment and Plan of Treatment:     Diagnosis: Pacemaker  Assessment and Plan of Treatment:     Diagnosis: Diabetic peripheral neuropathy  Assessment and Plan of Treatment:

## 2022-07-22 NOTE — DISCHARGE NOTE PROVIDER - NSDCMRMEDTOKEN_GEN_ALL_CORE_FT
acetaminophen 325 mg oral tablet: 2 tab(s) orally every 6 hours, As needed, Mild Pain (1 - 3)  Aspirin Low Dose 81 mg oral tablet, chewable: 1 tab(s) orally once a day  calcitriol 0.5 mcg oral capsule: 1 cap(s) orally once a day  clopidogrel 75 mg oral tablet: 1 tab(s) orally once a day  Coreg 25 mg oral tablet: 1 tab(s) orally every 12 hours  cyclobenzaprine 10 mg oral tablet: 1 tab(s) orally 3 times a day  folic acid 1 mg oral tablet: 1 tab(s) orally once a day  levothyroxine 100 mcg (0.1 mg) oral tablet: 1 tab(s) orally once a day  Lipitor 80 mg oral tablet: 1 tab(s) orally once a day (at bedtime)  NIFEdipine 60 mg oral tablet, extended release: 1 tab(s) orally once a day  omeprazole 20 mg oral delayed release capsule: 1 cap(s) orally once a day  oxycodone-acetaminophen 5 mg-325 mg oral tablet: 1 tab(s) orally every 4 hours, As needed, Moderate Pain (4 - 6)  oxycodone-acetaminophen 5 mg-325 mg oral tablet: 2 tab(s) orally every 6 hours, As needed, Severe Pain (7 - 10)  sevelamer carbonate 800 mg oral tablet: 1 tab(s) orally every 8 hours  torsemide 20 mg oral tablet: 2 tab(s) orally once a day in the morning   torsemide 20 mg oral tablet: 1 tab(s) orally once a day at bedside   zolpidem 5 mg oral tablet: 1 tab(s) orally once a day (at bedtime)

## 2022-07-22 NOTE — PROGRESS NOTE ADULT - SUBJECTIVE AND OBJECTIVE BOX
INTERVAL EVENTS:  -- NAEO  -- OR deferred on this admission due to Cr and fear of precipitating HD initiation     SUBJECTIVE:  -- No LE pain overnight or this morning  -- Denies fevers, chills, CP, SOB, palpitations, orthopnea and PND  -- Tolerating PO intake, +flatus/BM, voiding.  -- Review of Systems: 12 point review of systems otherwise negative    MEDICATIONS:  MEDICATIONS  (STANDING):  aspirin  chewable 81 milliGRAM(s) Oral daily  atorvastatin 80 milliGRAM(s) Oral at bedtime  calcitriol   Capsule 0.5 MICROGram(s) Oral daily  calcium carbonate   1250 mG (OsCal) 1 Tablet(s) Oral three times a day  carvedilol 25 milliGRAM(s) Oral every 12 hours  clopidogrel Tablet 75 milliGRAM(s) Oral daily  colchicine 0.3 milliGRAM(s) Oral daily  dextrose 5%. 1000 milliLiter(s) (100 mL/Hr) IV Continuous <Continuous>  dextrose 5%. 1000 milliLiter(s) (50 mL/Hr) IV Continuous <Continuous>  dextrose 50% Injectable 25 Gram(s) IV Push once  dextrose 50% Injectable 12.5 Gram(s) IV Push once  dextrose 50% Injectable 25 Gram(s) IV Push once  folic acid 1 milliGRAM(s) Oral daily  glucagon  Injectable 1 milliGRAM(s) IntraMuscular once  heparin   Injectable 5000 Unit(s) SubCutaneous every 8 hours  insulin lispro (ADMELOG) corrective regimen sliding scale   SubCutaneous Before meals and at bedtime  levothyroxine 100 MICROGram(s) Oral daily  NIFEdipine XL 60 milliGRAM(s) Oral daily  pantoprazole    Tablet 40 milliGRAM(s) Oral before breakfast  sevelamer carbonate 800 milliGRAM(s) Oral every 8 hours  sodium chloride 0.9%. 1000 milliLiter(s) (40 mL/Hr) IV Continuous <Continuous>    MEDICATIONS  (PRN):  acetaminophen     Tablet .. 650 milliGRAM(s) Oral every 6 hours PRN Mild Pain (1 - 3)  cyclobenzaprine 10 milliGRAM(s) Oral three times a day PRN Muscle Spasm  dextrose Oral Gel 15 Gram(s) Oral once PRN Blood Glucose LESS THAN 70 milliGRAM(s)/deciliter  oxycodone    5 mG/acetaminophen 325 mG 1 Tablet(s) Oral every 6 hours PRN Moderate Pain (4 - 6)  zolpidem 5 milliGRAM(s) Oral at bedtime PRN Insomnia    Allergies  No Known Allergies    OBJECTIVE:  Vital Signs Last 24 Hrs  T(C): 36.5 (22 Jul 2022 10:52), Max: 36.5 (22 Jul 2022 10:52)  T(F): 97.7 (22 Jul 2022 10:52), Max: 97.7 (22 Jul 2022 10:52)  HR: 61 (22 Jul 2022 09:00) (60 - 71)  BP: 155/68 (22 Jul 2022 09:00) (137/62 - 160/72)  BP(mean): --  RR: 18 (22 Jul 2022 09:00) (16 - 19)  SpO2: 99% (22 Jul 2022 09:00) (98% - 100%)    Parameters below as of 22 Jul 2022 09:00  Patient On (Oxygen Delivery Method): room air    I&O's Summary    21 Jul 2022 07:01  -  22 Jul 2022 07:00  --------------------------------------------------------  IN: 0 mL / OUT: 900 mL / NET: -900 mL    22 Jul 2022 07:01  -  22 Jul 2022 11:14  --------------------------------------------------------  IN: 240 mL / OUT: 0 mL / NET: 240 mL    PHYSICAL EXAM:  Gen: NAD, sitting upright on the edge of bed, kyphotic  HEENT: NCAT, MMM, clear OP  Neck: supple, trachea at midline  CV: RRR, no m/g/r appreciated  Pulm: CTA B, no w/r/r; no increase in WOB  Abd: normoactive BS, soft, NTND  Ext: WWP, no c/c/e  Neuro: AOx3, nonfocal  Psych: pleasant, conversant and appropriate    LABS:                        8.0    2.32  )-----------( 112      ( 21 Jul 2022 06:35 )             24.2     07-22    137  |  104  |  94<H>  ----------------------------<  118<H>  4.0   |  18<L>  |  4.62<H>    Ca    9.6      22 Jul 2022 05:30  Phos  6.1     07-22  Mg     2.0     07-22    PT/INR - ( 20 Jul 2022 17:11 )   PT: 14.8 sec;   INR: 1.24     PTT - ( 21 Jul 2022 06:35 )  PTT:35.2 sec    CAPILLARY BLOOD GLUCOSE  POCT Blood Glucose.: 125 mg/dL (22 Jul 2022 06:31)  POCT Blood Glucose.: 164 mg/dL (21 Jul 2022 21:43)  POCT Blood Glucose.: 136 mg/dL (21 Jul 2022 17:32)  POCT Blood Glucose.: 141 mg/dL (21 Jul 2022 12:15)    Urinalysis Basic - ( 20 Jul 2022 18:20 )  Color: Yellow / Appearance: Clear / SG: <=1.005 / pH: x  Gluc: x / Ketone: NEGATIVE  / Bili: Negative / Urobili: 0.2 E.U./dL   Blood: x / Protein: NEGATIVE mg/dL / Nitrite: NEGATIVE   Leuk Esterase: NEGATIVE / RBC: < 5 /HPF / WBC < 5 /HPF   Sq Epi: x / Non Sq Epi: 0-5 /HPF / Bacteria: Present /HPF    MICRODATA:  -- No new microdata.    RADIOLOGY/OTHER STUDIES:  -- No new imaging.

## 2022-07-22 NOTE — DISCHARGE NOTE PROVIDER - CARE PROVIDER_API CALL
Roman Lerma)  Internal Medicine; Nephrology  [address] [address]  Phone: [phone]  Fax: [phone]  Follow Up Time: 1 week    Edmar Pagan)  [facility] Surgery  Vascular  [address] [place] [address]  Phone: [phone]  Fax: [phone]  Follow Up Time: 2 weeks   Edmar Pagan)  [facility] Surgery  Vascular  [address] [place] [address]  Phone: [phone]  Fax: [phone]  Scheduled Appointment: 11/10/2022 12:00 PM    Roman Lerma)  Internal Medicine; Nephrology  [address] [address]  Phone: [phone]  Fax: [phone]  Follow Up Time:

## 2022-07-22 NOTE — DISCHARGE NOTE PROVIDER - NSDCFUSCHEDAPPT_GEN_ALL_CORE_FT
Roman Lerma  [facility]  NEPHRO [address]  Scheduled Appointment: 08/24/2022

## 2022-07-22 NOTE — DISCHARGE NOTE PROVIDER - CARE PROVIDERS DIRECT ADDRESSES
[email or web address]

## 2022-07-22 NOTE — DISCHARGE NOTE NURSING/CASE MANAGEMENT/SOCIAL WORK - PATIENT PORTAL LINK FT
You can access the FollowMyHealth Patient Portal offered by [facility] by registering at the following website: [email or web address]. By joining [employer]’s FollowMyHealth portal, you will also be able to view your health information using other applications (apps) compatible with our system.

## 2022-07-22 NOTE — PROGRESS NOTE ADULT - ATTENDING COMMENTS
I agree with the fellow's findings and plans as written above with the following additions/amendments:    Seen and examined at bedside. Patient is doing well overall in terms of pain. Discussed with patient, son, and outpatient nephrologist plan to discharge and monitor pain, treat medically, and avoid uremic symptoms/treat medically. Will not plan on HD as per patient's wishes. Will discuss palliative care referral as an outpatient. Further recs as above
I agree with the fellow's findings and plans as written above with the following additions/amendments:    Seen and examined at bedside. Discussed kidneys are weak, may need dialysis after procedure and is very close right now. Otherwise no particular complaint, denies CP, palpitations SOB, cough. Further recs as above

## 2022-07-22 NOTE — DISCHARGE NOTE PROVIDER - PROVIDER TOKENS
PROVIDER:[TOKEN:[[id number]],FOLLOWUP:[1 week]],PROVIDER:[TOKEN:[[id number]],FOLLOWUP:[2 weeks]] PROVIDER:[TOKEN:[[id number]],SCHEDULEDAPPT:[11/10/2022],SCHEDULEDAPPTTIME:[12:00 PM]],PROVIDER:[TOKEN:[[id number]]]

## 2022-07-22 NOTE — PROGRESS NOTE ADULT - ASSESSMENT
This is an 89yo woman, mostly Sudanese-speaking, with a PMH of stage V CKD (Dr. 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 Pagan's office c/o RLE rest pain, referred to the hospital for an inpatient angiogram and concurrent management of stage V CKD.  Potential OR on 7/22 pending Cr and Renal clearance.     #Pre-op examination  -- RCRI at least class 4; Mora score 5.4%; high risk for low risk procedure  -- cleared by Cardiology  -- c/w statin and Coreg    #CKD stage 5  -- Renal following, appreciate recs  -- on Calcitriol, Oscal and Sevelamer     #Severe PAD  -- no angiogram on this admission   -- on DAPT     #Chronic HFpEF  #Essential HTN  #CAD  -- previously seen by our inpatient Cardiology service and Dr. Newton   -- c/w Coreg and Nifedepine   -- defer reinitiation of Torsemide to Renal     #NIDDM2 c/b neuropathy and PAD  -- c/w mISS for FS goal 140-180    #Hx of Gout   -- c/w Colchicine to 0.3mg daily; please discharge on this dose    #Hypothyroidism   -- c/w Levothyroxine 100mcg daily     #Diet - Consistent Carbohydrate Renal  #DVT PPx - SQH  #Dispo - TBD    Catherine Steward  Attending Hospitalist  [phone]

## 2022-07-22 NOTE — PROGRESS NOTE ADULT - SUBJECTIVE AND OBJECTIVE BOX
O/N: MANDY, VSS                                        A/P: 89y YO Female PMH PAD, CKD V who presents with chronic ischemia of the RLE. Case is complicated by acute on chronic kidney disease.     Vascular/PAD:  - OR/Angio plan pending   - Continue plavix 75mg QD   - Continue ASA 81 QD     HTN/HLD:  - Continue home coreg 25 BID  - Continue home nifedipine 60 mg ER QD    CAD/dCHF (EF 70%):   - Holding home torsemide 40mg in AM and 20mg in PM    CKD:   - Nephrology following for acute on chronic kidney disease w/ anticipation of angiography this admission  -NS @80cc/hr  -Trend daily Cr  -Renal US, urine lytes    Anemia:   - F/u labs    ID:  - None    Diet:   - Dash diet    Activity:   - WBAT    DVTPPx:   - SQH    Dispo:   - Pending progress/angiogram   O/N: MANDY, VSS      S: Patient does not have any complaints    O: Examined in bed resting comfortably     ROS: Denies foot pain, headache, blurred vision, chest pain, SOB, abdominal pain, nausea or vomiting.         aspirin  chewable 81  carvedilol 25  clopidogrel Tablet 75  heparin   Injectable 5000  NIFEdipine XL 60      Allergies  No Known Allergies    Intolerances        Vital Signs Last 24 Hrs  T(C): 36.4 (22 Jul 2022 06:00), Max: 36.4 (21 Jul 2022 11:04)  T(F): 97.5 (22 Jul 2022 06:00), Max: 97.5 (21 Jul 2022 11:04)  HR: 66 (22 Jul 2022 00:29) (60 - 71)  BP: 137/62 (22 Jul 2022 00:29) (135/97 - 160/72)  BP(mean): --  RR: 19 (22 Jul 2022 00:29) (16 - 19)  SpO2: 98% (22 Jul 2022 00:29) (98% - 100%)    Parameters below as of 22 Jul 2022 00:29  Patient On (Oxygen Delivery Method): room air      I&O's Summary    21 Jul 2022 07:01  -  22 Jul 2022 07:00  --------------------------------------------------------  IN: 0 mL / OUT: 600 mL / NET: -600 mL        General: Well appearing elderly female. No acute distress.   Pulmonary: Breathing comfortably on room air.   Cardiovascular: Hemodynamically stable.   Abdominal: S/NT/ND  Extremities: No appreciable edema. Strength equal bilaterally to toes. Diminished sensation of right toes. No wounds.  Pulses: RLE: 2+FEM/biphasic POP/mono AT  LLE: 2+_FEM/biphasic POP/triphasic DP/PT        LABS:                        8.0    2.32  )-----------( 112      ( 21 Jul 2022 06:35 )             24.2     07-22    137  |  104  |  94<H>  ----------------------------<  118<H>  4.0   |  18<L>  |  4.62<H>    Ca    9.6      22 Jul 2022 05:30  Phos  6.1     07-22  Mg     2.0     07-22      PT/INR - ( 20 Jul 2022 17:11 )   PT: 14.8 sec;   INR: 1.24          PTT - ( 21 Jul 2022 06:35 )  PTT:35.2 sec    Radiology and Additional Studies:    ---------------------------------------------------------------------------  PLEASE CHECK WHEN PRESENT:  [x] Heart Failure  [  ] Acute  [  ] Acute on Chronic  [x] Chronic  -------------------------------------------------------------------  [x]Diastolic [HFpEF]  [  ]Systolic [HFrEF]  [  ]Combined [HFpEF & HFrEF]  [  ] afib  [  ] hypertensive heart disease  [  ]Other:  -------------------------------------------------------------------  [ ] Respiratory failure  [ ] Acute cor pulmonale  [ ] Asthma/COPD Exacerbation  [ ] Pleural effusion  [ ] Aspiration pneumonia  -------------------------------------------------------------------  [  ]KYMBERLY  [  ]ATN  [  ]Reneal Medullary Necrosis  [  ]Renal Cortical Necrosis  [  ]Other Pathological Lesions:    [  ]CKD 1  [  ]CKD 2  [  ]CKD 3  [  ]CKD 4  [x]CKD 5  [  ]Other  -------------------------------------------------------------------  [x]Diabetes  [  ] Diabetic PVD Ulcer  [x] Neuropathic ulcer to DM  [  ] Diabetes with Nephropathy  [  ] Osteomyelitis due to diabetes  --------------------------------------------------------------------  [  ]Malnutrition: See Nutrition Note  [  ]Cachexia  [  ]Other:   [  ]Supplement Ordered:  [  ]Morbid Obesity (BMI >=40]  ---------------------------------------------------------------------  [ ] Sepsis/severe sepsis/septic shock  [ ] UTI  [ ] Pneumonia  -----------------------------------------------------------------------  [ ] Acidosis/alkalosis  [ ] Fluid overload  [ ] Hypokalemia  [ ] Hyperkalemia  [ ] Hypomagnesemia  [ ] Hypophosphatemia  [ ] Hyperphosphatemia  ------------------------------------------------------------------------  [ ] Acute blood loss anemia  [ ] Post op blood loss anemia  [ ] Iron deficiency anemia  [ ] Anemia due to chronic disease  [ ] Hypercoagulable state  ----------------------------------------------------------------------  [ ] Cerebral infarction  [ ] Transient ischemia attack  [ ] Encephalopathy      A/P: 89y YO Female PMH PAD, CKD stage V who presents with chronic ischemia of the RLE. Case is complicated by acute on chronic kidney disease.     Vascular/PAD:  - No longer planning for RLE angiogram, high risk of putting patient into complete renal failure. Patient's right leg is much improved.  - Continue plavix 75mg QD0   - Continue ASA 81 QD     HTN/HLD:  - Continue home coreg 25 BID  - Continue home nifedipine 60 mg ER QD    CAD/dCHF (EF 70%):   - Holding home torsemide 40mg in AM and 20mg in PM    CKD:   - Nephrology following for acute on chronic kidney disease  -NS @80cc/hr  -Trend daily Cr  -Renal US, urine lytes    Anemia:   - F/u labs    ID:  - None    Diet:   - Dash diet    Activity:   - WBAT    DVTPPx:   - [facility]    Dispo:   - Plan to d/c home today, pending AM labs/Cr check    I have personally seen and examined the patient. I have reviewed all pertinent imaging and laboratory findings.

## 2022-07-22 NOTE — DISCHARGE NOTE PROVIDER - NSDCFUADDINST_GEN_ALL_CORE_FT
FOLLOW UP: Dr. Pagan in 2 weeks. Call the office at  with any questions.   ACTIVITY: Ambulate as tolerated. Please wear sturdy shoes to avoid injuring your foot.   DIET: You may resume renal friendly diet.   Call the office if you experience increasing pain, redness, swelling or new wounds, or temperature >101.4F. If your leg pain becomes significantly worse at rest, please call the office.  NEW MEDICATION ADJUSTMENTS: Please stop taking colchicine and calcium carbonate.    ADDITIONAL FOLLOW UP AFTER DISCHARGE: Please see Dr. Lerma in one week.    DISCHARGE DESTINATION: Home w/ home health aid FOLLOW UP: In the Vascular office on 11/10/22 at 12pm. Call the office at  with any questions.   ACTIVITY: Ambulate as tolerated. Please wear sturdy shoes to avoid injuring your foot.   DIET: You may resume renal diet.   Call the office if you experience increasing pain, redness, swelling or new wounds, or temperature >101.4F. If your leg pain becomes significantly worse at rest, please call the office.  NEW MEDICATION ADJUSTMENTS: Please stop taking colchicine and calcium carbonate.    ADDITIONAL FOLLOW UP AFTER DISCHARGE: Please see Dr. Lerma in one week.    DISCHARGE DESTINATION: Home w/ home health aid

## 2022-07-22 NOTE — DISCHARGE NOTE PROVIDER - HOSPITAL COURSE
History of Present Illness:   89F with PMH of CKD V, DM, HFpEF (EF 70% 2021, grade 1 diastolic dysfunction), HTN, HLD, CAD, hypothyroidism, PAD presents from the office after complaing of RLE rest pain. The patient is known to the service after initially presenting with rest pain known to the service with h/o R [id number]-knee-Pop stenting 6/2021 and balloon angioplasty of 75% in-stent stenosis 2/2022 with biphasic pedal signals at conclusion of procedure. The patient reports that about two weeks ago she has had worsening of RLE pain and decrease in sensation in her right toes. She denies weakness or any ulcerations. At clinic follow up, SCr noted to be 4, up from recent baseline of 3.5. She states that over the past week she has been taking Advil twice a day. No CP/SOB, N/V. ROS otherwise negative.     Hospital course  7/20: Admitted for rest pain, CKD. Seen by nephrology. Started on maintenance fluids per nephrology.   7/21: ECHO stable, colchicine changed to 0.3mg. Echo obtained which was overall unchanged from prior (EF 70%, Grade 1 diastolic dysfunction), but showed hypokinesia of the base of the left ventricle, for which cardiology was consulted. However, cardiology consulted and the echo was overread as unchanged from prior. In discussion between family, nephrology and Dr. Pagan, the renal risk of angiography was deemed to be too great in a patient who would refuse dialysis. The angiography planned for 7/22 was cancelled.   7/22: Home aid coordinated to resume today. Patient discharged. Nephrology recommending holding colchicine and calcium carbonate and continuing home lasix dosing. History of Present Illness:   89F with PMH of CKD V, DM, HFpEF (EF 70% 2021, grade 1 diastolic dysfunction), HTN, HLD, CAD, hypothyroidism, PAD presents from the office after complaing of RLE rest pain. The patient is known to the service after initially presenting with rest pain known to the service with h/o R [id number]-knee-Pop stenting 6/2021 and balloon angioplasty of 75% in-stent stenosis 2/2022 with biphasic pedal signals at conclusion of procedure. The patient reports that about two weeks ago she has had worsening of RLE pain and decrease in sensation in her right toes. She denies weakness or any ulcerations. At clinic follow up, SCr noted to be 4, up from recent baseline of 3.5. She states that over the past week she has been taking Advil twice a day. No CP/SOB, N/V. ROS otherwise negative.     Hospital course  7/20: Admitted for rest pain, CKD. Seen by nephrology. Started on maintenance fluids per nephrology.   7/21: ECHO stable, colchicine changed to 0.3mg. Echo obtained which was overall unchanged from prior (EF 70%, Grade 1 diastolic dysfunction), but showed hypokinesia of the base of the left ventricle, for which cardiology was consulted. However, cardiology consulted and the echo was overread as unchanged from prior. In discussion between family, nephrology and Dr. Pagan, the renal risk of angiography was deemed to be too great in a patient who would refuse dialysis. The angiography planned for 7/22 was cancelled.   7/22: Home aid coordinated to resume today. Patient discharged. Nephrology recommending holding colchicine and calcium carbonate and continuing home lasix dosing.

## 2022-07-22 NOTE — PROGRESS NOTE ADULT - TIME BILLING
As above; Coordination of care with primary team, discussed CKD progression, fluids  This excludes any time spent on separate procedures or teaching.
As above; Coordination of care with primary team, discussed CKD, symptom management, med rec for d/c planning  This excludes any time spent on separate procedures or teaching.

## 2022-07-22 NOTE — PROGRESS NOTE ADULT - SUBJECTIVE AND OBJECTIVE BOX
Patient is a 89y Female seen and evaluated at bedside. No acute distress and does not offer any complaints at this time. Patient to be managed medically for RLE PAD as there is a high risk that patient will need HD after receiving contrast. VSS. Kidney function       Meds:    acetaminophen     Tablet .. 650 every 6 hours PRN  aspirin  chewable 81 daily  atorvastatin 80 at bedtime  calcitriol   Capsule 0.5 daily  calcium carbonate   1250 mG (OsCal) 1 three times a day  carvedilol 25 every 12 hours  clopidogrel Tablet 75 daily  colchicine 0.3 daily  cyclobenzaprine 10 three times a day PRN  dextrose 5%. 1000 <Continuous>  dextrose 5%. 1000 <Continuous>  dextrose 50% Injectable 25 once  dextrose 50% Injectable 12.5 once  dextrose 50% Injectable 25 once  dextrose Oral Gel 15 once PRN  folic acid 1 daily  glucagon  Injectable 1 once  heparin   Injectable 5000 every 8 hours  insulin lispro (ADMELOG) corrective regimen sliding scale  Before meals and at bedtime  levothyroxine 100 daily  NIFEdipine XL 60 daily  oxycodone    5 mG/acetaminophen 325 mG 1 every 6 hours PRN  pantoprazole    Tablet 40 before breakfast  sevelamer carbonate 800 every 8 hours  sodium chloride 0.9%. 1000 <Continuous>  zolpidem 5 at bedtime PRN      T(C): , Max: 36.4 (07-21-22 @ 11:04)  T(F): , Max: 97.5 (07-21-22 @ 11:04)  HR: 62 (07-22-22 @ 06:07)  BP: 150/69 (07-22-22 @ 06:07)  BP(mean): --  RR: 18 (07-22-22 @ 06:07)  SpO2: 99% (07-22-22 @ 06:07)  Wt(kg): --    07-21 @ 07:01  -  07-22 @ 07:00  --------------------------------------------------------  IN: 0 mL / OUT: 900 mL / NET: -900 mL          Review of Systems:  ROS negative except as per HPI      PHYSICAL EXAM:  GENERAL: NAD  NECK: supple, No JVD  CHEST/LUNG: Clear to auscultation bilaterally  HEART: normal S1S2, RRR  ABDOMEN: Soft, Nontender, +BS, No flank tenderness bilateral  EXTREMITIES: No clubbing, cyanosis, or edema   NEUROLOGY: AAO x3, no focal neurological deficit  ACCESS: good thrill and bruit appreciated      LABS:                        8.0    2.32  )-----------( 112      ( 21 Jul 2022 06:35 )             24.2     07-22    137  |  104  |  94<H>  ----------------------------<  118<H>  4.0   |  18<L>  |  4.62<H>    Ca    9.6      22 Jul 2022 05:30  Phos  6.1     07-22  Mg     2.0     07-22        PT/INR - ( 20 Jul 2022 17:11 )   PT: 14.8 sec;   INR: 1.24          PTT - ( 21 Jul 2022 06:35 )  PTT:35.2 sec  Urinalysis Basic - ( 20 Jul 2022 18:20 )    Color: Yellow / Appearance: Clear / SG: <=1.005 / pH: x  Gluc: x / Ketone: NEGATIVE  / Bili: Negative / Urobili: 0.2 E.U./dL   Blood: x / Protein: NEGATIVE mg/dL / Nitrite: NEGATIVE   Leuk Esterase: NEGATIVE / RBC: < 5 /HPF / WBC < 5 /HPF   Sq Epi: x / Non Sq Epi: 0-5 /HPF / Bacteria: Present /HPF      Sodium, Random Urine: 36 mmol/L (07-21 @ 16:57)  Creatinine, Random Urine: 59 mg/dL (07-21 @ 16:57)  Protein/Creatinine Ratio Calculation: 0.8 Ratio (07-21 @ 16:57)        RADIOLOGY & ADDITIONAL STUDIES:           Patient is a 89y Female seen and evaluated at bedside. No acute distress and does not offer any complaints at this time. Patient to be managed medically for RLE PAD as there is a high risk that patient will need HD after receiving contrast. VSS. Kidney function       Meds:    acetaminophen     Tablet .. 650 every 6 hours PRN  aspirin  chewable 81 daily  atorvastatin 80 at bedtime  calcitriol   Capsule 0.5 daily  calcium carbonate   1250 mG (OsCal) 1 three times a day  carvedilol 25 every 12 hours  clopidogrel Tablet 75 daily  colchicine 0.3 daily  cyclobenzaprine 10 three times a day PRN  dextrose 5%. 1000 <Continuous>  dextrose 5%. 1000 <Continuous>  dextrose 50% Injectable 25 once  dextrose 50% Injectable 12.5 once  dextrose 50% Injectable 25 once  dextrose Oral Gel 15 once PRN  folic acid 1 daily  glucagon  Injectable 1 once  heparin   Injectable 5000 every 8 hours  insulin lispro (ADMELOG) corrective regimen sliding scale  Before meals and at bedtime  levothyroxine 100 daily  NIFEdipine XL 60 daily  oxycodone    5 mG/acetaminophen 325 mG 1 every 6 hours PRN  pantoprazole    Tablet 40 before breakfast  sevelamer carbonate 800 every 8 hours  sodium chloride 0.9%. 1000 <Continuous>  zolpidem 5 at bedtime PRN      T(C): , Max: 36.4 (07-21-22 @ 11:04)  T(F): , Max: 97.5 (07-21-22 @ 11:04)  HR: 62 (07-22-22 @ 06:07)  BP: 150/69 (07-22-22 @ 06:07)  BP(mean): --  RR: 18 (07-22-22 @ 06:07)  SpO2: 99% (07-22-22 @ 06:07)  Wt(kg): --    07-21 @ 07:01  -  07-22 @ 07:00  --------------------------------------------------------  IN: 0 mL / OUT: 900 mL / NET: -900 mL          Review of Systems:  ROS negative except as per HPI      PHYSICAL EXAM:  GENERAL: NAD  NECK: supple, No JVD  CHEST/LUNG: Clear to auscultation bilaterally  HEART: normal S1S2, RRR  ABDOMEN: Soft, Nontender, +BS, No flank tenderness bilateral  EXTREMITIES: No clubbing, cyanosis, or edema   NEUROLOGY: AAO x3, no focal neurological deficit        LABS:                        8.0    2.32  )-----------( 112      ( 21 Jul 2022 06:35 )             24.2     07-22    137  |  104  |  94<H>  ----------------------------<  118<H>  4.0   |  18<L>  |  4.62<H>    Ca    9.6      22 Jul 2022 05:30  Phos  6.1     07-22  Mg     2.0     07-22        PT/INR - ( 20 Jul 2022 17:11 )   PT: 14.8 sec;   INR: 1.24          PTT - ( 21 Jul 2022 06:35 )  PTT:35.2 sec  Urinalysis Basic - ( 20 Jul 2022 18:20 )    Color: Yellow / Appearance: Clear / SG: <=1.005 / pH: x  Gluc: x / Ketone: NEGATIVE  / Bili: Negative / Urobili: 0.2 E.U./dL   Blood: x / Protein: NEGATIVE mg/dL / Nitrite: NEGATIVE   Leuk Esterase: NEGATIVE / RBC: < 5 /HPF / WBC < 5 /HPF   Sq Epi: x / Non Sq Epi: 0-5 /HPF / Bacteria: Present /HPF      Sodium, Random Urine: 36 mmol/L (07-21 @ 16:57)  Creatinine, Random Urine: 59 mg/dL (07-21 @ 16:57)  Protein/Creatinine Ratio Calculation: 0.8 Ratio (07-21 @ 16:57)        RADIOLOGY & ADDITIONAL STUDIES:           Patient is a 89y Female seen and evaluated at bedside. No acute distress and does not offer any complaints at this time. Patient to be managed medically for RLE PAD as there is a high risk that patient will need HD after receiving contrast. VSS. Kidney function       Meds:    acetaminophen     Tablet .. 650 every 6 hours PRN  aspirin  chewable 81 daily  atorvastatin 80 at bedtime  calcitriol   Capsule 0.5 daily  calcium carbonate   1250 mG (OsCal) 1 three times a day  carvedilol 25 every 12 hours  clopidogrel Tablet 75 daily  colchicine 0.3 daily  cyclobenzaprine 10 three times a day PRN  dextrose 5%. 1000 <Continuous>  dextrose 5%. 1000 <Continuous>  dextrose 50% Injectable 25 once  dextrose 50% Injectable 12.5 once  dextrose 50% Injectable 25 once  dextrose Oral Gel 15 once PRN  folic acid 1 daily  glucagon  Injectable 1 once  heparin   Injectable 5000 every 8 hours  insulin lispro (ADMELOG) corrective regimen sliding scale  Before meals and at bedtime  levothyroxine 100 daily  NIFEdipine XL 60 daily  oxycodone    5 mG/acetaminophen 325 mG 1 every 6 hours PRN  pantoprazole    Tablet 40 before breakfast  sevelamer carbonate 800 every 8 hours  sodium chloride 0.9%. 1000 <Continuous>  zolpidem 5 at bedtime PRN      T(C): , Max: 36.4 (07-21-22 @ 11:04)  T(F): , Max: 97.5 (07-21-22 @ 11:04)  HR: 62 (07-22-22 @ 06:07)  BP: 150/69 (07-22-22 @ 06:07)  BP(mean): --  RR: 18 (07-22-22 @ 06:07)  SpO2: 99% (07-22-22 @ 06:07)  Wt(kg): --    07-21 @ 07:01  -  07-22 @ 07:00  --------------------------------------------------------  IN: 0 mL / OUT: 900 mL / NET: -900 mL          Review of Systems:  ROS negative except as per HPI      PHYSICAL EXAM:  GENERAL: NAD, resting comfortably in bed   NECK: supple, No JVD  CHEST/LUNG: Clear to auscultation bilaterally, no crackles, wheezing or rales appreciated   HEART: normal S1S2, RRR  ABDOMEN: Soft, Nontender, +BS, No flank tenderness bilateral  EXTREMITIES: No clubbing, cyanosis, or edema , RLE cold to touch and decreased sensation on right.    NEUROLOGY: Awake, following commands,  no focal neurological deficit  Skin: No rashes or wounds visible        LABS:                        8.0    2.32  )-----------( 112      ( 21 Jul 2022 06:35 )             24.2     07-22    137  |  104  |  94<H>  ----------------------------<  118<H>  4.0   |  18<L>  |  4.62<H>    Ca    9.6      22 Jul 2022 05:30  Phos  6.1     07-22  Mg     2.0     07-22        PT/INR - ( 20 Jul 2022 17:11 )   PT: 14.8 sec;   INR: 1.24          PTT - ( 21 Jul 2022 06:35 )  PTT:35.2 sec  Urinalysis Basic - ( 20 Jul 2022 18:20 )    Color: Yellow / Appearance: Clear / SG: <=1.005 / pH: x  Gluc: x / Ketone: NEGATIVE  / Bili: Negative / Urobili: 0.2 E.U./dL   Blood: x / Protein: NEGATIVE mg/dL / Nitrite: NEGATIVE   Leuk Esterase: NEGATIVE / RBC: < 5 /HPF / WBC < 5 /HPF   Sq Epi: x / Non Sq Epi: 0-5 /HPF / Bacteria: Present /HPF      Sodium, Random Urine: 36 mmol/L (07-21 @ 16:57)  Creatinine, Random Urine: 59 mg/dL (07-21 @ 16:57)  Protein/Creatinine Ratio Calculation: 0.8 Ratio (07-21 @ 16:57)        RADIOLOGY & ADDITIONAL STUDIES:           Patient is a 89y Female seen and evaluated at bedside. No acute distress and does not offer any complaints at this time. Patient to be managed medically for RLE PAD as there is a high risk that patient will need HD after receiving contrast. VSS. Kidney function       Meds:    acetaminophen     Tablet .. 650 every 6 hours PRN  aspirin  chewable 81 daily  atorvastatin 80 at bedtime  calcitriol   Capsule 0.5 daily  calcium carbonate   1250 mG (OsCal) 1 three times a day  carvedilol 25 every 12 hours  clopidogrel Tablet 75 daily  colchicine 0.3 daily  cyclobenzaprine 10 three times a day PRN  dextrose 5%. 1000 <Continuous>  dextrose 5%. 1000 <Continuous>  dextrose 50% Injectable 25 once  dextrose 50% Injectable 12.5 once  dextrose 50% Injectable 25 once  dextrose Oral Gel 15 once PRN  folic acid 1 daily  glucagon  Injectable 1 once  heparin   Injectable 5000 every 8 hours  insulin lispro (ADMELOG) corrective regimen sliding scale  Before meals and at bedtime  levothyroxine 100 daily  NIFEdipine XL 60 daily  oxycodone    5 mG/acetaminophen 325 mG 1 every 6 hours PRN  pantoprazole    Tablet 40 before breakfast  sevelamer carbonate 800 every 8 hours  sodium chloride 0.9%. 1000 <Continuous>  zolpidem 5 at bedtime PRN      T(C): , Max: 36.4 (07-21-22 @ 11:04)  T(F): , Max: 97.5 (07-21-22 @ 11:04)  HR: 62 (07-22-22 @ 06:07)  BP: 150/69 (07-22-22 @ 06:07)  BP(mean): --  RR: 18 (07-22-22 @ 06:07)  SpO2: 99% (07-22-22 @ 06:07)  Wt(kg): --    07-21 @ 07:01  -  07-22 @ 07:00  --------------------------------------------------------  IN: 0 mL / OUT: 900 mL / NET: -900 mL          Review of Systems:  ROS negative except as per HPI      PHYSICAL EXAM:  GENERAL: NAD, resting comfortably in bed   NECK: supple, No JVD  CHEST/LUNG: Clear to auscultation bilaterally, no crackles, wheezing or rales appreciated   HEART: normal S1S2, RRR  ABDOMEN: Soft, Nontender, +BS, No flank tenderness bilateral  EXTREMITIES: No clubbing, cyanosis, or edema , RLE cold to touch and decreased sensation on right.    NEUROLOGY: Awake, following commands,  no focal neurological deficit  Skin: No rashes or wounds visible        LABS:                        8.0    2.32  )-----------( 112      ( 21 Jul 2022 06:35 )             24.2     07-22    137  |  104  |  94<H>  ----------------------------<  118<H>  4.0   |  18<L>  |  4.62<H>    Ca    9.6      22 Jul 2022 05:30  Phos  6.1     07-22  Mg     2.0     07-22        PT/INR - ( 20 Jul 2022 17:11 )   PT: 14.8 sec;   INR: 1.24          PTT - ( 21 Jul 2022 06:35 )  PTT:35.2 sec  Urinalysis Basic - ( 20 Jul 2022 18:20 )    Color: Yellow / Appearance: Clear / SG: <=1.005 / pH: x  Gluc: x / Ketone: NEGATIVE  / Bili: Negative / Urobili: 0.2 E.U./dL   Blood: x / Protein: NEGATIVE mg/dL / Nitrite: NEGATIVE   Leuk Esterase: NEGATIVE / RBC: < 5 /HPF / WBC < 5 /HPF   Sq Epi: x / Non Sq Epi: 0-5 /HPF / Bacteria: Present /HPF      Sodium, Random Urine: 36 mmol/L (07-21 @ 16:57)  Creatinine, Random Urine: 59 mg/dL (07-21 @ 16:57)  Protein/Creatinine Ratio Calculation: 0.8 Ratio (07-21 @ 16:57)        RADIOLOGY & ADDITIONAL STUDIES:      Creatinine, Serum: 4.62 mg/dL (07-22-22 @ 05:30)  Creatinine, Serum: 4.52 mg/dL (07-21-22 @ 06:35)  Creatinine, Serum: 4.45 mg/dL (07-20-22 @ 17:10)  Creatinine, Serum: 3.50 mg/dL (02-12-22 @ 07:51)  Creatinine, Serum: 3.61 mg/dL (02-11-22 @ 06:53)  Creatinine, Serum: 3.38 mg/dL (02-10-22 @ 14:11)  Creatinine, Serum: 5.62 mg/dL (06-15-21 @ 08:18)  Creatinine, Serum: 5.42 mg/dL (06-14-21 @ 06:21)  Creatinine, Serum: 4.73 mg/dL (06-13-21 @ 08:10)  Creatinine, Serum: 4.28 mg/dL (06-12-21 @ 07:47)

## 2022-07-26 DIAGNOSIS — E78.5 HYPERLIPIDEMIA, UNSPECIFIED: ICD-10-CM

## 2022-07-26 DIAGNOSIS — I50.32 CHRONIC DIASTOLIC (CONGESTIVE) HEART FAILURE: ICD-10-CM

## 2022-07-26 DIAGNOSIS — I13.2 HYPERTENSIVE HEART AND CHRONIC KIDNEY DISEASE WITH HEART FAILURE AND WITH STAGE 5 CHRONIC KIDNEY DISEASE, OR END STAGE RENAL DISEASE: ICD-10-CM

## 2022-07-26 DIAGNOSIS — N17.9 ACUTE KIDNEY FAILURE, UNSPECIFIED: ICD-10-CM

## 2022-07-26 DIAGNOSIS — Z53.09 PROCEDURE AND TREATMENT NOT CARRIED OUT BECAUSE OF OTHER CONTRAINDICATION: ICD-10-CM

## 2022-07-26 DIAGNOSIS — N18.5 CHRONIC KIDNEY DISEASE, STAGE 5: ICD-10-CM

## 2022-07-26 DIAGNOSIS — I70.221 ATHEROSCLEROSIS OF NATIVE ARTERIES OF EXTREMITIES WITH REST PAIN, RIGHT LEG: ICD-10-CM

## 2022-07-26 DIAGNOSIS — D63.1 ANEMIA IN CHRONIC KIDNEY DISEASE: ICD-10-CM

## 2022-07-26 DIAGNOSIS — E11.51 TYPE 2 DIABETES MELLITUS WITH DIABETIC PERIPHERAL ANGIOPATHY WITHOUT GANGRENE: ICD-10-CM

## 2022-07-26 DIAGNOSIS — Z95.0 PRESENCE OF CARDIAC PACEMAKER: ICD-10-CM

## 2022-07-26 DIAGNOSIS — Z79.82 LONG TERM (CURRENT) USE OF ASPIRIN: ICD-10-CM

## 2022-07-26 DIAGNOSIS — E03.9 HYPOTHYROIDISM, UNSPECIFIED: ICD-10-CM

## 2022-08-20 ENCOUNTER — EMERGENCY (EMERGENCY)
Facility: HOSPITAL | Age: 87
LOS: 1 days | Discharge: HOME | End: 2022-08-20
Attending: EMERGENCY MEDICINE | Admitting: EMERGENCY MEDICINE
Payer: MEDICARE

## 2022-08-20 VITALS
OXYGEN SATURATION: 97 % | SYSTOLIC BLOOD PRESSURE: 123 MMHG | HEIGHT: 61 IN | HEART RATE: 60 BPM | TEMPERATURE: 98 F | DIASTOLIC BLOOD PRESSURE: 73 MMHG | RESPIRATION RATE: 18 BRPM

## 2022-08-20 VITALS
TEMPERATURE: 98 F | OXYGEN SATURATION: 100 % | HEART RATE: 65 BPM | RESPIRATION RATE: 18 BRPM | DIASTOLIC BLOOD PRESSURE: 63 MMHG | SYSTOLIC BLOOD PRESSURE: 144 MMHG

## 2022-08-20 DIAGNOSIS — I13.2 HYPERTENSIVE HEART AND CHRONIC KIDNEY DISEASE WITH HEART FAILURE AND WITH STAGE 5 CHRONIC KIDNEY DISEASE, OR END STAGE RENAL DISEASE: ICD-10-CM

## 2022-08-20 DIAGNOSIS — N18.5 CHRONIC KIDNEY DISEASE, STAGE 5: ICD-10-CM

## 2022-08-20 DIAGNOSIS — M79.661 PAIN IN RIGHT LOWER LEG: ICD-10-CM

## 2022-08-20 DIAGNOSIS — I25.10 ATHEROSCLEROTIC HEART DISEASE OF NATIVE CORONARY ARTERY WITHOUT ANGINA PECTORIS: ICD-10-CM

## 2022-08-20 DIAGNOSIS — Z79.02 LONG TERM (CURRENT) USE OF ANTITHROMBOTICS/ANTIPLATELETS: ICD-10-CM

## 2022-08-20 DIAGNOSIS — Z95.820 PERIPHERAL VASCULAR ANGIOPLASTY STATUS WITH IMPLANTS AND GRAFTS: Chronic | ICD-10-CM

## 2022-08-20 DIAGNOSIS — E78.5 HYPERLIPIDEMIA, UNSPECIFIED: ICD-10-CM

## 2022-08-20 DIAGNOSIS — Z20.822 CONTACT WITH AND (SUSPECTED) EXPOSURE TO COVID-19: ICD-10-CM

## 2022-08-20 DIAGNOSIS — E11.22 TYPE 2 DIABETES MELLITUS WITH DIABETIC CHRONIC KIDNEY DISEASE: ICD-10-CM

## 2022-08-20 DIAGNOSIS — E03.9 HYPOTHYROIDISM, UNSPECIFIED: ICD-10-CM

## 2022-08-20 DIAGNOSIS — E11.40 TYPE 2 DIABETES MELLITUS WITH DIABETIC NEUROPATHY, UNSPECIFIED: ICD-10-CM

## 2022-08-20 DIAGNOSIS — Z95.0 PRESENCE OF CARDIAC PACEMAKER: Chronic | ICD-10-CM

## 2022-08-20 DIAGNOSIS — I50.32 CHRONIC DIASTOLIC (CONGESTIVE) HEART FAILURE: ICD-10-CM

## 2022-08-20 DIAGNOSIS — Z95.820 PERIPHERAL VASCULAR ANGIOPLASTY STATUS WITH IMPLANTS AND GRAFTS: ICD-10-CM

## 2022-08-20 DIAGNOSIS — Z79.82 LONG TERM (CURRENT) USE OF ASPIRIN: ICD-10-CM

## 2022-08-20 DIAGNOSIS — Z95.0 PRESENCE OF CARDIAC PACEMAKER: ICD-10-CM

## 2022-08-20 DIAGNOSIS — E11.51 TYPE 2 DIABETES MELLITUS WITH DIABETIC PERIPHERAL ANGIOPATHY WITHOUT GANGRENE: ICD-10-CM

## 2022-08-20 DIAGNOSIS — D63.1 ANEMIA IN CHRONIC KIDNEY DISEASE: ICD-10-CM

## 2022-08-20 LAB
ALBUMIN SERPL ELPH-MCNC: 3.5 G/DL — SIGNIFICANT CHANGE UP (ref 3.3–5)
ALP SERPL-CCNC: 73 U/L — SIGNIFICANT CHANGE UP (ref 40–120)
ALT FLD-CCNC: 8 U/L — LOW (ref 10–45)
ANION GAP SERPL CALC-SCNC: 15 MMOL/L — SIGNIFICANT CHANGE UP (ref 5–17)
ANISOCYTOSIS BLD QL: SIGNIFICANT CHANGE UP
APTT BLD: 31.1 SEC — SIGNIFICANT CHANGE UP (ref 27.5–35.5)
AST SERPL-CCNC: 12 U/L — SIGNIFICANT CHANGE UP (ref 10–40)
BASOPHILS # BLD AUTO: 0.02 K/UL — SIGNIFICANT CHANGE UP (ref 0–0.2)
BASOPHILS NFR BLD AUTO: 0.8 % — SIGNIFICANT CHANGE UP (ref 0–2)
BILIRUB SERPL-MCNC: 0.3 MG/DL — SIGNIFICANT CHANGE UP (ref 0.2–1.2)
BLASTS # FLD: 0.8 % — HIGH (ref 0–0)
BUN SERPL-MCNC: 68 MG/DL — HIGH (ref 7–23)
CALCIUM SERPL-MCNC: 10.6 MG/DL — HIGH (ref 8.4–10.5)
CHLORIDE SERPL-SCNC: 96 MMOL/L — SIGNIFICANT CHANGE UP (ref 96–108)
CO2 SERPL-SCNC: 21 MMOL/L — LOW (ref 22–31)
CREAT SERPL-MCNC: 4.02 MG/DL — HIGH (ref 0.5–1.3)
EGFR: 10 ML/MIN/1.73M2 — LOW
EOSINOPHIL # BLD AUTO: 0.04 K/UL — SIGNIFICANT CHANGE UP (ref 0–0.5)
EOSINOPHIL NFR BLD AUTO: 1.6 % — SIGNIFICANT CHANGE UP (ref 0–6)
GIANT PLATELETS BLD QL SMEAR: PRESENT — SIGNIFICANT CHANGE UP
GLUCOSE SERPL-MCNC: 138 MG/DL — HIGH (ref 70–99)
HCT VFR BLD CALC: 23.5 % — LOW (ref 34.5–45)
HGB BLD-MCNC: 7.8 G/DL — LOW (ref 11.5–15.5)
INR BLD: 1.56 — HIGH (ref 0.88–1.16)
LYMPHOCYTES # BLD AUTO: 1.27 K/UL — SIGNIFICANT CHANGE UP (ref 1–3.3)
LYMPHOCYTES # BLD AUTO: 46.8 % — HIGH (ref 13–44)
MACROCYTES BLD QL: SLIGHT — SIGNIFICANT CHANGE UP
MANUAL SMEAR VERIFICATION: SIGNIFICANT CHANGE UP
MCHC RBC-ENTMCNC: 31.5 PG — SIGNIFICANT CHANGE UP (ref 27–34)
MCHC RBC-ENTMCNC: 33.2 GM/DL — SIGNIFICANT CHANGE UP (ref 32–36)
MCV RBC AUTO: 94.8 FL — SIGNIFICANT CHANGE UP (ref 80–100)
METAMYELOCYTES # FLD: 0.8 % — HIGH (ref 0–0)
MONOCYTES # BLD AUTO: 0.26 K/UL — SIGNIFICANT CHANGE UP (ref 0–0.9)
MONOCYTES NFR BLD AUTO: 9.7 % — SIGNIFICANT CHANGE UP (ref 2–14)
MYELOCYTES NFR BLD: 2.4 % — HIGH (ref 0–0)
NEUTROPHILS # BLD AUTO: 0.97 K/UL — LOW (ref 1.8–7.4)
NEUTROPHILS NFR BLD AUTO: 31.5 % — LOW (ref 43–77)
NEUTS BAND # BLD: 4 % — SIGNIFICANT CHANGE UP (ref 0–8)
OVALOCYTES BLD QL SMEAR: SLIGHT — SIGNIFICANT CHANGE UP
PLAT MORPH BLD: ABNORMAL
PLATELET # BLD AUTO: 90 K/UL — LOW (ref 150–400)
POIKILOCYTOSIS BLD QL AUTO: SLIGHT — SIGNIFICANT CHANGE UP
POLYCHROMASIA BLD QL SMEAR: SLIGHT — SIGNIFICANT CHANGE UP
POTASSIUM SERPL-MCNC: 4.3 MMOL/L — SIGNIFICANT CHANGE UP (ref 3.5–5.3)
POTASSIUM SERPL-SCNC: 4.3 MMOL/L — SIGNIFICANT CHANGE UP (ref 3.5–5.3)
PROT SERPL-MCNC: 7.5 G/DL — SIGNIFICANT CHANGE UP (ref 6–8.3)
PROTHROM AB SERPL-ACNC: 18.6 SEC — HIGH (ref 10.5–13.4)
RBC # BLD: 2.48 M/UL — LOW (ref 3.8–5.2)
RBC # FLD: 21.1 % — HIGH (ref 10.3–14.5)
RBC BLD AUTO: ABNORMAL
SARS-COV-2 RNA SPEC QL NAA+PROBE: NEGATIVE — SIGNIFICANT CHANGE UP
SODIUM SERPL-SCNC: 132 MMOL/L — LOW (ref 135–145)
SPHEROCYTES BLD QL SMEAR: SLIGHT — SIGNIFICANT CHANGE UP
VARIANT LYMPHS # BLD: 1.6 % — SIGNIFICANT CHANGE UP (ref 0–6)
WBC # BLD: 2.72 K/UL — LOW (ref 3.8–10.5)
WBC # FLD AUTO: 2.72 K/UL — LOW (ref 3.8–10.5)

## 2022-08-20 PROCEDURE — 80053 COMPREHEN METABOLIC PANEL: CPT

## 2022-08-20 PROCEDURE — 93926 LOWER EXTREMITY STUDY: CPT

## 2022-08-20 PROCEDURE — 99284 EMERGENCY DEPT VISIT MOD MDM: CPT

## 2022-08-20 PROCEDURE — 85025 COMPLETE CBC W/AUTO DIFF WBC: CPT

## 2022-08-20 PROCEDURE — 87635 SARS-COV-2 COVID-19 AMP PRB: CPT

## 2022-08-20 PROCEDURE — 85730 THROMBOPLASTIN TIME PARTIAL: CPT

## 2022-08-20 PROCEDURE — 99284 EMERGENCY DEPT VISIT MOD MDM: CPT | Mod: 25

## 2022-08-20 PROCEDURE — 85610 PROTHROMBIN TIME: CPT

## 2022-08-20 PROCEDURE — 36415 COLL VENOUS BLD VENIPUNCTURE: CPT

## 2022-08-20 PROCEDURE — 93926 LOWER EXTREMITY STUDY: CPT | Mod: 26,RT

## 2022-08-20 RX ORDER — ACETAMINOPHEN 500 MG
975 TABLET ORAL ONCE
Refills: 0 | Status: COMPLETED | OUTPATIENT
Start: 2022-08-20 | End: 2022-08-20

## 2022-08-20 RX ADMIN — Medication 975 MILLIGRAM(S): at 20:45

## 2022-08-20 RX ADMIN — Medication 975 MILLIGRAM(S): at 21:15

## 2022-08-20 NOTE — CONSULT NOTE ADULT - SUBJECTIVE AND OBJECTIVE BOX
Vascular Attending:  Mo      HPI:  89 yo PMHx of CKD, DM, HFpEF (EF 70% 2021, grade 1 diastolic dysfunction), HTN, HLD, CAD, hypothyroidism, PAD w complaints of worsening RLE pain.  s/p R UBF-mb-xrrbt-knee-Pop stenting 6/2021 and balloon angioplasty of 75% in-stent stenosis 2/2022.    Vascular Addendum:  89 yo PMH of CKD V, DM, HFpEF (EF 70% 2021, grade 1 diastolic dysfunction), HTN, HLD, CAD, hypothyroidism, PSH R WFX-ha-uwkap-knee-Pop stenting 6/2021 and balloon angioplasty of 75% in-stent stenosis 2/2022 with biphasic pedal signals at end of case. Last admitted here at [facility] on 7/20/22  due to RLE pain and was planned for angiogram. angiogram cancelled due to CKD 5 and likely need for patient to start dialysis. Presents today with 3 days of worsening RLE pain. She lives alone at home, but has a home attendant to assist her. She is able to ambulate with walker, but has been unable to do so due to pain. She currently denies any CP, SOB, fevers or chills.        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      Diabetes      Diabetes mellitus with diabetic neuropathy      Chronic diastolic congestive heart failure      Hypertension      Hyperlipidemia      CAD (coronary artery disease)      Hypothyroidism      Pacemaker      S/P peripheral artery angioplasty with stent placement  6/2021 - SFA to BK Pop stenting (Zilver PTX 6x80mm, Zilver 518 5x80, Zilver 518 5x80 (proximal to distal)); 2/2022 - balloon angioplasty for in-stent stenosis          REVIEW OF SYSTEMS  Negative except per HPI    MEDICATIONS  (STANDING):    MEDICATIONS  (PRN):      Allergies    No Known Allergies    Intolerances        SOCIAL HISTORY:    FAMILY HISTORY:      Vital Signs Last 24 Hrs  T(C): 36.7 (20 Aug 2022 15:09), Max: 36.7 (20 Aug 2022 15:09)  T(F): 98 (20 Aug 2022 15:09), Max: 98 (20 Aug 2022 15:09)  HR: 60 (20 Aug 2022 15:09) (60 - 60)  BP: 123/73 (20 Aug 2022 15:09) (123/73 - 123/73)  BP(mean): --  RR: 18 (20 Aug 2022 15:09) (18 - 18)  SpO2: 97% (20 Aug 2022 15:09) (97% - 97%)    Parameters below as of 20 Aug 2022 15:09  Patient On (Oxygen Delivery Method): room air        PHYSICAL EXAM:    CONSTITUTIONAL: Awake, alert and in no apparent distress.  CARDIAC: Normal rate, regular rhythm.  Heart sounds S1, S2.   RESPIRATORY: Breath sounds clear and equal bilaterally. no tachypnea, respiratory distress.   GASTROINTESTINAL: Abdomen soft, non-tender, no guarding, distension.  MUSCULOSKELETAL: No edema in bilateral LE's. Leg compartments soft. No open wounds   VASCULAR: R: Palp Femoral, Bi/triphasic popliteal, biphasic DP + PT. L: palpable femoral, palpable popliteal, biphasic DP + PT  NEUROLOGICAL: Alert, no focal deficits, no motor or sensory deficits.  SKIN: Skin normal color for race, warm, dry and intact. No evidence of rash.  PSYCHIATRIC: Normal mood and affect. no apparent risk to self or others.        LABS:                        7.8    2.72  )-----------( 90       ( 20 Aug 2022 16:07 )             23.5     08-20    132<L>  |  96  |  68<H>  ----------------------------<  138<H>  4.3   |  21<L>  |  4.02<H>    Ca    10.6<H>      20 Aug 2022 16:07    TPro  7.5  /  Alb  3.5  /  TBili  0.3  /  DBili  x   /  AST  12  /  ALT  8<L>  /  AlkPhos  73  08-20    PT/INR - ( 20 Aug 2022 16:07 )   PT: 18.6 sec;   INR: 1.56          PTT - ( 20 Aug 2022 16:07 )  PTT:31.1 sec      RADIOLOGY & ADDITIONAL STUDIES

## 2022-08-20 NOTE — ED PROVIDER NOTE - PHYSICAL EXAMINATION
CONSTITUTIONAL: Awake, alert and in no apparent distress.  HEENT: Head is atraumatic. Eyes clear bilaterally, normal EOMI. Airway patent.  CARDIAC: Normal rate, regular rhythm.  Heart sounds S1, S2.   RESPIRATORY: Breath sounds clear and equal bilaterally. no tachypnea, respiratory distress.   GASTROINTESTINAL: Abdomen soft, non-tender, no guarding, distension.  MUSCULOSKELETAL: Spine appears normal, no midline spinal tenderness, range of motion is not limited, no muscle or joint tenderness. no bony tenderness.   NEUROLOGICAL: Alert, no focal deficits, no motor or sensory deficits.  SKIN: Skin normal color for race, warm, dry and intact. No evidence of rash.  PSYCHIATRIC: Normal mood and affect. no apparent risk to self or others. CONSTITUTIONAL: Awake, alert and in no apparent distress.  HEENT: Head is atraumatic. Eyes clear bilaterally, normal EOMI. Airway patent.  CARDIAC: Normal rate, regular rhythm.  Heart sounds S1, S2.   RESPIRATORY: Breath sounds clear and equal bilaterally. no tachypnea, respiratory distress.   GASTROINTESTINAL: Abdomen soft, non-tender, no guarding, distension.  MUSCULOSKELETAL: Spine appears normal, no midline spinal tenderness, range of motion is not limited, no muscle or joint tenderness. no bony tenderness.   Vasc: in conjunction with vasc exam; R: Palp Femoral, Bi/triphasic popliteal, biphasic DP + PT. L: palpable femoral, palpable popliteal, biphasic DP + PT  NEUROLOGICAL: Alert, no focal deficits, no motor or sensory deficits.  SKIN: Skin normal color for race, warm, dry and intact. No evidence of rash.  PSYCHIATRIC: Normal mood and affect. no apparent risk to self or others.

## 2022-08-20 NOTE — ED PROVIDER NOTE - CARE PROVIDER_API CALL
Christopher Hassan)  Surgery; Vascular Surgery  [address], [place] [address]  Phone: [phone]  Fax: [phone]  Follow Up Time:

## 2022-08-20 NOTE — ED ADULT NURSE NOTE - OBJECTIVE STATEMENT
Received 89y Female c/o RLE pain and redness x 3 days. Pt is A&O x3, primarily Tajik speaker, ambulatory with walker at baseline. Used language line  [place], [place] [id number]. Pt s/p RLE stent 2 months ago, as per pt stents were not working and 1 week ago was supposed to get procedure to "insert balloons" but was not able to get it d/t "kidney problems". Erythema noted to RLE, pulses present but cool to touch compared to LLE. Pt denies CP, SOB, dizziness, F/C, N/V/D.

## 2022-08-20 NOTE — ED ADULT NURSE NOTE - ISAR MEMORY
Patient : Sebastien Mchugh Age: 54 year old Sex: male   MRN: [id number] Encounter Date: 2/23/2020      History     Chief Complaint   Patient presents with   • Abdominal Pain     HPI  2:06 PM Sebastien Mchugh is a 54 year old male with PMH HOCM, PE/DVT one year ago on Eliquis who presents to the ED c/o sudden onset pelvic pain today while out shopping. He describes the pain as \"sharp\" and notes bloated feeling in his pelvis. The pain radiates to both testicles, L>R. He has had no fever, chills, N/V, changes in bowel habits, dysuria, hematuria. Notes that after he voids he feels like he could void again. He has never had similar pain before. H/o gastric bypass which had to be followed with keo-en-Y. Has problems with absorption now. He has close f/u with GI team.     Also for the past week he has had occasional dizzy spells with associated diaphoresis, chills, hot flashes, and some vomiting. These sx began around the time he stopped trazodone which he had been taking for sleep.  Denies any new cardiac meds or changes with his cardiac regimen. He is compliant with his cardiac meds. EF 11/2019 was 67%.       PCP: Mor Acosta MD          Allergies   Allergen Reactions   • Morphine NAUSEA       Current Discharge Medication List      Prior to Admission Medications    Details   diphenoxylate-atropine (LOMOTIL) 2.5-0.025 MG tablet Take 1 tablet by mouth 2 times daily as needed for Diarrhea.  Qty: 60 tablet, Refills: 3      DULoxetine HCl (CYMBALTA PO) Take by mouth nightly.      Eszopiclone 3 MG tablet Take 1 tablet by mouth at bedtime as needed for Sleep.  Qty: 30 tablet, Refills: 3      traMADol (ULTRAM) 50 MG tablet Take 1 tablet by mouth every 8 hours as needed for Pain.  Qty: 90 tablet, Refills: 1      tiZANidine (ZANAFLEX) 2 MG tablet Take 1 tablet by mouth every 8 hours as needed for Muscle spasms.  Qty: 60 tablet, Refills: 3      diSOpyramiDe (NORPACE) 150 MG capsule TAKE 1 CAPSULE THREE TIMES A  DAY  Qty: 270 capsule, Refills: 3      metoPROLOL succinate (TOPROL-XL) 25 MG 24 hr tablet TAKE 1 TABLET TWICE A DAY  Qty: 180 tablet, Refills: 3      apixaBAN (ELIQUIS) 5 MG Tab Take 1 tablet by mouth every 12 hours.  Qty: 180 tablet, Refills: 3      omeprazole (PRILOSEC) 40 MG capsule TAKE 1 CAPSULE DAILY  Qty: 90 capsule, Refills: 2      rOPINIRole (REQUIP) 1 MG tablet TAKE 1 TABLET NIGHTLY  Qty: 90 tablet, Refills: 2      cholecalciferol (VITAMIN D3) 1000 UNITS tablet Take 1,000 Units by mouth daily.      fluticasone (FLONASE) 50 MCG/ACT nasal spray Spray 2 sprays in each nostril daily.  Qty: 16 g, Refills: 0      Cyanocobalamin (B12 FAST DISSOLVE PO) Take 1 tablet by mouth daily.      aspirin 81 MG tablet Take 81 mg by mouth daily.             Current Discharge Medication List      New Prescriptions    Details   sulfamethoxazole-trimethoprim (BACTRIM DS) 800-160 MG per tablet Take 1 tablet by mouth 2 times daily.  Qty: 20 tablet, Refills: 0      HYDROcodone-acetaminophen (NORCO) 5-325 MG per tablet Take 1-2 tablets by mouth every 4 hours as needed for Pain.  Qty: 12 tablet, Refills: 0             Past Medical History:   Diagnosis Date   • Anxiety    • B12 deficiency    • Benign carcinoid tumor of stomach    • Depression    • Essential (primary) hypertension     not on medication any more 02/18   • History of pulmonary embolism    • IHSS (idiopathic hypertrophic subaortic stenosis) (CMS/HCC)    • Lupus anticoagulant disorder (CMS/HCC)     Right arm   • Raynaud's disease        Past Surgical History:   Procedure Laterality Date   • ABDOMEN SURGERY      partial gastrectomy   • GASTRECTOMY  6/2013   • GASTRECTOMY  6/2013   • HB IVC RETRIEVE W/FLUORO/S&I  2/21/14   • SERVICE TO GASTROENTEROLOGY      endoscopy   • SHOULDER SURGERY      right   • TYMPANOSTOMY TUBE PLACEMENT         Family History   Problem Relation Age of Onset   • Asthma Mother    • Diabetes Mother    • High blood pressure Mother    • Heart disease  Father    • Hypertrophic Cardiomyopathy Father         had surgery for HOCM    • Heart disease Brother         had surgery for HOCM and has ICD   • Hypertrophic Cardiomyopathy Brother    • Hypertrophic Cardiomyopathy Sister         had surgery for HOCM and has ICD   • Hypertrophic Cardiomyopathy Brother         is 1/2 brother. Had surgery for HOCM       Social History     Tobacco Use   • Smoking status: Never Smoker   • Smokeless tobacco: Never Used   Substance Use Topics   • Alcohol use: No     Alcohol/week: 0.0 standard drinks     Frequency: Never     Drinks per session: 1 or 2     Binge frequency: Never   • Drug use: No       Review of Systems   Constitutional: Positive for diaphoresis. Negative for appetite change, chills and fever.   HENT: Negative for congestion and rhinorrhea.    Eyes: Negative for photophobia and visual disturbance.   Respiratory: Negative for shortness of breath and wheezing.    Cardiovascular: Negative for chest pain, palpitations and leg swelling.   Gastrointestinal: Positive for abdominal pain and nausea. Negative for constipation, diarrhea and vomiting.   Genitourinary: Positive for frequency, scrotal swelling and testicular pain. Negative for difficulty urinating, dysuria, flank pain, hematuria, penile pain and urgency.   Musculoskeletal: Negative for arthralgias and back pain.   Skin: Negative for color change and wound.   Neurological: Positive for dizziness and light-headedness. Negative for weakness.       Physical Exam     ED Triage Vitals [02/23/20 1327]   ED Triage Vitals Group      Temp 97.6 °F (36.4 °C)      Heart Rate 95      Resp 18      BP (!) 151/88      SpO2 94 %      EtCO2 mmHg       Height       Weight 164 lb 10.9 oz (74.7 kg)      Weight Scale Used       BMI (Calculated)       IBW/kg (Calculated)        Physical Exam   Constitutional: He is oriented to person, place, and time. He appears well-developed and well-nourished. No distress.   HENT:   Head: Normocephalic and  atraumatic.   Right Ear: External ear normal.   Left Ear: External ear normal.   Eyes: Pupils are equal, round, and reactive to light. Right eye exhibits no discharge. Left eye exhibits no discharge.   Neck: Normal range of motion. Neck supple.   Cardiovascular: Normal rate, regular rhythm and intact distal pulses.   No murmur heard.  Pulmonary/Chest: Effort normal and breath sounds normal. No respiratory distress. He has no wheezes. He has no rales.   Abdominal: Soft. Bowel sounds are normal. There is tenderness in the suprapubic area. There is no rebound, no CVA tenderness, no tenderness at McBurney's point and negative Urbina's sign. No hernia. Hernia confirmed negative in the right inguinal area and confirmed negative in the left inguinal area.   Genitourinary:    Penis normal.   Right testis shows tenderness (generalized tenderness of scrotum). Right testis shows no mass. Right testis is descended. Left testis shows swelling and tenderness (especially of epididymis but generalized discomfort of scrotum). Left testis shows no mass. Left testis is descended. Circumcised. Musculoskeletal: Normal range of motion.         General: No edema.     Neurological: He is alert and oriented to person, place, and time.   Skin: Skin is warm and dry. No rash noted. No erythema. No pallor.   Vitals reviewed.      ED Course     Procedures    Lab Results     Results for orders placed or performed during the hospital encounter of 02/23/20   Basic Metabolic Panel   Result Value Ref Range    Sodium 140 135 - 145 mmol/L    Potassium 3.4 3.4 - 5.1 mmol/L    Chloride 103 98 - 107 mmol/L    Carbon Dioxide 25 21 - 32 mmol/L    Anion Gap 15 10 - 20 mmol/L    Glucose 101 (H) 65 - 99 mg/dL    BUN 14 6 - 20 mg/dL    Creatinine 0.93 0.67 - 1.17 mg/dL    Glomerular Filtration Rate >90 >90    BUN/ Creatinine Ratio 15 7 - 25    Calcium 8.7 8.4 - 10.2 mg/dL   URINALYSIS & REFLEX MICRO WITH CULTURE IF INDICATED   Result Value Ref Range    COLOR,  URINALYSIS Straw     APPEARANCE, URINALYSIS Clear     GLUCOSE, URINALYSIS Negative Negative mg/dL    BILIRUBIN, URINALYSIS Negative Negative    KETONES, URINALYSIS Negative Negative mg/dL    SPECIFIC GRAVITY, URINALYSIS <1.005 (L) 1.005 - 1.030    OCCULT BLOOD, URINALYSIS Moderate (A) Negative    PH, URINALYSIS 6.0 5.0 - 7.0    PROTEIN, URINALYSIS Negative Negative mg/dL    UROBILINOGEN, URINALYSIS 0.2 0.2, 1.0 mg/dL    NITRITE, URINALYSIS Negative Negative    LEUKOCYTE ESTERASE, URINALYSIS Negative Negative   CBC with Automated Differential (performable only)   Result Value Ref Range    WBC 12.4 (H) 4.2 - 11.0 K/mcL    RBC 5.20 4.50 - 5.90 mil/mcL    HGB 16.6 13.0 - 17.0 g/dL    HCT 47.0 39.0 - 51.0 %    MCV 90.4 78.0 - 100.0 fl    MCH 31.9 26.0 - 34.0 pg    MCHC 35.3 32.0 - 36.5 g/dL    RDW-SD 42.3 39.0 - 50.0 fL    RDW-CV 12.9 11.0 - 15.0 %     140 - 450 K/mcL    NRBC 0 <=0 /100 WBC    Neutrophil, Percent 74 %    Lymphocytes, Percent 15 %    Mono, Percent 8 %    Eosinophils, Percent 0 %    Basophils, Percent 1 %    Immature Granulocytes 2 %    Absolute Neutrophils 9.2 (H) 1.8 - 7.7 K/mcL    Absolute Lymphocytes 1.9 1.0 - 4.0 K/mcL    Absolute Monocytes 1.0 (H) 0.3 - 0.9 K/mcL    Absolute Eosinophils  0.0 (L) 0.1 - 0.5 K/mcL    Absolute Basophils 0.1 0.0 - 0.3 K/mcL    Absolute Immmature Granulocytes 0.2 0.0 - 0.2 K/mcL   Light Blue Top   Result Value Ref Range    Extra Tube Hold for Add Ons    Lavender Top   Result Value Ref Range    Extra Tube Hold for Add Ons    Gold Top   Result Value Ref Range    Extra Tube Hold for Add Ons    URINALYSIS MICROSCOPIC   Result Value Ref Range    SQUAMOUS EPITHELIAL, URINALYSIS None Seen None Seen, 1 to 5 /hpf    ERYTHROCYTES, URINALYSIS 3 to 5 (A) None Seen, 1 to 2 /hpf    LEUKOCYTES, URINALYSIS 1 to 5 None Seen, 1 to 5 /hpf    BACTERIA, URINALYSIS Few (A) None Seen /hpf    HYALINE CASTS, URINALYSIS None Seen None Seen, 1 to 5 /lpf     EKG:   NSR rate 70; normal EKG    Interpreted by ED attending physician.     Radiology Results     Imaging Results          CT ABDOMEN PELVIS WO CONTRAST (Final result)  Result time 02/23/20 17:28:09    Final result                 Impression:    IMPRESSION:     1. Nonobstructing bilateral renal calculi. There is no evidence of  ureterolithiasis or hydroureteronephrosis.  2. 1 cm umbilical hernia containing mesenteric fat.  3. Cholelithiasis. There is no CT evidence to suggest acute cholecystitis.                   Narrative:    CT ABDOMEN PELVIS WO CONTRAST    HISTORY:  Abd pain, unspecified.    DATE: 2/23/2020 4:38 PM    TECHNIQUE:  Multiple contiguous unenhanced axial sections through the  abdomen and pelvis were obtained.  Parasagittal and coronal reformatted  images are reviewed.    ORAL CONTRAST:NONE    COMPARISON:  NONE    FINDINGS:     LIVER: Normal.    GALLBLADDER AND BILIARY TREE:  There are multiple stones identified within  the gallbladder.   No intrahepatic or extrahepatic biliary ductal  dilatation.    KIDNEYS: There is a 3 mm nonobstructing calculus within a lower pole calyx  of the right kidney. There is a 2 mm nonobstructing calculus within a lower  pole calyx of the left kidney. There is no evidence of ureterolithiasis or  hydroureteronephrosis bilaterally.    SPLEEN: Normal.    ADRENALS: Normal.    PANCREAS: Normal.    SMALL BOWEL: Normal. Surgical clips are noted about the stomach from an  unknown procedure.    COLON:  Normal. The appendix appears normal..    LYMPH NODES: There is no retroperitoneal, portahepatis or mesenteric  adenopathy.    PERITONEUM:No ascites or free air. No other fluid collection.    VESSELS: Normal    PELVIS: The pelvic structures appear within normal limits.    ABDOMINAL WALL: There is a 1 cm in diameter umbilical hernia containing  mesenteric fat.    LUNG BASES:  The lung bases are clear.    BONES:  Normal,                               US Testicles and Scrotum (Final result)  Result time 02/23/20  15:35:32    Final result                 Impression:    IMPRESSION: Normal.               Narrative:      BILATERAL TESTICULAR ULTRASOUND    HISTORY: Left scrotal pain.    TECHNIQUE: Directed imaging of both testicles including color and  pulsed-wave Doppler imaging.    COMPARISON: NONE    FINDINGS:     Directed imaging of both testicles was obtained.    RIGHT HEMISCROTUM:  The right testicle is homogeneous in echotexture and normal in size and  shape.  Right testicular measurements: 4.4 x 2.4 x 2.7cm.  There is normal color Doppler flow within the right testicle.   The right epididymis is normal.   There is no hydrocele on the right.      LEFT HEMISCROTUM:  The left testicle is homogeneous in echotexture and normal in size and  shape.  Left testicular measurements: 4.8 x 2.3 x 3.2 cm.   There is normal color Doppler flow within the left testicle.   The left epididymis is normal.   There is no hydrocele on the left.                                  ED Medication Orders (From admission, onward)    Ordered Start     Status Ordering Provider    02/23/20 1554 02/23/20 1555  HYDROmorphone (DILAUDID) injection 0.5 mg  ONCE      Last MAR action:  Given MEAGAN PURDY    02/23/20 1554 02/23/20 1555  ondansetron (ZOFRAN) injection 4 mg  ONCE      Last MAR action:  Given MEAGAN PURDY    02/23/20 1509 02/23/20 1510  ketorolac injection 15 mg  ONCE      Last MAR action:  Given MEAGAN PURDY          ED Course as of Feb 23 1746   Sun Feb 23, 2020   1449 Mild leukocytosis. No anemia.   WBC(!): 12.4   1539 Negative US   US Testicles and Scrotum       MDM    ED Course/MDM:  55 y/o with sudden onset pelvic pain and associated left epididymal tenderness on palpation along with pelvic pain with palpation. He has a h/o stable HOCM and has no new cardiac findings. Also with week-long h/o intermittent dizziness, diaphoresis without cardiac complaints which started after discontinuing Trazodone. Evaluate pelvic pain with basic labs, UA  and US testicles/scrotum.     2:09 PM Reviewed past imaging - CT abd/pelvis from 01/2019 revealed multiple small kidney stones, calcified cyst near rectum with recommendation for MRI.     3:31 PM Mild leukocytosis. UA notable for moderate blood, few bacteria, otherwise negative. S/p single dose of toradol for pain; will limit high-dose NSAIDs due to Eliquis use.     4:18 PM Rechecked pt to discuss findings of labs and normal US. Plan for CT abd. Toradol didn't help his pain, will provide dilaudid with zofran for pain. If CT abd nl, will proceed with treatment of epididymitis with 10 day course of Bactrim DS BID.     5:44 PM Discussed findings of non-acute abd CT and plan to treat for epididymitis with 10 day course of Bactrim. Norco for pain, he has tramadol at home. Uro referral if he has continued pain. Patient and wife express understanding and agreement with plan.     Diagnosis  The encounter diagnosis was Epididymitis, left.    Follow Up:  Mor Acosta MD  [address]  [address]  [facility] [address]  [phone]          A Alfredo Lynn MD  [address] [facility] [address]  [facility] [address]  [phone]      If your symptoms don't improve, we recommend you visit a urologist.          Summary of your Discharge Medications      Take these Medications      Details   HYDROcodone-acetaminophen 5-325 MG per tablet  Commonly known as:  NORCO   Take 1-2 tablets by mouth every 4 hours as needed for Pain.     sulfamethoxazole-trimethoprim 800-160 MG per tablet  Commonly known as:  BACTRIM DS   Take 1 tablet by mouth 2 times daily.            Pt is discharged to home/self care in good condition.             Erin Lama MD  Resident  02/23/20 8026     No

## 2022-08-20 NOTE — CONSULT NOTE ADULT - ASSESSMENT
89 yo PMH of CKD V, DM, HFpEF (EF 70% 2021, grade 1 diastolic dysfunction), HTN, HLD, CAD, hypothyroidism, PSH R SYW-dz-fedrd-knee-Pop stenting 6/2021 and balloon angioplasty in-stent stenosis in 2/2022. WHo now presents for 3 days of worsening RLE pain.     Plan:   No acute surgical intervention at this time   Please order LLE arterial duplex  Please have patient follow up in office with Dr. Hassan Monday or Tuesday (8/22 or 8/23)   She may call [phone] to make an appointment    Vascular Surgery will sign off for now 89 yo PMH of CKD V, DM, HFpEF (EF 70% 2021, grade 1 diastolic dysfunction), HTN, HLD, CAD, hypothyroidism, PSH R AFN-ju-eviwi-knee-Pop stenting 6/2021 and balloon angioplasty in-stent stenosis in 2/2022. WHo now presents for 3 days of worsening RLE pain.     Plan:   No acute surgical intervention at this time   Please order LLE arterial duplex  Please have patient follow up in office with Dr. Hassan Monday or Tuesday (8/22 or 8/23)   She may call [phone] to make an appointment    Vascular Surgery will sign off for now    Chief Addendum  -Patient seen and evaluated with vascular surgery attending.  -No urgent vascular intervention indicated, patient with palpable femoral pulse, triphasic popliteal signal, biphasic DP and PT signals of RLE consistent with baseline.   -Patient with CKD5, per patient and family, would prefer to decline any angiographic intervention at this time due to risk of CT dye potentially precipitating need for dialysis.  -Patient will follow up with Dr. Hassan in the office for further imaging and discussion of treatment options if indicated.  -Please have patient follow up with Dr. Hassan at [facility], [address] [place] [address] on Monday or Tuesday of this upcoming week.  89 yo PMH of CKD V, DM, HFpEF (EF 70% 2021, grade 1 diastolic dysfunction), HTN, HLD, CAD, hypothyroidism, PSH R BGG-zo-wgcrv-knee-Pop stenting 6/2021 and balloon angioplasty in-stent stenosis in 2/2022. WHo now presents for 3 days of worsening RLE pain.     Plan:   No acute surgical intervention at this time   Please have patient follow up in office with Dr. Hassan Monday or Tuesday (8/22 or 8/23)   She may call [phone] to make an appointment    Vascular Surgery will sign off for now    Chief Addendum  -Patient seen and evaluated with vascular surgery attending.  -No urgent vascular intervention indicated, patient with palpable femoral pulse, triphasic popliteal signal, biphasic DP and PT signals of RLE consistent with baseline.   -Patient with CKD5, per patient and family, would prefer to decline any angiographic intervention at this time due to risk of CT dye potentially precipitating need for dialysis.  -Patient will follow up with Dr. Hassan in the office for further imaging and discussion of treatment options if indicated.  -Please have patient follow up with Dr. Hassan at [facility], [address] [place] [address] on Monday or Tuesday of this upcoming week.

## 2022-08-20 NOTE — ED PROVIDER NOTE - CLINICAL SUMMARY MEDICAL DECISION MAKING FREE TEXT BOX
89 yo PMHx of CKD, DM, HFpEF (EF 70% 2021, grade 1 diastolic dysfunction), HTN, HLD, CAD, hypothyroidism, PAD w complaints of worsening RLE pain.   s/p R JZA-xw-xzcnd-knee-Pop stenting 6/2021 and balloon angioplasty of 75% in-stent stenosis 2/2022.  VSS,plan for labs, doppler, vasc consult 89 yo PMHx of CKD, DM, HFpEF (EF 70% 2021, grade 1 diastolic dysfunction), HTN, HLD, CAD, hypothyroidism, PAD w complaints of worsening RLE pain over the past one month. s/p R SHF-ql-afemh-knee-Pop stenting 6/2021 and balloon angioplasty of 75% in-stent stenosis 2/2022. Denies sob, chest pain, abd pain, cough, f/c. Currently on plavix. Was planned for angiogram on last admission but was cancelled due to CKD.  VSS, exam as above, disc w vasc, plan for labs, duplex, reassess.

## 2022-08-20 NOTE — ED PROVIDER NOTE - PROGRESS NOTE DETAILS
s/o to [facility], pending US result, pt to  be dc'd. [facility], reads of US, still ok for dc, aide to stay with pt overnight until son reaches in morning.

## 2022-08-20 NOTE — ED ADULT TRIAGE NOTE - CHIEF COMPLAINT QUOTE
Pt brought in by EMS for right leg pain and decreased range of motion. Pt s/p right leg stent placement 2 months ago. Denies fevers, chills. DP pulses intact bilaterally.

## 2022-08-20 NOTE — ED ADULT NURSE REASSESSMENT NOTE - NS ED NURSE REASSESS COMMENT FT1
Received report from FATOU Frazier. Received patient in stretcher. AOX4. Vital signs as noted in flowsheet. Patient complaints of R leg pain. MD Bae made aware, to order Tylenol. Patient oriented to ED area. Plan of care discussed and verbalized understanding. All needs attended. Fall risk precautions maintained. Purposeful proactive hourly rounding in progress.

## 2022-08-20 NOTE — ED PROVIDER NOTE - PATIENT PORTAL LINK FT
You can access the FollowMyHealth Patient Portal offered by [facility] by registering at the following website: [email or web address]. By joining [facility]’s FollowMyHealth portal, you will also be able to view your health information using other applications (apps) compatible with our system.

## 2022-08-20 NOTE — ED PROVIDER NOTE - OBJECTIVE STATEMENT
89 yo PMHx of CKD, DM, HFpEF (EF 70% 2021, grade 1 diastolic dysfunction), HTN, HLD, CAD, hypothyroidism, PAD w complaints of worsening RLE pain.   s/p R HFT-eo-pzcra-knee-Pop stenting 6/2021 and balloon angioplasty of 75% in-stent stenosis 2/2022. 89 yo PMHx of CKD, DM, HFpEF (EF 70% 2021, grade 1 diastolic dysfunction), HTN, HLD, CAD, hypothyroidism, PAD w complaints of worsening RLE pain over the past one month. s/p R DXD-pv-gltzv-knee-Pop stenting 6/2021 and balloon angioplasty of 75% in-stent stenosis 2/2022. Denies sob, chest pain, abd pain, cough, f/c. Currently on plavix. Was planned for angiogram on last admission but was cancelled due to CKD.

## 2022-08-20 NOTE — ED PROVIDER NOTE - NSFOLLOWUPINSTRUCTIONS_ED_ALL_ED_FT
*** PLEASE CALL DR. BOWDEN'S OFFICE ON MONDAY FOR FOLLOW UP APPOINTMENT, YOU NEED TO SEE HIM ON MONDAY OR TUESDAY THIS WEEK****

## 2022-08-24 ENCOUNTER — TRANSCRIPTION ENCOUNTER (OUTPATIENT)
Age: 87
End: 2022-08-24

## 2022-08-24 ENCOUNTER — INPATIENT (INPATIENT)
Facility: HOSPITAL | Age: 87
LOS: 1 days | Discharge: HOME | DRG: 253 | End: 2022-08-26
Attending: SURGERY | Admitting: SURGERY
Payer: MEDICARE

## 2022-08-24 ENCOUNTER — APPOINTMENT (OUTPATIENT)
Dept: NEPHROLOGY | Facility: CLINIC | Age: 87
End: 2022-08-24

## 2022-08-24 VITALS
DIASTOLIC BLOOD PRESSURE: 75 MMHG | OXYGEN SATURATION: 97 % | WEIGHT: 192.02 LBS | RESPIRATION RATE: 16 BRPM | TEMPERATURE: 98 F | SYSTOLIC BLOOD PRESSURE: 129 MMHG | HEIGHT: 61 IN | HEART RATE: 76 BPM

## 2022-08-24 VITALS
OXYGEN SATURATION: 98 % | HEART RATE: 96 BPM | HEIGHT: 60 IN | DIASTOLIC BLOOD PRESSURE: 51 MMHG | WEIGHT: 135 LBS | SYSTOLIC BLOOD PRESSURE: 84 MMHG | BODY MASS INDEX: 26.5 KG/M2

## 2022-08-24 VITALS — DIASTOLIC BLOOD PRESSURE: 74 MMHG | OXYGEN SATURATION: 99 % | HEART RATE: 80 BPM | SYSTOLIC BLOOD PRESSURE: 120 MMHG

## 2022-08-24 VITALS — SYSTOLIC BLOOD PRESSURE: 134 MMHG | DIASTOLIC BLOOD PRESSURE: 74 MMHG

## 2022-08-24 DIAGNOSIS — D63.1 CHRONIC KIDNEY DISEASE, UNSPECIFIED: ICD-10-CM

## 2022-08-24 DIAGNOSIS — R07.9 CHEST PAIN, UNSPECIFIED: ICD-10-CM

## 2022-08-24 DIAGNOSIS — Z95.820 PERIPHERAL VASCULAR ANGIOPLASTY STATUS WITH IMPLANTS AND GRAFTS: Chronic | ICD-10-CM

## 2022-08-24 DIAGNOSIS — Z95.0 PRESENCE OF CARDIAC PACEMAKER: Chronic | ICD-10-CM

## 2022-08-24 DIAGNOSIS — R53.1 WEAKNESS: ICD-10-CM

## 2022-08-24 DIAGNOSIS — N18.9 CHRONIC KIDNEY DISEASE, UNSPECIFIED: ICD-10-CM

## 2022-08-24 DIAGNOSIS — N18.5 CHRONIC KIDNEY DISEASE, STAGE 5: ICD-10-CM

## 2022-08-24 LAB
ALBUMIN SERPL ELPH-MCNC: 3.7 G/DL — SIGNIFICANT CHANGE UP (ref 3.3–5)
ALP SERPL-CCNC: 84 U/L — SIGNIFICANT CHANGE UP (ref 40–120)
ALT FLD-CCNC: 9 U/L — LOW (ref 10–45)
ANION GAP SERPL CALC-SCNC: 14 MMOL/L — SIGNIFICANT CHANGE UP (ref 5–17)
ANISOCYTOSIS BLD QL: SIGNIFICANT CHANGE UP
APPEARANCE UR: CLEAR — SIGNIFICANT CHANGE UP
APTT BLD: 32.1 SEC — SIGNIFICANT CHANGE UP (ref 27.5–35.5)
AST SERPL-CCNC: 14 U/L — SIGNIFICANT CHANGE UP (ref 10–40)
BACTERIA # UR AUTO: PRESENT /HPF
BASOPHILS # BLD AUTO: 0.15 K/UL — SIGNIFICANT CHANGE UP (ref 0–0.2)
BASOPHILS NFR BLD AUTO: 2.7 % — HIGH (ref 0–2)
BILIRUB SERPL-MCNC: 0.3 MG/DL — SIGNIFICANT CHANGE UP (ref 0.2–1.2)
BILIRUB UR-MCNC: NEGATIVE — SIGNIFICANT CHANGE UP
BLD GP AB SCN SERPL QL: POSITIVE — SIGNIFICANT CHANGE UP
BUN SERPL-MCNC: 67 MG/DL — HIGH (ref 7–23)
CALCIUM SERPL-MCNC: 10.5 MG/DL — SIGNIFICANT CHANGE UP (ref 8.4–10.5)
CHLORIDE SERPL-SCNC: 91 MMOL/L — LOW (ref 96–108)
CO2 SERPL-SCNC: 22 MMOL/L — SIGNIFICANT CHANGE UP (ref 22–31)
COLOR SPEC: YELLOW — SIGNIFICANT CHANGE UP
CREAT SERPL-MCNC: 3.83 MG/DL — HIGH (ref 0.5–1.3)
DACRYOCYTES BLD QL SMEAR: SLIGHT — SIGNIFICANT CHANGE UP
DIFF PNL FLD: ABNORMAL
EGFR: 11 ML/MIN/1.73M2 — LOW
EOSINOPHIL # BLD AUTO: 0.15 K/UL — SIGNIFICANT CHANGE UP (ref 0–0.5)
EOSINOPHIL NFR BLD AUTO: 2.6 % — SIGNIFICANT CHANGE UP (ref 0–6)
GIANT PLATELETS BLD QL SMEAR: PRESENT — SIGNIFICANT CHANGE UP
GLUCOSE BLDC GLUCOMTR-MCNC: 122 MG/DL — HIGH (ref 70–99)
GLUCOSE SERPL-MCNC: 101 MG/DL — HIGH (ref 70–99)
GLUCOSE UR QL: NEGATIVE — SIGNIFICANT CHANGE UP
HCT VFR BLD CALC: 23.8 % — LOW (ref 34.5–45)
HGB BLD-MCNC: 7.7 G/DL — LOW (ref 11.5–15.5)
INR BLD: 1.51 — HIGH (ref 0.88–1.16)
KETONES UR-MCNC: NEGATIVE — SIGNIFICANT CHANGE UP
LEUKOCYTE ESTERASE UR-ACNC: NEGATIVE — SIGNIFICANT CHANGE UP
LIDOCAIN IGE QN: 32 U/L — SIGNIFICANT CHANGE UP (ref 7–60)
LYMPHOCYTES # BLD AUTO: 2.08 K/UL — SIGNIFICANT CHANGE UP (ref 1–3.3)
LYMPHOCYTES # BLD AUTO: 36.3 % — SIGNIFICANT CHANGE UP (ref 13–44)
MACROCYTES BLD QL: SIGNIFICANT CHANGE UP
MAGNESIUM SERPL-MCNC: 2.1 MG/DL — SIGNIFICANT CHANGE UP (ref 1.6–2.6)
MANUAL SMEAR VERIFICATION: SIGNIFICANT CHANGE UP
MCHC RBC-ENTMCNC: 31.2 PG — SIGNIFICANT CHANGE UP (ref 27–34)
MCHC RBC-ENTMCNC: 32.4 GM/DL — SIGNIFICANT CHANGE UP (ref 32–36)
MCV RBC AUTO: 96.4 FL — SIGNIFICANT CHANGE UP (ref 80–100)
MICROCYTES BLD QL: SLIGHT — SIGNIFICANT CHANGE UP
MONOCYTES # BLD AUTO: 0.71 K/UL — SIGNIFICANT CHANGE UP (ref 0–0.9)
MONOCYTES NFR BLD AUTO: 12.4 % — SIGNIFICANT CHANGE UP (ref 2–14)
NEUTROPHILS # BLD AUTO: 2.64 K/UL — SIGNIFICANT CHANGE UP (ref 1.8–7.4)
NEUTROPHILS NFR BLD AUTO: 44.2 % — SIGNIFICANT CHANGE UP (ref 43–77)
NEUTS BAND # BLD: 1.8 % — SIGNIFICANT CHANGE UP (ref 0–8)
NITRITE UR-MCNC: NEGATIVE — SIGNIFICANT CHANGE UP
NT-PROBNP SERPL-SCNC: 2218 PG/ML — HIGH (ref 0–300)
OSMOLALITY UR: 282 MOSM/KG — LOW (ref 300–900)
OVALOCYTES BLD QL SMEAR: SLIGHT — SIGNIFICANT CHANGE UP
PH UR: 5.5 — SIGNIFICANT CHANGE UP (ref 5–8)
PLAT MORPH BLD: ABNORMAL
PLATELET # BLD AUTO: 147 K/UL — LOW (ref 150–400)
POIKILOCYTOSIS BLD QL AUTO: SLIGHT — SIGNIFICANT CHANGE UP
POTASSIUM SERPL-MCNC: 4.2 MMOL/L — SIGNIFICANT CHANGE UP (ref 3.5–5.3)
POTASSIUM SERPL-SCNC: 4.2 MMOL/L — SIGNIFICANT CHANGE UP (ref 3.5–5.3)
PROT SERPL-MCNC: 7.9 G/DL — SIGNIFICANT CHANGE UP (ref 6–8.3)
PROT UR-MCNC: ABNORMAL MG/DL
PROTHROM AB SERPL-ACNC: 18 SEC — HIGH (ref 10.5–13.4)
RBC # BLD: 2.47 M/UL — LOW (ref 3.8–5.2)
RBC # FLD: 21.3 % — HIGH (ref 10.3–14.5)
RBC BLD AUTO: ABNORMAL
RBC CASTS # UR COMP ASSIST: < 5 /HPF — SIGNIFICANT CHANGE UP
RH IG SCN BLD-IMP: POSITIVE — SIGNIFICANT CHANGE UP
SARS-COV-2 RNA SPEC QL NAA+PROBE: NEGATIVE — SIGNIFICANT CHANGE UP
SODIUM SERPL-SCNC: 127 MMOL/L — LOW (ref 135–145)
SODIUM UR-SCNC: 64 MMOL/L — SIGNIFICANT CHANGE UP
SP GR SPEC: 1.01 — SIGNIFICANT CHANGE UP (ref 1–1.03)
SPHEROCYTES BLD QL SMEAR: SLIGHT — SIGNIFICANT CHANGE UP
TROPONIN T SERPL-MCNC: 0.04 NG/ML — CRITICAL HIGH (ref 0–0.01)
TROPONIN T SERPL-MCNC: 0.04 NG/ML — CRITICAL HIGH (ref 0–0.01)
UROBILINOGEN FLD QL: 0.2 E.U./DL — SIGNIFICANT CHANGE UP
WBC # BLD: 5.74 K/UL — SIGNIFICANT CHANGE UP (ref 3.8–10.5)
WBC # FLD AUTO: 5.74 K/UL — SIGNIFICANT CHANGE UP (ref 3.8–10.5)
WBC UR QL: ABNORMAL /HPF

## 2022-08-24 PROCEDURE — 71045 X-RAY EXAM CHEST 1 VIEW: CPT | Mod: 26

## 2022-08-24 PROCEDURE — 86077 PHYS BLOOD BANK SERV XMATCH: CPT

## 2022-08-24 PROCEDURE — 99285 EMERGENCY DEPT VISIT HI MDM: CPT | Mod: 25

## 2022-08-24 PROCEDURE — 99215 OFFICE O/P EST HI 40 MIN: CPT

## 2022-08-24 PROCEDURE — 93010 ELECTROCARDIOGRAM REPORT: CPT

## 2022-08-24 RX ORDER — CYCLOBENZAPRINE HYDROCHLORIDE 10 MG/1
10 TABLET, FILM COATED ORAL THREE TIMES A DAY
Refills: 0 | Status: DISCONTINUED | OUTPATIENT
Start: 2022-08-24 | End: 2022-08-24

## 2022-08-24 RX ORDER — DEXTROSE 50 % IN WATER 50 %
12.5 SYRINGE (ML) INTRAVENOUS ONCE
Refills: 0 | Status: DISCONTINUED | OUTPATIENT
Start: 2022-08-24 | End: 2022-08-26

## 2022-08-24 RX ORDER — DEXTROSE 50 % IN WATER 50 %
15 SYRINGE (ML) INTRAVENOUS ONCE
Refills: 0 | Status: DISCONTINUED | OUTPATIENT
Start: 2022-08-24 | End: 2022-08-26

## 2022-08-24 RX ORDER — ACETAMINOPHEN 500 MG
650 TABLET ORAL EVERY 6 HOURS
Refills: 0 | Status: DISCONTINUED | OUTPATIENT
Start: 2022-08-24 | End: 2022-08-26

## 2022-08-24 RX ORDER — SEVELAMER CARBONATE 2400 MG/1
800 POWDER, FOR SUSPENSION ORAL EVERY 8 HOURS
Refills: 0 | Status: DISCONTINUED | OUTPATIENT
Start: 2022-08-24 | End: 2022-08-24

## 2022-08-24 RX ORDER — ASPIRIN/CALCIUM CARB/MAGNESIUM 324 MG
81 TABLET ORAL DAILY
Refills: 0 | Status: DISCONTINUED | OUTPATIENT
Start: 2022-08-24 | End: 2022-08-26

## 2022-08-24 RX ORDER — CLOPIDOGREL BISULFATE 75 MG/1
75 TABLET, FILM COATED ORAL ONCE
Refills: 0 | Status: DISCONTINUED | OUTPATIENT
Start: 2022-08-24 | End: 2022-08-24

## 2022-08-24 RX ORDER — ALLOPURINOL 100 MG/1
100 TABLET ORAL DAILY
Qty: 15 | Refills: 5 | Status: ACTIVE | COMMUNITY
Start: 2022-08-24

## 2022-08-24 RX ORDER — DEXTROSE 50 % IN WATER 50 %
25 SYRINGE (ML) INTRAVENOUS ONCE
Refills: 0 | Status: DISCONTINUED | OUTPATIENT
Start: 2022-08-24 | End: 2022-08-26

## 2022-08-24 RX ORDER — OXYCODONE AND ACETAMINOPHEN 5; 325 MG/1; MG/1
2 TABLET ORAL ONCE
Refills: 0 | Status: DISCONTINUED | OUTPATIENT
Start: 2022-08-24 | End: 2022-08-24

## 2022-08-24 RX ORDER — SODIUM CHLORIDE 9 MG/ML
1000 INJECTION, SOLUTION INTRAVENOUS
Refills: 0 | Status: DISCONTINUED | OUTPATIENT
Start: 2022-08-24 | End: 2022-08-26

## 2022-08-24 RX ORDER — SODIUM CHLORIDE 9 MG/ML
1000 INJECTION INTRAMUSCULAR; INTRAVENOUS; SUBCUTANEOUS
Refills: 0 | Status: DISCONTINUED | OUTPATIENT
Start: 2022-08-24 | End: 2022-08-24

## 2022-08-24 RX ORDER — LABETALOL HCL 100 MG
10 TABLET ORAL ONCE
Refills: 0 | Status: COMPLETED | OUTPATIENT
Start: 2022-08-24 | End: 2022-08-24

## 2022-08-24 RX ORDER — CARVEDILOL PHOSPHATE 80 MG/1
25 CAPSULE, EXTENDED RELEASE ORAL EVERY 12 HOURS
Refills: 0 | Status: DISCONTINUED | OUTPATIENT
Start: 2022-08-24 | End: 2022-08-24

## 2022-08-24 RX ORDER — LEVOTHYROXINE SODIUM 125 MCG
100 TABLET ORAL DAILY
Refills: 0 | Status: DISCONTINUED | OUTPATIENT
Start: 2022-08-24 | End: 2022-08-26

## 2022-08-24 RX ORDER — CYCLOBENZAPRINE HYDROCHLORIDE 10 MG/1
10 TABLET, FILM COATED ORAL THREE TIMES A DAY
Refills: 0 | Status: DISCONTINUED | OUTPATIENT
Start: 2022-08-24 | End: 2022-08-26

## 2022-08-24 RX ORDER — LEVOTHYROXINE SODIUM 125 MCG
100 TABLET ORAL DAILY
Refills: 0 | Status: DISCONTINUED | OUTPATIENT
Start: 2022-08-24 | End: 2022-08-24

## 2022-08-24 RX ORDER — ATORVASTATIN CALCIUM 80 MG/1
80 TABLET, FILM COATED ORAL AT BEDTIME
Refills: 0 | Status: DISCONTINUED | OUTPATIENT
Start: 2022-08-24 | End: 2022-08-24

## 2022-08-24 RX ORDER — FOLIC ACID 0.8 MG
1 TABLET ORAL DAILY
Refills: 0 | Status: DISCONTINUED | OUTPATIENT
Start: 2022-08-24 | End: 2022-08-26

## 2022-08-24 RX ORDER — INSULIN LISPRO 100/ML
VIAL (ML) SUBCUTANEOUS
Refills: 0 | Status: DISCONTINUED | OUTPATIENT
Start: 2022-08-24 | End: 2022-08-26

## 2022-08-24 RX ORDER — HEPARIN SODIUM 5000 [USP'U]/ML
5000 INJECTION INTRAVENOUS; SUBCUTANEOUS EVERY 8 HOURS
Refills: 0 | Status: DISCONTINUED | OUTPATIENT
Start: 2022-08-24 | End: 2022-08-26

## 2022-08-24 RX ORDER — SODIUM CHLORIDE 9 MG/ML
1000 INJECTION INTRAMUSCULAR; INTRAVENOUS; SUBCUTANEOUS
Refills: 0 | Status: DISCONTINUED | OUTPATIENT
Start: 2022-08-24 | End: 2022-08-25

## 2022-08-24 RX ORDER — OMEPRAZOLE 10 MG/1
1 CAPSULE, DELAYED RELEASE ORAL
Qty: 0 | Refills: 0 | DISCHARGE

## 2022-08-24 RX ORDER — OXYCODONE AND ACETAMINOPHEN 5; 325 MG/1; MG/1
2 TABLET ORAL EVERY 6 HOURS
Refills: 0 | Status: DISCONTINUED | OUTPATIENT
Start: 2022-08-24 | End: 2022-08-25

## 2022-08-24 RX ORDER — NIFEDIPINE 30 MG
60 TABLET, EXTENDED RELEASE 24 HR ORAL ONCE
Refills: 0 | Status: DISCONTINUED | OUTPATIENT
Start: 2022-08-24 | End: 2022-08-24

## 2022-08-24 RX ORDER — CALCITRIOL 0.5 UG/1
0.5 CAPSULE ORAL DAILY
Refills: 0 | Status: DISCONTINUED | OUTPATIENT
Start: 2022-08-24 | End: 2022-08-26

## 2022-08-24 RX ORDER — GLUCAGON INJECTION, SOLUTION 0.5 MG/.1ML
1 INJECTION, SOLUTION SUBCUTANEOUS ONCE
Refills: 0 | Status: DISCONTINUED | OUTPATIENT
Start: 2022-08-24 | End: 2022-08-26

## 2022-08-24 RX ORDER — FOLIC ACID 0.8 MG
1 TABLET ORAL DAILY
Refills: 0 | Status: DISCONTINUED | OUTPATIENT
Start: 2022-08-24 | End: 2022-08-24

## 2022-08-24 RX ORDER — ASPIRIN/CALCIUM CARB/MAGNESIUM 324 MG
81 TABLET ORAL DAILY
Refills: 0 | Status: DISCONTINUED | OUTPATIENT
Start: 2022-08-24 | End: 2022-08-24

## 2022-08-24 RX ORDER — CALCITRIOL 0.5 UG/1
0.5 CAPSULE ORAL DAILY
Refills: 0 | Status: DISCONTINUED | OUTPATIENT
Start: 2022-08-24 | End: 2022-08-24

## 2022-08-24 RX ORDER — ZOLPIDEM TARTRATE 10 MG/1
5 TABLET ORAL AT BEDTIME
Refills: 0 | Status: DISCONTINUED | OUTPATIENT
Start: 2022-08-24 | End: 2022-08-24

## 2022-08-24 RX ORDER — CARVEDILOL PHOSPHATE 80 MG/1
25 CAPSULE, EXTENDED RELEASE ORAL EVERY 12 HOURS
Refills: 0 | Status: DISCONTINUED | OUTPATIENT
Start: 2022-08-24 | End: 2022-08-26

## 2022-08-24 RX ORDER — OXYCODONE AND ACETAMINOPHEN 5; 325 MG/1; MG/1
1 TABLET ORAL EVERY 6 HOURS
Refills: 0 | Status: DISCONTINUED | OUTPATIENT
Start: 2022-08-24 | End: 2022-08-25

## 2022-08-24 RX ORDER — PANTOPRAZOLE SODIUM 20 MG/1
40 TABLET, DELAYED RELEASE ORAL
Refills: 0 | Status: DISCONTINUED | OUTPATIENT
Start: 2022-08-24 | End: 2022-08-24

## 2022-08-24 RX ORDER — ZOLPIDEM TARTRATE 10 MG/1
5 TABLET ORAL AT BEDTIME
Refills: 0 | Status: DISCONTINUED | OUTPATIENT
Start: 2022-08-24 | End: 2022-08-25

## 2022-08-24 RX ORDER — AMOXICILLIN 250 MG/5ML
500 SUSPENSION, RECONSTITUTED, ORAL (ML) ORAL
Qty: 0 | Refills: 0 | DISCHARGE
End: 2022-09-03

## 2022-08-24 RX ORDER — SEVELAMER CARBONATE 2400 MG/1
800 POWDER, FOR SUSPENSION ORAL EVERY 8 HOURS
Refills: 0 | Status: DISCONTINUED | OUTPATIENT
Start: 2022-08-24 | End: 2022-08-26

## 2022-08-24 RX ORDER — ATORVASTATIN CALCIUM 80 MG/1
80 TABLET, FILM COATED ORAL AT BEDTIME
Refills: 0 | Status: DISCONTINUED | OUTPATIENT
Start: 2022-08-24 | End: 2022-08-26

## 2022-08-24 RX ORDER — NIFEDIPINE 30 MG
60 TABLET, EXTENDED RELEASE 24 HR ORAL DAILY
Refills: 0 | Status: DISCONTINUED | OUTPATIENT
Start: 2022-08-24 | End: 2022-08-26

## 2022-08-24 RX ORDER — CLOPIDOGREL BISULFATE 75 MG/1
75 TABLET, FILM COATED ORAL ONCE
Refills: 0 | Status: COMPLETED | OUTPATIENT
Start: 2022-08-24 | End: 2022-08-24

## 2022-08-24 RX ADMIN — OXYCODONE AND ACETAMINOPHEN 2 TABLET(S): 5; 325 TABLET ORAL at 14:15

## 2022-08-24 RX ADMIN — CARVEDILOL PHOSPHATE 25 MILLIGRAM(S): 80 CAPSULE, EXTENDED RELEASE ORAL at 20:04

## 2022-08-24 RX ADMIN — ATORVASTATIN CALCIUM 80 MILLIGRAM(S): 80 TABLET, FILM COATED ORAL at 20:04

## 2022-08-24 RX ADMIN — Medication 81 MILLIGRAM(S): at 19:28

## 2022-08-24 RX ADMIN — CLOPIDOGREL BISULFATE 75 MILLIGRAM(S): 75 TABLET, FILM COATED ORAL at 19:29

## 2022-08-24 RX ADMIN — HEPARIN SODIUM 5000 UNIT(S): 5000 INJECTION INTRAVENOUS; SUBCUTANEOUS at 21:26

## 2022-08-24 RX ADMIN — Medication 10 MILLIGRAM(S): at 21:25

## 2022-08-24 RX ADMIN — Medication 1 MILLIGRAM(S): at 20:04

## 2022-08-24 NOTE — H&P ADULT - HISTORY OF PRESENT ILLNESS
89 Yo F w/ Hx of CKD 5, DM, HFpEF (EF 70% 2021, grade 1 diastolic dysfunction), HTN, HLD, CAD, hypothyroidism, PAD presents to ED with rest pain. Patient was recently admitted to [facility] for right leg rest pain but at that time deferred due to risk of kidney failure from angiogram. Patient presents today after being seen by nephrology and states her pain is uncontrolled and is now willing to undergo angiogram knowing risk of kidney injury.     She reports pain, coldness and numbness to her right foot. She states she is able to move her toes and ankle slightly but admits to stiffness. Admits to having to hang her foot off the bed at night. Denies wounds of feet. Denies left foot pain or weakness, Denies CP/SPB, n,v,

## 2022-08-24 NOTE — ED PROVIDER NOTE - PHYSICAL EXAMINATION
CONSTITUTIONAL: Well appearing, awake, alert, oriented and in no apparent distress.  ENMT: Airway patent.  EYES: Clear bilaterally.  CARDIAC: Normal rate, regular rhythm.  Heart sounds S1, S2.   RESPIRATORY: Breath sounds clear and equal bilaterally.  GASTROINTESTINAL: Abdomen soft, non-tender, no guarding.  MUSCULOSKELETAL: Spine appears normal, range of motion is not limited. + Tenderness to palpation to the RLE.  NEUROLOGICAL: Alert and oriented, no focal deficits, no motor or sensory deficits.  SKIN: Chronic Erythema to RLE. warm, dry and intact. No evidence of rash.  PSYCHIATRIC: Alert and oriented. normal mood and affect. no apparent risk to self or others. CONSTITUTIONAL: Well appearing, awake, alert, oriented and in no apparent distress.  ENMT: Airway patent.  EYES: Clear bilaterally.  CARDIAC: Normal rate, regular rhythm.  Heart sounds S1, S2.   RESPIRATORY: Breath sounds clear and equal bilaterally.  GASTROINTESTINAL: Abdomen soft, non-tender, no guarding.  MUSCULOSKELETAL: Spine appears normal, range of motion is not limited. + Tenderness to palpation to the RLE.  NEUROLOGICAL: Alert and oriented, no focal deficits   SKIN: Chronic erythematous vascular changes noted to RLE. warm.   PSYCHIATRIC: Alert and oriented. normal mood and affect. no apparent risk to self or others. CONSTITUTIONAL: Well appearing, awake, alert, oriented and in no apparent distress.  ENMT: Airway patent.  EYES: Clear bilaterally.  CARDIAC: Normal rate, regular rhythm.  Heart sounds S1, S2.   RESPIRATORY: Breath sounds clear and equal bilaterally.  GASTROINTESTINAL: Abdomen soft, non-tender, no guarding.  MUSCULOSKELETAL: Spine appears normal, range of motion is not limited. + Tenderness to palpation to the RLE. Able to palpate femoral pulse, but unable to palpate DP/PT pulses on RLE. Doppler per vascular consult.   NEUROLOGICAL: Alert and oriented, no focal deficits   SKIN: Chronic erythematous vascular changes noted to RLE. warm.   PSYCHIATRIC: Alert and oriented. normal mood and affect. no apparent risk to self or others.

## 2022-08-24 NOTE — ED ADULT NURSE NOTE - OBJECTIVE STATEMENT
pt. a&ox3 with HHA @ bedside, both HHA and pt. poor historians to pmhx of pt, and current chief complaint / reason for visit today. Pt. reported to be c/o cp actively on arrival in triage, and informally UG to MD Bartlett r/t EKG changes seen on EKG versus prior in chart with ST elevations. Pt. interviewed @ bedside and denies cp, reports she came in today for RLE pain with ambulation, with secondary weakness to that extremity and tenderness upon touch to the RLE, difficulty ambulating, and redness noted on the skin. Pt. pedal pulses intact, pt. placed on ccm, pulseox, and BP. Pt. reports to be here primarily for RLE pain mgmt. Pt. denies cp, sob, decreased appetite, recent falls or trauma to RLE to cause injury or onset of pain, and denies n/t, n/v/d, f/c, recent illness. Pt. airway patent, breathing spontaneous and unlabored, speaking in clear coherent sentences. pt. a&ox3 with HHA @ bedside, both HHA and pt. poor historians to pmhx of pt, and current chief complaint / reason for visit today. Pt. reported to be c/o cp actively on arrival in triage, and informally UG to MD Bartlett r/t EKG changes seen on EKG versus prior in chart with ST elevations. Pt. interviewed @ bedside and denies cp, reports she came in today for RLE pain with ambulation, with secondary weakness to that extremity and tenderness upon touch to the RLE, difficulty ambulating, and redness noted on the skin. Pt. pedal pulses intact, pt. placed on ccm, pulseox, and BP. Pt. reports to be here primarily for RLE pain mgmt. Pt. denies cp, sob, decreased appetite, recent falls or trauma to RLE to cause injury or onset of pain, and denies n/t, n/v/d, f/c, recent illness. Pt. airway patent, breathing spontaneous and unlabored, speaking in clear coherent sentences. Pt. is on plavix, has one stent placed 2021, and has hx of chf, cad.

## 2022-08-24 NOTE — REVIEW OF SYSTEMS
[Feeling Tired] : feeling tired [Recent Weight Loss (___ Lbs)] : recent [unfilled] ~Ulb weight loss [Chest Pain] : chest pain [As Noted in HPI] : as noted in HPI [Limb Pain] : limb pain [Negative] : Heme/Lymph [Lower Ext Edema] : no lower extremity edema [Shortness Of Breath] : no shortness of breath [Vomiting] : no vomiting [FreeTextEntry5] : foot pain

## 2022-08-24 NOTE — ED ADULT TRIAGE NOTE - CHIEF COMPLAINT QUOTE
Pt c/o L sided constant chest pain that lasted 3 hrs this morning. Denies chest pain on arrival. Also c/o R leg pain x3days. Hx of RLE arterial stent placement x2. Redness noted to R foot-- no swelling noted. Denies fevers, chills, n/v, SOB, numbness, tingling. Pt wheelchair bound.

## 2022-08-24 NOTE — ASSESSMENT
[FreeTextEntry1] : CKD 5 - appears stable fxn \par chronic foot ischemia with worsening sx and weakness \par CP today responded to nitro\par anemia \par will send to ER to evaluation for CP and foot ischemia \par cardio and vascular eval \par may also need IVF as PO intake poor and likely dry  (on diuretics) \par may need PRBC\par pt does agree to dialysis if needs it in order to intervene on foot -- though not definite that would need HD now\par

## 2022-08-24 NOTE — PROGRESS NOTE ADULT - SUBJECTIVE AND OBJECTIVE BOX
Pre-op Diagnosis: right leg rest pain, PAD  Procedure: right leg angiogram, possible intervention  Surgeon: Mo    Consent: pending                          7.7    5.74  )-----------( 147      ( 24 Aug 2022 13:33 )             23.8     08-    127<L>  |  91<L>  |  67<H>  ----------------------------<  101<H>  4.2   |  22  |  3.83<H>    Ca    10.5      24 Aug 2022 13:33  Mg     2.1         TPro  7.9  /  Alb  3.7  /  TBili  0.3  /  DBili  x   /  AST  14  /  ALT  9<L>  /  AlkPhos  84      PT/INR - ( 24 Aug 2022 13:33 )   PT: 18.0 sec;   INR: 1.51          PTT - ( 24 Aug 2022 13:33 )  PTT:32.1 sec  Urinalysis Basic - ( 24 Aug 2022 16:32 )    Color: Yellow / Appearance: Clear / S.010 / pH: x  Gluc: x / Ketone: NEGATIVE  / Bili: Negative / Urobili: 0.2 E.U./dL   Blood: x / Protein: Trace mg/dL / Nitrite: NEGATIVE   Leuk Esterase: NEGATIVE / RBC: < 5 /HPF / WBC 5-10 /HPF   Sq Epi: x / Non Sq Epi: x / Bacteria: Present /HPF        Type & Screen: 22     (*With most recent within 72hrs of OR)  CXR: 22  EK22  UA: 22    COVID status/date: [X]Negative/Date: 22  [ ]Positive/Date:     *With most recent within 48hrs of OR    Is patient on ACE/ARB? [X]No [ ]Yes   *If yes, please hold any ACE/ARB the day of surgery    Is patient on Lantus at bedtime?  [X]No [ ]Yes   *If yes, please half the dose the night before OR since patient will be NPO    Does patient have a contrast allergy? [X]No [ ]Yes  *If yes, please pre-medicate per protocol    Is patient on anticoagulation? [X]No [ ] Yes  *If yes, please discuss with team when to hold it    Is the patient Female and <56yo [X]No [ ] Yes  If yes, pregnancy test must be documented in the chart    Is patient on dialysis? [X] No [ ]Yes  *If yes, please obtain all labs including K level EARLY the day of surgery   *Also, will NOT require IVF past midnight    A/P: 89yFemale pre-op for above procedure  1. NPO past midnight, except medications  2. IVF at midnight: NS 75cc  3. [ x] Blood on hold, Units: 1

## 2022-08-24 NOTE — HISTORY OF PRESENT ILLNESS
[FreeTextEntry1] : f/u CKD\par s/p admit a [facility] with ischemic rt foot- was scheduled for LE angio but cancelled for advanced CKD and concern for progression?\par had ER visit rt foot pain -- dcd after dopplers \par c.o worsening rt foot pain and weakness of foot \par poor appetite ,lost some weight \par no f/c, no n/v, no SOB\par had CP today after struggling to walk and took nitro and resolved \par labs done and reviewed - see below\par  \par \par

## 2022-08-24 NOTE — CHART NOTE - NSCHARTNOTEFT_GEN_A_CORE
Nephrology Chart Note    89F with pmhx of CKD V who presents to the hospital in the setting of worsening foot pain. Nephrology called for CKD management and for hydration recommendations prior to angiogram on 8/25.      A/P:  #CKD V  #Chronic Hyponatremia    PT with known history of CKD Stage V, has been in talks for potential dialysis at some point. However still makes urine at baseline. Please make sure patient aware that by receiving IV contrast there is a risk that she could progress to hemodialysis. Team in the AM to do full consult and will discuss more in depth with patient as well. Sodium noted to be 127, chloride low. On NS expect correction  -Agree with IV NS @ 75cc/hr  -Trend BMP Daily  -Make sure patient on renal diet  -Monitor I/O's  -Obtain urine studies order set (UA, urine sodium and urine osm)  -Avoid nephrotoxic meds such as NSAID's      Constanza Dowling DO   PGY 5 - Nephrology Fellow  [phone] Nephrology Chart Note    89F with pmhx of CKD V who presents to the hospital in the setting of worsening foot pain. Nephrology called for CKD management and for hydration recommendations prior to angiogram on 8/25.      A/P:  #CKD V  #Chronic Hyponatremia    PT with known history of CKD Stage V, has been in talks for potential dialysis at some point. However still makes urine at baseline. Please make sure patient aware that by receiving IV contrast there is a risk that she could progress to hemodialysis. Team in the AM to do full consult and will discuss more in depth with patient as well. Sodium noted to be 127, chloride low. On NS expect correction  -Agree with IV NS @ 75cc/hr  -Trend BMP Daily  -Make sure patient on renal diet  -Monitor I/O's  -Obtain urine studies order set (UA, urine sodium and urine osm)  -Hold diuretics and ACEi/ARB  -Avoid nephrotoxic meds such as NSAID's,       Constanza Dowling DO   PGY 5 - Nephrology Fellow  [phone]

## 2022-08-24 NOTE — ED ADULT NURSE NOTE - NSICDXPASTMEDICALHX_GEN_ALL_CORE_FT
PAST MEDICAL HISTORY:  Anemia of chronic disease     CAD (coronary artery disease)     Chronic diastolic congestive heart failure     Diabetes     Diabetes mellitus with diabetic neuropathy     Hyperlipidemia     Hypertension     Hypothyroidism     Stage 5 chronic kidney disease not on chronic dialysis        PAST MEDICAL HISTORY:  Anemia of chronic disease     CAD (coronary artery disease)     Chronic diastolic congestive heart failure     Diabetes     Diabetes mellitus with diabetic neuropathy     Hyperlipidemia     Hypertension     Hypothyroidism     Stage 5 chronic kidney disease not on chronic dialysis

## 2022-08-24 NOTE — H&P ADULT - NSHPPHYSICALEXAM_GEN_ALL_CORE
Gen: NAD   Card: S1S2 RRR   resp: b/l breath sounds   GI: soft   LE: RLE: 2+ fem DP/PT biphasic   Lt: fem 2+, DP/PT biphasic   Right foot cool to touch, decreased motor function at right ankle and toes, sensation decreased

## 2022-08-24 NOTE — ED PROVIDER NOTE - CLINICAL SUMMARY MEDICAL DECISION MAKING FREE TEXT BOX
Vital signs noted. Pt is afebrile and all other vital signs are within normal limits.    ED Course: will give Percocet for pain management, obtain pre-op labs, EKG, CXR, cardiac labs, and consult vascular service for recommendation. 89 y/o Female with a PMHx of CKD, DM, HFpEF (EF 70% 2021, grade 1 diastolic dysfunction), HTN, HLD, CAD, hypothyroidism, PAD, s/p R ZXE-is-otmpe-knee-Pop stenting 6/2021 and balloon angioplasty of 75% in-stent stenosis 2/2022 presenting to the ED, ambulating with walker at baseline assisted by home health aide, c/o continued and worsening RLE pain, difficulty ambulating due to pain, and states she experienced mild CP earlier this morning. Pt states she took Tylenol @ 10 am with no relief of symptoms and is currently on Plavix. States she was seen at [facility] in the ED 4 days ago for the same complaint and vascular service was consulted and pt was cleared by Vascular and advised to follow up with Dr. Mo red. However pt states she has not followed up because of the worsening symptoms and instead presents to the ED for further evaluation. Denies falls, trauma, injury, fevers, chills, SOB or current CP in ED.  ill give Percocet for pain management, obtain labs, EKG, CXR, cardiac labs, and consult vascular service for recommendation.    Vital signs noted. Pt is afebrile and all other vital signs are within normal limits. Will give Percocet for pain management, obtain labs, EKG, CXR, cardiac labs, and consult vascular service for recommendation. 89 y/o Female with a PMHx of CKD, DM, HFpEF (EF 70% 2021, grade 1 diastolic dysfunction), HTN, HLD, CAD, hypothyroidism, PAD, s/p R RFJ-gn-gospx-knee-Pop stenting 6/2021 and balloon angioplasty of 75% in-stent stenosis 2/2022 presenting to the ED, ambulating with walker at baseline assisted by home health aide, c/o continued and worsening RLE pain, difficulty ambulating due to pain, and states she experienced mild CP earlier this morning. Pt states she took Tylenol @ 10 am with no relief of symptoms and is currently on Plavix. States she was seen at [facility] in the ED 4 days ago for the same complaint and vascular service was consulted and pt was cleared by Vascular and advised to follow up with Dr. Mo red. However pt states she has not followed up because of the worsening symptoms and instead presents to the ED for further evaluation. Denies falls, trauma, injury, fevers, chills, SOB or current CP in ED.  Will give Percocet for pain management, obtain labs, EKG, CXR, cardiac labs, and consult vascular service for recommendation.  ED course: Vital signs noted. Pt is afebrile and all other vital signs are within normal limits. ECG noted. CXR with NAD. Labs noted and with chronic renal dysfunction (stable), potassium normal and trop elevated (likely sec to renal dysfunction). Vascular consulted in ED to see pt. Repeat trop sent. Case endorsed to Dr. Michel pending repeat trop and Vascular consult. Dispo per Vascular recs and pt reevaluation. Findings and plan discussed with pt. 89 y/o Female with a PMHx of CKD, DM, HFpEF (EF 70% 2021, grade 1 diastolic dysfunction), HTN, HLD, CAD, hypothyroidism, PAD, s/p R QSE-iv-buqgx-knee-Pop stenting 6/2021 and balloon angioplasty of 75% in-stent stenosis 2/2022 presenting to the ED, ambulating with walker at baseline assisted by home health aide, c/o continued and worsening RLE pain, difficulty ambulating due to pain, and states she experienced mild CP earlier this morning. Pt states she took Tylenol @ 10 am with no relief of symptoms and is currently on Plavix. States she was seen at [facility] in the ED 4 days ago for the same complaint and vascular service was consulted and pt was cleared by Vascular and advised to follow up with Dr. Mo red. However pt states she has not followed up because of the worsening symptoms and instead presents to the ED for further evaluation. Denies falls, trauma, injury, fevers, chills, SOB or current CP in ED.  Will give Percocet for pain management, obtain labs, EKG, CXR, cardiac labs, and consult vascular service for recommendation.  ED course: Vital signs noted. Pt is afebrile and all other vital signs are within normal limits. ECG noted. CXR with NAD. Labs noted and significant for  hyponatremia, chronic anemia, chronic renal dysfunction (stable, potassium normal) chronic anemia and elevated trop of 0.4 (likely sec to renal dysfunction). Vascular consulted in ED to see pt. Repeat trop sent. Case endorsed to Dr. Michel pending repeat trop and Vascular consult. Dispo per Vascular recs and pt reevaluation. Findings and plan discussed with pt.

## 2022-08-24 NOTE — H&P ADULT - ASSESSMENT
A/P: 89y YO Female with hx of CKD 5, DM, HFpEF (EF 70% 2021, grade 1 diastolic dysfunction), HTN, HLD, CAD, hypothyroidism, PAD presents to ED with rest pain. Patient was recently admitted to [facility] for right leg rest pain admitted with worsening right leg pain     Vascular/PAD:  -Continue home ASA and Plavix   -Plan for RLE angiogram tomorrow   -will pre-op and consent   -    HTN/HLD:  -Continue home coreg 25, Nifedipine 60  -continue home statin       CAD/CHF:   -last echo 8/21    DM:  -not on any home meds     CKD:   -renal consulted appreciate recs   -Cr 3.8    Anemia:   hgb 7.7 on admission     Hypothyroidism:   -restarted home meds   Diet:     Activity:   as tolerated     DVTPPx:   sqh   Dispo:   OR tomorrow for angiogram

## 2022-08-24 NOTE — ED PROVIDER NOTE - INTERPRETATION
LVH, possible lateral infarct age undetermined, inferior  infarct ge undetermined/normal sinus rhythm

## 2022-08-24 NOTE — PHYSICAL EXAM
[General Appearance - Alert] : alert [General Appearance - In No Acute Distress] : in no acute distress [Jugular Venous Distention Increased] : there was no jugular-venous distention [Heart Rate And Rhythm] : heart rate was normal and rhythm regular [Edema] : there was no peripheral edema [Abdomen Soft] : soft [Abdomen Tenderness] : non-tender [No CVA Tenderness] : no ~M costovertebral angle tenderness [Involuntary Movements] : no involuntary movements were seen [] : no rash [Oriented To Time, Place, And Person] : oriented to person, place, and time [Affect] : the affect was normal [FreeTextEntry1] : rt foot weakness

## 2022-08-24 NOTE — ED ADULT NURSE NOTE - NSICDXPASTSURGICALHX_GEN_ALL_CORE_FT
PAST SURGICAL HISTORY:  Pacemaker     S/P peripheral artery angioplasty with stent placement 6/2021 - SFA to BK Pop stenting (Zilver PTX 6x80mm, Zilver 518 5x80, Z[place]ver 518 5x80 (proximal to distal)); 2/2022 - balloon angioplasty for in-stent stenosis       PAST SURGICAL HISTORY:  Pacemaker     S/P peripheral artery angioplasty with stent placement 6/2021 - SFA to BK Pop stenting (Zilver PTX 6x80mm, Zilver 518 5x80, Zilver 518 5x80 (proximal to distal)); 2/2022 - balloon angioplasty for in-stent stenosis

## 2022-08-24 NOTE — ED ADULT NURSE REASSESSMENT NOTE - NS ED NURSE REASSESS COMMENT FT1
as per Vascular MD @ bedside, pt. is OK to go upstairs to room hypertensive. MD was told pt's most recent BP, MD verbalized OK to go upstairs. Pt. resting comfortably, denies SOB, lightheadedness, HA. Pt. transported with RN accompany on Zoll monitor ccm and pulseox.

## 2022-08-24 NOTE — ED PROVIDER NOTE - PROGRESS NOTE DETAILS
Anand: Received s/o from Dr Bartlett: leg pain, known PVD. Sometimes pain radiates into chest. CKD. Repeat trop, pending vascular for dispo  Repeat trop stable. Admit to vascular, tele, Jerry, pre-op

## 2022-08-25 ENCOUNTER — TRANSCRIPTION ENCOUNTER (OUTPATIENT)
Age: 87
End: 2022-08-25

## 2022-08-25 LAB
ANION GAP SERPL CALC-SCNC: 11 MMOL/L — SIGNIFICANT CHANGE UP (ref 5–17)
BUN SERPL-MCNC: 66 MG/DL — HIGH (ref 7–23)
CALCIUM SERPL-MCNC: 10.9 MG/DL — HIGH (ref 8.4–10.5)
CHLORIDE SERPL-SCNC: 94 MMOL/L — LOW (ref 96–108)
CO2 SERPL-SCNC: 25 MMOL/L — SIGNIFICANT CHANGE UP (ref 22–31)
CREAT SERPL-MCNC: 3.76 MG/DL — HIGH (ref 0.5–1.3)
EGFR: 11 ML/MIN/1.73M2 — LOW
GLUCOSE BLDC GLUCOMTR-MCNC: 102 MG/DL — HIGH (ref 70–99)
GLUCOSE BLDC GLUCOMTR-MCNC: 107 MG/DL — HIGH (ref 70–99)
GLUCOSE BLDC GLUCOMTR-MCNC: 90 MG/DL — SIGNIFICANT CHANGE UP (ref 70–99)
GLUCOSE BLDC GLUCOMTR-MCNC: 95 MG/DL — SIGNIFICANT CHANGE UP (ref 70–99)
GLUCOSE SERPL-MCNC: 115 MG/DL — HIGH (ref 70–99)
HCT VFR BLD CALC: 24.1 % — LOW (ref 34.5–45)
HGB BLD-MCNC: 7.8 G/DL — LOW (ref 11.5–15.5)
ISTAT ACTK (ACTIVATED CLOTTING TIME KAOLIN): 184 SEC — HIGH (ref 74–137)
ISTAT ACTK (ACTIVATED CLOTTING TIME KAOLIN): 213 SEC — HIGH (ref 74–137)
ISTAT ACTK (ACTIVATED CLOTTING TIME KAOLIN): 213 SEC — HIGH (ref 74–137)
ISTAT ACTK (ACTIVATED CLOTTING TIME KAOLIN): 294 SEC — HIGH (ref 74–137)
ISTAT ACTK (ACTIVATED CLOTTING TIME KAOLIN): 341 SEC — HIGH (ref 74–137)
MAGNESIUM SERPL-MCNC: 2.4 MG/DL — SIGNIFICANT CHANGE UP (ref 1.6–2.6)
MCHC RBC-ENTMCNC: 31.5 PG — SIGNIFICANT CHANGE UP (ref 27–34)
MCHC RBC-ENTMCNC: 32.4 GM/DL — SIGNIFICANT CHANGE UP (ref 32–36)
MCV RBC AUTO: 97.2 FL — SIGNIFICANT CHANGE UP (ref 80–100)
NRBC # BLD: 0 /100 WBCS — SIGNIFICANT CHANGE UP (ref 0–0)
PHOSPHATE SERPL-MCNC: 4.8 MG/DL — HIGH (ref 2.5–4.5)
PLATELET # BLD AUTO: 130 K/UL — LOW (ref 150–400)
POTASSIUM SERPL-MCNC: 3.9 MMOL/L — SIGNIFICANT CHANGE UP (ref 3.5–5.3)
POTASSIUM SERPL-SCNC: 3.9 MMOL/L — SIGNIFICANT CHANGE UP (ref 3.5–5.3)
RBC # BLD: 2.48 M/UL — LOW (ref 3.8–5.2)
RBC # FLD: 21.5 % — HIGH (ref 10.3–14.5)
SODIUM SERPL-SCNC: 130 MMOL/L — LOW (ref 135–145)
TROPONIN T SERPL-MCNC: 0.04 NG/ML — CRITICAL HIGH (ref 0–0.01)
WBC # BLD: 3.47 K/UL — LOW (ref 3.8–10.5)
WBC # FLD AUTO: 3.47 K/UL — LOW (ref 3.8–10.5)

## 2022-08-25 PROCEDURE — 75710 ARTERY X-RAYS ARM/LEG: CPT | Mod: 26,GC,59

## 2022-08-25 PROCEDURE — 36140 INTRO NDL ICATH UPR/LXTR ART: CPT | Mod: 59

## 2022-08-25 PROCEDURE — 75625 CONTRAST EXAM ABDOMINL AORTA: CPT | Mod: 26,GC

## 2022-08-25 PROCEDURE — 37226: CPT | Mod: GC

## 2022-08-25 PROCEDURE — 99223 1ST HOSP IP/OBS HIGH 75: CPT

## 2022-08-25 PROCEDURE — 76937 US GUIDE VASCULAR ACCESS: CPT | Mod: 26,GC

## 2022-08-25 RX ORDER — CLOPIDOGREL BISULFATE 75 MG/1
75 TABLET, FILM COATED ORAL DAILY
Refills: 0 | Status: DISCONTINUED | OUTPATIENT
Start: 2022-08-25 | End: 2022-08-26

## 2022-08-25 RX ORDER — SODIUM CHLORIDE 9 MG/ML
1000 INJECTION INTRAMUSCULAR; INTRAVENOUS; SUBCUTANEOUS
Refills: 0 | Status: DISCONTINUED | OUTPATIENT
Start: 2022-08-25 | End: 2022-08-26

## 2022-08-25 RX ADMIN — HEPARIN SODIUM 5000 UNIT(S): 5000 INJECTION INTRAVENOUS; SUBCUTANEOUS at 07:17

## 2022-08-25 RX ADMIN — Medication 81 MILLIGRAM(S): at 09:09

## 2022-08-25 RX ADMIN — CARVEDILOL PHOSPHATE 25 MILLIGRAM(S): 80 CAPSULE, EXTENDED RELEASE ORAL at 08:52

## 2022-08-25 RX ADMIN — SODIUM CHLORIDE 75 MILLILITER(S): 9 INJECTION INTRAMUSCULAR; INTRAVENOUS; SUBCUTANEOUS at 01:37

## 2022-08-25 RX ADMIN — Medication 60 MILLIGRAM(S): at 07:16

## 2022-08-25 RX ADMIN — Medication 100 MICROGRAM(S): at 07:16

## 2022-08-25 RX ADMIN — SODIUM CHLORIDE 60 MILLILITER(S): 9 INJECTION INTRAMUSCULAR; INTRAVENOUS; SUBCUTANEOUS at 13:15

## 2022-08-25 RX ADMIN — CLOPIDOGREL BISULFATE 75 MILLIGRAM(S): 75 TABLET, FILM COATED ORAL at 09:09

## 2022-08-25 NOTE — CONSULT NOTE ADULT - CONSULT REASON
Co-Management of Co-Morbidities
Nephrology called for CKD management and for hydration recommendations prior to angiogram today.

## 2022-08-25 NOTE — DIETITIAN INITIAL EVALUATION ADULT - NS FNS DIET ORDER
Diet, Renal Restrictions:   For patients receiving Renal Replacement - No Protein Restr, No Conc K, No Conc Phos, Low Sodium  Consistent Carbohydrate {No Snacks} (CSTCHO) (08-24-22 @ 21:01)  Diet, NPO after Midnight:      NPO Start Date: 24-Aug-2022,   NPO Start Time: 23:59 (08-24-22 @ 18:35)

## 2022-08-25 NOTE — ED PROVIDER NOTE - CPE EDP MUSC NORM
Detail Level: Simple Plan: Will re-evaluate in 2 weeks. Render In Strict Bullet Format?: No Plan: Will re-evaluate in 2 weeks Initiate Treatment: Efudex 5 % topical cream, Apply a thin layer to small area of face twice a day x 14 days. Initiate Treatment: Efudex 5 % topical cream, Apply a thin layer to small area of face twice a day x 14 days. Pt states Dr. Clarke recommended efudex treatment to the area normal...

## 2022-08-25 NOTE — CONSULT NOTE ADULT - ASSESSMENT
This is a 89yo woman, mostly Malagasy-speaking, with a PMH of stage V CKD (Dr. Lerma), NIDDM2 c/b neuropathy (A1c 5.7%), chronic HFpEF, HTN, HLD, CAD, PPM (indication unknown), hypothyroidism and PAD who re-presents with critical leg ischemia of her RLE.     #RLE critical leg ischemia  #PAD  -- plan for angiogram today  -- started on ASA and Plavix     #Stage V CKD  -- has been hydrated pre-angiogram   -- monitor Cr post-contrast   -- c/w home calcitriol   -- started on Sevelamer   -- Renal following, appreciate assistance    #Essential HTN   #Chronic HFpEF  -- hold home Torsemide  -- c/w home Coreg and Nifedipine    #NIDDM2   -- c/w mISS for FS goal 140-180     #HLD   -- c/w statin     #Hypothyroidism   -- c/w Levothyroixine    #Insomnia   -- would monitor Ambien use given age and risk for hospital-related delirium     #Diet - NPO for OR  #DVT PPx - SQH  #Dispo - will need post-op PT     Catherine Steward  Attending Hospitalist  [phone] This is a 89yo woman, mostly French-speaking, with a PMH of stage V CKD (Dr. Lerma), chronic HFpEF, HTN, HLD, CAD, PPM (indication unknown), hypothyroidism and PAD who re-presents with critical leg ischemia of her RLE.     #RLE critical leg ischemia  #PAD  -- plan for angiogram today  -- started on ASA and Plavix     #Stage V CKD  -- has been hydrated pre-angiogram   -- monitor Cr post-contrast   -- c/w home calcitriol   -- started on Sevelamer   -- Renal following, appreciate assistance    #Essential HTN   #Chronic HFpEF  -- hold home Torsemide; will be mindful of Cr when we restart it  -- c/w home Coreg and Nifedipine    #HLD   -- c/w statin     #Hypothyroidism   -- c/w Levothyroixine    #Insomnia   -- would monitor Ambien use given age and risk for hospital-related delirium     #Diet - NPO for OR  #DVT PPx - SQH  #Dispo - will need post-op PT     Catherine Steward  Attending Hospitalist  [phone]

## 2022-08-25 NOTE — DIETITIAN INITIAL EVALUATION ADULT - OTHER INFO
89 Yo F w/ Hx of CKD 5, DM, HFpEF (EF 70% 2021, grade 1 diastolic dysfunction), HTN, HLD, CAD, hypothyroidism, PAD presents to ED with rest pain. Patient was recently admitted to [facility] for right leg rest pain. Pt now s/p RLE Angio SFA pop stent in stent stenosis angioplasty and DCB, restent to proximal and dital part of old sent, one vessel runoff through peroneal.     RD attempted to see pt x2, pt not in room during attempted visits. Information obtained from RN, EMR, pt's son. Son confirms NKFA. Son denies change in appetite PTA; endorses 3 meals/day at home. Pt has home health aids at home 16 hours/day who cook for her regularly. At home, she enjoys chicken, fish, turkey, vegetables, mashed potatoes. Reports usual body weight 125-129pounds. Verbalizes no concern for recent weight loss. Of note, pt has varying wts in EMR, one being 192lbs. Suspect wt was inaccurately put in as 192lbs when it is really 129lbs. This wt is more consistent with recent wts in pt's records. No edema documented at this time. No pressure ulcers documented at this time. Winston score=20. Nutrition focused physical exam deferred given pt not in room during attempted visits. Based on ASPEN guidelines, pt does not meet criteria for malnutrition at this time. RD will continue to monitor. Discussed current diet order wt son; provided pt with education regarding RD's role in pt's care; amenable to education. Made aware RD remains available. RD to follow up per protocol. See nutrition recommendations below.

## 2022-08-25 NOTE — DIETITIAN INITIAL EVALUATION ADULT - PERTINENT LABORATORY DATA
08-25    130<L>  |  94<L>  |  66<H>  ----------------------------<  115<H>  3.9   |  25  |  3.76<H>    Ca    10.9<H>      25 Aug 2022 05:30  Phos  4.8     08-25  Mg     2.4     08-25    TPro  7.9  /  Alb  3.7  /  TBili  0.3  /  DBili  x   /  AST  14  /  ALT  9<L>  /  AlkPhos  84  08-24  POCT Blood Glucose.: 95 mg/dL (08-25-22 @ 07:04)  A1C with Estimated Average Glucose Result: 5.7 % (07-20-22 @ 17:10)  A1C with Estimated Average Glucose Result: 4.9 % (02-11-22 @ 06:53)

## 2022-08-25 NOTE — PROGRESS NOTE ADULT - SUBJECTIVE AND OBJECTIVE BOX
O/N: , given coreg.  shortly after coreg, given 10 labetolol and then nifedipine, SBP down to 160s                              A/P: 89y YO Female with hx of CKD 5, DM, HFpEF (EF 70% 2021, grade 1 diastolic dysfunction), HTN, HLD, CAD, hypothyroidism, PAD presents to ED with rest pain. Patient was recently admitted to [facility] for right leg rest pain admitted with worsening right leg pain     Vascular/PAD:  -Continue home ASA and Plavix   -Plan for RLE angiogram tomorrow   -will pre-op and consent   -    HTN/HLD:  -Continue home coreg 25, Nifedipine 60  -continue home statin       CAD/CHF:   -last echo 8/21    DM:  -not on any home meds     CKD:   -renal consulted appreciate recs   -Cr 3.8    Anemia:   hgb 7.7 on admission     Hypothyroidism:   -restarted home meds   Diet:     Activity:   as tolerated     DVTPPx:   sqh   Dispo:   OR tomorrow for angiogram    O/N: , given coreg.  shortly after coreg, given 10 labetolol and then nifedipine, SBP down to 160s  Subjective:     ROS:   Denies Headache, blurred vision, Chest Pain, SOB, Abdominal pain, nausea or vomiting     Social   aspirin  chewable 81  carvedilol 25  clopidogrel Tablet 75  heparin   Injectable 5000  NIFEdipine XL 60      Allergies    No Known Allergies    Intolerances        Vital Signs Last 24 Hrs  T(C): 36.1 (25 Aug 2022 05:20), Max: 36.9 (24 Aug 2022 15:32)  T(F): 97 (25 Aug 2022 05:20), Max: 98.4 (24 Aug 2022 15:32)  HR: 60 (25 Aug 2022 00:26) (60 - 76)  BP: 168/74 (25 Aug 2022 00:26) (129/75 - 214/89)  BP(mean): --  RR: 18 (25 Aug 2022 00:26) (16 - 18)  SpO2: 96% (25 Aug 2022 00:26) (95% - 98%)    Parameters below as of 25 Aug 2022 00:26  Patient On (Oxygen Delivery Method): room air      I&O's Summary    24 Aug 2022 07:01  -  25 Aug 2022 07:00  --------------------------------------------------------  IN: 285 mL / OUT: 0 mL / NET: 285 mL        Physical Exam:  General:  Pulmonary:  Cardiovascular:  Abdominal:  Extremities:  Pulses:   Right:                                                                          Left:  FEM [ ]2+ [ ]1+ [ ]doppler                                             FEM [ ]2+ [ ]1+ [ ]doppler    POP [ ]2+ [ ]1+ [ ]doppler                                             POP [ ]2+ [ ]1+ [ ]doppler    DP [ ]2+ [ ]1+ [ ]doppler                                                DP [ ]2+ [ ]1+ [ ]doppler  PT[ ]2+ [ ]1+ [ ]doppler                                                  PT [ ]2+ [ ]1+ [ ]doppler      LABS:                        7.7    5.74  )-----------( 147      ( 24 Aug 2022 13:33 )             23.8     08-24    127<L>  |  91<L>  |  67<H>  ----------------------------<  101<H>  4.2   |  22  |  3.83<H>    Ca    10.5      24 Aug 2022 13:33  Mg     2.1     08-24    TPro  7.9  /  Alb  3.7  /  TBili  0.3  /  DBili  x   /  AST  14  /  ALT  9<L>  /  AlkPhos  84  08-24    PT/INR - ( 24 Aug 2022 13:33 )   PT: 18.0 sec;   INR: 1.51          PTT - ( 24 Aug 2022 13:33 )  PTT:32.1 sec    Radiology and Additional Studies:        ---------------------------------------------------------------------------  PLEASE CHECK WHEN PRESENT:     [ x ]Heart Failure     [  ] Acute     [  ] Acute on Chronic     [ x ] Chronic  -------------------------------------------------------------------     [  ]Diastolic [HFpEF]     [x  ]Systolic [HFrEF]     [  ]Combined [HFpEF & HFrEF]  .................................................................................     [  ]Other:     [ ] Pulmonary Hypertension     [ ] Afib     [x ] Hypertensive Heart Disease  -------------------------------------------------------------------  [ ] Respiratory failure  [ ] Acute cor pulmonale  [ ] Asthma/COPD Exacerbation  [ ] Pleural effusion  [ ] Aspiration pneumonia  [ ] Obstructive Sleep Apnea  -------------------------------------------------------------------  [  ]KYMBERLY     [  ]ATN     [  ]Reneal Medullary Necrosis     [  ]Renal Cortical Necrosis     [  ]Other Pathological Lesions:    [  ]CKD 1  [  ]CKD 2  [  ]CKD 3  [  ]CKD 4  [ x ]CKD 5  [  ]Other  -------------------------------------------------------------------  [  x]Diabetes  [  ] Diabetic PVD Ulcer  [  ] Neuropathic ulcer to DM  [  x] Diabetes with Nephropathy  [  ] Osteomyelitis due to diabetes  [  ] Hyperglycemia   [  ]hypoglycemia   --------------------------------------------------------------------  [  ]Malnutrition: See Nutrition Note  [  ]Cachexia  [  ]Other:   [  ]Supplement Ordered:  [  ]Morbid Obesity (BMI >=40]  ---------------------------------------------------------------------  [ ] Sepsis/severe sepsis/septic shock  [ ] Noninfectious SIRS  [ ] UTI  [ ] Pneumonia  -----------------------------------------------------------------------  [ ] Acidosis/alkalosis  [ ] Fluid overload  [ ] Hypokalemia  [ ] Hyperkalemia  [ ] Hypomagnesemia  [ ] Hypophosphatemia  [ ] Hyperphosphatemia  [x ] hyponatremia   ------------------------------------------------------------------------  [ ] Acute blood loss anemia  [ ] Post op blood loss anemia  [ ] Iron deficiency anemia  [x ] Anemia due to chronic disease  [ ] Hypercoagulable state  [ ] Thrombocytopenia  ----------------------------------------------------------------------  [ ] Cerebral infarction  [ ] Transient ischemia attack  [ ] Encephalopathy - Toxic or Metabolic      A/P: 89y YO Female with hx of CKD 5, DM, HFpEF (EF 70% 2021, grade 1 diastolic dysfunction), HTN, HLD, CAD, hypothyroidism, PAD presents to ED with rest pain. Patient was recently admitted to [facility] for right leg rest pain admitted with worsening right leg pain     Vascular/PAD:  -Continue home ASA and Plavix   -Plan for RLE angiogram tomorrow   -will pre-op and consent   -    HTN/HLD:  -Continue home coreg 25, Nifedipine 60  -continue home statin       CAD/CHF:   -last echo 8/21    DM:  -not on any home meds     CKD:   -renal consulted appreciate recs   -Cr 3.8    Anemia:   hgb 7.7 on admission     Hypothyroidism:   -restarted home meds   Diet:     Activity:   as tolerated     DVTPPx:   [facility]   Dispo:   OR tomorrow for angiogram    O/N: , given coreg.  shortly after coreg, given 10 labetolol and then nifedipine, SBP down to 160s    Subjective: hypertensive overnight but denies any headache blurred vision, states she urinated this overnight but voids not collected   admits to pain and stiffness in right foot, states right foot feels cold     ROS:   Denies Headache, blurred vision, Chest Pain, SOB, Abdominal pain, nausea or vomiting     Social   aspirin  chewable 81  carvedilol 25  clopidogrel Tablet 75  heparin   Injectable 5000  NIFEdipine XL 60      Allergies    No Known Allergies    Intolerances        Vital Signs Last 24 Hrs  T(C): 36.1 (25 Aug 2022 05:20), Max: 36.9 (24 Aug 2022 15:32)  T(F): 97 (25 Aug 2022 05:20), Max: 98.4 (24 Aug 2022 15:32)  HR: 60 (25 Aug 2022 00:26) (60 - 76)  BP: 168/74 (25 Aug 2022 00:26) (129/75 - 214/89)  BP(mean): --  RR: 18 (25 Aug 2022 00:26) (16 - 18)  SpO2: 96% (25 Aug 2022 00:26) (95% - 98%)    Parameters below as of 25 Aug 2022 00:26  Patient On (Oxygen Delivery Method): room air      I&O's Summary    24 Aug 2022 07:01  -  25 Aug 2022 07:00  --------------------------------------------------------  IN: 285 mL / OUT: 0 mL / NET: 285 mL        Physical Exam:  General: NAD  Pulmonary: b/l breath sounds   Cardiovascular: s1s2   Abdominal: soft   Extremities: Right foot with DP/TP monophasic signals noted   right foot cool to touch   motor and sensation decreased but present         LABS:                        7.7    5.74  )-----------( 147      ( 24 Aug 2022 13:33 )             23.8     08-24    127<L>  |  91<L>  |  67<H>  ----------------------------<  101<H>  4.2   |  22  |  3.83<H>    Ca    10.5      24 Aug 2022 13:33  Mg     2.1     08-24    TPro  7.9  /  Alb  3.7  /  TBili  0.3  /  DBili  x   /  AST  14  /  ALT  9<L>  /  AlkPhos  84  08-24    PT/INR - ( 24 Aug 2022 13:33 )   PT: 18.0 sec;   INR: 1.51          PTT - ( 24 Aug 2022 13:33 )  PTT:32.1 sec    Radiology and Additional Studies:        ---------------------------------------------------------------------------  PLEASE CHECK WHEN PRESENT:     [ x ]Heart Failure     [  ] Acute     [  ] Acute on Chronic     [ x ] Chronic  -------------------------------------------------------------------     [  ]Diastolic [HFpEF]     [x  ]Systolic [HFrEF]     [  ]Combined [HFpEF & HFrEF]  .................................................................................     [  ]Other:     [ ] Pulmonary Hypertension     [ ] Afib     [x ] Hypertensive Heart Disease  -------------------------------------------------------------------  [ ] Respiratory failure  [ ] Acute cor pulmonale  [ ] Asthma/COPD Exacerbation  [ ] Pleural effusion  [ ] Aspiration pneumonia  [ ] Obstructive Sleep Apnea  -------------------------------------------------------------------  [  ]KYMBERLY     [  ]ATN     [  ]Reneal Medullary Necrosis     [  ]Renal Cortical Necrosis     [  ]Other Pathological Lesions:    [  ]CKD 1  [  ]CKD 2  [  ]CKD 3  [  ]CKD 4  [ x ]CKD 5  [  ]Other  -------------------------------------------------------------------  [  x]Diabetes  [  ] Diabetic PVD Ulcer  [  ] Neuropathic ulcer to DM  [  x] Diabetes with Nephropathy  [  ] Osteomyelitis due to diabetes  [  ] Hyperglycemia   [  ]hypoglycemia   --------------------------------------------------------------------  [  ]Malnutrition: See Nutrition Note  [  ]Cachexia  [  ]Other:   [  ]Supplement Ordered:  [  ]Morbid Obesity (BMI >=40]  ---------------------------------------------------------------------  [ ] Sepsis/severe sepsis/septic shock  [ ] Noninfectious SIRS  [ ] UTI  [ ] Pneumonia  -----------------------------------------------------------------------  [ ] Acidosis/alkalosis  [ ] Fluid overload  [ ] Hypokalemia  [ ] Hyperkalemia  [ ] Hypomagnesemia  [ ] Hypophosphatemia  [ ] Hyperphosphatemia  [x ] hyponatremia   ------------------------------------------------------------------------  [ ] Acute blood loss anemia  [ ] Post op blood loss anemia  [ ] Iron deficiency anemia  [x ] Anemia due to chronic disease  [ ] Hypercoagulable state  [ ] Thrombocytopenia  ----------------------------------------------------------------------  [ ] Cerebral infarction  [ ] Transient ischemia attack  [ ] Encephalopathy - Toxic or Metabolic      A/P: 89y YO Female with hx of CKD 5, DM, HFpEF (EF 70% 2021, grade 1 diastolic dysfunction), HTN, HLD, CAD, hypothyroidism, PAD presents to ED with rest pain. Patient was recently admitted to [facility] for right leg rest pain admitted with worsening right leg pain     Vascular/PAD:  -Continue home ASA and Plavix   -Plan for RLE angiogram 8/25  -pre-oped and consented for OR     HTN/HLD:  -Continue home coreg 25, Nifedipine 60  -will continue to monitor for HTN   -continue home statin       CAD/CHF:   -last echo 7/21 with EF 65-70% diastolic dysfunction noted      DM:  -not on any home meds   -A1c 5.7    CKD:   -renal consulted appreciate recs   -Cr 3.8    Anemia:   hgb 7.7 on admission   1 unit on home for OR     Hypothyroidism:   -restarted home meds   Diet:   NPO     Activity:   as tolerated  DVTPPx:   sqh   Dispo:   OR  for angiogram today     I (Alethea Ruby PA-C ) have personally seen and examined the patient. I have reviewed all pertinent imaging and laboratory findings.

## 2022-08-25 NOTE — CONSULT NOTE ADULT - ASSESSMENT
89 Yo F w/ Hx of CKD 5, DM, HFpEF (EF 70% 2021, grade 1 diastolic dysfunction), HTN, HLD, CAD, hypothyroidism, PAD presents to ED with right foot pain. Patient was recently admitted to [facility] for right leg rest pain but at that time deferred due to risk of kidney failure from angiogram. Patient presents today after being seen by nephrology and states her pain is uncontrolled and is now willing to undergo angiogram knowing risk of kidney injury. Nephrology called for CKD management and for hydration recommendations prior to angiogram today.     #CKD V  #Chronic Hyponatremia    PT with known history of CKD Stage V, has been in talks for potential dialysis last hospitalization (July 22/2022) Patient aware that by receiving IV contrast there is a risk that she could progress to hemodialysis.  kidney function stable, will reassess after procedure    -C/w with IV NS @ 75cc/hr  -Trend BMP Daily  -renal diet  -Monitor I/O's  -Hold diuretics and ACEi/ARB  -Avoid nephrotoxic meds such as NSAID's,    MBD-CKD -   Continue Sevelamer   Continue Calcitriol     Anemia - likely 2/2 CKD,

## 2022-08-25 NOTE — BRIEF OPERATIVE NOTE - OPERATION/FINDINGS
L CFA access via micropuncture technique under ultrasound guidance. Normal aortogram, tortuous. Patent CFA, proximal SFA, PFA. In-stent occlusion of prior distal SFA/pop stents. Reconstitution in distal stent. 1 vessel runoff via peroneal, fills DP/PT. Crossed lesion, ballooned with [employer] [id number] balloon, followed by [place] [id number] ALISON. Residual calcification with in proximal stent, stented with [employer] PTX 6x40mm. Distal prior stent ballooned with [place] 5x40mm ALISON. Completion runs showed good flow through stents. Heparin not reversed with protamine. 6Fr sheath sutured in place. L CFA access via micropuncture technique under ultrasound guidance. Normal aortogram, tortuous. Patent CFA, proximal SFA, PFA. In-stent occlusion of prior distal SFA/pop stents. Reconstitution in distal stent. 1 vessel runoff via peroneal, fills DP/PT. Crossed lesion, ballooned with [employer] [id number] balloon, followed by [place] [id number] ALISON. Residual calcification with in proximal stent, stented with [employer] PTX 6x40mm. Distal prior stent ballooned with [place] 5x40mm ALISON. Completion runs showed good flow through stents. Heparin not reversed with protamine. 6Fr sheath sutured in place. Total 40cc contrast used.

## 2022-08-25 NOTE — DIETITIAN INITIAL EVALUATION ADULT - ADD RECOMMEND
1. Advance diet within the next 24-48hrs.  >>Advance diet to CONSCHO, low sodium, no concentrated K or Phos. Texture Per SLP    2. Monitor %PO intake, diet tolerance.   >>Encourage PO intake as appropriate   3. Monitor lytes, replete prn. Monitor BG.   4. Trend daily wts. Monitor skin integrity, s/sx GI distress.   5. Pain/BM regimen per team   6. RD diet edu reinforcement prn.   7. RD to remain available for additional nutrition interventions as needed.  1. Advance diet within the next 24-48hrs.  >>Advance diet to CONSCHO, low sodium, no concentrated K or Phos. Texture Per SLP    >>Should pt undergo dialysis, change diet to CONSCHO with Renal diet (no protein restriction).   >>Fluids per team.   2. Monitor %PO intake, diet tolerance.   >>Encourage PO intake as appropriate   3. Monitor lytes, replete prn. Monitor BG.   4. Trend daily wts. Monitor skin integrity, s/sx GI distress.   5. Pain/BM regimen per team   6. RD diet edu reinforcement prn.   7. RD to remain available for additional nutrition interventions as needed.

## 2022-08-25 NOTE — CONSULT NOTE ADULT - SUBJECTIVE AND OBJECTIVE BOX
Vascular Co-Management Initial Consult Note    HPI:  89 Yo F w/ Hx of CKD 5, DM, HFpEF (EF 70% , grade 1 diastolic dysfunction), HTN, HLD, CAD, hypothyroidism, PAD presents to ED with rest pain. Patient was recently admitted to [facility] for right leg rest pain but at that time deferred due to risk of kidney failure from angiogram. Patient presents today after being seen by nephrology and states her pain is uncontrolled and is now willing to undergo angiogram knowing risk of kidney injury.     She reports pain, coldness and numbness to her right foot. She states she is able to move her toes and ankle slightly but admits to stiffness. Admits to having to hang her foot off the bed at night. Denies wounds of feet. 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      Diabetes      Diabetes mellitus with diabetic neuropathy      Chronic diastolic congestive heart failure      Hypertension      Hyperlipidemia      CAD (coronary artery disease)      Hypothyroidism      Pacemaker      S/P peripheral artery angioplasty with stent placement  2021 - SFA to BK Pop stenting (Zilver PTX 6x80mm, Zilver 518 5x80, Zilver 518 5x80 (proximal to distal)); 2022 - balloon angioplasty for in-stent stenosis          Home Medications:  acetaminophen 325 mg oral tablet: 2 tab(s) orally every 6 hours, As needed, Mild Pain (1 - 3) (24 Aug 2022 18:20)  calcitriol 0.5 mcg oral capsule: 1 cap(s) orally once a day (24 Aug 2022 18:20)  Coreg 25 mg oral tablet: 1 tab(s) orally every 12 hours (24 Aug 2022 18:20)  cyclobenzaprine 10 mg oral tablet: 1 tab(s) orally 3 times a day (24 Aug 2022 18:20)  folic acid 1 mg oral tablet: 1 tab(s) orally once a day (24 Aug 2022 18:20)  levothyroxine 100 mcg (0.1 mg) oral tablet: 1 tab(s) orally once a day (24 Aug 2022 18:20)  Lipitor 80 mg oral tablet: 1 tab(s) orally once a day (at bedtime) (24 Aug 2022 18:20)  NIFEdipine 60 mg oral tablet, extended release: 1 tab(s) orally once a day (24 Aug 2022 18:20)  torsemide 20 mg oral tablet: 2 tab(s) orally once a day in the morning  (24 Aug 2022 18:20)  torsemide 20 mg oral tablet: 1 tab(s) orally once a day at bedside  (24 Aug 2022 18:20)  zolpidem 5 mg oral tablet: 1 tab(s) orally once a day (at bedtime) (24 Aug 2022 18:20)      Allergies    No Known Allergies    Intolerances        FAMILY HISTORY:      Social History:      CURRENT MEDICATIONS:   acetaminophen     Tablet .. 650 milliGRAM(s) Oral every 6 hours PRN  aspirin  chewable 81 milliGRAM(s) Oral daily  atorvastatin 80 milliGRAM(s) Oral at bedtime  calcitriol   Capsule 0.5 MICROGram(s) Oral daily  carvedilol 25 milliGRAM(s) Oral every 12 hours  clopidogrel Tablet 75 milliGRAM(s) Oral daily  cyclobenzaprine 10 milliGRAM(s) Oral three times a day PRN  dextrose 5%. 1000 milliLiter(s) IV Continuous <Continuous>  dextrose 5%. 1000 milliLiter(s) IV Continuous <Continuous>  dextrose 50% Injectable 25 Gram(s) IV Push once  dextrose 50% Injectable 12.5 Gram(s) IV Push once  dextrose 50% Injectable 25 Gram(s) IV Push once  dextrose Oral Gel 15 Gram(s) Oral once PRN  folic acid 1 milliGRAM(s) Oral daily  glucagon  Injectable 1 milliGRAM(s) IntraMuscular once  heparin   Injectable 5000 Unit(s) SubCutaneous every 8 hours  insulin lispro (ADMELOG) corrective regimen sliding scale   SubCutaneous Before meals and at bedtime  levothyroxine 100 MICROGram(s) Oral daily  NIFEdipine XL 60 milliGRAM(s) Oral daily  oxycodone    5 mG/acetaminophen 325 mG 1 Tablet(s) Oral every 6 hours PRN  oxycodone    5 mG/acetaminophen 325 mG 2 Tablet(s) Oral every 6 hours PRN  sevelamer carbonate 800 milliGRAM(s) Oral every 8 hours  sodium chloride 0.9%. 1000 milliLiter(s) IV Continuous <Continuous>  zolpidem 5 milliGRAM(s) Oral at bedtime PRN      VITAL SIGNS, INS/OUTS (last 24 hours):  Vital Signs Last 24 Hrs  T(C): 36.6 (25 Aug 2022 08:30), Max: 36.9 (24 Aug 2022 15:32)  T(F): 97.9 (25 Aug 2022 08:30), Max: 98.4 (24 Aug 2022 15:32)  HR: 75 (25 Aug 2022 08:48) (60 - 76)  BP: 174/76 (25 Aug 2022 08:48) (129/75 - 214/89)  BP(mean): --  RR: 18 (25 Aug 2022 08:48) (16 - 18)  SpO2: 95% (25 Aug 2022 08:48) (95% - 98%)    Parameters below as of 25 Aug 2022 08:48  Patient On (Oxygen Delivery Method): room air      I&O's Summary    24 Aug 2022 07:01  -  25 Aug 2022 07:00  --------------------------------------------------------  IN: 345 mL / OUT: 250 mL / NET: 95 mL    25 Aug 2022 07:01  -  25 Aug 2022 09:38  --------------------------------------------------------  IN: 0 mL / OUT: 0 mL / NET: 0 mL        EXAM:    BASIC LABS:                        7.8    3.47  )-----------( 130      ( 25 Aug 2022 05:30 )             24.1     08-25    130<L>  |  94<L>  |  66<H>  ----------------------------<  115<H>  3.9   |  25  |  3.76<H>    Ca    10.9<H>      25 Aug 2022 05:30  Phos  4.8     08-25  Mg     2.4     08-25    TPro  7.9  /  Alb  3.7  /  TBili  0.3  /  DBili  x   /  AST  14  /  ALT  9<L>  /  AlkPhos  84  08-24    PT/INR - ( 24 Aug 2022 13:33 )   PT: 18.0 sec;   INR: 1.51          PTT - ( 24 Aug 2022 13:33 )  PTT:32.1 sec  Urinalysis Basic - ( 24 Aug 2022 16:32 )    Color: Yellow / Appearance: Clear / S.010 / pH: x  Gluc: x / Ketone: NEGATIVE  / Bili: Negative / Urobili: 0.2 E.U./dL   Blood: x / Protein: Trace mg/dL / Nitrite: NEGATIVE   Leuk Esterase: NEGATIVE / RBC: < 5 /HPF / WBC 5-10 /HPF   Sq Epi: x / Non Sq Epi: x / Bacteria: Present /HPF      CAPILLARY BLOOD GLUCOSE      POCT Blood Glucose.: 95 mg/dL (25 Aug 2022 07:04)  POCT Blood Glucose.: 122 mg/dL (24 Aug 2022 20:00)      OTHER LABS:  CARDIAC MARKERS ( 25 Aug 2022 05:30 )  x     / 0.04 ng/mL / x     / x     / x      CARDIAC MARKERS ( 24 Aug 2022 17:18 )  x     / 0.04 ng/mL / x     / x     / x      CARDIAC MARKERS ( 24 Aug 2022 13:33 )  x     / 0.04 ng/mL / x     / x     / x            MICRODATA:    Culture - Urine (collected 24 Aug 2022 16:32)  Source: Clean Catch Clean Catch (Midstream)  Preliminary Report (25 Aug 2022 08:13):    No growth to date.        IMAGING:    EKG:    #Diet - Diet, Renal Restrictions:   For patients receiving Renal Replacement - No Protein Restr, No Conc K, No Conc Phos, Low Sodium  Consistent Carbohydrate No Snacks (CSTCHO) (22 @ 21:01) [Active]  Diet, NPO after Midnight:      NPO Start Date: 24-Aug-2022,   NPO Start Time: 23:59 (22 @ 18:35) [Active]        #DVT PPx -  #Dispo -     Catherine Steward  Attending Hospitalist  [phone] Vascular Co-Management Initial Consult Note    HPI:  This is a 89yo woman, mostly Cameroonian-speaking, with a PMH of stage V CKD (Dr. Lerma), NIDDM2 c/b neuropathy (A1c 5.7%), chronic HFpEF, HTN, HLD, CAD, PPM (indication unknown), hypothyroidism and PAD who re-presents with RLE rest pain.  She was recently admitted to [facility] for right leg rest pain, but at that time her angiogram was deferred due to the risk of kidney failure and progression to HD.  She re-presents to the ED after a Nephrology appointment, where she endorsed ongoing RLE pain at rest and with exertion.  She is very well aware of the risk of progression to HD.  This morning she reports pain, coldness and numbness to her right foot.  She states she is able to move her toes and ankle slightly but admits to stiffness.  She admits to having to hang her foot off the bed at night.  Remainder of 12-point ROS otherwise negative.     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  Diabetes mellitus with diabetic neuropathy  Chronic diastolic congestive heart failure  Hypertension  Hyperlipidemia  CAD (coronary artery disease)  Hypothyroidism  Pacemaker  S/P peripheral artery angioplasty with stent placement  2021 - SFA to BK Pop stenting (Zilver PTX 6x80mm, Zilver 518 5x80, Zilver 518 5x80 (proximal to distal)); 2022 - balloon angioplasty for in-stent stenosis    Home Medications:  acetaminophen 325 mg oral tablet: 2 tab(s) orally every 6 hours, As needed, Mild Pain (1 - 3) (24 Aug 2022 18:20)  calcitriol 0.5 mcg oral capsule: 1 cap(s) orally once a day (24 Aug 2022 18:20)  Coreg 25 mg oral tablet: 1 tab(s) orally every 12 hours (24 Aug 2022 18:20)  cyclobenzaprine 10 mg oral tablet: 1 tab(s) orally 3 times a day (24 Aug 2022 18:20)  folic acid 1 mg oral tablet: 1 tab(s) orally once a day (24 Aug 2022 18:20)  levothyroxine 100 mcg (0.1 mg) oral tablet: 1 tab(s) orally once a day (24 Aug 2022 18:20)  Lipitor 80 mg oral tablet: 1 tab(s) orally once a day (at bedtime) (24 Aug 2022 18:20)  NIFEdipine 60 mg oral tablet, extended release: 1 tab(s) orally once a day (24 Aug 2022 18:20)  torsemide 20 mg oral tablet: 2 tab(s) orally once a day in the morning  (24 Aug 2022 18:20)  torsemide 20 mg oral tablet: 1 tab(s) orally once a day at bedside  (24 Aug 2022 18:20)  zolpidem 5 mg oral tablet: 1 tab(s) orally once a day (at bedtime) (24 Aug 2022 18:20)    Allergies  No Known Allergies    FAMILY HISTORY:  No pertinent family history to this presentation.    Social History:  Lives alone in [place] w/an aide.  Son involved w/her care but he lives in [place].  Lives in an elevator-serviced building.     CURRENT MEDICATIONS:   acetaminophen     Tablet .. 650 milliGRAM(s) Oral every 6 hours PRN  aspirin  chewable 81 milliGRAM(s) Oral daily  atorvastatin 80 milliGRAM(s) Oral at bedtime  calcitriol   Capsule 0.5 MICROGram(s) Oral daily  carvedilol 25 milliGRAM(s) Oral every 12 hours  clopidogrel Tablet 75 milliGRAM(s) Oral daily  cyclobenzaprine 10 milliGRAM(s) Oral three times a day PRN  dextrose 5%. 1000 milliLiter(s) IV Continuous <Continuous>  dextrose 5%. 1000 milliLiter(s) IV Continuous <Continuous>  dextrose 50% Injectable 25 Gram(s) IV Push once  dextrose 50% Injectable 12.5 Gram(s) IV Push once  dextrose 50% Injectable 25 Gram(s) IV Push once  dextrose Oral Gel 15 Gram(s) Oral once PRN  folic acid 1 milliGRAM(s) Oral daily  glucagon  Injectable 1 milliGRAM(s) IntraMuscular once  heparin   Injectable 5000 Unit(s) SubCutaneous every 8 hours  insulin lispro (ADMELOG) corrective regimen sliding scale   SubCutaneous Before meals and at bedtime  levothyroxine 100 MICROGram(s) Oral daily  NIFEdipine XL 60 milliGRAM(s) Oral daily  oxycodone    5 mG/acetaminophen 325 mG 1 Tablet(s) Oral every 6 hours PRN  oxycodone    5 mG/acetaminophen 325 mG 2 Tablet(s) Oral every 6 hours PRN  sevelamer carbonate 800 milliGRAM(s) Oral every 8 hours  sodium chloride 0.9%. 1000 milliLiter(s) IV Continuous <Continuous>  zolpidem 5 milliGRAM(s) Oral at bedtime PRN    VITAL SIGNS, INS/OUTS (last 24 hours):  Vital Signs Last 24 Hrs  T(C): 36.6 (25 Aug 2022 08:30), Max: 36.9 (24 Aug 2022 15:32)  T(F): 97.9 (25 Aug 2022 08:30), Max: 98.4 (24 Aug 2022 15:32)  HR: 75 (25 Aug 2022 08:48) (60 - 76)  BP: 174/76 (25 Aug 2022 08:48) (129/75 - 214/89)  BP(mean): --  RR: 18 (25 Aug 2022 08:48) (16 - 18)  SpO2: 95% (25 Aug 2022 08:48) (95% - 98%)    Parameters below as of 25 Aug 2022 08:48  Patient On (Oxygen Delivery Method): room air    I&O's Summary    24 Aug 2022 07:01  -  25 Aug 2022 07:00  --------------------------------------------------------  IN: 345 mL / OUT: 250 mL / NET: 95 mL    25 Aug 2022 07:01  -  25 Aug 2022 09:38  --------------------------------------------------------  IN: 0 mL / OUT: 0 mL / NET: 0 mL    EXAM:    BASIC LABS:                        7.8    3.47  )-----------( 130      ( 25 Aug 2022 05:30 )             24.1     08-25    130<L>  |  94<L>  |  66<H>  ----------------------------<  115<H>  3.9   |  25  |  3.76<H>    Ca    10.9<H>      25 Aug 2022 05:30  Phos  4.8     -  Mg     2.4     -25    TPro  7.9  /  Alb  3.7  /  TBili  0.3  /  DBili  x   /  AST  14  /  ALT  9<L>  /  AlkPhos  84  08-    PT/INR - ( 24 Aug 2022 13:33 )   PT: 18.0 sec;   INR: 1.51          PTT - ( 24 Aug 2022 13:33 )  PTT:32.1 sec  Urinalysis Basic - ( 24 Aug 2022 16:32 )    Color: Yellow / Appearance: Clear / S.010 / pH: x  Gluc: x / Ketone: NEGATIVE  / Bili: Negative / Urobili: 0.2 E.U./dL   Blood: x / Protein: Trace mg/dL / Nitrite: NEGATIVE   Leuk Esterase: NEGATIVE / RBC: < 5 /HPF / WBC 5-10 /HPF   Sq Epi: x / Non Sq Epi: x / Bacteria: Present /HPF      CAPILLARY BLOOD GLUCOSE      POCT Blood Glucose.: 95 mg/dL (25 Aug 2022 07:04)  POCT Blood Glucose.: 122 mg/dL (24 Aug 2022 20:00)      OTHER LABS:  CARDIAC MARKERS ( 25 Aug 2022 05:30 )  x     / 0.04 ng/mL / x     / x     / x      CARDIAC MARKERS ( 24 Aug 2022 17:18 )  x     / 0.04 ng/mL / x     / x     / x      CARDIAC MARKERS ( 24 Aug 2022 13:33 )  x     / 0.04 ng/mL / x     / x     / x            MICRODATA:    Culture - Urine (collected 24 Aug 2022 16:32)  Source: Clean Catch Clean Catch (Midstream)  Preliminary Report (25 Aug 2022 08:13):    No growth to date.        IMAGING:    EKG:    #Diet - Diet, Renal Restrictions:   For patients receiving Renal Replacement - No Protein Restr, No Conc K, No Conc Phos, Low Sodium  Consistent Carbohydrate No Snacks (CSTCHO) (22 @ 21:01) [Active]  Diet, NPO after Midnight:      NPO Start Date: 24-Aug-2022,   NPO Start Time: 23:59 (22 @ 18:35) [Active]        #DVT PPx -  #Dispo -     Catherine Steward  Attending Hospitalist  [phone] Vascular Co-Management Initial Consult Note    HPI:  This is a 89yo woman, mostly Indian-speaking, with a PMH of stage V CKD (Dr. Lerma), NIDDM2 c/b neuropathy (A1c 5.7%), chronic HFpEF, HTN, HLD, CAD, PPM (indication unknown), hypothyroidism and PAD who re-presents with RLE rest pain.  She was recently admitted to [facility] for right leg rest pain, but at that time her angiogram was deferred due to the risk of kidney failure and progression to HD.  She re-presents to the ED after a Nephrology appointment, where she endorsed ongoing RLE pain at rest and with exertion.  She is very well aware of the risk of progression to HD.  This morning she reports pain, coldness and numbness to her right foot.  She states she is able to move her toes and ankle slightly but admits to stiffness.  She admits to having to hang her foot off the bed at night.  Remainder of 12-point ROS otherwise negative.     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  Diabetes mellitus with diabetic neuropathy  Chronic diastolic congestive heart failure  Hypertension  Hyperlipidemia  CAD (coronary artery disease)  Hypothyroidism  Pacemaker  S/P peripheral artery angioplasty with stent placement  2021 - SFA to BK Pop stenting (Zilver PTX 6x80mm, Zilver 518 5x80, Zilver 518 5x80 (proximal to distal)); 2022 - balloon angioplasty for in-stent stenosis    Home Medications:  acetaminophen 325 mg oral tablet: 2 tab(s) orally every 6 hours, As needed, Mild Pain (1 - 3) (24 Aug 2022 18:20)  calcitriol 0.5 mcg oral capsule: 1 cap(s) orally once a day (24 Aug 2022 18:20)  Coreg 25 mg oral tablet: 1 tab(s) orally every 12 hours (24 Aug 2022 18:20)  cyclobenzaprine 10 mg oral tablet: 1 tab(s) orally 3 times a day (24 Aug 2022 18:20)  folic acid 1 mg oral tablet: 1 tab(s) orally once a day (24 Aug 2022 18:20)  levothyroxine 100 mcg (0.1 mg) oral tablet: 1 tab(s) orally once a day (24 Aug 2022 18:20)  Lipitor 80 mg oral tablet: 1 tab(s) orally once a day (at bedtime) (24 Aug 2022 18:20)  NIFEdipine 60 mg oral tablet, extended release: 1 tab(s) orally once a day (24 Aug 2022 18:20)  torsemide 20 mg oral tablet: 2 tab(s) orally once a day in the morning  (24 Aug 2022 18:20)  torsemide 20 mg oral tablet: 1 tab(s) orally once a day at bedside  (24 Aug 2022 18:20)  zolpidem 5 mg oral tablet: 1 tab(s) orally once a day (at bedtime) (24 Aug 2022 18:20)    Allergies  No Known Allergies    FAMILY HISTORY:  No pertinent family history to this presentation.    Social History:  Lives alone in [place] w/an aide.  Son involved w/her care but he lives in [place].  Lives in an elevator-serviced building.     CURRENT MEDICATIONS:   acetaminophen     Tablet .. 650 milliGRAM(s) Oral every 6 hours PRN  aspirin  chewable 81 milliGRAM(s) Oral daily  atorvastatin 80 milliGRAM(s) Oral at bedtime  calcitriol   Capsule 0.5 MICROGram(s) Oral daily  carvedilol 25 milliGRAM(s) Oral every 12 hours  clopidogrel Tablet 75 milliGRAM(s) Oral daily  cyclobenzaprine 10 milliGRAM(s) Oral three times a day PRN  dextrose 5%. 1000 milliLiter(s) IV Continuous <Continuous>  dextrose 5%. 1000 milliLiter(s) IV Continuous <Continuous>  dextrose 50% Injectable 25 Gram(s) IV Push once  dextrose 50% Injectable 12.5 Gram(s) IV Push once  dextrose 50% Injectable 25 Gram(s) IV Push once  dextrose Oral Gel 15 Gram(s) Oral once PRN  folic acid 1 milliGRAM(s) Oral daily  glucagon  Injectable 1 milliGRAM(s) IntraMuscular once  heparin   Injectable 5000 Unit(s) SubCutaneous every 8 hours  insulin lispro (ADMELOG) corrective regimen sliding scale   SubCutaneous Before meals and at bedtime  levothyroxine 100 MICROGram(s) Oral daily  NIFEdipine XL 60 milliGRAM(s) Oral daily  oxycodone    5 mG/acetaminophen 325 mG 1 Tablet(s) Oral every 6 hours PRN  oxycodone    5 mG/acetaminophen 325 mG 2 Tablet(s) Oral every 6 hours PRN  sevelamer carbonate 800 milliGRAM(s) Oral every 8 hours  sodium chloride 0.9%. 1000 milliLiter(s) IV Continuous <Continuous>  zolpidem 5 milliGRAM(s) Oral at bedtime PRN    VITAL SIGNS, INS/OUTS (last 24 hours):  Vital Signs Last 24 Hrs  T(C): 36.6 (25 Aug 2022 08:30), Max: 36.9 (24 Aug 2022 15:32)  T(F): 97.9 (25 Aug 2022 08:30), Max: 98.4 (24 Aug 2022 15:32)  HR: 75 (25 Aug 2022 08:48) (60 - 76)  BP: 174/76 (25 Aug 2022 08:48) (129/75 - 214/89)  BP(mean): --  RR: 18 (25 Aug 2022 08:48) (16 - 18)  SpO2: 95% (25 Aug 2022 08:48) (95% - 98%)    Parameters below as of 25 Aug 2022 08:48  Patient On (Oxygen Delivery Method): room air    I&O's Summary    24 Aug 2022 07:01  -  25 Aug 2022 07:00  --------------------------------------------------------  IN: 345 mL / OUT: 250 mL / NET: 95 mL    25 Aug 2022 07:01  -  25 Aug 2022 09:38  --------------------------------------------------------  IN: 0 mL / OUT: 0 mL / NET: 0 mL    EXAM:  Gen: NAD, sitting upright in bed  HEENT: NCAT, MMM, clear OP  CV: RRR, no m/g/r appreciated  Pulm: CTA B, no w/r/r; no increase in WOB  Abd: normoactive BS, soft, NTND  Ext: RLE warm but with erythematous skin changes overlying dorsum of R foot  Neuro: AOx3, nonfocal  Psych: pleasant, conversant and appropriate    BASIC LABS:                        7.8    3.47  )-----------( 130      ( 25 Aug 2022 05:30 )             24.1     08-25    130<L>  |  94<L>  |  66<H>  ----------------------------<  115<H>  3.9   |  25  |  3.76<H>    Ca    10.9<H>      25 Aug 2022 05:30  Phos  4.8     08  Mg     2.4         TPro  7.9  /  Alb  3.7  /  TBili  0.3  /  DBili  x   /  AST  14  /  ALT  9<L>  /  AlkPhos  84  08    PT/INR - ( 24 Aug 2022 13:33 )   PT: 18.0 sec;   INR: 1.51     PTT - ( 24 Aug 2022 13:33 )  PTT:32.1 sec    Urinalysis Basic - ( 24 Aug 2022 16:32 )  Color: Yellow / Appearance: Clear / S.010 / pH: x  Gluc: x / Ketone: NEGATIVE  / Bili: Negative / Urobili: 0.2 E.U./dL   Blood: x / Protein: Trace mg/dL / Nitrite: NEGATIVE   Leuk Esterase: NEGATIVE / RBC: < 5 /HPF / WBC 5-10 /HPF   Sq Epi: x / Non Sq Epi: x / Bacteria: Present /HPF    CAPILLARY BLOOD GLUCOSE  POCT Blood Glucose.: 95 mg/dL (25 Aug 2022 07:04)  POCT Blood Glucose.: 122 mg/dL (24 Aug 2022 20:00)    OTHER LABS:  CARDIAC MARKERS ( 25 Aug 2022 05:30 )  x     / 0.04 ng/mL / x     / x     / x      CARDIAC MARKERS ( 24 Aug 2022 17:18 )  x     / 0.04 ng/mL / x     / x     / x      CARDIAC MARKERS ( 24 Aug 2022 13:33 )  x     / 0.04 ng/mL / x     / x     / x        MICRODATA:  Culture - Urine (collected 24 Aug 2022 16:32)  Source: Clean Catch Clean Catch (Midstream)  Preliminary Report (25 Aug 2022 08:13):    No growth to date.    IMAGING:  CXR (, my read) - clear lung fields w/o effusion, consolidation or pulmonary edema    Vascular Co-Management Initial Consult Note    HPI:  This is a 89yo woman, mostly [place]ian-speaking, with a PMH of stage V CKD (Dr. Lerma), chronic HFpEF, HTN, HLD, CAD, PPM (indication unknown), hypothyroidism and PAD who re-presents with RLE rest pain.  She was recently admitted to [facility] for right leg rest pain, but at that time her angiogram was deferred due to the risk of kidney failure and progression to HD.  She re-presents to the ED after a Nephrology appointment, where she endorsed ongoing RLE pain at rest and with exertion.  She is very well aware of the risk of progression to HD.  This morning she reports pain, coldness and numbness to her right foot.  She states she is able to move her toes and ankle slightly but admits to stiffness.  She admits to having to hang her foot off the bed at night.  Remainder of 12-point ROS otherwise negative.     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  Diabetes mellitus with diabetic neuropathy  Chronic diastolic congestive heart failure  Hypertension  Hyperlipidemia  CAD (coronary artery disease)  Hypothyroidism  Pacemaker  S/P peripheral artery angioplasty with stent placement  2021 - SFA to BK Pop stenting (Zilver PTX 6x80mm, Zilver 518 5x80, Zilver 518 5x80 (proximal to distal)); 2022 - balloon angioplasty for in-stent stenosis    Home Medications:  acetaminophen 325 mg oral tablet: 2 tab(s) orally every 6 hours, As needed, Mild Pain (1 - 3) (24 Aug 2022 18:20)  calcitriol 0.5 mcg oral capsule: 1 cap(s) orally once a day (24 Aug 2022 18:20)  Coreg 25 mg oral tablet: 1 tab(s) orally every 12 hours (24 Aug 2022 18:20)  cyclobenzaprine 10 mg oral tablet: 1 tab(s) orally 3 times a day (24 Aug 2022 18:20)  folic acid 1 mg oral tablet: 1 tab(s) orally once a day (24 Aug 2022 18:20)  levothyroxine 100 mcg (0.1 mg) oral tablet: 1 tab(s) orally once a day (24 Aug 2022 18:20)  Lipitor 80 mg oral tablet: 1 tab(s) orally once a day (at bedtime) (24 Aug 2022 18:20)  NIFEdipine 60 mg oral tablet, extended release: 1 tab(s) orally once a day (24 Aug 2022 18:20)  torsemide 20 mg oral tablet: 2 tab(s) orally once a day in the morning  (24 Aug 2022 18:20)  torsemide 20 mg oral tablet: 1 tab(s) orally once a day at bedside  (24 Aug 2022 18:20)  zolpidem 5 mg oral tablet: 1 tab(s) orally once a day (at bedtime) (24 Aug 2022 18:20)    Allergies  No Known Allergies    FAMILY HISTORY:  No pertinent family history to this presentation.    Social History:  Lives alone in [place] w/an aide.  Son involved w/her care but he lives in [place].  Lives in an elevator-serviced building.     CURRENT MEDICATIONS:   acetaminophen     Tablet .. 650 milliGRAM(s) Oral every 6 hours PRN  aspirin  chewable 81 milliGRAM(s) Oral daily  atorvastatin 80 milliGRAM(s) Oral at bedtime  calcitriol   Capsule 0.5 MICROGram(s) Oral daily  carvedilol 25 milliGRAM(s) Oral every 12 hours  clopidogrel Tablet 75 milliGRAM(s) Oral daily  cyclobenzaprine 10 milliGRAM(s) Oral three times a day PRN  dextrose 5%. 1000 milliLiter(s) IV Continuous <Continuous>  dextrose 5%. 1000 milliLiter(s) IV Continuous <Continuous>  dextrose 50% Injectable 25 Gram(s) IV Push once  dextrose 50% Injectable 12.5 Gram(s) IV Push once  dextrose 50% Injectable 25 Gram(s) IV Push once  dextrose Oral Gel 15 Gram(s) Oral once PRN  folic acid 1 milliGRAM(s) Oral daily  glucagon  Injectable 1 milliGRAM(s) IntraMuscular once  heparin   Injectable 5000 Unit(s) SubCutaneous every 8 hours  insulin lispro (ADMELOG) corrective regimen sliding scale   SubCutaneous Before meals and at bedtime  levothyroxine 100 MICROGram(s) Oral daily  NIFEdipine XL 60 milliGRAM(s) Oral daily  oxycodone    5 mG/acetaminophen 325 mG 1 Tablet(s) Oral every 6 hours PRN  oxycodone    5 mG/acetaminophen 325 mG 2 Tablet(s) Oral every 6 hours PRN  sevelamer carbonate 800 milliGRAM(s) Oral every 8 hours  sodium chloride 0.9%. 1000 milliLiter(s) IV Continuous <Continuous>  zolpidem 5 milliGRAM(s) Oral at bedtime PRN    VITAL SIGNS, INS/OUTS (last 24 hours):  Vital Signs Last 24 Hrs  T(C): 36.6 (25 Aug 2022 08:30), Max: 36.9 (24 Aug 2022 15:32)  T(F): 97.9 (25 Aug 2022 08:30), Max: 98.4 (24 Aug 2022 15:32)  HR: 75 (25 Aug 2022 08:48) (60 - 76)  BP: 174/76 (25 Aug 2022 08:48) (129/75 - 214/89)  BP(mean): --  RR: 18 (25 Aug 2022 08:48) (16 - 18)  SpO2: 95% (25 Aug 2022 08:48) (95% - 98%)    Parameters below as of 25 Aug 2022 08:48  Patient On (Oxygen Delivery Method): room air    I&O's Summary    24 Aug 2022 07:01  -  25 Aug 2022 07:00  --------------------------------------------------------  IN: 345 mL / OUT: 250 mL / NET: 95 mL    25 Aug 2022 07:01  -  25 Aug 2022 09:38  --------------------------------------------------------  IN: 0 mL / OUT: 0 mL / NET: 0 mL    EXAM:  Gen: NAD, sitting upright in bed  HEENT: NCAT, MMM, clear OP  CV: RRR, no m/g/r appreciated  Pulm: CTA B, no w/r/r; no increase in WOB  Abd: normoactive BS, soft, NTND  Ext: RLE warm but with erythematous skin changes overlying dorsum of R foot  Neuro: AOx3, nonfocal  Psych: pleasant, conversant and appropriate    BASIC LABS:                        7.8    3.47  )-----------( 130      ( 25 Aug 2022 05:30 )             24.1     08-25    130<L>  |  94<L>  |  66<H>  ----------------------------<  115<H>  3.9   |  25  |  3.76<H>    Ca    10.9<H>      25 Aug 2022 05:30  Phos  4.8     08  Mg     2.4     08    TPro  7.9  /  Alb  3.7  /  TBili  0.3  /  DBili  x   /  AST  14  /  ALT  9<L>  /  AlkPhos  84  08    PT/INR - ( 24 Aug 2022 13:33 )   PT: 18.0 sec;   INR: 1.51     PTT - ( 24 Aug 2022 13:33 )  PTT:32.1 sec    Urinalysis Basic - ( 24 Aug 2022 16:32 )  Color: Yellow / Appearance: Clear / S.010 / pH: x  Gluc: x / Ketone: NEGATIVE  / Bili: Negative / Urobili: 0.2 E.U./dL   Blood: x / Protein: Trace mg/dL / Nitrite: NEGATIVE   Leuk Esterase: NEGATIVE / RBC: < 5 /HPF / WBC 5-10 /HPF   Sq Epi: x / Non Sq Epi: x / Bacteria: Present /HPF    CAPILLARY BLOOD GLUCOSE  POCT Blood Glucose.: 95 mg/dL (25 Aug 2022 07:04)  POCT Blood Glucose.: 122 mg/dL (24 Aug 2022 20:00)    OTHER LABS:  CARDIAC MARKERS ( 25 Aug 2022 05:30 )  x     / 0.04 ng/mL / x     / x     / x      CARDIAC MARKERS ( 24 Aug 2022 17:18 )  x     / 0.04 ng/mL / x     / x     / x      CARDIAC MARKERS ( 24 Aug 2022 13:33 )  x     / 0.04 ng/mL / x     / x     / x        MICRODATA:  Culture - Urine (collected 24 Aug 2022 16:32)  Source: Clean Catch Clean Catch (Midstream)  Preliminary Report (25 Aug 2022 08:13):    No growth to date.    IMAGING:  CXR (, my read) - clear lung fields w/o effusion, consolidation or pulmonary edema

## 2022-08-25 NOTE — DIETITIAN INITIAL EVALUATION ADULT - NUTRITIONGOAL OUTCOME1
1. Pt's diet to be advanced within 24-48hrs pending medical feasibility. 2. Pt to consistently meet >75% of EER during hospital stay

## 2022-08-25 NOTE — DIETITIAN INITIAL EVALUATION ADULT - PERTINENT MEDS FT
MEDICATIONS  (STANDING):  aspirin  chewable 81 milliGRAM(s) Oral daily  atorvastatin 80 milliGRAM(s) Oral at bedtime  calcitriol   Capsule 0.5 MICROGram(s) Oral daily  carvedilol 25 milliGRAM(s) Oral every 12 hours  clopidogrel Tablet 75 milliGRAM(s) Oral daily  dextrose 5%. 1000 milliLiter(s) (50 mL/Hr) IV Continuous <Continuous>  dextrose 5%. 1000 milliLiter(s) (100 mL/Hr) IV Continuous <Continuous>  dextrose 50% Injectable 25 Gram(s) IV Push once  dextrose 50% Injectable 12.5 Gram(s) IV Push once  dextrose 50% Injectable 25 Gram(s) IV Push once  folic acid 1 milliGRAM(s) Oral daily  glucagon  Injectable 1 milliGRAM(s) IntraMuscular once  heparin   Injectable 5000 Unit(s) SubCutaneous every 8 hours  insulin lispro (ADMELOG) corrective regimen sliding scale   SubCutaneous Before meals and at bedtime  levothyroxine 100 MICROGram(s) Oral daily  NIFEdipine XL 60 milliGRAM(s) Oral daily  sevelamer carbonate 800 milliGRAM(s) Oral every 8 hours  sodium chloride 0.9%. 1000 milliLiter(s) (60 mL/Hr) IV Continuous <Continuous>    MEDICATIONS  (PRN):  acetaminophen     Tablet .. 650 milliGRAM(s) Oral every 6 hours PRN Mild Pain (1 - 3)  cyclobenzaprine 10 milliGRAM(s) Oral three times a day PRN Muscle Spasm  dextrose Oral Gel 15 Gram(s) Oral once PRN Blood Glucose LESS THAN 70 milliGRAM(s)/deciliter  oxycodone    5 mG/acetaminophen 325 mG 1 Tablet(s) Oral every 6 hours PRN Moderate Pain (4 - 6)  oxycodone    5 mG/acetaminophen 325 mG 2 Tablet(s) Oral every 6 hours PRN Severe Pain (7 - 10)  zolpidem 5 milliGRAM(s) Oral at bedtime PRN Insomnia

## 2022-08-25 NOTE — DIETITIAN INITIAL EVALUATION ADULT - DIET TYPE
low sodium/no concentrated potassium/no concentrated phosphorus/consistent carbohydrate renal with evening snacks

## 2022-08-25 NOTE — PATIENT PROFILE ADULT - FALL HARM RISK - HARM RISK INTERVENTIONS

## 2022-08-25 NOTE — CONSULT NOTE ADULT - SUBJECTIVE AND OBJECTIVE BOX
HPI:  89 Yo F w/ Hx of CKD 5, DM, HFpEF (EF 70% , grade 1 diastolic dysfunction), HTN, HLD, CAD, hypothyroidism, PAD presents to ED with right foot pain. Patient was recently admitted to [facility] for right leg rest pain but at that time deferred due to risk of kidney failure from angiogram. Patient presents today after being seen by nephrology and states her pain is uncontrolled and is now willing to undergo angiogram knowing risk of kidney injury. Nephrology called for CKD management and for hydration recommendations prior to angiogram today.       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 650 milliGRAM(s) Oral every 6 hours PRN  aspirin  chewable 81 milliGRAM(s) Oral daily  atorvastatin 80 milliGRAM(s) Oral at bedtime  calcitriol   Capsule 0.5 MICROGram(s) Oral daily  carvedilol 25 milliGRAM(s) Oral every 12 hours  clopidogrel Tablet 75 milliGRAM(s) Oral daily  cyclobenzaprine 10 milliGRAM(s) Oral three times a day PRN  dextrose 5%. 1000 milliLiter(s) IV Continuous <Continuous>  dextrose 5%. 1000 milliLiter(s) IV Continuous <Continuous>  dextrose 50% Injectable 25 Gram(s) IV Push once  dextrose 50% Injectable 12.5 Gram(s) IV Push once  dextrose 50% Injectable 25 Gram(s) IV Push once  dextrose Oral Gel 15 Gram(s) Oral once PRN  folic acid 1 milliGRAM(s) Oral daily  glucagon  Injectable 1 milliGRAM(s) IntraMuscular once  heparin   Injectable 5000 Unit(s) SubCutaneous every 8 hours  insulin lispro (ADMELOG) corrective regimen sliding scale   SubCutaneous Before meals and at bedtime  levothyroxine 100 MICROGram(s) Oral daily  NIFEdipine XL 60 milliGRAM(s) Oral daily  oxycodone    5 mG/acetaminophen 325 mG 1 Tablet(s) Oral every 6 hours PRN  oxycodone    5 mG/acetaminophen 325 mG 2 Tablet(s) Oral every 6 hours PRN  sevelamer carbonate 800 milliGRAM(s) Oral every 8 hours  sodium chloride 0.9%. 1000 milliLiter(s) IV Continuous <Continuous>  zolpidem 5 milliGRAM(s) Oral at bedtime PRN      Hospital Medications:   MEDICATIONS  (STANDING):  aspirin  chewable 81 milliGRAM(s) Oral daily  atorvastatin 80 milliGRAM(s) Oral at bedtime  calcitriol   Capsule 0.5 MICROGram(s) Oral daily  carvedilol 25 milliGRAM(s) Oral every 12 hours  clopidogrel Tablet 75 milliGRAM(s) Oral daily  dextrose 5%. 1000 milliLiter(s) (50 mL/Hr) IV Continuous <Continuous>  dextrose 5%. 1000 milliLiter(s) (100 mL/Hr) IV Continuous <Continuous>  dextrose 50% Injectable 25 Gram(s) IV Push once  dextrose 50% Injectable 12.5 Gram(s) IV Push once  dextrose 50% Injectable 25 Gram(s) IV Push once  folic acid 1 milliGRAM(s) Oral daily  glucagon  Injectable 1 milliGRAM(s) IntraMuscular once  heparin   Injectable 5000 Unit(s) SubCutaneous every 8 hours  insulin lispro (ADMELOG) corrective regimen sliding scale   SubCutaneous Before meals and at bedtime  levothyroxine 100 MICROGram(s) Oral daily  NIFEdipine XL 60 milliGRAM(s) Oral daily  sevelamer carbonate 800 milliGRAM(s) Oral every 8 hours  sodium chloride 0.9%. 1000 milliLiter(s) (60 mL/Hr) IV Continuous <Continuous>      SOCIAL HISTORY:  Denies ETOh, Smoking,     Family History:  FAMILY HISTORY:        VITALS:  T(F): 97.9 (22 @ 08:30), Max: 98.4 (22 @ 15:32)  HR: 75 (22 @ 09:40)  BP: 174/76 (22 @ 09:40)  RR: 18 (22 @ 09:40)  SpO2: 95% (22 @ 09:40)  Wt(kg): --     @ 07:01  -   @ 07:00  --------------------------------------------------------  IN: 345 mL / OUT: 250 mL / NET: 95 mL     @ 07:01  -   @ 13:38  --------------------------------------------------------  IN: 0 mL / OUT: 0 mL / NET: 0 mL      Height (cm): 154.9 ( @ 09:40)  Weight (kg): 87.1 ( @ :40)  BMI (kg/m2): 36.3 ( @ :40)  BSA (m2): 1.86 (:40)  CAPILLARY BLOOD GLUCOSE      POCT Blood Glucose.: 95 mg/dL (25 Aug 2022 07:04)  POCT Blood Glucose.: 122 mg/dL (24 Aug 2022 20:00)      Review of Systems:  CONSTITUTIONAL: No fever or chills.  RESPIRATORY: No shortness of breath, cough  CARDIOVASCULAR: No Chest pain, shortness of breath, palpitations  GASTROINTESTINAL: No abdominal pain, nausea, vomiting, diarrhea  GENITOURINARY: No urinary frequency, gross hematuria, dysuria  NEUROLOGICAL: No headache, weakness  SKIN: No rash or skin lesion  MUSCULOSKELETAL: Right leg pain   Psych: Denies suicidal or homicidal ideation    PHYSICAL EXAM:  GENERAL: Alert, awake, oriented x3   HEENT: FABRICIO, EOMI, neck supple, no JVP  CHEST/LUNG: Bilateral clear breath sounds  HEART: Regular rate and rhythm, no murmur, no gallops, no rub   ABDOMEN: Soft, nontender, non distended  : No flank or supra pubic tenderness.  EXTREMITIES: Right foot cool to touch, decreased motor function at right ankle and toes, sensation decreased  Neurology: AAOx3, no focal neurological deficit  SKIN: No rash or skin lesion       LABS:      130<L>  |  94<L>  |  66<H>  ----------------------------<  115<H>  3.9   |  25  |  3.76<H>    Ca    10.9<H>      25 Aug 2022 05:30  Phos  4.8       Mg     2.4         TPro  7.9  /  Alb  3.7  /  TBili  0.3  /  DBili      /  AST  14  /  ALT  9<L>  /  AlkPhos  84      Creatinine Trend: 3.76 <--, 3.83 <--, 4.02 <--                        7.8    3.47  )-----------( 130      ( 25 Aug 2022 05:30 )             24.1     Urine Studies:  Urinalysis Basic - ( 24 Aug 2022 16:32 )    Color: Yellow / Appearance: Clear / S.010 / pH:   Gluc:  / Ketone: NEGATIVE  / Bili: Negative / Urobili: 0.2 E.U./dL   Blood:  / Protein: Trace mg/dL / Nitrite: NEGATIVE   Leuk Esterase: NEGATIVE / RBC: < 5 /HPF / WBC 5-10 /HPF   Sq Epi:  / Non Sq Epi:  / Bacteria: Present /HPF      Sodium, Random Urine: 64 mmol/L ( @ 19:25)  Osmolality, Random Urine: 282 mosm/kg ( @ 19:25)              84M PMHx HTN, DM, and CKD presents to the ED via EMS from [facility] for evaluation of hypoxia. Consult for KYMBERLY on CKD     Assessment/Plan:     #KYMBERLY on CKD, acute hypoxic respiratory failure, acute CHF exacerbation   unclear baseline creatinine  possibly KYMBERLY on CKD vs baseline CKD vs cardiorenal syndrome type 2     Recommend:   daily CXR   diuresis with 40mg IV lasix q8h PRN to achieve euvolemia   daily BMP  urine lytes   renal sono  TTE   cardiology consult    vic for strict I/Os   renal diet     Constanza Dowling D.O.  PGY 4 - Nephrology Fellow  [phone]

## 2022-08-25 NOTE — PROGRESS NOTE ADULT - SUBJECTIVE AND OBJECTIVE BOX
Vascular Surgery Post-Op Note    Procedure: Right leg angiogram with SFA angioplasty and stent    Diagnosis/Indication: ischemic right leg    Surgeon: Dr. Hassan    S: Pt has no complaints. Denies CP, SOB, ROSE, calf tenderness. Pain controlled with medication.    O:  T(C): 36.7 (08-25-22 @ 14:33), Max: 36.7 (08-25-22 @ 14:33)  T(F): 98 (08-25-22 @ 14:33), Max: 98 (08-25-22 @ 14:33)  HR: 64 (08-25-22 @ 13:47) (64 - 64)  BP: 122/57 (08-25-22 @ 13:47) (122/57 - 122/57)  RR: 14 (08-25-22 @ 13:47) (14 - 14)  SpO2: 94% (08-25-22 @ 13:47) (94% - 94%)  Wt(kg): --                        7.8    3.47  )-----------( 130      ( 25 Aug 2022 05:30 )             24.1     08-25    130<L>  |  94<L>  |  66<H>  ----------------------------<  115<H>  3.9   |  25  |  3.76<H>    Ca    10.9<H>      25 Aug 2022 05:30  Phos  4.8     08-25  Mg     2.4     08-25    TPro  7.9  /  Alb  3.7  /  TBili  0.3  /  DBili  x   /  AST  14  /  ALT  9<L>  /  AlkPhos  84  08-24      Gen: NAD, resting comfortably in bed  C/V: NSR  Pulm: Nonlabored breathing, no respiratory distress  Abd: soft, NT/ND  Groin: left groin sheath in place, no surrounding hematoma or bleeding  Extrem: warm, well perfused, monophasic DP/PT signals in R foot      A/P: 89yFemale s/p above procedure  Diet: advance once bedrest completed  IVF  Pain/nausea control  c/w ASA and Plavix   6 hours of bedrest post sheath removal  Dispo plan:

## 2022-08-26 ENCOUNTER — TRANSCRIPTION ENCOUNTER (OUTPATIENT)
Age: 87
End: 2022-08-26

## 2022-08-26 VITALS — TEMPERATURE: 98 F

## 2022-08-26 LAB
ANION GAP SERPL CALC-SCNC: 14 MMOL/L — SIGNIFICANT CHANGE UP (ref 5–17)
BUN SERPL-MCNC: 62 MG/DL — HIGH (ref 7–23)
CALCIUM SERPL-MCNC: 9.6 MG/DL — SIGNIFICANT CHANGE UP (ref 8.4–10.5)
CHLORIDE SERPL-SCNC: 100 MMOL/L — SIGNIFICANT CHANGE UP (ref 96–108)
CO2 SERPL-SCNC: 21 MMOL/L — LOW (ref 22–31)
CREAT SERPL-MCNC: 3.5 MG/DL — HIGH (ref 0.5–1.3)
CULTURE RESULTS: SIGNIFICANT CHANGE UP
EGFR: 12 ML/MIN/1.73M2 — LOW
GLUCOSE BLDC GLUCOMTR-MCNC: 219 MG/DL — HIGH (ref 70–99)
GLUCOSE BLDC GLUCOMTR-MCNC: 99 MG/DL — SIGNIFICANT CHANGE UP (ref 70–99)
GLUCOSE SERPL-MCNC: 91 MG/DL — SIGNIFICANT CHANGE UP (ref 70–99)
HCT VFR BLD CALC: 21.4 % — LOW (ref 34.5–45)
HGB BLD-MCNC: 7 G/DL — CRITICAL LOW (ref 11.5–15.5)
MAGNESIUM SERPL-MCNC: 2.2 MG/DL — SIGNIFICANT CHANGE UP (ref 1.6–2.6)
MCHC RBC-ENTMCNC: 31.7 PG — SIGNIFICANT CHANGE UP (ref 27–34)
MCHC RBC-ENTMCNC: 32.7 GM/DL — SIGNIFICANT CHANGE UP (ref 32–36)
MCV RBC AUTO: 96.8 FL — SIGNIFICANT CHANGE UP (ref 80–100)
NRBC # BLD: 0 /100 WBCS — SIGNIFICANT CHANGE UP (ref 0–0)
PHOSPHATE SERPL-MCNC: 4.4 MG/DL — SIGNIFICANT CHANGE UP (ref 2.5–4.5)
PLATELET # BLD AUTO: 118 K/UL — LOW (ref 150–400)
POTASSIUM SERPL-MCNC: 3.8 MMOL/L — SIGNIFICANT CHANGE UP (ref 3.5–5.3)
POTASSIUM SERPL-SCNC: 3.8 MMOL/L — SIGNIFICANT CHANGE UP (ref 3.5–5.3)
RBC # BLD: 2.21 M/UL — LOW (ref 3.8–5.2)
RBC # FLD: 21.4 % — HIGH (ref 10.3–14.5)
SODIUM SERPL-SCNC: 135 MMOL/L — SIGNIFICANT CHANGE UP (ref 135–145)
SPECIMEN SOURCE: SIGNIFICANT CHANGE UP
WBC # BLD: 3.65 K/UL — LOW (ref 3.8–10.5)
WBC # FLD AUTO: 3.65 K/UL — LOW (ref 3.8–10.5)

## 2022-08-26 PROCEDURE — 99233 SBSQ HOSP IP/OBS HIGH 50: CPT

## 2022-08-26 RX ADMIN — SEVELAMER CARBONATE 800 MILLIGRAM(S): 2400 POWDER, FOR SUSPENSION ORAL at 07:11

## 2022-08-26 RX ADMIN — Medication 2: at 12:46

## 2022-08-26 RX ADMIN — CARVEDILOL PHOSPHATE 25 MILLIGRAM(S): 80 CAPSULE, EXTENDED RELEASE ORAL at 09:12

## 2022-08-26 RX ADMIN — Medication 1 MILLIGRAM(S): at 12:48

## 2022-08-26 RX ADMIN — Medication 60 MILLIGRAM(S): at 07:11

## 2022-08-26 RX ADMIN — Medication 100 MICROGRAM(S): at 07:11

## 2022-08-26 RX ADMIN — CALCITRIOL 0.5 MICROGRAM(S): 0.5 CAPSULE ORAL at 12:48

## 2022-08-26 RX ADMIN — CLOPIDOGREL BISULFATE 75 MILLIGRAM(S): 75 TABLET, FILM COATED ORAL at 12:47

## 2022-08-26 RX ADMIN — Medication 81 MILLIGRAM(S): at 12:47

## 2022-08-26 NOTE — DISCHARGE NOTE PROVIDER - CARE PROVIDER_API CALL
Christopher Hassan)  Surgery; Vascular Surgery  [address], [place] [address]  Phone: [phone]  Fax: [phone]  Follow Up Time:

## 2022-08-26 NOTE — PROGRESS NOTE ADULT - SUBJECTIVE AND OBJECTIVE BOX
INTERVAL EVENTS:  -- s/p angiogram w/SFA angioplasty and stent yesterday; Cr stable this AM  -- getting PRBC for Hb of 7.0; gets outpatient transfusions about q2wks     SUBJECTIVE:  -- reports ongoing RLE pain but does not that it's improved from yesterday morning before her procedure  -- ambulating w/nurses to the bathroom w/o difficulty  -- reports getting transfusions through her MD at [facility] for chronic anemia   -- Review of Systems: 12 point review of systems otherwise negative    MEDICATIONS:  MEDICATIONS  (STANDING):  aspirin  chewable 81 milliGRAM(s) Oral daily  atorvastatin 80 milliGRAM(s) Oral at bedtime  calcitriol   Capsule 0.5 MICROGram(s) Oral daily  carvedilol 25 milliGRAM(s) Oral every 12 hours  clopidogrel Tablet 75 milliGRAM(s) Oral daily  dextrose 5%. 1000 milliLiter(s) (50 mL/Hr) IV Continuous <Continuous>  dextrose 5%. 1000 milliLiter(s) (100 mL/Hr) IV Continuous <Continuous>  dextrose 50% Injectable 25 Gram(s) IV Push once  dextrose 50% Injectable 12.5 Gram(s) IV Push once  dextrose 50% Injectable 25 Gram(s) IV Push once  folic acid 1 milliGRAM(s) Oral daily  glucagon  Injectable 1 milliGRAM(s) IntraMuscular once  heparin   Injectable 5000 Unit(s) SubCutaneous every 8 hours  insulin lispro (ADMELOG) corrective regimen sliding scale   SubCutaneous Before meals and at bedtime  levothyroxine 100 MICROGram(s) Oral daily  NIFEdipine XL 60 milliGRAM(s) Oral daily  sevelamer carbonate 800 milliGRAM(s) Oral every 8 hours    MEDICATIONS  (PRN):  acetaminophen     Tablet .. 650 milliGRAM(s) Oral every 6 hours PRN Mild Pain (1 - 3)  cyclobenzaprine 10 milliGRAM(s) Oral three times a day PRN Muscle Spasm  dextrose Oral Gel 15 Gram(s) Oral once PRN Blood Glucose LESS THAN 70 milliGRAM(s)/deciliter    Allergies  No Known Allergies    OBJECTIVE:  Vital Signs Last 24 Hrs  T(C): 36.3 (26 Aug 2022 10:15), Max: 36.7 (25 Aug 2022 14:33)  T(F): 97.4 (26 Aug 2022 10:15), Max: 98.1 (26 Aug 2022 06:56)  HR: 72 (26 Aug 2022 09:54) (60 - 75)  BP: 127/60 (26 Aug 2022 09:54) (105/54 - 162/96)  BP(mean): --  RR: 16 (26 Aug 2022 09:00) (14 - 16)  SpO2: 98% (26 Aug 2022 09:00) (93% - 98%)    Parameters below as of 26 Aug 2022 09:00  Patient On (Oxygen Delivery Method): room air    I&O's Summary    25 Aug 2022 07:01  -  26 Aug 2022 07:00  --------------------------------------------------------  IN: 0 mL / OUT: 725 mL / NET: -725 mL    26 Aug 2022 07:01  -  26 Aug 2022 10:50  --------------------------------------------------------  IN: 120 mL / OUT: 400 mL / NET: -280 mL    PHYSICAL EXAM:  Gen: NAD, sitting upright in bed  HEENT: NCAT, MMM, clear OP  CV: RRR, no m/g/r appreciated  Pulm: CTA B, no w/r/r; no increase in WOB  Abd: normoactive BS, soft, NTND  Ext: RLE warm with stable erythematous skin changes overlying dorsum of R foot  Neuro: AOx3, nonfocal  Psych: pleasant, conversant and appropriate    LABS:                        7.0    3.65  )-----------( 118      ( 26 Aug 2022 05:30 )             21.4     08-26    135  |  100  |  62<H>  ----------------------------<  91  3.8   |  21<L>  |  3.50<H>    Ca    9.6      26 Aug 2022 05:30  Phos  4.4     08-26  Mg     2.2     08-26    TPro  7.9  /  Alb  3.7  /  TBili  0.3  /  DBili  x   /  AST  14  /  ALT  9<L>  /  AlkPhos  84  08-24    PT/INR - ( 24 Aug 2022 13:33 )   PT: 18.0 sec;   INR: 1.51     PTT - ( 24 Aug 2022 13:33 )  PTT:32.1 sec    CAPILLARY BLOOD GLUCOSE  POCT Blood Glucose.: 99 mg/dL (26 Aug 2022 06:48)  POCT Blood Glucose.: 102 mg/dL (25 Aug 2022 22:15)  POCT Blood Glucose.: 107 mg/dL (25 Aug 2022 16:45)  POCT Blood Glucose.: 90 mg/dL (25 Aug 2022 14:23)    MICRODATA:  -- Culture - Urine (collected 24 Aug 2022 16:32)  Source: Clean Catch Clean Catch (Midstream)  Final Report (26 Aug 2022 08:06):    Insignificant amount of mixed growth.    RADIOLOGY/OTHER STUDIES:  -- No new imaging.

## 2022-08-26 NOTE — PROGRESS NOTE ADULT - ATTENDING COMMENTS
have reviewed status and examined pt .  she is s/p angio and angioplasty, and renal fct is stable.  to be discharged this aft and will return to f/u with dr leija.  agree with findings and recommendations.

## 2022-08-26 NOTE — DISCHARGE NOTE PROVIDER - NSDCCPCAREPLAN_GEN_ALL_CORE_FT
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

## 2022-08-26 NOTE — DISCHARGE NOTE PROVIDER - HOSPITAL COURSE
89 Yo F w/ Hx of CKD 5, DM, HFpEF (EF 70% 2021, grade 1 diastolic dysfunction), HTN, HLD, CAD, hypothyroidism, PAD presented to the ED with right lower extremity rest pain. She ws admitted and on 8/25/22 she underwent  right lower extremity angiogram with SFA pop stent in stent stenosis angioplasty with drug coated balloon and re-stenting of the proximal and distal part of old sent,  showing one vessel runoff through peroneal  at the end of procedure. She tolerated the procedure well and her doppler signals in the foot improved. On POD#1___   89 Yo F w/ Hx of CKD 5, DM, HFpEF (EF 70% 2021, grade 1 diastolic dysfunction), HTN, HLD, CAD, hypothyroidism, PAD presented to the ED with right lower extremity rest pain. She ws admitted and on 8/25/22 she underwent  right lower extremity angiogram with SFA pop stent in stent stenosis angioplasty with drug coated balloon and re-stenting of the proximal and distal part of old sent,  showing one vessel runoff through peroneal  at the end of procedure. She tolerated the procedure well and her doppler signals in the foot improved. She had an episode of urinary retention and had to get straight cathed. After bedrest was over she was able to ambulate and urinate on her own. On POD#1 she was feeling well, her foot pain had improved, she was able to ambulate and tolerate diet and void on her own. Patient's Hgb noted to be 7.0 which per patient is chronic anemia for which she regularly gets outpatient transfusions so she was transfused in the hospital 1u pRBC. She tolerated it well and was discharged home after with her home aid and outpatient follow up.

## 2022-08-26 NOTE — PROGRESS NOTE ADULT - ASSESSMENT
89 Yo F w/ Hx of CKD 5, HTN presented to the ED with right foot pain now s/p SFA angioplasty and stent on 8/25 nephrology consulted for CKD management and pre-angio hydration recommendations    #CKD V  Cr stable s/p angioplasty   -Trend BMP Daily  -renal diet  -Monitor I/O's  can restart ace-i      MBD-CKD -   Continue Sevelamer   Continue Calcitriol     Grace Martin D.O  PGY 5 nephrology fellow  [phone]

## 2022-08-26 NOTE — PROGRESS NOTE ADULT - SUBJECTIVE AND OBJECTIVE BOX
O/N: MANDY, CSS, sheath pulled no complications, voided 125 retained >400                                                ---------------------------------------------------------------------------  PLEASE CHECK WHEN PRESENT:     [ x ]Heart Failure     [  ] Acute     [  ] Acute on Chronic     [ x ] Chronic  -------------------------------------------------------------------     [  ]Diastolic [HFpEF]     [x  ]Systolic [HFrEF]     [  ]Combined [HFpEF & HFrEF]  .................................................................................     [  ]Other:     [ ] Pulmonary Hypertension     [ ] Afib     [x ] Hypertensive Heart Disease  -------------------------------------------------------------------  [ ] Respiratory failure  [ ] Acute cor pulmonale  [ ] Asthma/COPD Exacerbation  [ ] Pleural effusion  [ ] Aspiration pneumonia  [ ] Obstructive Sleep Apnea  -------------------------------------------------------------------  [  ]KYMBERLY     [  ]ATN     [  ]Reneal Medullary Necrosis     [  ]Renal Cortical Necrosis     [  ]Other Pathological Lesions:    [  ]CKD 1  [  ]CKD 2  [  ]CKD 3  [  ]CKD 4  [ x ]CKD 5  [  ]Other  -------------------------------------------------------------------  [  x]Diabetes  [  ] Diabetic PVD Ulcer  [  ] Neuropathic ulcer to DM  [  x] Diabetes with Nephropathy  [  ] Osteomyelitis due to diabetes  [  ] Hyperglycemia   [  ]hypoglycemia   --------------------------------------------------------------------  [  ]Malnutrition: See Nutrition Note  [  ]Cachexia  [  ]Other:   [  ]Supplement Ordered:  [  ]Morbid Obesity (BMI >=40]  ---------------------------------------------------------------------  [ ] Sepsis/severe sepsis/septic shock  [ ] Noninfectious SIRS  [ ] UTI  [ ] Pneumonia  -----------------------------------------------------------------------  [ ] Acidosis/alkalosis  [ ] Fluid overload  [ ] Hypokalemia  [ ] Hyperkalemia  [ ] Hypomagnesemia  [ ] Hypophosphatemia  [ ] Hyperphosphatemia  [x ] hyponatremia   ------------------------------------------------------------------------  [ ] Acute blood loss anemia  [ ] Post op blood loss anemia  [ ] Iron deficiency anemia  [x ] Anemia due to chronic disease  [ ] Hypercoagulable state  [ ] Thrombocytopenia  ----------------------------------------------------------------------  [ ] Cerebral infarction  [ ] Transient ischemia attack  [ ] Encephalopathy - Toxic or Metabolic      A/P: 89y YO Female with hx of CKD 5, DM, HFpEF (EF 70% 2021, grade 1 diastolic dysfunction), HTN, HLD, CAD, hypothyroidism, PAD presents to ED with rest pain. Patient was recently admitted to [facility] for right leg rest pain admitted with worsening right leg pain     Vascular/PAD:  -Continue home ASA and Plavix   -Plan for RLE angiogram 8/25  -pre-oped and consented for OR     HTN/HLD:  -Continue home coreg 25, Nifedipine 60  -will continue to monitor for HTN   -continue home statin       CAD/CHF:   -last echo 7/21 with EF 65-70% diastolic dysfunction noted      DM:  -not on any home meds   -A1c 5.7    CKD:   -renal consulted appreciate recs   -Cr 3.8    Anemia:   hgb 7.7 on admission   1 unit on home for OR     Hypothyroidism:   -restarted home meds   Diet:   NPO     Activity:   as tolerated  DVTPPx:   sqh   Dispo:   OR  for angiogram today  O/N: MANDY, CSS, sheath pulled no complications, voided 125 retained >400            S: Patient does not have any complaints    O: Examined in bed resting comfortably     ROS: Denies headache, blurred vision, chest pain, SOB, abdominal pain, nausea or vomiting.         aspirin  chewable 81  carvedilol 25  clopidogrel Tablet 75  heparin   Injectable 5000  NIFEdipine XL 60      Allergies    No Known Allergies    Intolerances        Vital Signs Last 24 Hrs  T(C): 36.7 (26 Aug 2022 06:56), Max: 36.7 (25 Aug 2022 14:33)  T(F): 98.1 (26 Aug 2022 06:56), Max: 98.1 (26 Aug 2022 06:56)  HR: 73 (26 Aug 2022 03:02) (60 - 75)  BP: 140/68 (26 Aug 2022 03:02) (105/54 - 174/76)  BP(mean): --  RR: 16 (25 Aug 2022 20:32) (14 - 18)  SpO2: 98% (26 Aug 2022 03:02) (93% - 98%)    Parameters below as of 26 Aug 2022 03:02  Patient On (Oxygen Delivery Method): room air      I&O's Summary    25 Aug 2022 07:01  -  26 Aug 2022 07:00  --------------------------------------------------------  IN: 0 mL / OUT: 725 mL / NET: -725 mL        Gen: NAD, resting comfortably in bed  C/V: NSR  Pulm: Nonlabored breathing, no respiratory distress  Abd: soft, NT/ND  Groin: left groin soft, no hematoma or bleeding  Extrem: warm, well perfused, biphasic DP/PT signals in R foot    LABS:                        7.0    3.65  )-----------( 118      ( 26 Aug 2022 05:30 )             21.4     08-26    135  |  100  |  62<H>  ----------------------------<  91  3.8   |  21<L>  |  3.50<H>    Ca    9.6      26 Aug 2022 05:30  Phos  4.4     08-26  Mg     2.2     08-26    TPro  7.9  /  Alb  3.7  /  TBili  0.3  /  DBili  x   /  AST  14  /  ALT  9<L>  /  AlkPhos  84  08-24    PT/INR - ( 24 Aug 2022 13:33 )   PT: 18.0 sec;   INR: 1.51          PTT - ( 24 Aug 2022 13:33 )  PTT:32.1 sec    Radiology and Additional Studies:    ---------------------------------------------------------------------------  PLEASE CHECK WHEN PRESENT:     [ x ]Heart Failure     [  ] Acute     [  ] Acute on Chronic     [ x ] Chronic  -------------------------------------------------------------------     [  ]Diastolic [HFpEF]     [x  ]Systolic [HFrEF]     [  ]Combined [HFpEF & HFrEF]  .................................................................................     [  ]Other:     [ ] Pulmonary Hypertension     [ ] Afib     [x ] Hypertensive Heart Disease  -------------------------------------------------------------------  [ ] Respiratory failure  [ ] Acute cor pulmonale  [ ] Asthma/COPD Exacerbation  [ ] Pleural effusion  [ ] Aspiration pneumonia  [ ] Obstructive Sleep Apnea  -------------------------------------------------------------------  [  ]KYMBERLY     [  ]ATN     [  ]Reneal Medullary Necrosis     [  ]Renal Cortical Necrosis     [  ]Other Pathological Lesions:    [  ]CKD 1  [  ]CKD 2  [  ]CKD 3  [  ]CKD 4  [ x ]CKD 5  [  ]Other  -------------------------------------------------------------------  [  x]Diabetes  [  ] Diabetic PVD Ulcer  [  ] Neuropathic ulcer to DM  [  x] Diabetes with Nephropathy  [  ] Osteomyelitis due to diabetes  [  ] Hyperglycemia   [  ]hypoglycemia   --------------------------------------------------------------------  [  ]Malnutrition: See Nutrition Note  [  ]Cachexia  [  ]Other:   [  ]Supplement Ordered:  [  ]Morbid Obesity (BMI >=40]  ---------------------------------------------------------------------  [ ] Sepsis/severe sepsis/septic shock  [ ] Noninfectious SIRS  [ ] UTI  [ ] Pneumonia  -----------------------------------------------------------------------  [ ] Acidosis/alkalosis  [ ] Fluid overload  [ ] Hypokalemia  [ ] Hyperkalemia  [ ] Hypomagnesemia  [ ] Hypophosphatemia  [ ] Hyperphosphatemia  [x ] hyponatremia   ------------------------------------------------------------------------  [ ] Acute blood loss anemia  [ ] Post op blood loss anemia  [ ] Iron deficiency anemia  [x ] Anemia due to chronic disease  [ ] Hypercoagulable state  [ ] Thrombocytopenia  ----------------------------------------------------------------------  [ ] Cerebral infarction  [ ] Transient ischemia attack  [ ] Encephalopathy - Toxic or Metabolic      A/P: 89y YO Female with hx of CKD 5, DM, HFpEF (EF 70% 2021, grade 1 diastolic dysfunction), HTN, HLD, CAD, hypothyroidism, PAD presents to ED with rest pain. Patient was recently admitted to [facility] for right leg rest pain admitted with worsening right leg pain, now s/p RLE angiogram and SFA angioplasty and stent 8/25/22    Vascular/PAD:  -Continue home ASA and Plavix   -s/p RLE angiogram and SFA angioplasty and stent 8/25/22      HTN/HLD:  -Continue home coreg 25, Nifedipine 60  -will continue to monitor for HTN   -continue home statin       CAD/CHF:   -last echo 7/21 with EF 65-70% diastolic dysfunction noted      DM:  -not on any home meds   -A1c 5.7    CKD:   -renal consulted appreciate recs   -Cr 3.8    Anemia:   hgb 7.7 ->7.0   Known anemia of chronic disease    Hypothyroidism:   -restarted home meds     Diet:   Consistant carb/renal    Activity:   as tolerated    DVTPPx:   sqh     Dispo:   d/c home today

## 2022-08-26 NOTE — PROVIDER CONTACT NOTE (OTHER) - ASSESSMENT
No discomfort. Vital signs stable.
Patient AOx4. /72, vitals otherwise stable. Patient asymptomatic.

## 2022-08-26 NOTE — DISCHARGE NOTE NURSING/CASE MANAGEMENT/SOCIAL WORK - PATIENT PORTAL LINK FT
You can access the FollowMyHealth Patient Portal offered by [facility] by registering at the following website: [email or web address]. By joining [employer]’s FollowMyHealth portal, you will also be able to view your health information using other applications (apps) compatible with our system.

## 2022-08-26 NOTE — DISCHARGE NOTE PROVIDER - NSDCMRMEDTOKEN_GEN_ALL_CORE_FT
acetaminophen 325 mg oral tablet: 2 tab(s) orally every 6 hours, As needed, Mild Pain (1 - 3)  Aspirin Low Dose 81 mg oral tablet, chewable: 1 tab(s) orally once a day  calcitriol 0.5 mcg oral capsule: 1 cap(s) orally once a day  clopidogrel 75 mg oral tablet: 1 tab(s) orally once a day  Coreg 25 mg oral tablet: 1 tab(s) orally every 12 hours  cyclobenzaprine 10 mg oral tablet: 1 tab(s) orally 3 times a day  folic acid 1 mg oral tablet: 1 tab(s) orally once a day  levothyroxine 100 mcg (0.1 mg) oral tablet: 1 tab(s) orally once a day  Lipitor 80 mg oral tablet: 1 tab(s) orally once a day (at bedtime)  NIFEdipine 60 mg oral tablet, extended release: 1 tab(s) orally once a day  sevelamer carbonate 800 mg oral tablet: 1 tab(s) orally every 8 hours  torsemide 20 mg oral tablet: 2 tab(s) orally once a day in the morning   torsemide 20 mg oral tablet: 1 tab(s) orally once a day at bedside   zolpidem 5 mg oral tablet: 1 tab(s) orally once a day (at bedtime)   acetaminophen 325 mg oral tablet: 2 tab(s) orally every 6 hours, As needed, Mild Pain (1 - 3)  Aspirin Low Dose 81 mg oral tablet, chewable: 1 tab(s) orally once a day  calcitriol 0.5 mcg oral capsule: 1 cap(s) orally once a day  clopidogrel 75 mg oral tablet: 1 tab(s) orally once a day  Coreg 25 mg oral tablet: 1 tab(s) orally every 12 hours  cyclobenzaprine 10 mg oral tablet: 1 tab(s) orally 3 times a day  folic acid 1 mg oral tablet: 1 tab(s) orally once a day  levothyroxine 100 mcg (0.1 mg) oral tablet: 1 tab(s) orally once a day  Lipitor 80 mg oral tablet: 1 tab(s) orally once a day (at bedtime)  NIFEdipine 60 mg oral tablet, extended release: 1 tab(s) orally once a day  sevelamer carbonate 800 mg oral tablet: 1 tab(s) orally every 8 hours  torsemide 20 mg oral tablet: 2 tab(s) orally once a day in the morning   torsemide 20 mg oral tablet: 1 tab(s) orally once a day (at bedtime)  zolpidem 5 mg oral tablet: 1 tab(s) orally once a day (at bedtime)

## 2022-08-26 NOTE — DISCHARGE NOTE PROVIDER - NSDCFUADDINST_GEN_ALL_CORE_FT
FOLLOW UP: Dr. Hassan in 1 week.   Your appointment has been made for _______. Call the office at  with any questions  WOUND CARE: You may shower; soap and water over incision sites. Do not scrub. Pat dry when done.   ACTIVITY: Ambulate as tolerated, but no heavy lifting (>10lbs) or strenuous exercise.  DIET: You may resume diabetic/renal diet.   Call the office if you experience increasing pain, redness, swelling or drainage from incision sites/wounds, or temperature >101.4F.   NEW MEDICATIONS: none  DISCHARGE DESTINATION: Home

## 2022-08-26 NOTE — PROGRESS NOTE ADULT - SUBJECTIVE AND OBJECTIVE BOX
Patient is a 89y Female seen and evaluated at bedside. no acute events overnight patient is s/p angiogram and SFA angioplasty and stent placement she is still complaining of foot pain and coolness- /63 K 3.8 bicarb 21 Cr s.5 (baseline)      Meds:    acetaminophen     Tablet .. 650 every 6 hours PRN  aspirin  chewable 81 daily  atorvastatin 80 at bedtime  calcitriol   Capsule 0.5 daily  carvedilol 25 every 12 hours  clopidogrel Tablet 75 daily  cyclobenzaprine 10 three times a day PRN  dextrose 5%. 1000 <Continuous>  dextrose 5%. 1000 <Continuous>  dextrose 50% Injectable 25 once  dextrose 50% Injectable 12.5 once  dextrose 50% Injectable 25 once  dextrose Oral Gel 15 once PRN  folic acid 1 daily  glucagon  Injectable 1 once  heparin   Injectable 5000 every 8 hours  insulin lispro (ADMELOG) corrective regimen sliding scale  Before meals and at bedtime  levothyroxine 100 daily  NIFEdipine XL 60 daily  sevelamer carbonate 800 every 8 hours      T(C): , Max: 36.7 (22 @ 14:33)  T(F): , Max: 98.1 (22 @ 06:56)  HR: 70 (22 @ 12:44)  BP: 140/63 (22 @ 12:44)  BP(mean): --  RR: 16 (22 @ 12:44)  SpO2: 95% (22 @ 12:44)  Wt(kg): --     @ 07:  -   @ 07:00  --------------------------------------------------------  IN: 0 mL / OUT: 725 mL / NET: -725 mL     @ 07:01  -   @ 13:30  --------------------------------------------------------  IN: 120 mL / OUT: 400 mL / NET: -280 mL          Review of Systems:  all other ROS negative       PHYSICAL EXAM:  GENERAL: NAD resting comfortably   CHEST/LUNG: CTA no accessory muscle use  HEART: normal S1S2, RRR  ABDOMEN: Soft, Nontender, +BS,   EXTREMITIES: No clubbing, cyanosis, or edema         LABS:                        7.0    3.65  )-----------( 118      ( 26 Aug 2022 05:30 )             21.4         135  |  100  |  62<H>  ----------------------------<  91  3.8   |  21<L>  |  3.50<H>    Ca    9.6      26 Aug 2022 05:30  Phos  4.4       Mg     2.2         TPro  7.9  /  Alb  3.7  /  TBili  0.3  /  DBili  x   /  AST  14  /  ALT  9<L>  /  AlkPhos  84        PT/INR - ( 24 Aug 2022 13:33 )   PT: 18.0 sec;   INR: 1.51          PTT - ( 24 Aug 2022 13:33 )  PTT:32.1 sec  Urinalysis Basic - ( 24 Aug 2022 16:32 )    Color: Yellow / Appearance: Clear / S.010 / pH: x  Gluc: x / Ketone: NEGATIVE  / Bili: Negative / Urobili: 0.2 E.U./dL   Blood: x / Protein: Trace mg/dL / Nitrite: NEGATIVE   Leuk Esterase: NEGATIVE / RBC: < 5 /HPF / WBC 5-10 /HPF   Sq Epi: x / Non Sq Epi: x / Bacteria: Present /HPF      Sodium, Random Urine: 64 mmol/L ( @ 19:25)  Osmolality, Random Urine: 282 mosm/kg ( @ 19:25)        RADIOLOGY & ADDITIONAL STUDIES:

## 2022-08-26 NOTE — PROGRESS NOTE ADULT - ASSESSMENT
This is a 89yo woman, mostly Tajik-speaking, with a PMH of stage V CKD (Dr. Lerma), chronic HFpEF, HTN, HLD, CAD, PPM (indication unknown), hypothyroidism and PAD who re-presented with critical leg ischemia of her RLE, now s/p R-SFA angioplasty with stent on 8/25.  Renal function appears to have tolerated her contrast load well per AM BMP.  Transfusing 1U of PRBC before discharge given Hb 7 and known outpatient blood transfusions for chronic anemia.     #RLE critical leg ischemia  #PAD  -- c/w ASA and Plavix     #Stage V CKD  -- c/w home calcitriol   -- t/b with Renal if they'd like her to be discharged w/sevelamer   -- Renal following, appreciate assistance    #Essential HTN   #Chronic HFpEF  -- can restart Torsemide upon discharge   -- c/w home Coreg and Nifedipine    #HLD   -- c/w statin     #Hypothyroidism   -- c/w Levothyroxine    #Insomnia   -- would monitor Ambien use given age and risk for hospital-related delirium     #Chronic anemia  #Acute blood loss anemia  -- plan for 1U of pRBC this AM   -- resume care with her [facility] team     #Diet - Renal  #DVT PPx - SQH  #Dispo - home today w/[facility]HA     Catherine Steward  Attending Hospitalist  [phone]

## 2022-08-26 NOTE — PROVIDER CONTACT NOTE (OTHER) - RECOMMENDATIONS
Per policy, straight cath if bladder scan is greater than 350mL. Pt already had straight cath, and to avoid injury, a ho catheter could be used.
Notified PA of BP. PO coreg scheduled, given. Per PA, hold off on starting fluids pending BP re-check after coreg. Nifidipine ordered, awaiting med from pharmacy.

## 2022-08-26 NOTE — DISCHARGE NOTE NURSING/CASE MANAGEMENT/SOCIAL WORK - NSDCPEFALRISK_GEN_ALL_CORE
For information on Fall & Injury Prevention, visit: [email or web address] OR  [email or web address] OR  https://www.cdc.gov/steadi/patient.html

## 2022-08-26 NOTE — PROVIDER CONTACT NOTE (OTHER) - SITUATION
Bladder scan showed retaining 589 mL. Pt attempted to void on bedpan and put out 125mL.
Patient with hx CKD 5, htn, DM, CHF, hld, CAD with stents, PAD, hypothyroid p/w RLE pain. Admitted to vascular service with plan for RLE angio in AM. Upon arrival to unit at 1950 patient w/  /72

## 2022-08-26 NOTE — PROVIDER CONTACT NOTE (OTHER) - BACKGROUND
CKD 5, htn, DM, CHF, hld, CAD with stents, PAD, hypothyroid
S/p RLE angiogram through L groin. Straight cath 8/25 at 1800 for 600mL.

## 2022-08-26 NOTE — PROVIDER CONTACT NOTE (OTHER) - ACTION/TREATMENT ORDERED:
Let patient ambulate in the am. If pt does not void recheck bladder scan. As long as pt is not uncomfortable, let patient ambulate in the am. If pt does not void recheck bladder scan.

## 2022-08-26 NOTE — DISCHARGE NOTE PROVIDER - NSDCFUSCHEDAPPT_GEN_ALL_CORE_FT
Christopher Hassan  [facility]  VASCULAR [address]  Scheduled Appointment: 08/30/2022

## 2022-08-28 ENCOUNTER — INPATIENT (INPATIENT)
Facility: HOSPITAL | Age: 87
LOS: 3 days | Discharge: HOME HEALTH | DRG: 256 | End: 2022-09-01
Attending: SURGERY | Admitting: SURGERY
Payer: MEDICARE

## 2022-08-28 VITALS
DIASTOLIC BLOOD PRESSURE: 62 MMHG | TEMPERATURE: 99 F | HEART RATE: 70 BPM | RESPIRATION RATE: 18 BRPM | SYSTOLIC BLOOD PRESSURE: 145 MMHG | OXYGEN SATURATION: 98 % | HEIGHT: 61 IN

## 2022-08-28 DIAGNOSIS — Z95.0 PRESENCE OF CARDIAC PACEMAKER: Chronic | ICD-10-CM

## 2022-08-28 DIAGNOSIS — Z95.820 PERIPHERAL VASCULAR ANGIOPLASTY STATUS WITH IMPLANTS AND GRAFTS: Chronic | ICD-10-CM

## 2022-08-28 LAB
ALBUMIN SERPL ELPH-MCNC: 3.6 G/DL — SIGNIFICANT CHANGE UP (ref 3.3–5)
ALP SERPL-CCNC: 76 U/L — SIGNIFICANT CHANGE UP (ref 40–120)
ALT FLD-CCNC: 9 U/L — LOW (ref 10–45)
ANION GAP SERPL CALC-SCNC: 14 MMOL/L — SIGNIFICANT CHANGE UP (ref 5–17)
ANISOCYTOSIS BLD QL: SLIGHT — SIGNIFICANT CHANGE UP
APTT BLD: 26.6 SEC — LOW (ref 27.5–35.5)
AST SERPL-CCNC: 14 U/L — SIGNIFICANT CHANGE UP (ref 10–40)
BASOPHILS # BLD AUTO: 0.1 K/UL — SIGNIFICANT CHANGE UP (ref 0–0.2)
BASOPHILS NFR BLD AUTO: 1.7 % — SIGNIFICANT CHANGE UP (ref 0–2)
BILIRUB SERPL-MCNC: 0.3 MG/DL — SIGNIFICANT CHANGE UP (ref 0.2–1.2)
BLD GP AB SCN SERPL QL: POSITIVE — SIGNIFICANT CHANGE UP
BUN SERPL-MCNC: 60 MG/DL — HIGH (ref 7–23)
CALCIUM SERPL-MCNC: 10.2 MG/DL — SIGNIFICANT CHANGE UP (ref 8.4–10.5)
CHLORIDE SERPL-SCNC: 96 MMOL/L — SIGNIFICANT CHANGE UP (ref 96–108)
CO2 SERPL-SCNC: 20 MMOL/L — LOW (ref 22–31)
CREAT SERPL-MCNC: 3.7 MG/DL — HIGH (ref 0.5–1.3)
EGFR: 11 ML/MIN/1.73M2 — LOW
EOSINOPHIL # BLD AUTO: 0.2 K/UL — SIGNIFICANT CHANGE UP (ref 0–0.5)
EOSINOPHIL NFR BLD AUTO: 3.5 % — SIGNIFICANT CHANGE UP (ref 0–6)
GLUCOSE SERPL-MCNC: 126 MG/DL — HIGH (ref 70–99)
HCT VFR BLD CALC: 23.6 % — LOW (ref 34.5–45)
HGB BLD-MCNC: 7.9 G/DL — LOW (ref 11.5–15.5)
INR BLD: 1.35 — HIGH (ref 0.88–1.16)
LACTATE SERPL-SCNC: 1.2 MMOL/L — SIGNIFICANT CHANGE UP (ref 0.5–2)
LYMPHOCYTES # BLD AUTO: 1.76 K/UL — SIGNIFICANT CHANGE UP (ref 1–3.3)
LYMPHOCYTES # BLD AUTO: 31.3 % — SIGNIFICANT CHANGE UP (ref 13–44)
MACROCYTES BLD QL: SLIGHT — SIGNIFICANT CHANGE UP
MANUAL SMEAR VERIFICATION: SIGNIFICANT CHANGE UP
MCHC RBC-ENTMCNC: 31.7 PG — SIGNIFICANT CHANGE UP (ref 27–34)
MCHC RBC-ENTMCNC: 33.5 GM/DL — SIGNIFICANT CHANGE UP (ref 32–36)
MCV RBC AUTO: 94.8 FL — SIGNIFICANT CHANGE UP (ref 80–100)
METAMYELOCYTES # FLD: 0.9 % — HIGH (ref 0–0)
MICROCYTES BLD QL: SLIGHT — SIGNIFICANT CHANGE UP
MONOCYTES # BLD AUTO: 0.44 K/UL — SIGNIFICANT CHANGE UP (ref 0–0.9)
MONOCYTES NFR BLD AUTO: 7.8 % — SIGNIFICANT CHANGE UP (ref 2–14)
NEUTROPHILS # BLD AUTO: 2.98 K/UL — SIGNIFICANT CHANGE UP (ref 1.8–7.4)
NEUTROPHILS NFR BLD AUTO: 52.2 % — SIGNIFICANT CHANGE UP (ref 43–77)
NEUTS BAND # BLD: 0.9 % — SIGNIFICANT CHANGE UP (ref 0–8)
PLAT MORPH BLD: NORMAL — SIGNIFICANT CHANGE UP
PLATELET # BLD AUTO: 132 K/UL — LOW (ref 150–400)
POIKILOCYTOSIS BLD QL AUTO: SLIGHT — SIGNIFICANT CHANGE UP
POLYCHROMASIA BLD QL SMEAR: SLIGHT — SIGNIFICANT CHANGE UP
POTASSIUM SERPL-MCNC: 3.8 MMOL/L — SIGNIFICANT CHANGE UP (ref 3.5–5.3)
POTASSIUM SERPL-SCNC: 3.8 MMOL/L — SIGNIFICANT CHANGE UP (ref 3.5–5.3)
PROT SERPL-MCNC: 7.5 G/DL — SIGNIFICANT CHANGE UP (ref 6–8.3)
PROTHROM AB SERPL-ACNC: 16.1 SEC — HIGH (ref 10.5–13.4)
RBC # BLD: 2.49 M/UL — LOW (ref 3.8–5.2)
RBC # FLD: 19.9 % — HIGH (ref 10.3–14.5)
RBC BLD AUTO: ABNORMAL
RH IG SCN BLD-IMP: POSITIVE — SIGNIFICANT CHANGE UP
SARS-COV-2 RNA SPEC QL NAA+PROBE: NEGATIVE — SIGNIFICANT CHANGE UP
SODIUM SERPL-SCNC: 130 MMOL/L — LOW (ref 135–145)
SPHEROCYTES BLD QL SMEAR: SLIGHT — SIGNIFICANT CHANGE UP
VARIANT LYMPHS # BLD: 1.7 % — SIGNIFICANT CHANGE UP (ref 0–6)
WBC # BLD: 5.62 K/UL — SIGNIFICANT CHANGE UP (ref 3.8–10.5)
WBC # FLD AUTO: 5.62 K/UL — SIGNIFICANT CHANGE UP (ref 3.8–10.5)

## 2022-08-28 PROCEDURE — 99285 EMERGENCY DEPT VISIT HI MDM: CPT

## 2022-08-28 PROCEDURE — 93010 ELECTROCARDIOGRAM REPORT: CPT

## 2022-08-28 PROCEDURE — 73620 X-RAY EXAM OF FOOT: CPT | Mod: 26,RT

## 2022-08-28 PROCEDURE — 86077 PHYS BLOOD BANK SERV XMATCH: CPT

## 2022-08-28 RX ORDER — ASPIRIN/CALCIUM CARB/MAGNESIUM 324 MG
81 TABLET ORAL DAILY
Refills: 0 | Status: DISCONTINUED | OUTPATIENT
Start: 2022-08-28 | End: 2022-08-30

## 2022-08-28 RX ORDER — CYCLOBENZAPRINE HYDROCHLORIDE 10 MG/1
10 TABLET, FILM COATED ORAL THREE TIMES A DAY
Refills: 0 | Status: DISCONTINUED | OUTPATIENT
Start: 2022-08-28 | End: 2022-08-30

## 2022-08-28 RX ORDER — SEVELAMER CARBONATE 2400 MG/1
800 POWDER, FOR SUSPENSION ORAL EVERY 8 HOURS
Refills: 0 | Status: DISCONTINUED | OUTPATIENT
Start: 2022-08-28 | End: 2022-08-30

## 2022-08-28 RX ORDER — FOLIC ACID 0.8 MG
1 TABLET ORAL DAILY
Refills: 0 | Status: DISCONTINUED | OUTPATIENT
Start: 2022-08-28 | End: 2022-08-30

## 2022-08-28 RX ORDER — ACETAMINOPHEN 500 MG
650 TABLET ORAL EVERY 6 HOURS
Refills: 0 | Status: DISCONTINUED | OUTPATIENT
Start: 2022-08-28 | End: 2022-08-30

## 2022-08-28 RX ORDER — NIFEDIPINE 30 MG
60 TABLET, EXTENDED RELEASE 24 HR ORAL DAILY
Refills: 0 | Status: DISCONTINUED | OUTPATIENT
Start: 2022-08-28 | End: 2022-08-30

## 2022-08-28 RX ORDER — LEVOTHYROXINE SODIUM 125 MCG
100 TABLET ORAL DAILY
Refills: 0 | Status: DISCONTINUED | OUTPATIENT
Start: 2022-08-28 | End: 2022-08-30

## 2022-08-28 RX ORDER — CALCITRIOL 0.5 UG/1
0.5 CAPSULE ORAL DAILY
Refills: 0 | Status: DISCONTINUED | OUTPATIENT
Start: 2022-08-28 | End: 2022-08-30

## 2022-08-28 RX ORDER — CARVEDILOL PHOSPHATE 80 MG/1
25 CAPSULE, EXTENDED RELEASE ORAL EVERY 12 HOURS
Refills: 0 | Status: DISCONTINUED | OUTPATIENT
Start: 2022-08-28 | End: 2022-08-30

## 2022-08-28 RX ORDER — ATORVASTATIN CALCIUM 80 MG/1
80 TABLET, FILM COATED ORAL AT BEDTIME
Refills: 0 | Status: DISCONTINUED | OUTPATIENT
Start: 2022-08-28 | End: 2022-08-30

## 2022-08-28 RX ORDER — ZOLPIDEM TARTRATE 10 MG/1
5 TABLET ORAL AT BEDTIME
Refills: 0 | Status: DISCONTINUED | OUTPATIENT
Start: 2022-08-28 | End: 2022-08-29

## 2022-08-28 RX ORDER — CLOPIDOGREL BISULFATE 75 MG/1
75 TABLET, FILM COATED ORAL DAILY
Refills: 0 | Status: DISCONTINUED | OUTPATIENT
Start: 2022-08-28 | End: 2022-08-30

## 2022-08-28 RX ORDER — AMPICILLIN SODIUM AND SULBACTAM SODIUM 250; 125 MG/ML; MG/ML
3 INJECTION, POWDER, FOR SUSPENSION INTRAMUSCULAR; INTRAVENOUS EVERY 24 HOURS
Refills: 0 | Status: DISCONTINUED | OUTPATIENT
Start: 2022-08-29 | End: 2022-08-30

## 2022-08-28 RX ORDER — AMPICILLIN SODIUM AND SULBACTAM SODIUM 250; 125 MG/ML; MG/ML
3 INJECTION, POWDER, FOR SUSPENSION INTRAMUSCULAR; INTRAVENOUS ONCE
Refills: 0 | Status: COMPLETED | OUTPATIENT
Start: 2022-08-28 | End: 2022-08-28

## 2022-08-28 RX ADMIN — CARVEDILOL PHOSPHATE 25 MILLIGRAM(S): 80 CAPSULE, EXTENDED RELEASE ORAL at 18:22

## 2022-08-28 RX ADMIN — AMPICILLIN SODIUM AND SULBACTAM SODIUM 200 GRAM(S): 250; 125 INJECTION, POWDER, FOR SUSPENSION INTRAMUSCULAR; INTRAVENOUS at 17:00

## 2022-08-28 RX ADMIN — ATORVASTATIN CALCIUM 80 MILLIGRAM(S): 80 TABLET, FILM COATED ORAL at 21:55

## 2022-08-28 RX ADMIN — SEVELAMER CARBONATE 800 MILLIGRAM(S): 2400 POWDER, FOR SUSPENSION ORAL at 21:54

## 2022-08-28 RX ADMIN — Medication 81 MILLIGRAM(S): at 21:54

## 2022-08-28 NOTE — CONSULT NOTE ADULT - SUBJECTIVE AND OBJECTIVE BOX
Attending: Dr. Brumfield     Patient is a 89y old  Female who presents with a chief complaint of black right 3rd toe     HPI: 89 y/o F Iraqi speaking w/  Krystyna (#[id number]), PMHx of CAD ,CHF, HLD, HTN, DM, Hypothyroidism, anemia, CKD stage 5, and PAD, recent PSHx of L leg angioplasty 2 days ago, now BIBEMS for black R 3rd toe with drainage. Pt was discharged home on Friday and her home health nurse noticed this morning that the patients right 3rd digit was turning black. Per patient the toe was already blue before her vascular procedure. Pt also states she is unable to move all her toes on her R foot. She states she saw some yellow drainage from her right 3rd toe this morning. She denies any nausea, vomiting fever, chills, or SOB.      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      Diabetes      Diabetes mellitus with diabetic neuropathy      Chronic diastolic congestive heart failure      Hypertension      Hyperlipidemia      CAD (coronary artery disease)      Hypothyroidism      Pacemaker      S/P peripheral artery angioplasty with stent placement  6/2021 - SFA to BK Pop stenting (Zilver PTX 6x80mm, Zilver 518 5x80, Zilver 518 5x80 (proximal to distal)); 2/2022 - balloon angioplasty for in-stent stenosis        Home Medications:  acetaminophen 325 mg oral tablet: 2 tab(s) orally every 6 hours, As needed, Mild Pain (1 - 3) (28 Aug 2022 14:56)  calcitriol 0.5 mcg oral capsule: 1 cap(s) orally once a day (28 Aug 2022 14:56)  Coreg 25 mg oral tablet: 1 tab(s) orally every 12 hours (28 Aug 2022 14:56)  cyclobenzaprine 10 mg oral tablet: 1 tab(s) orally 3 times a day, As Needed (28 Aug 2022 14:56)  folic acid 1 mg oral tablet: 1 tab(s) orally once a day (28 Aug 2022 14:56)  levothyroxine 100 mcg (0.1 mg) oral tablet: 1 tab(s) orally once a day (28 Aug 2022 14:56)  Lipitor 80 mg oral tablet: 1 tab(s) orally once a day (at bedtime) (28 Aug 2022 14:56)  NIFEdipine 60 mg oral tablet, extended release: 1 tab(s) orally once a day (28 Aug 2022 14:56)  torsemide 20 mg oral tablet: 2 tab(s) orally once a day in the morning  (28 Aug 2022 14:56)  torsemide 20 mg oral tablet: 1 tab(s) orally once a day (at bedtime) (28 Aug 2022 14:56)  zolpidem 5 mg oral tablet: 1 tab(s) orally once a day (at bedtime) (28 Aug 2022 14:56)    Allergies    No Known Allergies    Intolerances      FAMILY HISTORY:    Social History:       LABS                        7.9    5.62  )-----------( 132      ( 28 Aug 2022 15:54 )             23.6     08-28    130<L>  |  96  |  60<H>  ----------------------------<  126<H>  3.8   |  20<L>  |  3.70<H>    Ca    10.2      28 Aug 2022 15:54    TPro  7.5  /  Alb  3.6  /  TBili  0.3  /  DBili  x   /  AST  14  /  ALT  9<L>  /  AlkPhos  76  08-28    PT/INR - ( 28 Aug 2022 15:54 )   PT: 16.1 sec;   INR: 1.35          PTT - ( 28 Aug 2022 15:54 )  PTT:26.6 sec    Vital Signs Last 24 Hrs  T(C): 37.1 (28 Aug 2022 13:41), Max: 37.1 (28 Aug 2022 13:41)  T(F): 98.7 (28 Aug 2022 13:41), Max: 98.7 (28 Aug 2022 13:41)  HR: 70 (28 Aug 2022 13:41) (70 - 70)  BP: 145/62 (28 Aug 2022 13:41) (145/62 - 145/62)  BP(mean): --  RR: 18 (28 Aug 2022 13:41) (18 - 18)  SpO2: 98% (28 Aug 2022 13:41) (98% - 98%)    Parameters below as of 28 Aug 2022 13:41  Patient On (Oxygen Delivery Method): room air        PHYSICAL EXAM  General: NAD, AA0x3  Lower Extremity Focused:  Vasc: Non-palpable pulses, DP/PT biphasic on doppler, No edema, gangrenous right 3rd digit black, dusky changes extending to medial aspect of 4th digit   Derm: interdigital maceration of the 2nd and 3rd interspace, Peeling, macerated skin overlying dorsum of 3rd digit, serous drainage noted to right 3rd digit base, +malodor, neg PTB, Erythema extending from right 3rd digit to ankle, patchy erythema noted on right LE  Neuro: Protective sensation diminished   MSK: 3rd digit right foot ttp, decreased AROM of right foot digits 1-5    RADIOLOGY: Pending                       Attending: Dr. Brumfield     Patient is a 89y old  Female who presents with a chief complaint of black right 3rd toe     HPI: 89 y/o F Tristanian speaking w/  Krystyna (#[id number]), PMHx of CAD ,CHF, HLD, HTN, DM, Hypothyroidism, anemia, CKD stage 5, and PAD, recent PSHx of R leg angioplasty 2 days ago, now BIBEMS for black R 3rd toe with drainage. Pt was discharged home on Friday and her home health nurse noticed this morning that the patients right 3rd digit was turning black. Per patient the toe was already blue before her vascular procedure. Pt also states she is unable to move all her toes on her R foot. She states she saw some yellow drainage from her right 3rd toe this morning. She denies any nausea, vomiting fever, chills, or SOB.      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      Diabetes      Diabetes mellitus with diabetic neuropathy      Chronic diastolic congestive heart failure      Hypertension      Hyperlipidemia      CAD (coronary artery disease)      Hypothyroidism      Pacemaker      S/P peripheral artery angioplasty with stent placement  6/2021 - SFA to BK Pop stenting (Zilver PTX 6x80mm, Zilver 518 5x80, Zilver 518 5x80 (proximal to distal)); 2/2022 - balloon angioplasty for in-stent stenosis        Home Medications:  acetaminophen 325 mg oral tablet: 2 tab(s) orally every 6 hours, As needed, Mild Pain (1 - 3) (28 Aug 2022 14:56)  calcitriol 0.5 mcg oral capsule: 1 cap(s) orally once a day (28 Aug 2022 14:56)  Coreg 25 mg oral tablet: 1 tab(s) orally every 12 hours (28 Aug 2022 14:56)  cyclobenzaprine 10 mg oral tablet: 1 tab(s) orally 3 times a day, As Needed (28 Aug 2022 14:56)  folic acid 1 mg oral tablet: 1 tab(s) orally once a day (28 Aug 2022 14:56)  levothyroxine 100 mcg (0.1 mg) oral tablet: 1 tab(s) orally once a day (28 Aug 2022 14:56)  Lipitor 80 mg oral tablet: 1 tab(s) orally once a day (at bedtime) (28 Aug 2022 14:56)  NIFEdipine 60 mg oral tablet, extended release: 1 tab(s) orally once a day (28 Aug 2022 14:56)  torsemide 20 mg oral tablet: 2 tab(s) orally once a day in the morning  (28 Aug 2022 14:56)  torsemide 20 mg oral tablet: 1 tab(s) orally once a day (at bedtime) (28 Aug 2022 14:56)  zolpidem 5 mg oral tablet: 1 tab(s) orally once a day (at bedtime) (28 Aug 2022 14:56)    Allergies    No Known Allergies    Intolerances      FAMILY HISTORY:    Social History:       LABS                        7.9    5.62  )-----------( 132      ( 28 Aug 2022 15:54 )             23.6     08-28    130<L>  |  96  |  60<H>  ----------------------------<  126<H>  3.8   |  20<L>  |  3.70<H>    Ca    10.2      28 Aug 2022 15:54    TPro  7.5  /  Alb  3.6  /  TBili  0.3  /  DBili  x   /  AST  14  /  ALT  9<L>  /  AlkPhos  76  08-28    PT/INR - ( 28 Aug 2022 15:54 )   PT: 16.1 sec;   INR: 1.35          PTT - ( 28 Aug 2022 15:54 )  PTT:26.6 sec    Vital Signs Last 24 Hrs  T(C): 37.1 (28 Aug 2022 13:41), Max: 37.1 (28 Aug 2022 13:41)  T(F): 98.7 (28 Aug 2022 13:41), Max: 98.7 (28 Aug 2022 13:41)  HR: 70 (28 Aug 2022 13:41) (70 - 70)  BP: 145/62 (28 Aug 2022 13:41) (145/62 - 145/62)  BP(mean): --  RR: 18 (28 Aug 2022 13:41) (18 - 18)  SpO2: 98% (28 Aug 2022 13:41) (98% - 98%)    Parameters below as of 28 Aug 2022 13:41  Patient On (Oxygen Delivery Method): room air        PHYSICAL EXAM  General: NAD, AA0x3  Lower Extremity Focused:  Vasc: Non-palpable pulses, DP/PT biphasic on doppler, No edema, gangrenous right 3rd digit black, dusky changes extending to medial aspect of 4th digit   Derm: interdigital maceration of the 2nd and 3rd interspace, Peeling, macerated skin overlying dorsum of 3rd digit, serous drainage noted to right 3rd digit base, +malodor, neg PTB, Erythema extending from right 3rd digit to ankle, patchy erythema noted on right LE  Neuro: Protective sensation diminished   MSK: 3rd digit right foot ttp, decreased AROM of right foot digits 1-5    RADIOLOGY: Pending

## 2022-08-28 NOTE — H&P ADULT - ASSESSMENT
89y YO Female with hx of CKD 5, DM, HFpEF (EF 70% 2021, grade 1 diastolic dysfunction), HTN, HLD, CAD, hypothyroidism, PAD presents to ED with rest pain. Patient was recently admitted to [facility] for right leg rest pain admitted with worsening right leg pain, now s/p RLE angiogram and SFA angioplasty and stent 8/25/22    Vascular/PAD:  -Continue home ASA and Plavix   -s/p RLE angiogram and SFA angioplasty and stent 8/25/22  -pain control  -f/u labs  -local wound care  -f/u podiatry recs  -IV unasyn      HTN/HLD:  -Continue home coreg 25, Nifedipine 60  -will continue to monitor for HTN   -continue home statin       CAD/CHF:   -last echo 7/21 with EF 65-70% diastolic dysfunction noted    -hold torsemide    DM:  -not on any home meds   -A1c 5.7    CKD:   -renal consulted appreciate recs   -Cr 3.7    Anemia:   hgb 7.9  Known anemia of chronic disease    Hypothyroidism:   -levothyroxine     Diet:   Consistant carb/renal    Activity:   as tolerated    DVTPPx:   sqh     Dispo: plan for poss OR   89y YO Female with hx of CKD 5, DM, HFpEF (EF 70% 2021, grade 1 diastolic dysfunction), HTN, HLD, CAD, hypothyroidism, PAD presents to ED with rest pain. Patient was recently admitted to [facility] for right leg rest pain admitted with worsening right leg pain, now s/p RLE angiogram and SFA angioplasty and stent 8/25/22    Vascular/PAD:  -Continue home ASA and Plavix   -s/p RLE angiogram and SFA angioplasty and stent 8/25/22  -pain control  -f/u labs  -local wound care  -f/u podiatry recs  -IV unasyn  -f/u foot xray      HTN/HLD:  -Continue home coreg 25, Nifedipine 60  -will continue to monitor for HTN   -continue home statin       CAD/CHF:   -last echo 7/21 with EF 65-70% diastolic dysfunction noted    -hold torsemide    DM:  -not on any home meds   -A1c 5.7    CKD:   -renal consulted appreciate recs   -Cr 3.7    Anemia:   hgb 7.9  Known anemia of chronic disease    Hypothyroidism:   -levothyroxine     Diet:   Consistant carb/renal    Activity:   as tolerated    DVTPPx:   sqh     Dispo: plan for poss OR

## 2022-08-28 NOTE — H&P ADULT - NSHPPHYSICALEXAM_GEN_ALL_CORE
Gen- NAD  Pulm- nonlabored breathing  Cards- NSR  Abd- soft nt/nd  Ext- RLE- +3rd toe purplish discoloration +medial 4th toe with discoloration +erythema up to midfoot dorsally and erythema on plantar aspect as well +odor +ttp +serous discharge between 3rd and 4th webspace +swelling of toes  Vascular- RLE- bi DP/PT

## 2022-08-28 NOTE — ED ADULT NURSE NOTE - NSHOSCREENINGQ1_ED_ALL_ED
Detail Level: Simple Patient Specific Counseling (Will Not Stick From Patient To Patient): Patient states rash started after using different makeup\\n\\nDiscussed with patient LHR may improve irritation. Cost would be $150 per treatment. Approximate number of treatments would be 3 or 4. No

## 2022-08-28 NOTE — ED PROVIDER NOTE - SKIN, MLM
Skin normal color for race, warm, dry and intact. No evidence of rash. Erythema to dorsal region of R foot, shiny in appearance. 1st toe of R foot black in appearance.

## 2022-08-28 NOTE — CHART NOTE - NSCHARTNOTEFT_GEN_A_CORE
Pt is a 89 yo F w/ CKD Stage V, DM, HFpEF, HTN, HLD, CAD, PAD, hypothyroidism was recently admitted on 8/25 with right lower extremity rest pain s/p Rt LE angiogram with angioplasty and re-stenting. Nephrology saw her at the time for CKD management and pre-cath hydration recs. Her Cr remained stable post angioplasty. She was discharged on 8/26 but now returns with worsening 3rd toe discoloration, pain and erythema.      Comprehensive Metabolic Panel (08.28.22 @ 15:54)   Sodium, Serum: 130 mmol/L   Potassium, Serum: 3.8 mmol/L   Chloride, Serum: 96 mmol/L   Carbon Dioxide, Serum: 20 mmol/L   Anion Gap, Serum: 14 mmol/L   Blood Urea Nitrogen, Serum: 60 mg/dL   Creatinine, Serum: 3.70 mg/dL   Glucose, Serum: 126 mg/dL   Calcium, Total Serum: 10.2 mg/dL   Protein Total, Serum: 7.5 g/dL   Albumin, Serum: 3.6 g/dL   Bilirubin Total, Serum: 0.3 mg/dL   Alkaline Phosphatase, Serum: 76 U/L   Aspartate Aminotransferase (AST/SGOT): 14 U/L   Alanine Aminotransferase (ALT/SGPT): 9 U/L   eGFR: 11:         Historical Values  Creatinine, Serum: 3.70 mg/dL (08.28.22 @ 15:54)   Creatinine, Serum: 3.50 mg/dL (08.26.22 @ 05:30)   Creatinine, Serum: 3.76 mg/dL (08.25.22 @ 05:30)   Creatinine, Serum: 3.83 mg/dL (08.24.22 @ 13:33)         Pt's kidney function appears to be stable with no evidence of KYMBERLY on top of her known CKD. No urgent indication of HD. Will continue to monitor her renal function and provide recs for any intervention that she may undergo this hospitalization. Full consult note to follow in AM.        Gonzalo Tidwell M.D  PGY-4 Nephrology fellow  [phone]

## 2022-08-28 NOTE — ED ADULT NURSE NOTE - WOUND TYPE
discharge to anterior area above right 3rd toe and black 3rd toe and part of 4th toe as well is black.

## 2022-08-28 NOTE — PATIENT PROFILE ADULT - FALL HARM RISK - HARM RISK INTERVENTIONS

## 2022-08-28 NOTE — ED PROVIDER NOTE - OBJECTIVE STATEMENT
89 y/o F Kyrgyz speaking w/ language solutions  (#[id number]) , PMHx of CAD ,CHF, HLD, HTN, DM, Hypothyroidism, anemia, CKD stage 5, and PAD, recent PSHx of L leg angioplasty 2 days ago, now BIBEMS for black R 1st toe with drainage. She is here with home health nurse who visited today and noticed black and blue on pt's R leg. Pt also states she is unable to move all her toes on her R foot. She denies fevers and chills. 89 y/o F Icelandic speaking w/ language [employer]  (#[id number]) , PMHx of CAD ,CHF, HLD, HTN, DM, Hypothyroidism, anemia, CKD stage 5, and PAD, recent PSHx of L leg angioplasty 2 days ago, now BIBEMS for black R 1st toe with drainage. She is here with home health nurse who visited today and noticed black and blue on pt's R leg. Pt also states she is unable to move all her toes on her R foot. She denies fevers and chills  mild pain and redness to area  no sig exac/allev factors

## 2022-08-28 NOTE — ED PROVIDER NOTE - PRINCIPAL DIAGNOSIS
Yonatan cash/Bulmaro BRAGA is calling pt is being discharged from ED [facility] today and asking if Dr Stephanie Figueroa will follow for home health.   [phone] Gangrene of toe

## 2022-08-28 NOTE — CONSULT NOTE ADULT - ASSESSMENT
89 y/o F Wallisian speaking w/  Krystyna (#[id number]), PMHx of CAD ,CHF, HLD, HTN, DM, Hypothyroidism, anemia, CKD stage 5, and PAD, recent PSHx of L leg angioplasty 2 days ago, now BIBEMS for black R 3rd toe with drainage. VSS, wbc WNL, erythema and malodor noted to right 3rd digit most likely 2/2 acute infection with underlying necrotic changes. Will need to R/O gas.       *************INCOMPLETE*********  Plan:   - Pt is being admitted to vascular  - F/U x rays-pending  - Rec IV abx   - Wound dressed with betadine DSD   - Heel WBAT to RLE  -rest of plan pending X ray and discussion with attending     Podiatry following.   89 y/o F Singaporean speaking w/  Krystyna (#[id number]), PMHx of CAD ,CHF, HLD, HTN, DM, Hypothyroidism, anemia, CKD stage 5, and PAD, recent PSHx of R leg angioplasty 2 days ago, now BIBEMS for black R 3rd toe with drainage. VSS, wbc WNL, erythema and malodor noted to right 3rd digit most likely 2/2 acute infection with underlying necrotic changes. No gas noted on X ray. Will monitor 4th digit for demarcation before deciding on amputation level this week.     Plan:   - Pt is being admitted to vascular  - x rays reviewed   - Rec IV abx   - Wound dressed with betadine DSD   - Heel WBAT to RLE  - Will monitor for demarcation of 4th digit, Plan for amputation later this week     Podiatry following. Plan D/W attending.

## 2022-08-28 NOTE — H&P ADULT - HISTORY OF PRESENT ILLNESS
89 Yo F w/ Hx of CKD 5, DM, HFpEF (EF 70% 2021, grade 1 diastolic dysfunction), HTN, HLD, CAD, hypothyroidism, PAD who was recently admitted on 8/25 with right lower extremity rest pain  she underwent  right lower extremity angiogram with SFA pop stent in stent stenosis angioplasty with drug coated balloon and re-stenting of the proximal and distal part of old sent,  showing one vessel runoff through peroneal  at the end of procedure.  She now presents to the ED with worsening R 3rd toe discoloration, pain, and erythema.  Patient denies CP/SOB/N/V.

## 2022-08-28 NOTE — ED ADULT NURSE NOTE - OBJECTIVE STATEMENT
Patient came to ER stating as per  Etselle #[id number] "I was seen by the visiting nurse this morning and she saw my foot and it is black and blue with some discharge so she told me I had to come to ER right away. It is painful, it feels tight and I can't move my feet anymore, and it is red". Patient noted to have a black 3rd right toe and there is spread to the side of the 4th right toe. Patient denies fever, chills, NVD.

## 2022-08-28 NOTE — ED PROVIDER NOTE - CLINICAL SUMMARY MEDICAL DECISION MAKING FREE TEXT BOX
cyanosis to 3rd digit- vascular consult cyanosis to 3rd digit- vascular consult --- concern re ischemia- admit to vascular IV abx, possible surgery in am

## 2022-08-28 NOTE — ED ADULT TRIAGE NOTE - CHIEF COMPLAINT QUOTE
BIBEMS reporting black R 1st toe with drainage, sent for eval by home health nurse who visited today. Pt dc'ed 2 days ago s/p L leg angioplasty. hx T2DM. denies fevers, chills.

## 2022-08-29 ENCOUNTER — TRANSCRIPTION ENCOUNTER (OUTPATIENT)
Age: 87
End: 2022-08-29

## 2022-08-29 LAB
ANION GAP SERPL CALC-SCNC: 13 MMOL/L — SIGNIFICANT CHANGE UP (ref 5–17)
BUN SERPL-MCNC: 63 MG/DL — HIGH (ref 7–23)
CALCIUM SERPL-MCNC: 10.4 MG/DL — SIGNIFICANT CHANGE UP (ref 8.4–10.5)
CHLORIDE SERPL-SCNC: 99 MMOL/L — SIGNIFICANT CHANGE UP (ref 96–108)
CO2 SERPL-SCNC: 23 MMOL/L — SIGNIFICANT CHANGE UP (ref 22–31)
CREAT SERPL-MCNC: 3.52 MG/DL — HIGH (ref 0.5–1.3)
EGFR: 12 ML/MIN/1.73M2 — LOW
GLUCOSE SERPL-MCNC: 100 MG/DL — HIGH (ref 70–99)
HCT VFR BLD CALC: 24.6 % — LOW (ref 34.5–45)
HGB BLD-MCNC: 8.2 G/DL — LOW (ref 11.5–15.5)
MAGNESIUM SERPL-MCNC: 2 MG/DL — SIGNIFICANT CHANGE UP (ref 1.6–2.6)
MCHC RBC-ENTMCNC: 31.7 PG — SIGNIFICANT CHANGE UP (ref 27–34)
MCHC RBC-ENTMCNC: 33.3 GM/DL — SIGNIFICANT CHANGE UP (ref 32–36)
MCV RBC AUTO: 95 FL — SIGNIFICANT CHANGE UP (ref 80–100)
NRBC # BLD: 0 /100 WBCS — SIGNIFICANT CHANGE UP (ref 0–0)
PHOSPHATE SERPL-MCNC: 4 MG/DL — SIGNIFICANT CHANGE UP (ref 2.5–4.5)
PLATELET # BLD AUTO: 131 K/UL — LOW (ref 150–400)
POTASSIUM SERPL-MCNC: 3.8 MMOL/L — SIGNIFICANT CHANGE UP (ref 3.5–5.3)
POTASSIUM SERPL-SCNC: 3.8 MMOL/L — SIGNIFICANT CHANGE UP (ref 3.5–5.3)
RBC # BLD: 2.59 M/UL — LOW (ref 3.8–5.2)
RBC # FLD: 19.8 % — HIGH (ref 10.3–14.5)
SODIUM SERPL-SCNC: 135 MMOL/L — SIGNIFICANT CHANGE UP (ref 135–145)
WBC # BLD: 4.56 K/UL — SIGNIFICANT CHANGE UP (ref 3.8–10.5)
WBC # FLD AUTO: 4.56 K/UL — SIGNIFICANT CHANGE UP (ref 3.8–10.5)

## 2022-08-29 PROCEDURE — 99223 1ST HOSP IP/OBS HIGH 75: CPT

## 2022-08-29 RX ORDER — HEPARIN SODIUM 5000 [USP'U]/ML
5000 INJECTION INTRAVENOUS; SUBCUTANEOUS EVERY 12 HOURS
Refills: 0 | Status: DISCONTINUED | OUTPATIENT
Start: 2022-08-29 | End: 2022-08-30

## 2022-08-29 RX ADMIN — HEPARIN SODIUM 5000 UNIT(S): 5000 INJECTION INTRAVENOUS; SUBCUTANEOUS at 07:53

## 2022-08-29 RX ADMIN — ATORVASTATIN CALCIUM 80 MILLIGRAM(S): 80 TABLET, FILM COATED ORAL at 21:52

## 2022-08-29 RX ADMIN — CARVEDILOL PHOSPHATE 25 MILLIGRAM(S): 80 CAPSULE, EXTENDED RELEASE ORAL at 07:04

## 2022-08-29 RX ADMIN — SEVELAMER CARBONATE 800 MILLIGRAM(S): 2400 POWDER, FOR SUSPENSION ORAL at 12:56

## 2022-08-29 RX ADMIN — CALCITRIOL 0.5 MICROGRAM(S): 0.5 CAPSULE ORAL at 11:48

## 2022-08-29 RX ADMIN — Medication 1 MILLIGRAM(S): at 11:47

## 2022-08-29 RX ADMIN — SEVELAMER CARBONATE 800 MILLIGRAM(S): 2400 POWDER, FOR SUSPENSION ORAL at 21:52

## 2022-08-29 RX ADMIN — Medication 100 MICROGRAM(S): at 07:04

## 2022-08-29 RX ADMIN — Medication 60 MILLIGRAM(S): at 07:05

## 2022-08-29 RX ADMIN — CLOPIDOGREL BISULFATE 75 MILLIGRAM(S): 75 TABLET, FILM COATED ORAL at 11:48

## 2022-08-29 RX ADMIN — AMPICILLIN SODIUM AND SULBACTAM SODIUM 200 GRAM(S): 250; 125 INJECTION, POWDER, FOR SUSPENSION INTRAMUSCULAR; INTRAVENOUS at 17:05

## 2022-08-29 RX ADMIN — SEVELAMER CARBONATE 800 MILLIGRAM(S): 2400 POWDER, FOR SUSPENSION ORAL at 07:05

## 2022-08-29 RX ADMIN — HEPARIN SODIUM 5000 UNIT(S): 5000 INJECTION INTRAVENOUS; SUBCUTANEOUS at 17:05

## 2022-08-29 RX ADMIN — Medication 81 MILLIGRAM(S): at 11:47

## 2022-08-29 RX ADMIN — CARVEDILOL PHOSPHATE 25 MILLIGRAM(S): 80 CAPSULE, EXTENDED RELEASE ORAL at 17:05

## 2022-08-29 NOTE — CONSULT NOTE ADULT - SUBJECTIVE AND OBJECTIVE BOX
Vascular Co-Management Initial Consult Note    HPI:  This is a 89yo woman, mostly Dominican-speaking, with a PMH of stage V CKD (Dr. Lerma), chronic HFpEF, HTN, HLD, CAD, PPM (indication unknown), hypothyroidism and PAD who re-presents with worsening R-3rd toe discoloration after a discharge on 8/26 for an admission for critical leg ischemia of her RLE that resulted in a R-SFA angioplasty with stent on 8/25.  She note that her VNS nurse came for a visit and noticed the dark, purple discoloration of her toe.  During the prior admission she did have some erythema and purplish discoloration at the tip of her toe but not it's entire length.  On ROS she denies chest pain w/ and w/o ambulation, SOB, ROSE, palpitations, orthopnea and PND.  Her ET is very limited due to her PAD.  She is mostly apartment-bound and does not climb stairs.     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  Diabetes mellitus with diabetic neuropathy  Chronic diastolic congestive heart failure  Hypertension  Hyperlipidemia  CAD (coronary artery disease)  Hypothyroidism  Pacemaker  S/P peripheral artery angioplasty with stent placement  6/2021 - SFA to BK Pop stenting (Zilver PTX 6x80mm, Zilver 518 5x80, Zilver 518 5x80 (proximal to distal)); 2/2022 - balloon angioplasty for in-stent stenosis    Home Medications:  acetaminophen 325 mg oral tablet: 2 tab(s) orally every 6 hours, As needed, Mild Pain (1 - 3) (28 Aug 2022 14:56)  calcitriol 0.5 mcg oral capsule: 1 cap(s) orally once a day (28 Aug 2022 14:56)  Coreg 25 mg oral tablet: 1 tab(s) orally every 12 hours (28 Aug 2022 14:56)  cyclobenzaprine 10 mg oral tablet: 1 tab(s) orally 3 times a day, As Needed (28 Aug 2022 14:56)  folic acid 1 mg oral tablet: 1 tab(s) orally once a day (28 Aug 2022 14:56)  levothyroxine 100 mcg (0.1 mg) oral tablet: 1 tab(s) orally once a day (28 Aug 2022 14:56)  Lipitor 80 mg oral tablet: 1 tab(s) orally once a day (at bedtime) (28 Aug 2022 14:56)  NIFEdipine 60 mg oral tablet, extended release: 1 tab(s) orally once a day (28 Aug 2022 14:56)  torsemide 20 mg oral tablet: 2 tab(s) orally once a day in the morning  (28 Aug 2022 14:56)  torsemide 20 mg oral tablet: 1 tab(s) orally once a day (at bedtime) (28 Aug 2022 14:56)  zolpidem 5 mg oral tablet: 1 tab(s) orally once a day (at bedtime) (28 Aug 2022 14:56)    Allergies  No Known Allergies    FAMILY HISTORY:  No pertinent family history to this presentation.    Social History:  Lives alone in [place] w/an aide.  Son involved w/her care but he lives in [place].  Lives in an elevator-serviced building.     CURRENT MEDICATIONS:   acetaminophen     Tablet .. 650 milliGRAM(s) Oral every 6 hours PRN  ampicillin/sulbactam  IVPB 3 Gram(s) IV Intermittent every 24 hours  aspirin  chewable 81 milliGRAM(s) Oral daily  atorvastatin 80 milliGRAM(s) Oral at bedtime  calcitriol   Capsule 0.5 MICROGram(s) Oral daily  carvedilol 25 milliGRAM(s) Oral every 12 hours  clopidogrel Tablet 75 milliGRAM(s) Oral daily  cyclobenzaprine 10 milliGRAM(s) Oral three times a day PRN  folic acid 1 milliGRAM(s) Oral daily  heparin   Injectable 5000 Unit(s) SubCutaneous every 12 hours  levothyroxine 100 MICROGram(s) Oral daily  NIFEdipine XL 60 milliGRAM(s) Oral daily  sevelamer carbonate 800 milliGRAM(s) Oral every 8 hours  zolpidem 5 milliGRAM(s) Oral at bedtime PRN    VITAL SIGNS, INS/OUTS (last 24 hours):  Vital Signs Last 24 Hrs  T(C): 36.1 (29 Aug 2022 09:50), Max: 37.1 (28 Aug 2022 13:41)  T(F): 96.9 (29 Aug 2022 09:50), Max: 98.7 (28 Aug 2022 13:41)  HR: 61 (29 Aug 2022 08:52) (55 - 71)  BP: 146/65 (29 Aug 2022 08:52) (145/62 - 171/72)  BP(mean): 93 (29 Aug 2022 08:52) (93 - 93)  RR: 17 (29 Aug 2022 08:52) (16 - 18)  SpO2: 99% (29 Aug 2022 08:52) (95% - 100%)    Parameters below as of 29 Aug 2022 08:52  Patient On (Oxygen Delivery Method): room air    I&O's Summary    28 Aug 2022 07:01  -  29 Aug 2022 07:00  --------------------------------------------------------  IN: 120 mL / OUT: 150 mL / NET: -30 mL    29 Aug 2022 07:01  -  29 Aug 2022 11:51  --------------------------------------------------------  IN: 150 mL / OUT: 0 mL / NET: 150 mL    EXAM:  Gen: NAD, sitting upright in bed  HEENT: NCAT, MMM, clear OP  CV: RRR, no m/g/r appreciated  Pulm: CTA B, no w/r/r; no increase in WOB  Abd: normoactive BS, soft, NTND  Ext: RLE +dressing   Neuro: AOx3, nonfocal  Psych: pleasant, conversant and appropriate    BASIC LABS:                        8.2    4.56  )-----------( 131      ( 29 Aug 2022 06:36 )             24.6     08-29    135  |  99  |  63<H>  ----------------------------<  100<H>  3.8   |  23  |  3.52<H>    Ca    10.4      29 Aug 2022 06:36  Phos  4.0     08-29  Mg     2.0     08-29    TPro  7.5  /  Alb  3.6  /  TBili  0.3  /  DBili  x   /  AST  14  /  ALT  9<L>  /  AlkPhos  76  08-28    PT/INR - ( 28 Aug 2022 15:54 )   PT: 16.1 sec;   INR: 1.35     PTT - ( 28 Aug 2022 15:54 )  PTT:26.6 sec    MICRODATA:  Culture - Blood (collected 28 Aug 2022 14:15)  Source: .Blood Blood-Peripheral  Preliminary Report (29 Aug 2022 04:00):    No growth at 12 hours    Culture - Blood (collected 28 Aug 2022 14:15)  Source: .Blood Blood-Peripheral  Preliminary Report (29 Aug 2022 04:00):    No growth at 12 hours    IMAGING:  -- No new imaging.    EKG: NSR w/o acute ischemic changes or pathological q-waves

## 2022-08-29 NOTE — CONSULT NOTE ADULT - SUBJECTIVE AND OBJECTIVE BOX
Patient is a 89y old  Female who presents with a chief complaint of R 3rd toe infection (29 Aug 2022 05:45)      HPI:  89 Yo F w/ Hx of CKD 5, (baseline around 3-3.5) HTN HLD recent SFA stent and angioplasty on  presented to the ED with complaints of worsening right third toe discoloration, pain, erythema since discharge on  denies any CP SOB N V labs notable for Cr 3.5 K 3.8 bicarb 23 Hgb 8.2 .70- denies any CP SOB N V lilkey going to the OR for amputation on  or  nephrology consulted for CKD management            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      Diabetes      Diabetes mellitus with diabetic neuropathy      Chronic diastolic congestive heart failure      Hypertension      Hyperlipidemia      CAD (coronary artery disease)      Hypothyroidism      Pacemaker      S/P peripheral artery angioplasty with stent placement  2021 - SFA to BK Pop stenting (Zilver PTX 6x80mm, Zilver 518 5x80, Zilver 518 5x80 (proximal to distal)); 2022 - balloon angioplasty for in-stent stenosis            Allergies:  No Known Allergies      Home Medications:   acetaminophen     Tablet .. 650 milliGRAM(s) Oral every 6 hours PRN  ampicillin/sulbactam  IVPB 3 Gram(s) IV Intermittent every 24 hours  aspirin  chewable 81 milliGRAM(s) Oral daily  atorvastatin 80 milliGRAM(s) Oral at bedtime  calcitriol   Capsule 0.5 MICROGram(s) Oral daily  carvedilol 25 milliGRAM(s) Oral every 12 hours  clopidogrel Tablet 75 milliGRAM(s) Oral daily  cyclobenzaprine 10 milliGRAM(s) Oral three times a day PRN  folic acid 1 milliGRAM(s) Oral daily  heparin   Injectable 5000 Unit(s) SubCutaneous every 12 hours  levothyroxine 100 MICROGram(s) Oral daily  NIFEdipine XL 60 milliGRAM(s) Oral daily  sevelamer carbonate 800 milliGRAM(s) Oral every 8 hours  zolpidem 5 milliGRAM(s) Oral at bedtime PRN      Hospital Medications:   MEDICATIONS  (STANDING):  ampicillin/sulbactam  IVPB 3 Gram(s) IV Intermittent every 24 hours  aspirin  chewable 81 milliGRAM(s) Oral daily  atorvastatin 80 milliGRAM(s) Oral at bedtime  calcitriol   Capsule 0.5 MICROGram(s) Oral daily  carvedilol 25 milliGRAM(s) Oral every 12 hours  clopidogrel Tablet 75 milliGRAM(s) Oral daily  folic acid 1 milliGRAM(s) Oral daily  heparin   Injectable 5000 Unit(s) SubCutaneous every 12 hours  levothyroxine 100 MICROGram(s) Oral daily  NIFEdipine XL 60 milliGRAM(s) Oral daily  sevelamer carbonate 800 milliGRAM(s) Oral every 8 hours      SOCIAL HISTORY:  Denies ETOh, Smoking,     Family History:  FAMILY HISTORY:        VITALS:  T(F): 98.4 (22 @ 05:25), Max: 98.7 (22 @ 13:41)  HR: 63 (22 @ 00:04)  BP: 170/70 (22 @ 00:04)  RR: 16 (22 @ 00:04)  SpO2: 99% (22 @ 00:04)  Wt(kg): --     @ 07:01  -   @ 07:00  --------------------------------------------------------  IN: 120 mL / OUT: 150 mL / NET: -30 mL      Height (cm): 154.9 ( @ 20:40)  Weight (kg): 48 ( @ 20:40)  BMI (kg/m2): 20 ( @ 20:40)  BSA (m2): 1.44 ( @ 20:40)  CAPILLARY BLOOD GLUCOSE          Review of Systems:  all other ROS negative     PHYSICAL EXAM:  GENERAL: NAD resting comfortably in bed  CHEST/LUNG: CTA no accessory muscle use  HEART: Regular rate and rhythm, no murmur, no gallops, no rub   ABDOMEN: Soft, nontender, non distended  EXTREMITIES: no pedal edema      LABS:      135  |  99  |  63<H>  ----------------------------<  100<H>  3.8   |  23  |  3.52<H>    Ca    10.4      29 Aug 2022 06:36  Phos  4.0       Mg     2.0         TPro  7.5  /  Alb  3.6  /  TBili  0.3  /  DBili      /  AST  14  /  ALT  9<L>  /  AlkPhos  76      Creatinine Trend: 3.52 <--, 3.70 <--, 3.50 <--, 3.76 <--, 3.83 <--                        8.2    4.56  )-----------( 131      ( 29 Aug 2022 06:36 )             24.6     Urine Studies:  Urinalysis Basic - ( 24 Aug 2022 16:32 )    Color: Yellow / Appearance: Clear / S.010 / pH:   Gluc:  / Ketone: NEGATIVE  / Bili: Negative / Urobili: 0.2 E.U./dL   Blood:  / Protein: Trace mg/dL / Nitrite: NEGATIVE   Leuk Esterase: NEGATIVE / RBC: < 5 /HPF / WBC 5-10 /HPF   Sq Epi:  / Non Sq Epi:  / Bacteria: Present /HPF      Sodium, Random Urine: 64 mmol/L ( @ 19:25)  Osmolality, Random Urine: 282 mosm/kg ( @ 19:25)

## 2022-08-29 NOTE — CONSULT NOTE ADULT - ATTENDING COMMENTS
89 yo pt known to our service since prior eval and f/u for crf and severe pad.   pt s/p recent angio with stent placement for right limb ischemia, and now returns for contd progressive changes in toe.   agree with findings and plan.

## 2022-08-29 NOTE — PROGRESS NOTE ADULT - SUBJECTIVE AND OBJECTIVE BOX
Patient is a 89y old  Female who presents with a chief complaint of R 3rd toe infection (29 Aug 2022 08:09)      INTERVAL HPI/ OVERNIGHT EVENTS: MANDY O/N. Pt in good spirits, understands that we are waiting to see if R 4th toe demarcates (in addn to R 3rd toe that is already necrotic) before taking her to surgery possibly later this week.       LABS                        8.2    4.56  )-----------( 131      ( 29 Aug 2022 06:36 )             24.6     08-29    135  |  99  |  63<H>  ----------------------------<  100<H>  3.8   |  23  |  3.52<H>    Ca    10.4      29 Aug 2022 06:36  Phos  4.0     08-29  Mg     2.0     08-29    TPro  7.5  /  Alb  3.6  /  TBili  0.3  /  DBili  x   /  AST  14  /  ALT  9<L>  /  AlkPhos  76  08-28    PT/INR - ( 28 Aug 2022 15:54 )   PT: 16.1 sec;   INR: 1.35          PTT - ( 28 Aug 2022 15:54 )  PTT:26.6 sec    ICU Vital Signs Last 24 Hrs  T(C): 36.1 (29 Aug 2022 09:50), Max: 37.1 (28 Aug 2022 13:41)  T(F): 96.9 (29 Aug 2022 09:50), Max: 98.7 (28 Aug 2022 13:41)  HR: 61 (29 Aug 2022 08:52) (55 - 71)  BP: 146/65 (29 Aug 2022 08:52) (145/62 - 171/72)  BP(mean): 93 (29 Aug 2022 08:52) (93 - 93)  ABP: --  ABP(mean): --  RR: 17 (29 Aug 2022 08:52) (16 - 18)  SpO2: 99% (29 Aug 2022 08:52) (95% - 100%)    O2 Parameters below as of 29 Aug 2022 08:52  Patient On (Oxygen Delivery Method): room air      PHYSICAL EXAM  Lower Extremity Focused  Vasc: Non-palpable pulses, DP/PT biphasic on doppler, No edema, gangrenous right 3rd digit black, dusky changes extending to medial aspect of 4th digit   Derm: interdigital maceration of the 2nd and 3rd interspace, Peeling, macerated skin overlying dorsum of 3rd digit, serous drainage noted to right 3rd digit base, +malodor, neg PTB, Erythema extending from right 3rd digit to ankle, patchy erythema noted on right LE  Neuro: Protective sensation diminished   MSK: 3rd digit right foot ttp, decreased AROM of right foot digits 1-5

## 2022-08-29 NOTE — CONSULT NOTE ADULT - ASSESSMENT
This is a 89yo woman, mostly Georgian-speaking, with a PMH of stage V CKD (Dr. Lerma), chronic HFpEF, HTN, HLD, CAD, PPM (indication unknown), hypothyroidism and PAD who re-presents with worsening R-3rd toe discoloration after a discharge on 8/26 for an admission for critical leg ischemia of her RLE that resulted in a R-SFA angioplasty with stent on 8/25.    #R-3rd toe gangrene/ischemia   -- on Unasyn   -- Podiatry following  -- plan for OR tomorrow v 8/31  -- RCRI 2 and Mora score 3.4%; pt is intermediate risk for intermediate risk procedure     #RLE critical leg ischemia  #PAD  -- c/w ASA and Plavix     #Stage V CKD  -- c/w home calcitriol   -- Renal following, appreciate assistance    #Essential HTN   #Chronic HFpEF  -- continue to hol Torsemide   -- c/w home Coreg and Nifedipine    #HLD   -- c/w statin     #Hypothyroidism   -- c/w Levothyroxine    #Insomnia   -- would monitor Ambien use given age and risk for hospital-related delirium     #Chronic anemia  #Acute blood loss anemia  -- s/p 1U of pRBC on her prior admission as part of her routine outpt transfusion schedule   -- resume care with her [facility] team     #Diet - Carb-controlled/Renal   #DVT PPx - SQH  #Dispo - TBD    Catherine Steward  Attending Hospitalist  [phone]

## 2022-08-29 NOTE — CONSULT NOTE ADULT - ASSESSMENT
89 Yo F w/ Hx of CKD 5, (baseline around 3-3.5) HTN HLD recent SFA stent and angioplasty on 8/25 presented to the ED with complaints of worsening right third toe discoloration, pain, erythema pending OR for amputation- nephrology consulted for CKD management    Assessment/Plan:     #CKD 5  baseline 3-3.5      Recommend:   would hold diuretic on day of OR  c/w anti-hypertensives    daily BMP, mag, phos  renal diet   c/w renvela  no NSAIDS for pain control    Thank you for the opportunity to participate in the care of your patient. The nephrology service remains available to assist with any questions or concerns. Please feel free to reach us by paging the on-call nephrology fellow for urgent issues or as below.     Grace Martin D.O  PGY 5 nephrology fellow  P: [phone]   89 Yo F w/ Hx of CKD 5, (baseline around 3-3.5) HTN HLD recent SFA stent and angioplasty on 8/25 presented to the ED with complaints of worsening right third toe discoloration, pain, erythema pending OR for amputation- nephrology consulted for CKD management    Assessment/Plan:     #CKD 5  baseline 3-3.5      Recommend:   would hold diuretic on day of OR  c/w anti-hypertensives    daily BMP, mag, phos  renal diet   c/w renvela  no NSAIDS for pain control  renally dose abx for GFR<20    Thank you for the opportunity to participate in the care of your patient. The nephrology service remains available to assist with any questions or concerns. Please feel free to reach us by paging the on-call nephrology fellow for urgent issues or as below.     Grace Martin D.O  PGY 5 nephrology fellow  P: [phone]

## 2022-08-29 NOTE — PROGRESS NOTE ADULT - SUBJECTIVE AND OBJECTIVE BOX
O/N: WAGNER GALVAN                                                  ---------------------------------------------------------------------------  PLEASE CHECK WHEN PRESENT:     [ x ]Heart Failure     [  ] Acute     [  ] Acute on Chronic     [ x ] Chronic  -------------------------------------------------------------------     [  ]Diastolic [HFpEF]     [x  ]Systolic [HFrEF]     [  ]Combined [HFpEF & HFrEF]  .................................................................................     [  ]Other:     [ ] Pulmonary Hypertension     [ ] Afib     [x ] Hypertensive Heart Disease  -------------------------------------------------------------------  [ ] Respiratory failure  [ ] Acute cor pulmonale  [ ] Asthma/COPD Exacerbation  [ ] Pleural effusion  [ ] Aspiration pneumonia  [ ] Obstructive Sleep Apnea  -------------------------------------------------------------------  [  ]KYMBERLY     [  ]ATN     [  ]Reneal Medullary Necrosis     [  ]Renal Cortical Necrosis     [  ]Other Pathological Lesions:    [  ]CKD 1  [  ]CKD 2  [  ]CKD 3  [  ]CKD 4  [ x ]CKD 5  [  ]Other  -------------------------------------------------------------------  [  x]Diabetes  [  ] Diabetic PVD Ulcer  [  ] Neuropathic ulcer to DM  [  x] Diabetes with Nephropathy  [  ] Osteomyelitis due to diabetes  [  ] Hyperglycemia   [  ]hypoglycemia   --------------------------------------------------------------------  [  ]Malnutrition: See Nutrition Note  [  ]Cachexia  [  ]Other:   [  ]Supplement Ordered:  [  ]Morbid Obesity (BMI >=40]  ---------------------------------------------------------------------  [ ] Sepsis/severe sepsis/septic shock  [ ] Noninfectious SIRS  [ ] UTI  [ ] Pneumonia  -----------------------------------------------------------------------  [ ] Acidosis/alkalosis  [ ] Fluid overload  [ ] Hypokalemia  [ ] Hyperkalemia  [ ] Hypomagnesemia  [ ] Hypophosphatemia  [ ] Hyperphosphatemia  [x ] hyponatremia   ------------------------------------------------------------------------  [ ] Acute blood loss anemia  [ ] Post op blood loss anemia  [ ] Iron deficiency anemia  [x ] Anemia due to chronic disease  [ ] Hypercoagulable state  [ ] Thrombocytopenia  ----------------------------------------------------------------------  [ ] Cerebral infarction  [ ] Transient ischemia attack  [ ] Encephalopathy - Toxic or Metabolic      A/P: 89y YO Female with hx of CKD 5, DM, HFpEF (EF 70% 2021, grade 1 diastolic dysfunction), HTN, HLD, CAD, hypothyroidism, PAD presents to ED with rest pain. Patient was recently admitted to [facility] for right leg rest pain admitted with worsening right leg pain, now s/p RLE angiogram and SFA angioplasty and stent 8/25/22    Vascular/PAD:  -Continue home ASA and Plavix   -s/p RLE angiogram and SFA angioplasty and stent 8/25/22      HTN/HLD:  -Continue home coreg 25, Nifedipine 60  -will continue to monitor for HTN   -continue home statin       CAD/CHF:   -last echo 7/21 with EF 65-70% diastolic dysfunction noted      DM:  -not on any home meds   -A1c 5.7    CKD:   -renal consulted appreciate recs   -Cr 3.8    Anemia:   hgb 7.7 ->7.0   Known anemia of chronic disease    Hypothyroidism:   -restarted home meds     Diet:   Consistant carb/renal    Activity:   as tolerated    DVTPPx:   sqh     Dispo:   d/c home today O/N: MANDY, VSS      S: Patient does not have any complaints    O: Examined in bed resting comfortably     ROS: Denies headache, blurred vision, chest pain, SOB, abdominal pain, nausea or vomiting.         ampicillin/sulbactam  IVPB 3  ampicillin/sulbactam  IVPB 3  aspirin  chewable 81  carvedilol 25  clopidogrel Tablet 75  heparin   Injectable 5000  NIFEdipine XL 60      Allergies    No Known Allergies    Intolerances        Vital Signs Last 24 Hrs  T(C): 36.9 (29 Aug 2022 05:25), Max: 37.1 (28 Aug 2022 13:41)  T(F): 98.4 (29 Aug 2022 05:25), Max: 98.7 (28 Aug 2022 13:41)  HR: 63 (29 Aug 2022 00:04) (55 - 71)  BP: 170/70 (29 Aug 2022 00:04) (145/62 - 170/70)  BP(mean): --  RR: 16 (29 Aug 2022 00:04) (16 - 18)  SpO2: 99% (29 Aug 2022 00:04) (95% - 100%)    Parameters below as of 29 Aug 2022 00:04  Patient On (Oxygen Delivery Method): room air      I&O's Summary    28 Aug 2022 07:01  -  29 Aug 2022 07:00  --------------------------------------------------------  IN: 120 mL / OUT: 150 mL / NET: -30 mL      Gen: NAD, resting comfortably in bed  C/V: NSR  Pulm: Nonlabored breathing, no respiratory distress  Abd: soft, NT/ND  Extrem: RLE: 3rd toe gangrene with necrosis of the medial aspect of 4th toe with erythema tracking up to midfoot dorsally and erythema on plantar aspect as well +odor +ttp +serous discharge between 3rd and 4th webspace +swelling of toes. Biphasic DP/PT      LABS:                        8.2    4.56  )-----------( 131      ( 29 Aug 2022 06:36 )             24.6     08-28    130<L>  |  96  |  60<H>  ----------------------------<  126<H>  3.8   |  20<L>  |  3.70<H>    Ca    10.2      28 Aug 2022 15:54    TPro  7.5  /  Alb  3.6  /  TBili  0.3  /  DBili  x   /  AST  14  /  ALT  9<L>  /  AlkPhos  76  08-28    PT/INR - ( 28 Aug 2022 15:54 )   PT: 16.1 sec;   INR: 1.35          PTT - ( 28 Aug 2022 15:54 )  PTT:26.6 sec    Radiology and Additional Studies:            ---------------------------------------------------------------------------  PLEASE CHECK WHEN PRESENT:     [ x ]Heart Failure     [  ] Acute     [  ] Acute on Chronic     [ x ] Chronic  -------------------------------------------------------------------     [  ]Diastolic [HFpEF]     [x  ]Systolic [HFrEF]     [  ]Combined [HFpEF & HFrEF]  .................................................................................     [  ]Other:     [ ] Pulmonary Hypertension     [ ] Afib     [x ] Hypertensive Heart Disease  -------------------------------------------------------------------  [ ] Respiratory failure  [ ] Acute cor pulmonale  [ ] Asthma/COPD Exacerbation  [ ] Pleural effusion  [ ] Aspiration pneumonia  [ ] Obstructive Sleep Apnea  -------------------------------------------------------------------  [  ]KYMBERLY     [  ]ATN     [  ]Reneal Medullary Necrosis     [  ]Renal Cortical Necrosis     [  ]Other Pathological Lesions:    [  ]CKD 1  [  ]CKD 2  [  ]CKD 3  [  ]CKD 4  [ x ]CKD 5  [  ]Other  -------------------------------------------------------------------  [  x]Diabetes  [  ] Diabetic PVD Ulcer  [  ] Neuropathic ulcer to DM  [  x] Diabetes with Nephropathy  [  ] Osteomyelitis due to diabetes  [  ] Hyperglycemia   [  ]hypoglycemia   --------------------------------------------------------------------  [  ]Malnutrition: See Nutrition Note  [  ]Cachexia  [  ]Other:   [  ]Supplement Ordered:  [  ]Morbid Obesity (BMI >=40]  ---------------------------------------------------------------------  [ ] Sepsis/severe sepsis/septic shock  [ ] Noninfectious SIRS  [ ] UTI  [ ] Pneumonia  -----------------------------------------------------------------------  [ ] Acidosis/alkalosis  [ ] Fluid overload  [ ] Hypokalemia  [ ] Hyperkalemia  [ ] Hypomagnesemia  [ ] Hypophosphatemia  [ ] Hyperphosphatemia  [x ] hyponatremia   ------------------------------------------------------------------------  [ ] Acute blood loss anemia  [ ] Post op blood loss anemia  [ ] Iron deficiency anemia  [x ] Anemia due to chronic disease  [ ] Hypercoagulable state  [ ] Thrombocytopenia  ----------------------------------------------------------------------  [ ] Cerebral infarction  [ ] Transient ischemia attack  [ ] Encephalopathy - Toxic or Metabolic      A/P: 89y YO Female with hx of CKD 5, DM, HFpEF (EF 70% 2021, grade 1 diastolic dysfunction), HTN, HLD, CAD, hypothyroidism, PAD presents to ED with rest pain. Patient was recently admitted to [facility] for right leg rest pain admitted with worsening right leg pain, now s/p RLE angiogram and SFA angioplasty and stent 8/25/22 now with gangrene of right 3rd toe    Vascular/PAD:  -Continue home ASA and Plavix   -s/p RLE angiogram and SFA angioplasty and stent 8/25/22  -Right 3rd toe gangrene - podiatry consulted and following  -ASa and plavix    HTN/HLD:  -Continue home coreg 25, Nifedipine 60  -will continue to monitor for HTN   -continue home statin       CAD/CHF:   -last echo 7/21 with EF 65-70% diastolic dysfunction noted      DM:  -not on any home meds   -A1c 5.7    CKD:   -Cr stable 3.5    Anemia:   Known anemia of chronic disease  Hgb stable    Hypothyroidism:   -c/w synthroid    Diet:   Consistant carb/renal    Activity:   as tolerated    DVTPPx:   HSQ    Dispo:   -Plan for OR with podiatry for toe amputation on Tues/Wed    I have personally seen and examined the patient. I have reviewed all pertinent imaging and laboratory findings.

## 2022-08-30 ENCOUNTER — TRANSCRIPTION ENCOUNTER (OUTPATIENT)
Age: 87
End: 2022-08-30

## 2022-08-30 ENCOUNTER — RESULT REVIEW (OUTPATIENT)
Age: 87
End: 2022-08-30

## 2022-08-30 LAB
ANION GAP SERPL CALC-SCNC: 15 MMOL/L — SIGNIFICANT CHANGE UP (ref 5–17)
BUN SERPL-MCNC: 61 MG/DL — HIGH (ref 7–23)
CALCIUM SERPL-MCNC: 10.7 MG/DL — HIGH (ref 8.4–10.5)
CHLORIDE SERPL-SCNC: 98 MMOL/L — SIGNIFICANT CHANGE UP (ref 96–108)
CO2 SERPL-SCNC: 23 MMOL/L — SIGNIFICANT CHANGE UP (ref 22–31)
CREAT SERPL-MCNC: 3.58 MG/DL — HIGH (ref 0.5–1.3)
EGFR: 12 ML/MIN/1.73M2 — LOW
GLUCOSE BLDC GLUCOMTR-MCNC: 87 MG/DL — SIGNIFICANT CHANGE UP (ref 70–99)
GLUCOSE SERPL-MCNC: 117 MG/DL — HIGH (ref 70–99)
HCT VFR BLD CALC: 25.3 % — LOW (ref 34.5–45)
HGB BLD-MCNC: 8.2 G/DL — LOW (ref 11.5–15.5)
MAGNESIUM SERPL-MCNC: 2.1 MG/DL — SIGNIFICANT CHANGE UP (ref 1.6–2.6)
MCHC RBC-ENTMCNC: 30.8 PG — SIGNIFICANT CHANGE UP (ref 27–34)
MCHC RBC-ENTMCNC: 32.4 GM/DL — SIGNIFICANT CHANGE UP (ref 32–36)
MCV RBC AUTO: 95.1 FL — SIGNIFICANT CHANGE UP (ref 80–100)
NRBC # BLD: 0 /100 WBCS — SIGNIFICANT CHANGE UP (ref 0–0)
PHOSPHATE SERPL-MCNC: 3.8 MG/DL — SIGNIFICANT CHANGE UP (ref 2.5–4.5)
PLATELET # BLD AUTO: 132 K/UL — LOW (ref 150–400)
POTASSIUM SERPL-MCNC: 3.8 MMOL/L — SIGNIFICANT CHANGE UP (ref 3.5–5.3)
POTASSIUM SERPL-SCNC: 3.8 MMOL/L — SIGNIFICANT CHANGE UP (ref 3.5–5.3)
RBC # BLD: 2.66 M/UL — LOW (ref 3.8–5.2)
RBC # FLD: 19.9 % — HIGH (ref 10.3–14.5)
SODIUM SERPL-SCNC: 136 MMOL/L — SIGNIFICANT CHANGE UP (ref 135–145)
WBC # BLD: 3.95 K/UL — SIGNIFICANT CHANGE UP (ref 3.8–10.5)
WBC # FLD AUTO: 3.95 K/UL — SIGNIFICANT CHANGE UP (ref 3.8–10.5)

## 2022-08-30 PROCEDURE — 99231 SBSQ HOSP IP/OBS SF/LOW 25: CPT

## 2022-08-30 PROCEDURE — 88305 TISSUE EXAM BY PATHOLOGIST: CPT | Mod: 26

## 2022-08-30 PROCEDURE — 99232 SBSQ HOSP IP/OBS MODERATE 35: CPT

## 2022-08-30 PROCEDURE — 73630 X-RAY EXAM OF FOOT: CPT | Mod: 26,RT

## 2022-08-30 PROCEDURE — 99233 SBSQ HOSP IP/OBS HIGH 50: CPT

## 2022-08-30 RX ORDER — OXYCODONE HYDROCHLORIDE 5 MG/1
10 TABLET ORAL EVERY 6 HOURS
Refills: 0 | Status: DISCONTINUED | OUTPATIENT
Start: 2022-08-30 | End: 2022-08-31

## 2022-08-30 RX ORDER — HYDROMORPHONE HYDROCHLORIDE 2 MG/ML
0.5 INJECTION INTRAMUSCULAR; INTRAVENOUS; SUBCUTANEOUS
Refills: 0 | Status: DISCONTINUED | OUTPATIENT
Start: 2022-08-30 | End: 2022-08-31

## 2022-08-30 RX ORDER — CYCLOBENZAPRINE HYDROCHLORIDE 10 MG/1
10 TABLET, FILM COATED ORAL THREE TIMES A DAY
Refills: 0 | Status: DISCONTINUED | OUTPATIENT
Start: 2022-08-30 | End: 2022-09-01

## 2022-08-30 RX ORDER — CLOPIDOGREL BISULFATE 75 MG/1
75 TABLET, FILM COATED ORAL DAILY
Refills: 0 | Status: DISCONTINUED | OUTPATIENT
Start: 2022-08-31 | End: 2022-09-01

## 2022-08-30 RX ORDER — CALCITRIOL 0.5 UG/1
0.5 CAPSULE ORAL DAILY
Refills: 0 | Status: DISCONTINUED | OUTPATIENT
Start: 2022-08-30 | End: 2022-09-01

## 2022-08-30 RX ORDER — ATORVASTATIN CALCIUM 80 MG/1
80 TABLET, FILM COATED ORAL AT BEDTIME
Refills: 0 | Status: DISCONTINUED | OUTPATIENT
Start: 2022-08-30 | End: 2022-09-01

## 2022-08-30 RX ORDER — LEVOTHYROXINE SODIUM 125 MCG
100 TABLET ORAL DAILY
Refills: 0 | Status: DISCONTINUED | OUTPATIENT
Start: 2022-08-30 | End: 2022-09-01

## 2022-08-30 RX ORDER — ACETAMINOPHEN 500 MG
650 TABLET ORAL EVERY 6 HOURS
Refills: 0 | Status: DISCONTINUED | OUTPATIENT
Start: 2022-08-30 | End: 2022-08-31

## 2022-08-30 RX ORDER — OXYCODONE HYDROCHLORIDE 5 MG/1
5 TABLET ORAL EVERY 6 HOURS
Refills: 0 | Status: DISCONTINUED | OUTPATIENT
Start: 2022-08-30 | End: 2022-08-31

## 2022-08-30 RX ORDER — FOLIC ACID 0.8 MG
1 TABLET ORAL DAILY
Refills: 0 | Status: DISCONTINUED | OUTPATIENT
Start: 2022-08-30 | End: 2022-09-01

## 2022-08-30 RX ORDER — AMPICILLIN SODIUM AND SULBACTAM SODIUM 250; 125 MG/ML; MG/ML
3 INJECTION, POWDER, FOR SUSPENSION INTRAMUSCULAR; INTRAVENOUS EVERY 24 HOURS
Refills: 0 | Status: DISCONTINUED | OUTPATIENT
Start: 2022-08-30 | End: 2022-09-01

## 2022-08-30 RX ORDER — SEVELAMER CARBONATE 2400 MG/1
800 POWDER, FOR SUSPENSION ORAL EVERY 8 HOURS
Refills: 0 | Status: DISCONTINUED | OUTPATIENT
Start: 2022-08-30 | End: 2022-09-01

## 2022-08-30 RX ORDER — NIFEDIPINE 30 MG
60 TABLET, EXTENDED RELEASE 24 HR ORAL DAILY
Refills: 0 | Status: DISCONTINUED | OUTPATIENT
Start: 2022-08-30 | End: 2022-09-01

## 2022-08-30 RX ORDER — HEPARIN SODIUM 5000 [USP'U]/ML
5000 INJECTION INTRAVENOUS; SUBCUTANEOUS EVERY 12 HOURS
Refills: 0 | Status: DISCONTINUED | OUTPATIENT
Start: 2022-08-30 | End: 2022-09-01

## 2022-08-30 RX ORDER — ASPIRIN/CALCIUM CARB/MAGNESIUM 324 MG
81 TABLET ORAL DAILY
Refills: 0 | Status: DISCONTINUED | OUTPATIENT
Start: 2022-08-30 | End: 2022-09-01

## 2022-08-30 RX ORDER — CARVEDILOL PHOSPHATE 80 MG/1
25 CAPSULE, EXTENDED RELEASE ORAL EVERY 12 HOURS
Refills: 0 | Status: DISCONTINUED | OUTPATIENT
Start: 2022-08-30 | End: 2022-09-01

## 2022-08-30 RX ADMIN — Medication 1 MILLIGRAM(S): at 12:36

## 2022-08-30 RX ADMIN — CLOPIDOGREL BISULFATE 75 MILLIGRAM(S): 75 TABLET, FILM COATED ORAL at 12:36

## 2022-08-30 RX ADMIN — Medication 81 MILLIGRAM(S): at 12:35

## 2022-08-30 RX ADMIN — SEVELAMER CARBONATE 800 MILLIGRAM(S): 2400 POWDER, FOR SUSPENSION ORAL at 22:47

## 2022-08-30 RX ADMIN — CALCITRIOL 0.5 MICROGRAM(S): 0.5 CAPSULE ORAL at 12:36

## 2022-08-30 RX ADMIN — Medication 60 MILLIGRAM(S): at 05:38

## 2022-08-30 RX ADMIN — Medication 60 MILLIGRAM(S): at 22:46

## 2022-08-30 RX ADMIN — AMPICILLIN SODIUM AND SULBACTAM SODIUM 200 GRAM(S): 250; 125 INJECTION, POWDER, FOR SUSPENSION INTRAMUSCULAR; INTRAVENOUS at 17:31

## 2022-08-30 RX ADMIN — ATORVASTATIN CALCIUM 80 MILLIGRAM(S): 80 TABLET, FILM COATED ORAL at 22:46

## 2022-08-30 RX ADMIN — Medication 100 MICROGRAM(S): at 05:38

## 2022-08-30 RX ADMIN — HEPARIN SODIUM 5000 UNIT(S): 5000 INJECTION INTRAVENOUS; SUBCUTANEOUS at 05:38

## 2022-08-30 RX ADMIN — SEVELAMER CARBONATE 800 MILLIGRAM(S): 2400 POWDER, FOR SUSPENSION ORAL at 05:38

## 2022-08-30 RX ADMIN — CARVEDILOL PHOSPHATE 25 MILLIGRAM(S): 80 CAPSULE, EXTENDED RELEASE ORAL at 05:38

## 2022-08-30 NOTE — PROGRESS NOTE ADULT - ATTENDING COMMENTS
have reviewed status and examined pt.  pt is awake and communicative, though appropriately worried about surgery.  agree with findings and plans.

## 2022-08-30 NOTE — BRIEF OPERATIVE NOTE - NSICDXBRIEFPROCEDURE_GEN_ALL_CORE_FT
PROCEDURES:  Amputation, toe, through metatarsal head 30-Aug-2022 18:07:56 amp of 3rd and 4th amp through MT head Hany Abreu

## 2022-08-30 NOTE — PROGRESS NOTE ADULT - SUBJECTIVE AND OBJECTIVE BOX
O/N: Start NPO at 9AM per podiatry for OR Tuesday evening/night. MANDY                      ---------------------------------------------------------------------------  PLEASE CHECK WHEN PRESENT:     [ x ]Heart Failure     [  ] Acute     [  ] Acute on Chronic     [ x ] Chronic  -------------------------------------------------------------------     [  ]Diastolic [HFpEF]     [x  ]Systolic [HFrEF]     [  ]Combined [HFpEF & HFrEF]  .................................................................................     [  ]Other:     [ ] Pulmonary Hypertension     [ ] Afib     [x ] Hypertensive Heart Disease  -------------------------------------------------------------------  [ ] Respiratory failure  [ ] Acute cor pulmonale  [ ] Asthma/COPD Exacerbation  [ ] Pleural effusion  [ ] Aspiration pneumonia  [ ] Obstructive Sleep Apnea  -------------------------------------------------------------------  [  ]KYMBERLY     [  ]ATN     [  ]Reneal Medullary Necrosis     [  ]Renal Cortical Necrosis     [  ]Other Pathological Lesions:    [  ]CKD 1  [  ]CKD 2  [  ]CKD 3  [  ]CKD 4  [ x ]CKD 5  [  ]Other  -------------------------------------------------------------------  [  x]Diabetes  [  ] Diabetic PVD Ulcer  [  ] Neuropathic ulcer to DM  [  x] Diabetes with Nephropathy  [  ] Osteomyelitis due to diabetes  [  ] Hyperglycemia   [  ]hypoglycemia   --------------------------------------------------------------------  [  ]Malnutrition: See Nutrition Note  [  ]Cachexia  [  ]Other:   [  ]Supplement Ordered:  [  ]Morbid Obesity (BMI >=40]  ---------------------------------------------------------------------  [ ] Sepsis/severe sepsis/septic shock  [ ] Noninfectious SIRS  [ ] UTI  [ ] Pneumonia  -----------------------------------------------------------------------  [ ] Acidosis/alkalosis  [ ] Fluid overload  [ ] Hypokalemia  [ ] Hyperkalemia  [ ] Hypomagnesemia  [ ] Hypophosphatemia  [ ] Hyperphosphatemia  [x ] hyponatremia   ------------------------------------------------------------------------  [ ] Acute blood loss anemia  [ ] Post op blood loss anemia  [ ] Iron deficiency anemia  [x ] Anemia due to chronic disease  [ ] Hypercoagulable state  [ ] Thrombocytopenia  ----------------------------------------------------------------------  [ ] Cerebral infarction  [ ] Transient ischemia attack  [ ] Encephalopathy - Toxic or Metabolic      A/P: 89y YO Female with hx of CKD 5, DM, HFpEF (EF 70% 2021, grade 1 diastolic dysfunction), HTN, HLD, CAD, hypothyroidism, PAD presents to ED with rest pain. 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 toe    Vascular/PAD:  -Continue home ASA and Plavix   -s/p RLE angiogram and SFA angioplasty and stent 8/25/22  -Right 3rd toe gangrene - podiatry consulted and following  -ASa and plavix    HTN/HLD:  -Continue home coreg 25, Nifedipine 60  -will continue to monitor for HTN   -continue home statin       CAD/CHF:   -last echo 7/21 with EF 65-70% diastolic dysfunction noted      DM:  -not on any home meds   -A1c 5.7    CKD:   -Cr stable 3.5    Anemia:   Known anemia of chronic disease  Hgb stable    Hypothyroidism:   -c/w synthroid    Diet:   Consistant carb/renal    Activity:   as tolerated    DVTPPx:   HSQ    Dispo:   -Plan for OR with podiatry for toe amputation on Tues/Wed     O/N: Start NPO at 9AM per podiatry for OR Tuesday evening/night. MANDY      Subjective:  admits to pain in toes of Right foot. Denies right calf pain. denies CP/SOB     ROS:   Denies Headache, blurred vision, Chest Pain, SOB, Abdominal pain, nausea or vomiting     Social   ampicillin/sulbactam  IVPB 3  ampicillin/sulbactam  IVPB 3  aspirin  chewable 81  carvedilol 25  clopidogrel Tablet 75  heparin   Injectable 5000  NIFEdipine XL 60      Allergies    No Known Allergies    Intolerances        Vital Signs Last 24 Hrs  T(C): 36.6 (30 Aug 2022 06:11), Max: 36.7 (29 Aug 2022 08:52)  T(F): 97.9 (30 Aug 2022 06:11), Max: 98.1 (29 Aug 2022 08:52)  HR: 65 (30 Aug 2022 05:33) (60 - 70)  BP: 156/68 (30 Aug 2022 05:33) (135/61 - 161/70)  BP(mean): 101 (30 Aug 2022 00:09) (88 - 101)  RR: 16 (30 Aug 2022 05:33) (16 - 18)  SpO2: 100% (30 Aug 2022 05:33) (98% - 100%)    Parameters below as of 30 Aug 2022 05:33  Patient On (Oxygen Delivery Method): room air      I&O's Summary    28 Aug 2022 07:01  -  29 Aug 2022 07:00  --------------------------------------------------------  IN: 120 mL / OUT: 150 mL / NET: -30 mL    29 Aug 2022 07:01  -  30 Aug 2022 06:53  --------------------------------------------------------  IN: 570 mL / OUT: 250 mL / NET: 320 mL        Physical Exam:  General: NAD AAOx3   Pulmonary: b/l breath sounds   Cardiovascular: s1s2 REg  Abdominal: soft NT/ND  Extremities: Right foot with 3/4th toe gangrene noted. erythema at base of toe i, tenderness at base of toe, slight odor and serous discharge noted   Biphasic DP/PT signals in right foot   right calf soft and nontender         LABS:                        8.2    4.56  )-----------( 131      ( 29 Aug 2022 06:36 )             24.6     08-29    135  |  99  |  63<H>  ----------------------------<  100<H>  3.8   |  23  |  3.52<H>    Ca    10.4      29 Aug 2022 06:36  Phos  4.0     08-29  Mg     2.0     08-29    TPro  7.5  /  Alb  3.6  /  TBili  0.3  /  DBili  x   /  AST  14  /  ALT  9<L>  /  AlkPhos  76  08-28    PT/INR - ( 28 Aug 2022 15:54 )   PT: 16.1 sec;   INR: 1.35          PTT - ( 28 Aug 2022 15:54 )  PTT:26.6 sec    Radiology and Additional Studies:          ---------------------------------------------------------------------------  PLEASE CHECK WHEN PRESENT:     [ x ]Heart Failure     [  ] Acute     [  ] Acute on Chronic     [ x ] Chronic  -------------------------------------------------------------------     [  xDiastolic [HFpEF]     [x ]Systolic [HFrEF]     [  ]Combined [HFpEF & HFrEF]  .................................................................................     [  ]Other:     [ ] Pulmonary Hypertension     [ ] Afib     [x ] Hypertensive Heart Disease  -------------------------------------------------------------------  [ ] Respiratory failure  [ ] Acute cor pulmonale  [ ] Asthma/COPD Exacerbation  [ ] Pleural effusion  [ ] Aspiration pneumonia  [ ] Obstructive Sleep Apnea  -------------------------------------------------------------------  [  ]KYMBERLY     [  ]ATN     [  ]Reneal Medullary Necrosis     [  ]Renal Cortical Necrosis     [  ]Other Pathological Lesions:    [  ]CKD 1  [  ]CKD 2  [  ]CKD 3  [  ]CKD 4  [ x ]CKD 5  [  ]Other  -------------------------------------------------------------------  [  x]Diabetes  [  ] Diabetic PVD Ulcer  [  ] Neuropathic ulcer to DM  [  x] Diabetes with Nephropathy  [  ] Osteomyelitis due to diabetes  [  ] Hyperglycemia   [  ]hypoglycemia   --------------------------------------------------------------------  [  ]Malnutrition: See Nutrition Note  [  ]Cachexia  [  ]Other:   [  ]Supplement Ordered:  [  ]Morbid Obesity (BMI >=40]  ---------------------------------------------------------------------  [ ] Sepsis/severe sepsis/septic shock  [ ] Noninfectious SIRS  [ ] UTI  [ ] Pneumonia  -----------------------------------------------------------------------  [ ] Acidosis/alkalosis  [ ] Fluid overload  [ ] Hypokalemia  [ ] Hyperkalemia  [ ] Hypomagnesemia  [ ] Hypophosphatemia  [ ] Hyperphosphatemia  [x ] hyponatremia   ------------------------------------------------------------------------  [ ] Acute blood loss anemia  [ ] Post op blood loss anemia  [ ] Iron deficiency anemia  [x ] Anemia due to chronic disease  [ ] Hypercoagulable state  [ ] Thrombocytopenia  ----------------------------------------------------------------------  [ ] Cerebral infarction  [ ] Transient ischemia attack  [ ] Encephalopathy - Toxic or Metabolic      A/P: 89y YO Female with hx of CKD 5, DM, HFpEF (EF 70% 2021, grade 1 diastolic dysfunction), HTN, HLD, CAD, hypothyroidism, PAD presents to ED with rest pain. 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    Vascular/PAD: Right  toe gangrene   -Continue home ASA and Plavix   -s/p RLE angiogram and SFA angioplasty and stent 8/25/22  -Right 3rd toe gangrene - podiatry consulted and following, plan for OR later this week once well demarcated   -Continue Unasyn   -ASA and plavix    HTN/HLD:  -Continue home coreg 25, Nifedipine 60  -will continue to monitor for HTN   -continue home statin       CAD/CHF:   -last echo 7/21 with EF 65-70% diastolic dysfunction noted      DM:  -not on any home meds   -A1c 5.7    CKD:   -Cr stable 3.5  -Holding home Torsemide     Anemia:   Known anemia of chronic disease  Hgb stable    Hypothyroidism:   -c/w synthroid    Diet:   Consistant carb/renal    Activity:   as tolerated    DVTPPx:   HSQ    Dispo:   -Plan for OR with podiatry for toe amputation pending demarcation      O/N: Start NPO at 9AM per podiatry for OR Tuesday evening/night. MANDY      Subjective:  admits to pain in toes of Right foot. Denies right calf pain. denies CP/SOB     ROS:   Denies Headache, blurred vision, Chest Pain, SOB, Abdominal pain, nausea or vomiting     Social   ampicillin/sulbactam  IVPB 3  ampicillin/sulbactam  IVPB 3  aspirin  chewable 81  carvedilol 25  clopidogrel Tablet 75  heparin   Injectable 5000  NIFEdipine XL 60      Allergies    No Known Allergies    Intolerances        Vital Signs Last 24 Hrs  T(C): 36.6 (30 Aug 2022 06:11), Max: 36.7 (29 Aug 2022 08:52)  T(F): 97.9 (30 Aug 2022 06:11), Max: 98.1 (29 Aug 2022 08:52)  HR: 65 (30 Aug 2022 05:33) (60 - 70)  BP: 156/68 (30 Aug 2022 05:33) (135/61 - 161/70)  BP(mean): 101 (30 Aug 2022 00:09) (88 - 101)  RR: 16 (30 Aug 2022 05:33) (16 - 18)  SpO2: 100% (30 Aug 2022 05:33) (98% - 100%)    Parameters below as of 30 Aug 2022 05:33  Patient On (Oxygen Delivery Method): room air      I&O's Summary    28 Aug 2022 07:01  -  29 Aug 2022 07:00  --------------------------------------------------------  IN: 120 mL / OUT: 150 mL / NET: -30 mL    29 Aug 2022 07:01  -  30 Aug 2022 06:53  --------------------------------------------------------  IN: 570 mL / OUT: 250 mL / NET: 320 mL        Physical Exam:  General: NAD AAOx3   Pulmonary: b/l breath sounds   Cardiovascular: s1s2 REg  Abdominal: soft NT/ND  Extremities: Right foot with 3/4th toe gangrene noted. erythema at base of toe i, tenderness at base of toe, slight odor and serous discharge noted   Biphasic DP/PT signals in right foot   right calf soft and nontender         LABS:                        8.2    4.56  )-----------( 131      ( 29 Aug 2022 06:36 )             24.6     08-29    135  |  99  |  63<H>  ----------------------------<  100<H>  3.8   |  23  |  3.52<H>    Ca    10.4      29 Aug 2022 06:36  Phos  4.0     08-29  Mg     2.0     08-29    TPro  7.5  /  Alb  3.6  /  TBili  0.3  /  DBili  x   /  AST  14  /  ALT  9<L>  /  AlkPhos  76  08-28    PT/INR - ( 28 Aug 2022 15:54 )   PT: 16.1 sec;   INR: 1.35          PTT - ( 28 Aug 2022 15:54 )  PTT:26.6 sec    Radiology and Additional Studies:          ---------------------------------------------------------------------------  PLEASE CHECK WHEN PRESENT:     [ x ]Heart Failure     [  ] Acute     [  ] Acute on Chronic     [ x ] Chronic  -------------------------------------------------------------------     [  xDiastolic [HFpEF]     [x ]Systolic [HFrEF]     [  ]Combined [HFpEF & HFrEF]  .................................................................................     [  ]Other:     [ ] Pulmonary Hypertension     [ ] Afib     [x ] Hypertensive Heart Disease  -------------------------------------------------------------------  [ ] Respiratory failure  [ ] Acute cor pulmonale  [ ] Asthma/COPD Exacerbation  [ ] Pleural effusion  [ ] Aspiration pneumonia  [ ] Obstructive Sleep Apnea  -------------------------------------------------------------------  [  ]KYMBERLY     [  ]ATN     [  ]Reneal Medullary Necrosis     [  ]Renal Cortical Necrosis     [  ]Other Pathological Lesions:    [  ]CKD 1  [  ]CKD 2  [  ]CKD 3  [  ]CKD 4  [ x ]CKD 5  [  ]Other  -------------------------------------------------------------------  [  x]Diabetes  [  ] Diabetic PVD Ulcer  [  ] Neuropathic ulcer to DM  [  x] Diabetes with Nephropathy  [  ] Osteomyelitis due to diabetes  [  ] Hyperglycemia   [  ]hypoglycemia   --------------------------------------------------------------------  [  ]Malnutrition: See Nutrition Note  [  ]Cachexia  [  ]Other:   [  ]Supplement Ordered:  [  ]Morbid Obesity (BMI >=40]  ---------------------------------------------------------------------  [ ] Sepsis/severe sepsis/septic shock  [ ] Noninfectious SIRS  [ ] UTI  [ ] Pneumonia  -----------------------------------------------------------------------  [ ] Acidosis/alkalosis  [ ] Fluid overload  [ ] Hypokalemia  [ ] Hyperkalemia  [ ] Hypomagnesemia  [ ] Hypophosphatemia  [ ] Hyperphosphatemia  [x ] hyponatremia   ------------------------------------------------------------------------  [ ] Acute blood loss anemia  [ ] Post op blood loss anemia  [ ] Iron deficiency anemia  [x ] Anemia due to chronic disease  [ ] Hypercoagulable state  [ ] Thrombocytopenia  ----------------------------------------------------------------------  [ ] Cerebral infarction  [ ] Transient ischemia attack  [ ] Encephalopathy - Toxic or Metabolic      A/P: 89y YO Female with hx of CKD 5, DM, HFpEF (EF 70% 2021, grade 1 diastolic dysfunction), HTN, HLD, CAD, hypothyroidism, PAD presents to ED with rest pain. 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    Vascular/PAD: Right  toe gangrene   -Continue home ASA and Plavix   -s/p RLE angiogram and SFA angioplasty and stent 8/25/22  -Right 3rd toe gangrene - podiatry consulted and following, plan for OR later this week once well demarcated   -Continue Unasyn   -ASA and plavix    HTN/HLD:  -Continue home coreg 25, Nifedipine 60  -will continue to monitor for HTN   -continue home statin       CAD/CHF:   -last echo 7/21 with EF 65-70% diastolic dysfunction noted      DM:  -not on any home meds   -A1c 5.7    CKD:   -Cr stable 3.5  -Holding home Torsemide     Anemia:   Known anemia of chronic disease  Hgb stable    Hypothyroidism:   -c/w synthroid    Diet:   Consistant carb/renal    Activity:   as tolerated    DVTPPx:   HSQ    Dispo:   -Plan for OR with podiatry for toe amputation pending demarcation     I (Alethea Ruby PA-C ) have personally seen and examined the patient. I have reviewed all pertinent imaging and laboratory findings.

## 2022-08-30 NOTE — PRE-ANESTHESIA EVALUATION ADULT - NSANTHOBSERVEDRD_ENT_A_CORE
Phone call to patient. Patient notified. Advised due to time of day it is better for him to go to [facility] for evaluation  Anais Dark he has to talk to his daughter before doing anything because she will not let him out of the house  Advised he needs to be evaluated and the sooner better as does not want to turn into something worst.  Patient verbalized understanding       Fwd to RN - follow up regarding patient being seen.  Was advised to go to [facility] No

## 2022-08-30 NOTE — PROGRESS NOTE ADULT - ATTENDING COMMENTS
PT seen bedside, labs vitals imaging reviewed  discussion with vascular team regarding floor management  plan for Right 3rd and 4th toe amputation and washout    rest of care per primary team

## 2022-08-30 NOTE — PROGRESS NOTE ADULT - SUBJECTIVE AND OBJECTIVE BOX
INTERVAL EVENTS:  -- NAEO    SUBJECTIVE:  -- Denies fevers, chills, CP, SOB, palpitations, orthopnea and PND  -- Tolerating PO intake, +flatus, voiding.  -- Review of Systems: 12 point review of systems otherwise negative    MEDICATIONS:  MEDICATIONS  (STANDING):  ampicillin/sulbactam  IVPB 3 Gram(s) IV Intermittent every 24 hours  aspirin  chewable 81 milliGRAM(s) Oral daily  atorvastatin 80 milliGRAM(s) Oral at bedtime  calcitriol   Capsule 0.5 MICROGram(s) Oral daily  carvedilol 25 milliGRAM(s) Oral every 12 hours  clopidogrel Tablet 75 milliGRAM(s) Oral daily  folic acid 1 milliGRAM(s) Oral daily  heparin   Injectable 5000 Unit(s) SubCutaneous every 12 hours  levothyroxine 100 MICROGram(s) Oral daily  NIFEdipine XL 60 milliGRAM(s) Oral daily  sevelamer carbonate 800 milliGRAM(s) Oral every 8 hours    MEDICATIONS  (PRN):  acetaminophen     Tablet .. 650 milliGRAM(s) Oral every 6 hours PRN Mild Pain (1 - 3)  cyclobenzaprine 10 milliGRAM(s) Oral three times a day PRN Muscle Spasm    Allergies  No Known Allergies    OBJECTIVE:  Vital Signs Last 24 Hrs  T(C): 36.4 (30 Aug 2022 13:53), Max: 36.6 (30 Aug 2022 06:11)  T(F): 97.5 (30 Aug 2022 13:53), Max: 97.9 (30 Aug 2022 06:11)  HR: 60 (30 Aug 2022 08:34) (60 - 70)  BP: 125/61 (30 Aug 2022 08:34) (125/61 - 161/70)  BP(mean): 101 (30 Aug 2022 00:09) (99 - 101)  RR: 16 (30 Aug 2022 08:34) (16 - 18)  SpO2: 99% (30 Aug 2022 08:34) (98% - 100%)    Parameters below as of 30 Aug 2022 08:34  Patient On (Oxygen Delivery Method): room air      I&O's Summary    29 Aug 2022 07:01  -  30 Aug 2022 07:00  --------------------------------------------------------  IN: 570 mL / OUT: 250 mL / NET: 320 mL    30 Aug 2022 07:01  -  30 Aug 2022 14:34  --------------------------------------------------------  IN: 0 mL / OUT: 0 mL / NET: 0 mL    PHYSICAL EXAM:  Gen: NAD, sitting upright in bed  HEENT: NCAT, MMM, clear OP  CV: RRR, no m/g/r appreciated  Pulm: CTA B, no w/r/r; no increase in WOB  Abd: normoactive BS, soft, NTND  Ext: RLE +dressing   Neuro: AOx3, nonfocal  Psych: pleasant, conversant and appropriate    LABS:                        8.2    3.95  )-----------( 132      ( 30 Aug 2022 07:57 )             25.3     08-30    136  |  98  |  61<H>  ----------------------------<  117<H>  3.8   |  23  |  3.58<H>    Ca    10.7<H>      30 Aug 2022 07:57  Phos  3.8     08-30  Mg     2.1     08-30    TPro  7.5  /  Alb  3.6  /  TBili  0.3  /  DBili  x   /  AST  14  /  ALT  9<L>  /  AlkPhos  76  08-28    PT/INR - ( 28 Aug 2022 15:54 )   PT: 16.1 sec;   INR: 1.35     PTT - ( 28 Aug 2022 15:54 )  PTT:26.6 sec    MICRODATA:  Culture - Blood (collected 28 Aug 2022 14:15)  Source: .Blood Blood-Peripheral  Preliminary Report (29 Aug 2022 16:00):    No growth at 1 day.    Culture - Blood (collected 28 Aug 2022 14:15)  Source: .Blood Blood-Peripheral  Preliminary Report (29 Aug 2022 16:00):    No growth at 1 day.    RADIOLOGY/OTHER STUDIES:  -- No new imaging.

## 2022-08-30 NOTE — BRIEF OPERATIVE NOTE - OPERATION/FINDINGS
Right foot prepped and draped in usual aseptic manner. Attention directed to R 3rd and 4th digits. [place] incision made circumfe Right foot prepped and draped in usual aseptic manner. Attention directed to R 3rd and 4th digits. [place] incision made circumferentially at the level of the MPJ. Disarticulated digits passed off field for pathology. Soft tissue seperated to reveal MT heads and MT heads of 3 and 4 were resected w/ sagittal saw. Surgical site irrigated w/ pulselavage. Packed site w/ iodoform, dressed w/ DSD, ABD, Kerlix and ACE.

## 2022-08-30 NOTE — PROGRESS NOTE ADULT - SUBJECTIVE AND OBJECTIVE BOX
Patient is a 89y Female seen and evaluated at bedside. No acute distress and does not offer any complaints. Kidney function stable, Plan for Right foot 3rd and possible 4th digit amputation this afternoon.       Meds:    acetaminophen     Tablet .. 650 every 6 hours PRN  ampicillin/sulbactam  IVPB 3 every 24 hours  aspirin  chewable 81 daily  atorvastatin 80 at bedtime  calcitriol   Capsule 0.5 daily  carvedilol 25 every 12 hours  clopidogrel Tablet 75 daily  cyclobenzaprine 10 three times a day PRN  folic acid 1 daily  heparin   Injectable 5000 every 12 hours  levothyroxine 100 daily  NIFEdipine XL 60 daily  sevelamer carbonate 800 every 8 hours      T(C): , Max: 36.6 (08-30-22 @ 06:11)  T(F): , Max: 97.9 (08-30-22 @ 06:11)  HR: 60 (08-30-22 @ 08:34)  BP: 125/61 (08-30-22 @ 08:34)  BP(mean): 101 (08-30-22 @ 00:09)  RR: 16 (08-30-22 @ 08:34)  SpO2: 99% (08-30-22 @ 08:34)  Wt(kg): --    08-29 @ 07:01  -  08-30 @ 07:00  --------------------------------------------------------  IN: 570 mL / OUT: 250 mL / NET: 320 mL    08-30 @ 07:01  -  08-30 @ 14:35  --------------------------------------------------------  IN: 0 mL / OUT: 0 mL / NET: 0 mL          Review of Systems:  ROS negative except as per HPI      PHYSICAL EXAM:  GENERAL: NAD resting comfortably in bed  CHEST/LUNG: CTA no accessory muscle use  HEART: Regular rate and rhythm, no murmur, no gallops, no rub   ABDOMEN: Soft, nontender, non distended  EXTREMITIES: no pedal edema      LABS:                        8.2    3.95  )-----------( 132      ( 30 Aug 2022 07:57 )             25.3     08-30    136  |  98  |  61<H>  ----------------------------<  117<H>  3.8   |  23  |  3.58<H>    Ca    10.7<H>      30 Aug 2022 07:57  Phos  3.8     08-30  Mg     2.1     08-30    TPro  7.5  /  Alb  3.6  /  TBili  0.3  /  DBili  x   /  AST  14  /  ALT  9<L>  /  AlkPhos  76  08-28      PT/INR - ( 28 Aug 2022 15:54 )   PT: 16.1 sec;   INR: 1.35          PTT - ( 28 Aug 2022 15:54 )  PTT:26.6 sec          RADIOLOGY & ADDITIONAL STUDIES:

## 2022-08-30 NOTE — PROGRESS NOTE ADULT - SUBJECTIVE AND OBJECTIVE BOX
POST OP NOTE    PO HANSON  [id number]    Procedure: R 3rd and 4th digit amp w/ Metatarsal 3 & 4 head resection  Surgeon: Dr. Bin Brumfield DPM  Assistants: Hany Abreu DPM    Patient tolerated procedure well without incident.  Patient transferred to PACU in stable condition. Cap refill wnl. Dressings C/D/I    PE / Post Op Check:       GEN: NAD, AAOx3, resting comfortably with pain controlled      LE Focused: CFT shows adequate perfusion b/l with no signs of ischemic compromise.  No strikethrough is appreciated on surgical dressings.  Dressings were dry, clean, and intact.  No neuromuscular deficits appreciated.

## 2022-08-30 NOTE — PROGRESS NOTE ADULT - SUBJECTIVE AND OBJECTIVE BOX
PRE-OP NOTE    Pre-Op Diagnosis: Right foot 3rd toe and medial aspect of 4th toe ischemia  Planned Procedure: Right foot 3rd and possible 4th digit amputation   Scheduled Date / Time: Tues 8/30 after 4pm   Indication: Digital ischemia    Labs:                       8.2    3.95  )-----------( 132      ( 30 Aug 2022 07:57 )             25.3   08-30    136  |  98  |  61<H>  ----------------------------<  117<H>  3.8   |  23  |  3.58<H>    Ca    10.7<H>      30 Aug 2022 07:57  Phos  3.8     08-30  Mg     2.1     08-30    TPro  7.5  /  Alb  3.6  /  TBili  0.3  /  DBili  x   /  AST  14  /  ALT  9<L>  /  AlkPhos  76  08-28  LIVER FUNCTIONS - ( 28 Aug 2022 15:54 )  Alb: 3.6 g/dL / Pro: 7.5 g/dL / ALK PHOS: 76 U/L / ALT: 9 U/L / AST: 14 U/L / GGT: x           Vitals: Vital Signs Last 24 Hrs  T(C): 36.6 (30 Aug 2022 06:11), Max: 36.6 (30 Aug 2022 06:11)  T(F): 97.9 (30 Aug 2022 06:11), Max: 97.9 (30 Aug 2022 06:11)  HR: 65 (30 Aug 2022 05:33) (60 - 70)  BP: 156/68 (30 Aug 2022 05:33) (135/61 - 161/70)  BP(mean): 101 (30 Aug 2022 00:09) (88 - 101)  RR: 16 (30 Aug 2022 05:33) (16 - 18)  SpO2: 100% (30 Aug 2022 05:33) (98% - 100%)    Parameters below as of 30 Aug 2022 05:33  Patient On (Oxygen Delivery Method): room air

## 2022-08-30 NOTE — PACU DISCHARGE NOTE - COMMENTS
report given to chelsea mejia. patient awake, a/ox4. dsg right foot intact dry no bleeding noted. elevated on apillow. xray done right foot. denies pain. d/c to floor on a bed.

## 2022-08-30 NOTE — PRE-ANESTHESIA EVALUATION ADULT - NSANTHOSAYNRD_GEN_A_CORE
No. MILLIE screening performed.  STOP BANG Legend: 0-2 = LOW Risk; 3-4 = INTERMEDIATE Risk; 5-8 = HIGH Risk

## 2022-08-31 DIAGNOSIS — Z95.820 PERIPHERAL VASCULAR ANGIOPLASTY STATUS WITH IMPLANTS AND GRAFTS: ICD-10-CM

## 2022-08-31 DIAGNOSIS — D62 ACUTE POSTHEMORRHAGIC ANEMIA: ICD-10-CM
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
## 2022-08-31 DIAGNOSIS — N18.5 CHRONIC KIDNEY DISEASE, STAGE 5: ICD-10-CM

## 2022-08-31 DIAGNOSIS — I25.10 ATHEROSCLEROTIC HEART DISEASE OF NATIVE CORONARY ARTERY WITHOUT ANGINA PECTORIS: ICD-10-CM

## 2022-08-31 DIAGNOSIS — R33.9 RETENTION OF URINE, UNSPECIFIED: ICD-10-CM

## 2022-08-31 DIAGNOSIS — E11.40 TYPE 2 DIABETES MELLITUS WITH DIABETIC NEUROPATHY, UNSPECIFIED: ICD-10-CM

## 2022-08-31 DIAGNOSIS — Z79.82 LONG TERM (CURRENT) USE OF ASPIRIN: ICD-10-CM

## 2022-08-31 DIAGNOSIS — I50.32 CHRONIC DIASTOLIC (CONGESTIVE) HEART FAILURE: ICD-10-CM

## 2022-08-31 DIAGNOSIS — G47.00 INSOMNIA, UNSPECIFIED: ICD-10-CM
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
## 2022-08-31 DIAGNOSIS — E11.22 TYPE 2 DIABETES MELLITUS WITH DIABETIC CHRONIC KIDNEY DISEASE: ICD-10-CM

## 2022-08-31 DIAGNOSIS — E87.1 HYPO-OSMOLALITY AND HYPONATREMIA: ICD-10-CM

## 2022-08-31 DIAGNOSIS — Z95.0 PRESENCE OF CARDIAC PACEMAKER: ICD-10-CM

## 2022-08-31 DIAGNOSIS — E03.9 HYPOTHYROIDISM, UNSPECIFIED: ICD-10-CM

## 2022-08-31 DIAGNOSIS — E11.51 TYPE 2 DIABETES MELLITUS WITH DIABETIC PERIPHERAL ANGIOPATHY WITHOUT GANGRENE: ICD-10-CM

## 2022-08-31 DIAGNOSIS — D63.1 ANEMIA IN CHRONIC KIDNEY DISEASE: ICD-10-CM

## 2022-08-31 DIAGNOSIS — I13.2 HYPERTENSIVE HEART AND CHRONIC KIDNEY DISEASE WITH HEART FAILURE AND WITH STAGE 5 CHRONIC KIDNEY DISEASE, OR END STAGE RENAL DISEASE: ICD-10-CM

## 2022-08-31 DIAGNOSIS — I73.9 PERIPHERAL VASCULAR DISEASE, UNSPECIFIED: ICD-10-CM

## 2022-08-31 DIAGNOSIS — Z79.02 LONG TERM (CURRENT) USE OF ANTITHROMBOTICS/ANTIPLATELETS: ICD-10-CM

## 2022-08-31 DIAGNOSIS — I70.221 ATHEROSCLEROSIS OF NATIVE ARTERIES OF EXTREMITIES WITH REST PAIN, RIGHT LEG: ICD-10-CM

## 2022-08-31 DIAGNOSIS — E78.5 HYPERLIPIDEMIA, UNSPECIFIED: ICD-10-CM

## 2022-08-31 DIAGNOSIS — T82.856A STENOSIS OF PERIPHERAL VASCULAR STENT, INITIAL ENCOUNTER: ICD-10-CM

## 2022-08-31 LAB
ANION GAP SERPL CALC-SCNC: 14 MMOL/L — SIGNIFICANT CHANGE UP (ref 5–17)
BUN SERPL-MCNC: 61 MG/DL — HIGH (ref 7–23)
CALCIUM SERPL-MCNC: 10.7 MG/DL — HIGH (ref 8.4–10.5)
CHLORIDE SERPL-SCNC: 102 MMOL/L — SIGNIFICANT CHANGE UP (ref 96–108)
CO2 SERPL-SCNC: 23 MMOL/L — SIGNIFICANT CHANGE UP (ref 22–31)
CREAT SERPL-MCNC: 3.54 MG/DL — HIGH (ref 0.5–1.3)
EGFR: 12 ML/MIN/1.73M2 — LOW
GLUCOSE SERPL-MCNC: 94 MG/DL — SIGNIFICANT CHANGE UP (ref 70–99)
HCT VFR BLD CALC: 24.8 % — LOW (ref 34.5–45)
HGB BLD-MCNC: 8.1 G/DL — LOW (ref 11.5–15.5)
MAGNESIUM SERPL-MCNC: 2.1 MG/DL — SIGNIFICANT CHANGE UP (ref 1.6–2.6)
MCHC RBC-ENTMCNC: 31 PG — SIGNIFICANT CHANGE UP (ref 27–34)
MCHC RBC-ENTMCNC: 32.7 GM/DL — SIGNIFICANT CHANGE UP (ref 32–36)
MCV RBC AUTO: 95 FL — SIGNIFICANT CHANGE UP (ref 80–100)
NRBC # BLD: 0 /100 WBCS — SIGNIFICANT CHANGE UP (ref 0–0)
PHOSPHATE SERPL-MCNC: 4.1 MG/DL — SIGNIFICANT CHANGE UP (ref 2.5–4.5)
PLATELET # BLD AUTO: 132 K/UL — LOW (ref 150–400)
POTASSIUM SERPL-MCNC: 4 MMOL/L — SIGNIFICANT CHANGE UP (ref 3.5–5.3)
POTASSIUM SERPL-SCNC: 4 MMOL/L — SIGNIFICANT CHANGE UP (ref 3.5–5.3)
RBC # BLD: 2.61 M/UL — LOW (ref 3.8–5.2)
RBC # FLD: 19.7 % — HIGH (ref 10.3–14.5)
SODIUM SERPL-SCNC: 139 MMOL/L — SIGNIFICANT CHANGE UP (ref 135–145)
WBC # BLD: 5.14 K/UL — SIGNIFICANT CHANGE UP (ref 3.8–10.5)
WBC # FLD AUTO: 5.14 K/UL — SIGNIFICANT CHANGE UP (ref 3.8–10.5)

## 2022-08-31 PROCEDURE — 99232 SBSQ HOSP IP/OBS MODERATE 35: CPT

## 2022-08-31 PROCEDURE — 99233 SBSQ HOSP IP/OBS HIGH 50: CPT

## 2022-08-31 RX ORDER — ACETAMINOPHEN 500 MG
650 TABLET ORAL EVERY 6 HOURS
Refills: 0 | Status: DISCONTINUED | OUTPATIENT
Start: 2022-08-31 | End: 2022-09-01

## 2022-08-31 RX ORDER — OXYCODONE HYDROCHLORIDE 5 MG/1
5 TABLET ORAL ONCE
Refills: 0 | Status: DISCONTINUED | OUTPATIENT
Start: 2022-08-31 | End: 2022-08-31

## 2022-08-31 RX ORDER — HYDROMORPHONE HYDROCHLORIDE 2 MG/ML
0.5 INJECTION INTRAMUSCULAR; INTRAVENOUS; SUBCUTANEOUS ONCE
Refills: 0 | Status: DISCONTINUED | OUTPATIENT
Start: 2022-08-31 | End: 2022-08-31

## 2022-08-31 RX ADMIN — OXYCODONE HYDROCHLORIDE 5 MILLIGRAM(S): 5 TABLET ORAL at 20:36

## 2022-08-31 RX ADMIN — Medication 100 MICROGRAM(S): at 06:25

## 2022-08-31 RX ADMIN — ATORVASTATIN CALCIUM 80 MILLIGRAM(S): 80 TABLET, FILM COATED ORAL at 22:03

## 2022-08-31 RX ADMIN — CALCITRIOL 0.5 MICROGRAM(S): 0.5 CAPSULE ORAL at 12:31

## 2022-08-31 RX ADMIN — AMPICILLIN SODIUM AND SULBACTAM SODIUM 200 GRAM(S): 250; 125 INJECTION, POWDER, FOR SUSPENSION INTRAMUSCULAR; INTRAVENOUS at 18:03

## 2022-08-31 RX ADMIN — Medication 1 MILLIGRAM(S): at 12:31

## 2022-08-31 RX ADMIN — CARVEDILOL PHOSPHATE 25 MILLIGRAM(S): 80 CAPSULE, EXTENDED RELEASE ORAL at 18:03

## 2022-08-31 RX ADMIN — OXYCODONE HYDROCHLORIDE 10 MILLIGRAM(S): 5 TABLET ORAL at 09:43

## 2022-08-31 RX ADMIN — CARVEDILOL PHOSPHATE 25 MILLIGRAM(S): 80 CAPSULE, EXTENDED RELEASE ORAL at 06:24

## 2022-08-31 RX ADMIN — HEPARIN SODIUM 5000 UNIT(S): 5000 INJECTION INTRAVENOUS; SUBCUTANEOUS at 06:25

## 2022-08-31 RX ADMIN — HEPARIN SODIUM 5000 UNIT(S): 5000 INJECTION INTRAVENOUS; SUBCUTANEOUS at 18:03

## 2022-08-31 RX ADMIN — SEVELAMER CARBONATE 800 MILLIGRAM(S): 2400 POWDER, FOR SUSPENSION ORAL at 22:11

## 2022-08-31 RX ADMIN — SEVELAMER CARBONATE 800 MILLIGRAM(S): 2400 POWDER, FOR SUSPENSION ORAL at 12:32

## 2022-08-31 RX ADMIN — CLOPIDOGREL BISULFATE 75 MILLIGRAM(S): 75 TABLET, FILM COATED ORAL at 12:31

## 2022-08-31 RX ADMIN — Medication 81 MILLIGRAM(S): at 12:31

## 2022-08-31 RX ADMIN — OXYCODONE HYDROCHLORIDE 10 MILLIGRAM(S): 5 TABLET ORAL at 08:45

## 2022-08-31 RX ADMIN — SEVELAMER CARBONATE 800 MILLIGRAM(S): 2400 POWDER, FOR SUSPENSION ORAL at 06:24

## 2022-08-31 RX ADMIN — OXYCODONE HYDROCHLORIDE 5 MILLIGRAM(S): 5 TABLET ORAL at 21:35

## 2022-08-31 RX ADMIN — Medication 60 MILLIGRAM(S): at 06:24

## 2022-08-31 RX ADMIN — OXYCODONE HYDROCHLORIDE 10 MILLIGRAM(S): 5 TABLET ORAL at 02:37

## 2022-08-31 NOTE — PHYSICAL THERAPY INITIAL EVALUATION ADULT - PERTINENT HX OF CURRENT PROBLEM, REHAB EVAL
89 year old female recently admitted on 8/25 with right lower extremity rest pain s/p Rt LE angiogram with angioplasty and re-stenting. She was discharged on 8/26 but now returns with worsening 3rd toe discoloration, pain and erythema.

## 2022-08-31 NOTE — PHYSICAL THERAPY INITIAL EVALUATION ADULT - GENERAL OBSERVATIONS, REHAB EVAL
patient was received seated at edge of bed with no acute distress. +EKG +Heplock +right foot dressing clean/dry/intact +right foot wound vac

## 2022-08-31 NOTE — PROGRESS NOTE ADULT - SUBJECTIVE AND OBJECTIVE BOX
INTERVAL EVENTS:  -- NAEO    SUBJECTIVE:  -- has pain at the site of her amputations; however, willing to work with PT  -- Denies fevers, chills, CP, SOB, palpitations, orthopnea and PND  -- Tolerating PO intake, +flatus/BM, voiding.  -- Review of Systems: 12 point review of systems otherwise negative    MEDICATIONS:  MEDICATIONS  (STANDING):  ampicillin/sulbactam  IVPB 3 Gram(s) IV Intermittent every 24 hours  aspirin  chewable 81 milliGRAM(s) Oral daily  atorvastatin 80 milliGRAM(s) Oral at bedtime  calcitriol   Capsule 0.5 MICROGram(s) Oral daily  carvedilol 25 milliGRAM(s) Oral every 12 hours  clopidogrel Tablet 75 milliGRAM(s) Oral daily  folic acid 1 milliGRAM(s) Oral daily  heparin   Injectable 5000 Unit(s) SubCutaneous every 12 hours  levothyroxine 100 MICROGram(s) Oral daily  NIFEdipine XL 60 milliGRAM(s) Oral daily  sevelamer carbonate 800 milliGRAM(s) Oral every 8 hours    MEDICATIONS  (PRN):  acetaminophen     Tablet .. 650 milliGRAM(s) Oral every 6 hours PRN Moderate Pain (4 - 6)  cyclobenzaprine 10 milliGRAM(s) Oral three times a day PRN Muscle Spasm  oxyCODONE    IR 5 milliGRAM(s) Oral once PRN Moderate Pain (4 - 6)    Allergies  No Known Allergies    OBJECTIVE:  Vital Signs Last 24 Hrs  T(C): 36.7 (31 Aug 2022 08:38), Max: 36.7 (31 Aug 2022 06:07)  T(F): 98 (31 Aug 2022 08:38), Max: 98.1 (31 Aug 2022 06:07)  HR: 65 (31 Aug 2022 08:44) (60 - 70)  BP: 139/66 (31 Aug 2022 08:44) (126/59 - 166/69)  BP(mean): 93 (30 Aug 2022 18:57) (93 - 124)  RR: 16 (31 Aug 2022 08:44) (13 - 22)  SpO2: 100% (31 Aug 2022 08:44) (94% - 100%)    Parameters below as of 31 Aug 2022 08:44  Patient On (Oxygen Delivery Method): room air    I&O's Summary    30 Aug 2022 07:01  -  31 Aug 2022 07:00  --------------------------------------------------------  IN: 180 mL / OUT: 150 mL / NET: 30 mL    PHYSICAL EXAM:  Gen: NAD, sitting upright on the edge of bed eating breakfast  HEENT: NCAT, MMM, clear OP  CV: RRR, no m/g/r appreciated  Pulm: CTA B, no w/r/r; no increase in WOB  Abd: normoactive BS, soft, NTND  Ext: RLE +dressing   Neuro: AOx3, nonfocal  Psych: pleasant, conversant and appropriate    LABS:                        8.1    5.14  )-----------( 132      ( 31 Aug 2022 06:01 )             24.8     08-31    139  |  102  |  61<H>  ----------------------------<  94  4.0   |  23  |  3.54<H>    Ca    10.7<H>      31 Aug 2022 06:01  Phos  4.1     08-31  Mg     2.1     08-31    CAPILLARY BLOOD GLUCOSE  POCT Blood Glucose.: 87 mg/dL (30 Aug 2022 18:37)    MICRODATA:  Culture - Blood (collected 28 Aug 2022 14:15)  Source: .Blood Blood-Peripheral  Preliminary Report (30 Aug 2022 16:00):    No growth at 2 days.    Culture - Blood (collected 28 Aug 2022 14:15)  Source: .Blood Blood-Peripheral  Preliminary Report (30 Aug 2022 16:00):    No growth at 2 days.    RADIOLOGY/OTHER STUDIES:  -- No new imaging.

## 2022-08-31 NOTE — PROGRESS NOTE ADULT - SUBJECTIVE AND OBJECTIVE BOX
O/N: MANDY VSS                      ---------------------------------------------------------------------------  PLEASE CHECK WHEN PRESENT:     [ x ]Heart Failure     [  ] Acute     [  ] Acute on Chronic     [ x ] Chronic  -------------------------------------------------------------------     [  xDiastolic [HFpEF]     [x ]Systolic [HFrEF]     [  ]Combined [HFpEF & HFrEF]  .................................................................................     [  ]Other:     [ ] Pulmonary Hypertension     [ ] Afib     [x ] Hypertensive Heart Disease  -------------------------------------------------------------------  [ ] Respiratory failure  [ ] Acute cor pulmonale  [ ] Asthma/COPD Exacerbation  [ ] Pleural effusion  [ ] Aspiration pneumonia  [ ] Obstructive Sleep Apnea  -------------------------------------------------------------------  [  ]KYMBERLY     [  ]ATN     [  ]Reneal Medullary Necrosis     [  ]Renal Cortical Necrosis     [  ]Other Pathological Lesions:    [  ]CKD 1  [  ]CKD 2  [  ]CKD 3  [  ]CKD 4  [ x ]CKD 5  [  ]Other  -------------------------------------------------------------------  [  x]Diabetes  [  ] Diabetic PVD Ulcer  [  ] Neuropathic ulcer to DM  [  x] Diabetes with Nephropathy  [  ] Osteomyelitis due to diabetes  [  ] Hyperglycemia   [  ]hypoglycemia   --------------------------------------------------------------------  [  ]Malnutrition: See Nutrition Note  [  ]Cachexia  [  ]Other:   [  ]Supplement Ordered:  [  ]Morbid Obesity (BMI >=40]  ---------------------------------------------------------------------  [ ] Sepsis/severe sepsis/septic shock  [ ] Noninfectious SIRS  [ ] UTI  [ ] Pneumonia  -----------------------------------------------------------------------  [ ] Acidosis/alkalosis  [ ] Fluid overload  [ ] Hypokalemia  [ ] Hyperkalemia  [ ] Hypomagnesemia  [ ] Hypophosphatemia  [ ] Hyperphosphatemia  [x ] hyponatremia   ------------------------------------------------------------------------  [ ] Acute blood loss anemia  [ ] Post op blood loss anemia  [ ] Iron deficiency anemia  [x ] Anemia due to chronic disease  [ ] Hypercoagulable state  [ ] Thrombocytopenia  ----------------------------------------------------------------------  [ ] Cerebral infarction  [ ] Transient ischemia attack  [ ] Encephalopathy - Toxic or Metabolic      A/P: 89y YO Female with hx of CKD 5, DM, HFpEF (EF 70% 2021, grade 1 diastolic dysfunction), HTN, HLD, CAD, hypothyroidism, PAD presents to ED with rest pain. Patient was recently admitted to [facility] for right leg rest pain admitted with worsening right leg pain, now s/p RLE angiogram and SFA angioplasty and stent 8/25/22 now with gangrene of right 3rd toe    Vascular/PAD: Right  toe gangrene   -Continue home ASA and Plavix   -s/p RLE angiogram and SFA angioplasty and stent 8/25/22  -Right 3rd toe gangrene - podiatry consulted and following, plan for OR later this week once well demarcated   -Continue Unasyn   -ASA and plavix    HTN/HLD:  -Continue home coreg 25, Nifedipine 60  -will continue to monitor for HTN   -continue home statin       CAD/CHF:   -last echo 7/21 with EF 65-70% diastolic dysfunction noted      DM:  -not on any home meds   -A1c 5.7    CKD:   -Cr stable 3.5  -Holding home Torsemide     Anemia:   Known anemia of chronic disease  Hgb stable    Hypothyroidism:   -c/w synthroid    Diet:   Consistant carb/renal    Activity:   as tolerated    DVTPPx:   HSQ    Dispo:   -Plan for OR with podiatry for toe amputation pending demarcation    O/N: MANDY, VSS    Subjective:     ROS:   Denies Headache, blurred vision, Chest Pain, SOB, Abdominal pain, nausea or vomiting     Social   ampicillin/sulbactam  IVPB 3  ampicillin/sulbactam  IVPB 3  aspirin  chewable 81  carvedilol 25  clopidogrel Tablet 75  heparin   Injectable 5000  NIFEdipine XL 60      Allergies    No Known Allergies    Intolerances        Vital Signs Last 24 Hrs  T(C): 36.7 (31 Aug 2022 06:07), Max: 36.7 (31 Aug 2022 06:07)  T(F): 98.1 (31 Aug 2022 06:07), Max: 98.1 (31 Aug 2022 06:07)  HR: 70 (31 Aug 2022 06:39) (60 - 70)  BP: 160/81 (31 Aug 2022 06:39) (125/61 - 166/69)  BP(mean): 93 (30 Aug 2022 18:57) (93 - 124)  RR: 18 (31 Aug 2022 06:39) (13 - 22)  SpO2: 94% (31 Aug 2022 06:39) (94% - 100%)    Parameters below as of 31 Aug 2022 06:39  Patient On (Oxygen Delivery Method): room air      I&O's Summary    30 Aug 2022 07:01  -  31 Aug 2022 07:00  --------------------------------------------------------  IN: 180 mL / OUT: 150 mL / NET: 30 mL        Physical Exam:  General: NAD AAOx3   Pulmonary: nonlabored breathing   Cardiovascular: s1s2 REg  Abdominal: soft NT/ND  Extremities: Right foot drsg- c/d/i to be changed by podiatry  Biphasic DP/PT signals in right foot     LABS:                        8.1    5.14  )-----------( 132      ( 31 Aug 2022 06:01 )             24.8     08-31    139  |  102  |  x   ----------------------------<  94  4.0   |  23  |  x     Ca    10.7<H>      30 Aug 2022 07:57  Phos  4.1     08-31  Mg     2.1     08-31    ---------------------------------------------------------------------------  PLEASE CHECK WHEN PRESENT:     [ x ]Heart Failure     [  ] Acute     [  ] Acute on Chronic     [ x ] Chronic  -------------------------------------------------------------------     [  xDiastolic [HFpEF]     [x ]Systolic [HFrEF]     [  ]Combined [HFpEF & HFrEF]  .................................................................................     [  ]Other:     [ ] Pulmonary Hypertension     [ ] Afib     [x ] Hypertensive Heart Disease  -------------------------------------------------------------------  [ ] Respiratory failure  [ ] Acute cor pulmonale  [ ] Asthma/COPD Exacerbation  [ ] Pleural effusion  [ ] Aspiration pneumonia  [ ] Obstructive Sleep Apnea  -------------------------------------------------------------------  [  ]KYMBERLY     [  ]ATN     [  ]Reneal Medullary Necrosis     [  ]Renal Cortical Necrosis     [  ]Other Pathological Lesions:    [  ]CKD 1  [  ]CKD 2  [  ]CKD 3  [  ]CKD 4  [ x ]CKD 5  [  ]Other  -------------------------------------------------------------------  [  x]Diabetes  [  ] Diabetic PVD Ulcer  [  ] Neuropathic ulcer to DM  [  x] Diabetes with Nephropathy  [  ] Osteomyelitis due to diabetes  [  ] Hyperglycemia   [  ]hypoglycemia   --------------------------------------------------------------------  [  ]Malnutrition: See Nutrition Note  [  ]Cachexia  [  ]Other:   [  ]Supplement Ordered:  [  ]Morbid Obesity (BMI >=40]  ---------------------------------------------------------------------  [ ] Sepsis/severe sepsis/septic shock  [ ] Noninfectious SIRS  [ ] UTI  [ ] Pneumonia  -----------------------------------------------------------------------  [ ] Acidosis/alkalosis  [ ] Fluid overload  [ ] Hypokalemia  [ ] Hyperkalemia  [ ] Hypomagnesemia  [ ] Hypophosphatemia  [ ] Hyperphosphatemia  [x ] hyponatremia   ------------------------------------------------------------------------  [ ] Acute blood loss anemia  [ ] Post op blood loss anemia  [ ] Iron deficiency anemia  [x ] Anemia due to chronic disease  [ ] Hypercoagulable state  [ ] Thrombocytopenia  ----------------------------------------------------------------------  [ ] Cerebral infarction  [ ] Transient ischemia attack  [ ] Encephalopathy - Toxic or Metabolic      A/P: 89y YO Female with hx of CKD 5, DM, HFpEF (EF 70% 2021, grade 1 diastolic dysfunction), HTN, HLD, CAD, hypothyroidism, PAD presents to ED with rest pain. Patient was recently admitted to [facility] for right leg rest pain admitted with worsening right leg pain, now s/p RLE angiogram and SFA angioplasty and stent 8/25/22 now with gangrene of right 3rd toe    Vascular/PAD: Right  toe gangrene   -Continue home ASA and Plavix   -s/p RLE angiogram and SFA angioplasty and stent 8/25/22  -Right 3rd toe gangrene - s/p R 3 and 4 toe amp 8/30 done by podiatry  -Continue Unasyn   -ASA and plavix    HTN/HLD:  -Continue home coreg 25, Nifedipine 60  -will continue to monitor for HTN   -continue home statin       CAD/CHF:   -last echo 7/21 with EF 65-70% diastolic dysfunction noted      DM:  -not on any home meds   -A1c 5.7    CKD:   -Cr stable 3.5  -Holding home Torsemide     Anemia:   Known anemia of chronic disease  Hgb stable    Hypothyroidism:   -c/w synthroid    Diet:   Consistant carb/renal    Activity:   as tolerated    DVTPPx:   HSQ    Dispo:   -awaiting PT recs

## 2022-08-31 NOTE — PHYSICAL THERAPY INITIAL EVALUATION ADULT - GAIT DEVIATIONS NOTED, PT EVAL
slightly unsteady, no loss of balance, no c/o pain, able to maintain weightbearing precautions, c/o nausea

## 2022-08-31 NOTE — PHYSICAL THERAPY INITIAL EVALUATION ADULT - IMPAIRMENTS FOUND, PT EVAL
aerobic capacity/endurance/circulation/gait, locomotion, and balance/integumentary integrity/joint integrity and mobility/posture

## 2022-08-31 NOTE — PHYSICAL THERAPY INITIAL EVALUATION ADULT - ADDITIONAL COMMENTS
patient ambulates with a rollator, has a wheelchair, commode and shower chair. She required assistance for all functional mobility and ADLs. Her home health aid would leave her on the bed for the night where she would stay until they came in the morning. Per  at times her son would stay with her.

## 2022-08-31 NOTE — PROGRESS NOTE ADULT - ATTENDING COMMENTS
patient status reviewed and discussed with pt.   pt reasonably comfortable and appears awake, verbal, euvolemic, with clean dressing over surgical site.   agree with findings and recommendations.

## 2022-08-31 NOTE — PROGRESS NOTE ADULT - SUBJECTIVE AND OBJECTIVE BOX
Patient is a 89y Female seen and evaluated at bedside. No acute distress and does not offer any complaints. Patient seen resting in bed comfortably. S/P POD #1 R-3rd and 4th amputations w/Podiatry on 8/30.       Meds:    acetaminophen     Tablet .. 650 every 6 hours PRN  ampicillin/sulbactam  IVPB 3 every 24 hours  aspirin  chewable 81 daily  atorvastatin 80 at bedtime  calcitriol   Capsule 0.5 daily  carvedilol 25 every 12 hours  clopidogrel Tablet 75 daily  cyclobenzaprine 10 three times a day PRN  folic acid 1 daily  heparin   Injectable 5000 every 12 hours  levothyroxine 100 daily  NIFEdipine XL 60 daily  oxyCODONE    IR 5 once PRN  sevelamer carbonate 800 every 8 hours      T(C): , Max: 36.7 (08-31-22 @ 06:07)  T(F): , Max: 98.1 (08-31-22 @ 06:07)  HR: 65 (08-31-22 @ 08:44)  BP: 139/66 (08-31-22 @ 08:44)  BP(mean): 93 (08-30-22 @ 18:57)  RR: 16 (08-31-22 @ 08:44)  SpO2: 100% (08-31-22 @ 08:44)  Wt(kg): --    08-30 @ 07:01  -  08-31 @ 07:00  --------------------------------------------------------  IN: 180 mL / OUT: 150 mL / NET: 30 mL      Height (cm): 154.9 (08-30 @ 15:59)  Weight (kg): 48 (08-30 @ 15:59)  BMI (kg/m2): 20 (08-30 @ 15:59)  BSA (m2): 1.44 (08-30 @ 15:59)    Review of Systems:  ROS negative except as per HPI      PHYSICAL EXAM:  Gen: NAD, sitting upright on the edge of bed eating breakfast  HEENT: NCAT, MMM, clear OP  CV: RRR, no m/g/r appreciated  Pulm: CTA B, no w/r/r; no increase in WOB  Abd: normoactive BS, soft, NTND  Ext: RLE +dressing   Neuro: AOx3, nonfocal    LABS:                        8.1    5.14  )-----------( 132      ( 31 Aug 2022 06:01 )             24.8     08-31    139  |  102  |  61<H>  ----------------------------<  94  4.0   |  23  |  3.54<H>    Ca    10.7<H>      31 Aug 2022 06:01  Phos  4.1     08-31  Mg     2.1     08-31                  RADIOLOGY & ADDITIONAL STUDIES:

## 2022-09-01 ENCOUNTER — TRANSCRIPTION ENCOUNTER (OUTPATIENT)
Age: 87
End: 2022-09-01

## 2022-09-01 VITALS — TEMPERATURE: 98 F

## 2022-09-01 PROCEDURE — 99231 SBSQ HOSP IP/OBS SF/LOW 25: CPT

## 2022-09-01 PROCEDURE — 99232 SBSQ HOSP IP/OBS MODERATE 35: CPT

## 2022-09-01 RX ORDER — NIFEDIPINE 30 MG
1 TABLET, EXTENDED RELEASE 24 HR ORAL
Qty: 30 | Refills: 3
Start: 2022-09-01 | End: 2022-12-29

## 2022-09-01 RX ORDER — CARVEDILOL PHOSPHATE 80 MG/1
1 CAPSULE, EXTENDED RELEASE ORAL
Qty: 60 | Refills: 0
Start: 2022-09-01 | End: 2022-09-30

## 2022-09-01 RX ORDER — OXYCODONE HYDROCHLORIDE 5 MG/1
1 TABLET ORAL
Qty: 10 | Refills: 0
Start: 2022-09-01 | End: 2022-09-10

## 2022-09-01 RX ORDER — CARVEDILOL PHOSPHATE 80 MG/1
1 CAPSULE, EXTENDED RELEASE ORAL
Qty: 60 | Refills: 3
Start: 2022-09-01 | End: 2022-12-29

## 2022-09-01 RX ADMIN — CARVEDILOL PHOSPHATE 25 MILLIGRAM(S): 80 CAPSULE, EXTENDED RELEASE ORAL at 05:53

## 2022-09-01 RX ADMIN — Medication 100 MICROGRAM(S): at 05:53

## 2022-09-01 RX ADMIN — CALCITRIOL 0.5 MICROGRAM(S): 0.5 CAPSULE ORAL at 12:23

## 2022-09-01 RX ADMIN — Medication 60 MILLIGRAM(S): at 05:53

## 2022-09-01 RX ADMIN — Medication 1 MILLIGRAM(S): at 12:23

## 2022-09-01 RX ADMIN — HEPARIN SODIUM 5000 UNIT(S): 5000 INJECTION INTRAVENOUS; SUBCUTANEOUS at 05:53

## 2022-09-01 RX ADMIN — SEVELAMER CARBONATE 800 MILLIGRAM(S): 2400 POWDER, FOR SUSPENSION ORAL at 08:33

## 2022-09-01 RX ADMIN — Medication 81 MILLIGRAM(S): at 12:23

## 2022-09-01 RX ADMIN — SEVELAMER CARBONATE 800 MILLIGRAM(S): 2400 POWDER, FOR SUSPENSION ORAL at 12:24

## 2022-09-01 RX ADMIN — CLOPIDOGREL BISULFATE 75 MILLIGRAM(S): 75 TABLET, FILM COATED ORAL at 12:23

## 2022-09-01 NOTE — PROGRESS NOTE ADULT - ASSESSMENT
89 Yo F w/ Hx of CKD 5, (baseline around 3-3.5) HTN HLD recent SFA stent and angioplasty on 8/25 presented to the ED with complaints of worsening right third toe discoloration, pain, erythema pending OR for amputation- nephrology consulted for CKD management    Assessment/Plan:     #CKD 5  baseline 3-3.5      Recommend:   would hold diuretic on day of OR  c/w anti-hypertensives    daily BMP, mag, phos  renal diet   c/w renvela  no NSAIDS for pain control  renally dose abx for GFR<20    Lily Jones  PGY4 Nephrology Fellow   [phone]
89 y/o F Northern Irish speaking w/  Krystyna (#[id number]), PMHx of CAD ,CHF, HLD, HTN, DM, Hypothyroidism, anemia, CKD stage 5, and PAD, recent PSHx of R leg angioplasty 2 days ago, now BIBEMS for black R 3rd toe with drainage. VSS, wbc WNL, erythema and malodor noted to right 3rd digit most likely 2/2 acute infection with underlying necrotic changes. No gas noted on X ray. Will monitor 4th digit for demarcation before deciding on amputation level this week.     Plan:   - Cont IV abx   - Wound dressed with betadine DSD   - Heel WBAT to RLE  - Will monitor for demarcation of 4th digit, plan for amputation later this week     Podiatry following. Plan D/W attending. 
89 Yo F w/ Hx of CKD 5, (baseline around 3-3.5) HTN HLD recent SFA stent and angioplasty on 8/25 presented to the ED with complaints of worsening right third toe discoloration, pain, erythema pending OR for amputation- nephrology consulted for CKD management    Assessment/Plan:     #CKD 5  baseline 3-3.5  Cr. stable s/p procedure         Plan:  c/w anti-hypertensives  daily BMP, mag, phos  renal diet   c/w renvela  no NSAIDS for pain control  renally dose abx for GFR<20    Lily Jones  PGY4 Nephrology Fellow   [phone]  
This is a 89yo woman, mostly Kosovan-speaking, with a PMH of stage V CKD (Dr. Lerma), chronic HFpEF, HTN, HLD, CAD, PPM (indication unknown), hypothyroidism and PAD who re-presents with worsening R-3rd toe discoloration after a discharge on 8/26 for an admission for critical leg ischemia of her RLE that resulted in a R-SFA angioplasty with stent on 8/25.  Now s/p R-3rd and 4th amputations w/Podiatry on 8/30.    #R-3rd toe gangrene/ischemia   -- will be discharged on Augmentin x 10d  -- Podiatry following for wound care; will be discharged w/wound vac and return later for closure  -- RCRI 2 and Mora score 3.4%; pt is intermediate risk for intermediate risk procedure     #RLE critical leg ischemia  #PAD  -- c/w ASA and Plavix     #Stage V CKD  -- c/w home calcitriol   -- Renal following, appreciate assistance    #Essential HTN   #Chronic HFpEF  -- continue to hold Torsemide upon discharge  -- c/w home Coreg   -- can increase home Nifedipine to 90mg daily upon discharge as holding Torsemide     #HLD   -- c/w statin     #Hypothyroidism   -- c/w Levothyroxine    #Insomnia   -- would monitor Ambien use given age and risk for hospital-related delirium     #Chronic anemia  #Acute blood loss anemia  -- s/p 1U of pRBC on her prior admission as part of her routine outpt transfusion schedule   -- resume care with her [facility] team     #Diet - Carb-controlled/Renal   #DVT PPx - SQH  #Dispo - home today    Catherine Steward  Attending Hospitalist  [phone]
89 y/o F Barbadian speaking w/  Krystyna (#[id number]), PMHx of CAD ,CHF, HLD, HTN, DM, Hypothyroidism, anemia, CKD stage 5, and PAD, recent PSHx of R leg angioplasty 2 days ago, now BIBEMS for black R 3rd toe with drainage. VSS, wbc WNL, erythema and malodor noted to right 3rd digit most likely 2/2 acute infection with underlying necrotic changes. No gas noted on X ray.    Plan:   - Cont IV abx   - Wound dressed with betadine DSD   - Heel WBAT to RLE  - Plan for amputation of Right 3rd and possibly Right 4th digit amputation today (Tues 8/30) after 4pm)   - Continue NPO   - Consent obtained in chart; discussed surgical proceedings w/ son (Campbell) as well     Podiatry following. Plan D/W attending.   
89 y/o F Montenegrin speaking w/  Krystyna (#[id number]), PMHx of CAD ,CHF, HLD, HTN, DM, Hypothyroidism, anemia, CKD stage 5, and PAD, recent PSHx of R leg angioplasty 2 days ago, now BIBEMS for black R 3rd toe with drainage. VSS, wbc WNL, erythema and malodor noted to right 3rd digit most likely 2/2 acute infection with underlying necrotic changes. No gas noted on X ray. Pt is s/p R 3rd and 4th digit amp w/ amp of 3rd and 4th MT heads (8/30), home wound vac in place.     Plan:   - Cont IV abx; no OR Cx taken, no purulent drainage noted in OR.   - Local wound care: Iodoform packing, DSD, ABD, Kerlix, ACE  - Home wound vac supplies at bedside; home machine in place to right foot  - Recc Augmentin 875mg PO for 10 days for SSTI  - recc Heel WBAT to RLE   - Rest of care per primary team    Podiatry following. Plan D/W attending. 
This is a 89yo woman, mostly Honduran-speaking, with a PMH of stage V CKD (Dr. Lerma), chronic HFpEF, HTN, HLD, CAD, PPM (indication unknown), hypothyroidism and PAD who re-presents with worsening R-3rd toe discoloration after a discharge on 8/26 for an admission for critical leg ischemia of her RLE that resulted in a R-SFA angioplasty with stent on 8/25.    #R-3rd toe gangrene/ischemia   -- on Unasyn   -- Podiatry following  -- plan for amputation today   -- RCRI 2 and Mora score 3.4%; pt is intermediate risk for intermediate risk procedure     #RLE critical leg ischemia  #PAD  -- c/w ASA and Plavix     #Stage V CKD  -- c/w home calcitriol   -- Renal following, appreciate assistance    #Essential HTN   #Chronic HFpEF  -- continue to hold Torsemide   -- c/w home Coreg and Nifedipine    #HLD   -- c/w statin     #Hypothyroidism   -- c/w Levothyroxine    #Insomnia   -- would monitor Ambien use given age and risk for hospital-related delirium     #Chronic anemia  #Acute blood loss anemia  -- s/p 1U of pRBC on her prior admission as part of her routine outpt transfusion schedule   -- resume care with her [facility] team     #Diet - Carb-controlled/Renal   #DVT PPx - SQH  #Dispo - TBD    Catherine Steward  Attending Hospitalist  [phone]
This is a 89yo woman, mostly Central African-speaking, with a PMH of stage V CKD (Dr. Lerma), chronic HFpEF, HTN, HLD, CAD, PPM (indication unknown), hypothyroidism and PAD who re-presents with worsening R-3rd toe discoloration after a discharge on 8/26 for an admission for critical leg ischemia of her RLE that resulted in a R-SFA angioplasty with stent on 8/25.  Now s/p R-3rd and 4th amputations w/Podiatry on 8/30.    #R-3rd toe gangrene/ischemia   -- on Unasyn   -- Podiatry following for wound care; considering wound vac  -- RCRI 2 and Mora score 3.4%; pt is intermediate risk for intermediate risk procedure     #RLE critical leg ischemia  #PAD  -- c/w ASA and Plavix     #Stage V CKD  -- c/w home calcitriol   -- Renal following, appreciate assistance    #Essential HTN   #Chronic HFpEF  -- continue to hold Torsemide but clarify with Renal if okay to restart or hold indefinitely   -- c/w home Coreg and Nifedipine    #HLD   -- c/w statin     #Hypothyroidism   -- c/w Levothyroxine    #Insomnia   -- would monitor Ambien use given age and risk for hospital-related delirium     #Chronic anemia  #Acute blood loss anemia  -- s/p 1U of pRBC on her prior admission as part of her routine outpt transfusion schedule   -- resume care with her [facility] team     #Diet - Carb-controlled/Renal   #DVT PPx - SQH  #Dispo - TBD; PT eval pending today     Catherine Steward  Attending Hospitalist  [phone]
89 y/o F Austrian speaking w/  Krystyna (#[id number]), PMHx of CAD ,CHF, HLD, HTN, DM, Hypothyroidism, anemia, CKD stage 5, and PAD, recent PSHx of R leg angioplasty 2 days ago, now BIBEMS for black R 3rd toe with drainage. VSS, wbc WNL, erythema and malodor noted to right 3rd digit most likely 2/2 acute infection with underlying necrotic changes. No gas noted on X ray. Pt is s/p R 3rd and 4th digit amp w/ amp of 3rd and 4th MT heads (8/30), amputation site packed open.     Plan:   - Cont IV abx; no OR Cx taken, no purulent drainage noted in OR.   - Local wound care: Iodoform packing, DSD, ABD, Kerlix, ACE  - plan for wound vac to aide in granulation w/ plan for closure at later date; ordered for bedside   - recc Heel WBAT to RLE  - f/u post op XR  - can resume diet   - Rest of care per primary team    Podiatry following. Plan D/W attending.

## 2022-09-01 NOTE — OCCUPATIONAL THERAPY INITIAL EVALUATION ADULT - ADDITIONAL COMMENTS
Pt lives alone in an apartment with elevator access, pt has a HHA 16 hours a day/ 7 days a week that assists with all ADLs with exception of self-feeding. Pt has a w/c, commode, and shower chair.

## 2022-09-01 NOTE — DISCHARGE NOTE PROVIDER - NSDCCPCAREPLAN_GEN_ALL_CORE_FT
PRINCIPAL DISCHARGE DIAGNOSIS  Diagnosis: Gangrene of toe  Assessment and Plan of Treatment:       SECONDARY DISCHARGE DIAGNOSES  Diagnosis: Critical lower limb ischemia  Assessment and Plan of Treatment:     Diagnosis: Stage 5 chronic kidney disease not on chronic dialysis  Assessment and Plan of Treatment:     Diagnosis: Chronic diastolic congestive heart failure  Assessment and Plan of Treatment:     Diagnosis: HTN (hypertension)  Assessment and Plan of Treatment:     Diagnosis: HLD (hyperlipidemia)  Assessment and Plan of Treatment:     Diagnosis: CAD (coronary artery disease)  Assessment and Plan of Treatment:     Diagnosis: PAD (peripheral artery disease)  Assessment and Plan of Treatment:     Diagnosis: Hypothyroid  Assessment and Plan of Treatment:     Diagnosis: Anemia of chronic disease  Assessment and Plan of Treatment:     Diagnosis: Type 2 diabetes mellitus with peripheral neuropathy  Assessment and Plan of Treatment:     Diagnosis: Acute blood loss anemia  Assessment and Plan of Treatment:     Diagnosis: Insomnia  Assessment and Plan of Treatment:     Diagnosis: Thrombocytopenia  Assessment and Plan of Treatment:     Diagnosis: Hyponatremia  Assessment and Plan of Treatment:

## 2022-09-01 NOTE — OCCUPATIONAL THERAPY INITIAL EVALUATION ADULT - MODIFIED CLINICAL TEST OF SENSORY INTEGRATION IN BALANCE TEST
Pt able to ambulate ~15'x2 with RW and CGA, pt maintaining heel weight bearing with RLE, no LOB noted

## 2022-09-01 NOTE — DISCHARGE NOTE PROVIDER - NSDCMRMEDTOKEN_GEN_ALL_CORE_FT
acetaminophen 325 mg oral tablet: 2 tab(s) orally every 6 hours, As needed, Mild Pain (1 - 3)  Aspirin Low Dose 81 mg oral tablet, chewable: 1 tab(s) orally once a day  calcitriol 0.5 mcg oral capsule: 1 cap(s) orally once a day  clopidogrel 75 mg oral tablet: 1 tab(s) orally once a day  Coreg 25 mg oral tablet: 1 tab(s) orally every 12 hours  cyclobenzaprine 10 mg oral tablet: 1 tab(s) orally 3 times a day, As Needed  folic acid 1 mg oral tablet: 1 tab(s) orally once a day  levothyroxine 100 mcg (0.1 mg) oral tablet: 1 tab(s) orally once a day  Lipitor 80 mg oral tablet: 1 tab(s) orally once a day (at bedtime)  NIFEdipine 60 mg oral tablet, extended release: 1 tab(s) orally once a day  sevelamer carbonate 800 mg oral tablet: 1 tab(s) orally every 8 hours  torsemide 20 mg oral tablet: 2 tab(s) orally once a day in the morning   torsemide 20 mg oral tablet: 1 tab(s) orally once a day (at bedtime)  zolpidem 5 mg oral tablet: 1 tab(s) orally once a day (at bedtime)   acetaminophen 325 mg oral tablet: 2 tab(s) orally every 6 hours, As needed, Mild Pain (1 - 3)  amoxicillin-clavulanate 500 mg-125 mg oral tablet: 1 tab(s) orally once a day   Aspirin Low Dose 81 mg oral tablet, chewable: 1 tab(s) orally once a day  calcitriol 0.5 mcg oral capsule: 1 cap(s) orally once a day  clopidogrel 75 mg oral tablet: 1 tab(s) orally once a day  Coreg 25 mg oral tablet: 1 tab(s) orally every 12 hours  cyclobenzaprine 10 mg oral tablet: 1 tab(s) orally 3 times a day, As Needed  folic acid 1 mg oral tablet: 1 tab(s) orally once a day  levothyroxine 100 mcg (0.1 mg) oral tablet: 1 tab(s) orally once a day  Lipitor 80 mg oral tablet: 1 tab(s) orally once a day (at bedtime)  NIFEdipine 90 mg oral tablet, extended release: 1 tab(s) orally once a day  oxyCODONE 5 mg oral capsule: 1 cap(s) orally once a day, As Needed for severe pain MDD:1 tab  sevelamer carbonate 800 mg oral tablet: 1 tab(s) orally every 8 hours  zolpidem 5 mg oral tablet: 1 tab(s) orally once a day (at bedtime)   acetaminophen 325 mg oral tablet: 2 tab(s) orally every 6 hours, As needed, Mild Pain (1 - 3)  amoxicillin-clavulanate 500 mg-125 mg oral tablet: 1 tab(s) orally once a day   Aspirin Low Dose 81 mg oral tablet, chewable: 1 tab(s) orally once a day  calcitriol 0.5 mcg oral capsule: 1 cap(s) orally once a day  clopidogrel 75 mg oral tablet: 1 tab(s) orally once a day  Coreg 25 mg oral tablet: 1 tab(s) orally every 12 hours  cyclobenzaprine 10 mg oral tablet: 1 tab(s) orally 3 times a day, As Needed  folic acid 1 mg oral tablet: 1 tab(s) orally once a day  levothyroxine 100 mcg (0.1 mg) oral tablet: 1 tab(s) orally once a day  Lipitor 80 mg oral tablet: 1 tab(s) orally once a day (at bedtime)  NIFEdipine 90 mg oral tablet, extended release: 1 tab(s) orally once a day  oxyCODONE 5 mg oral tablet: 1 tab(s) orally once a day -for severe pain MDD:1 tablet  sevelamer carbonate 800 mg oral tablet: 1 tab(s) orally every 8 hours  zolpidem 5 mg oral tablet: 1 tab(s) orally once a day (at bedtime)

## 2022-09-01 NOTE — DISCHARGE NOTE PROVIDER - NSDCFUADDINST_GEN_ALL_CORE_FT
FOLLOW UP: on 9/6/22 at 1pm.  Call the office at  with any questions    WOUND CARE: You may shower as long as you are able to do this without getting VAC dressing wet. VAC dressing should be changed 3 times per week   Continuous suction  125mmhg pressure   If wound VAC malfunctions then please remove sponge and apply saline soaked gauze to the amputation site. wrap right foot with kerlix and secure with tape. dressing should be changed daily until VAC is replaced.    ACTIVITY:Ambulate as tolerated, but no heavy lifting (>10lbs) or strenuous exercise.  Heel weight bearing   DIET:   You may resume diabetic regular diet.     Call the office if you experience increasing pain, redness, swelling or drainage from incision sites/wounds, or temperature >101.4F.     NEW MEDICATIONS:  Please stop your home Torsemide   Please increase Nifedipine to 90mg daily starting 9/2/22  Augmentin 500mg once daily for 10 days   ADDITIONAL FOLLOW UP AFTER DISCHARGE:  Please follow up with your primary care doctor in one week   Please follow up with your kidney doctor in 1-2 weeks to check creatinine and discuss restarting your Torsemide   DISCHARGE DESTINATION: Home with Home care  FOLLOW UP: on 9/6/22 at 1pm.  Call the office at  with any questions    WOUND CARE: You may shower as long as you are able to do this without getting VAC dressing wet. VAC dressing should be changed 3 times per week   Continuous suction  125mmhg pressure   If wound VAC malfunctions then please remove sponge and apply saline soaked gauze to the amputation site. wrap right foot with kerlix and secure with tape. dressing should be changed daily until VAC is replaced.    ACTIVITY:Ambulate as tolerated, but no heavy lifting (>10lbs) or strenuous exercise.  Heel weight bearing   DIET:   You may resume diabetic regular diet.     Call the office if you experience increasing pain, redness, swelling or drainage from incision sites/wounds, or temperature >101.4F.     NEW MEDICATIONS:  Please stop your home Torsemide   Please increase Nifedipine to 90mg daily starting 9/2/22  Augmentin 500mg once daily for 10 days   ADDITIONAL FOLLOW UP AFTER DISCHARGE:  Please follow up with your primary care doctor in one week   Please follow up with your kidney doctor in 1-2 weeks to check creatinine and discuss restarting your Torsemide   Please call Dr. Yusuf's office to arrange a one week follow up appointment for wound check.   DISCHARGE DESTINATION: Home with Home care  FOLLOW UP: on 9/6/22 at 1pm.  Call the office at  with any questions    WOUND CARE: You may shower as long as you are able to do this without getting VAC dressing wet. VAC dressing should be changed 3 times per week   Continuous suction  125mmhg pressure   If wound VAC malfunctions then please remove sponge and apply saline soaked gauze to the amputation site. wrap right foot with kerlix and secure with tape. dressing should be changed daily until VAC is replaced.    ACTIVITY:Ambulate as tolerated, but no heavy lifting (>10lbs) or strenuous exercise.  Heel weight bearing   DIET:   You may resume diabetic regular diet.     Call the office if you experience increasing pain, redness, swelling or drainage from incision sites/wounds, or temperature >101.4F.     NEW MEDICATIONS:  Please stop your home Torsemide   Please increase Nifedipine to 90mg daily starting 9/2/22  Augmentin 500mg once daily for 10 days   ADDITIONAL FOLLOW UP AFTER DISCHARGE:  Please follow up with your primary care doctor in one week   Please follow up with your kidney doctor in 1-2 weeks to check creatinine and discuss restarting your Torsemide   Please call Dr. Bin Keyes's office to arrange a one week follow up appointment for wound check.   DISCHARGE DESTINATION: Home with Home care

## 2022-09-01 NOTE — PROGRESS NOTE ADULT - REASON FOR ADMISSION
R 3rd toe infection

## 2022-09-01 NOTE — OCCUPATIONAL THERAPY INITIAL EVALUATION ADULT - GENERAL OBSERVATIONS, REHAB EVAL
Pt received seated in chair, +tele, +L foot ACE wrap, +LLE wound vac, in NAD and agreeable to OT. Cleared by FATOU Casarez to see. Pt received seated in chair, +tele, +R foot ACE wrap, +RLE wound vac, in NAD and agreeable to OT. Cleared by FATOU Casarez to see.

## 2022-09-01 NOTE — OCCUPATIONAL THERAPY INITIAL EVALUATION ADULT - RANGE OF MOTION EXAMINATION, UPPER EXTREMITY
with exception of b/l shoulder flexion AROM ~0-90 deg limited by arthritis/bilateral UE Active ROM was WNL (within normal limits)

## 2022-09-01 NOTE — DISCHARGE NOTE PROVIDER - HOSPITAL COURSE
89 Yo F w/ Hx of CKD 5, DM, HFpEF (EF 70% 2021, grade 1 diastolic dysfunction), HTN, HLD, CAD, hypothyroidism, PAD who was recently admitted on 8/25 with right lower extremity rest pain  she underwent  right lower extremity angiogram with SFA pop stent in stent stenosis angioplasty with drug coated balloon and re-stenting of the proximal and distal part of old sent,  showing one vessel runoff through peroneal  at the end of procedure.  She now presents to the ED with worsening R 3rd toe discoloration, pain, and erythema.            89 Yo F w/ Hx of CKD 5, DM, HFpEF (EF 70% 2021, grade 1 diastolic dysfunction), HTN, HLD, CAD, hypothyroidism, PAD who was recently admitted on 8/25 with right lower extremity rest pain  she underwent  right lower extremity angiogram with SFA pop stent in stent stenosis angioplasty with drug coated balloon and re-stenting of the proximal and distal part of old sent,  showing one vessel runoff through peroneal  at the end of procedure.  She now presents to the ED with worsening R 3rd toe discoloration, pain, and erythema.  Patient was admitted and stated on IV antibiotics. Podiatry was consulted for management of right toe gangrene. on 8/29 she was taken to OR for right 3/4th toe amputation. The amputation site appeared clean and healthy. on 8/31 a Wound VAC was placed to the amputation site. Patient was seen by PT and recommended for home. Home VAC was arranged and home nursing was arranged for 3 times weekly VAC changes. She will be discharged on a 10 days course of oral abx.     Her home Torsemide will be held due to her creatinine. Her Nifedipine will be increased to 90mg starting 9/2.     Patient voices readiness for discharge and will follow up with podiatry for wound checks and planning of wound closure

## 2022-09-01 NOTE — OCCUPATIONAL THERAPY INITIAL EVALUATION ADULT - DIAGNOSIS, OT EVAL
Pt s/p R toe amputation of 3rd and 4th through MT head (8/30) presents with impaired balance, decreased strength, and decreased activity tolerance impacting overall ease of completing ADLs and functional mobility tasks at prior level of function.

## 2022-09-01 NOTE — DISCHARGE NOTE PROVIDER - NSDCCPTREATMENT_GEN_ALL_CORE_FT
PRINCIPAL PROCEDURE  Procedure: Amputation, toe, through metatarsal head  Findings and Treatment: amp of 3rd and 4th amp through MT head

## 2022-09-01 NOTE — PROGRESS NOTE ADULT - SUBJECTIVE AND OBJECTIVE BOX
Patient is a 89y old  Female who presents with a chief complaint of R 3rd toe infection (01 Sep 2022 09:06)      INTERVAL HPI/ OVERNIGHT EVENTS: MANDY O/N. Pt sitting in chair comfortably.       LABS                        8.1    5.14  )-----------( 132      ( 31 Aug 2022 06:01 )             24.8     08-31    139  |  102  |  61<H>  ----------------------------<  94  4.0   |  23  |  3.54<H>    Ca    10.7<H>      31 Aug 2022 06:01  Phos  4.1     08-31  Mg     2.1     08-31          ICU Vital Signs Last 24 Hrs  T(C): 37 (01 Sep 2022 09:00), Max: 37 (01 Sep 2022 09:00)  T(F): 98.6 (01 Sep 2022 09:00), Max: 98.6 (01 Sep 2022 09:00)  HR: 66 (01 Sep 2022 08:30) (60 - 70)  BP: 167/72 (01 Sep 2022 08:30) (128/60 - 176/72)  BP(mean): --  ABP: --  ABP(mean): --  RR: 17 (01 Sep 2022 08:30) (16 - 18)  SpO2: 99% (01 Sep 2022 08:30) (98% - 100%)    O2 Parameters below as of 01 Sep 2022 08:30  Patient On (Oxygen Delivery Method): room air      PHYSICAL EXAM  Right Lower Extremity Focused  LE Focused: CFT shows adequate perfusion b/l with no signs of ischemic compromise.  Right 3rd and 4th digit amputated, site left open. Black granulofoam wound vac in place. No strikethrough is appreciated on surgical dressings.  Dressings were dry, clean, and intact.  No neuromuscular deficits appreciated.

## 2022-09-01 NOTE — PROGRESS NOTE ADULT - SUBJECTIVE AND OBJECTIVE BOX
INTERVAL EVENTS:  -- NAEO    SUBJECTIVE:  -- reports feeling well with less pain even after wound vac placed  -- Denies fevers, chills, CP, SOB, palpitations, orthopnea and PND  -- Tolerating PO intake, +flatus/BM, voiding.  -- Review of Systems: 12 point review of systems otherwise negative    MEDICATIONS:  MEDICATIONS  (STANDING):  ampicillin/sulbactam  IVPB 3 Gram(s) IV Intermittent every 24 hours  aspirin  chewable 81 milliGRAM(s) Oral daily  atorvastatin 80 milliGRAM(s) Oral at bedtime  calcitriol   Capsule 0.5 MICROGram(s) Oral daily  carvedilol 25 milliGRAM(s) Oral every 12 hours  clopidogrel Tablet 75 milliGRAM(s) Oral daily  folic acid 1 milliGRAM(s) Oral daily  heparin   Injectable 5000 Unit(s) SubCutaneous every 12 hours  levothyroxine 100 MICROGram(s) Oral daily  NIFEdipine XL 60 milliGRAM(s) Oral daily  sevelamer carbonate 800 milliGRAM(s) Oral every 8 hours    MEDICATIONS  (PRN):  acetaminophen     Tablet .. 650 milliGRAM(s) Oral every 6 hours PRN Moderate Pain (4 - 6)  cyclobenzaprine 10 milliGRAM(s) Oral three times a day PRN Muscle Spasm    Allergies  No Known Allergies    OBJECTIVE:  Vital Signs Last 24 Hrs  T(C): 37 (01 Sep 2022 09:00), Max: 37 (01 Sep 2022 09:00)  T(F): 98.6 (01 Sep 2022 09:00), Max: 98.6 (01 Sep 2022 09:00)  HR: 72 (01 Sep 2022 10:40) (60 - 72)  BP: 132/59 (01 Sep 2022 10:40) (111/76 - 176/72)  BP(mean): --  RR: 17 (01 Sep 2022 08:30) (16 - 18)  SpO2: 99% (01 Sep 2022 08:30) (98% - 100%)    Parameters below as of 01 Sep 2022 08:30  Patient On (Oxygen Delivery Method): room air      I&O's Summary    31 Aug 2022 07:01  -  01 Sep 2022 07:00  --------------------------------------------------------  IN: 420 mL / OUT: 0 mL / NET: 420 mL    PHYSICAL EXAM:  Gen: NAD, sitting in bedside chair eating breakfast  HEENT: NCAT, MMM, clear OP  CV: RRR, no m/g/r appreciated  Pulm: CTA B, no w/r/r; no increase in WOB  Abd: normoactive BS, soft, NTND  Ext: RLE +dressing   Neuro: AOx3, nonfocal  Psych: pleasant, conversant and appropriate    LABS:                        8.1    5.14  )-----------( 132      ( 31 Aug 2022 06:01 )             24.8     08-31    139  |  102  |  61<H>  ----------------------------<  94  4.0   |  23  |  3.54<H>    Ca    10.7<H>      31 Aug 2022 06:01  Phos  4.1     08-31  Mg     2.1     08-31    MICRODATA:  -- No new microdata.    RADIOLOGY/OTHER STUDIES:  -- No new imaging.

## 2022-09-01 NOTE — DISCHARGE NOTE NURSING/CASE MANAGEMENT/SOCIAL WORK - PATIENT PORTAL LINK FT
You can access the FollowMyHealth Patient Portal offered by [facility] by registering at the following website: [email or web address]. By joining [facility]’s FollowMyHealth portal, you will also be able to view your health information using other applications (apps) compatible with our system.

## 2022-09-01 NOTE — DISCHARGE NOTE PROVIDER - NSDCFUSCHEDAPPT_GEN_ALL_CORE_FT
Christopher Hassan  [facility]  VASCULAR [address]  Scheduled Appointment: 09/06/2022

## 2022-09-01 NOTE — PROGRESS NOTE ADULT - PROVIDER SPECIALTY LIST ADULT
Podiatry
Podiatry
Vascular Surgery
Hospitalist
Hospitalist
Nephrology
Hospitalist
Podiatry
Vascular Surgery
Nephrology
Podiatry

## 2022-09-01 NOTE — DISCHARGE NOTE PROVIDER - PROVIDER TOKENS
PROVIDER:[TOKEN:[[id number]],SCHEDULEDAPPT:[09/06/2022],SCHEDULEDAPPTTIME:[12:00 PM]],PROVIDER:[TOKEN:[[id number]],FOLLOWUP:[1 week]],PROVIDER:[TOKEN:[[id number]],FOLLOWUP:[2 weeks]] PROVIDER:[TOKEN:[[id number]],SCHEDULEDAPPT:[09/06/2022],SCHEDULEDAPPTTIME:[12:00 PM]],PROVIDER:[TOKEN:[[id number]],FOLLOWUP:[1 week],ESTABLISHEDPATIENT:[T]],PROVIDER:[TOKEN:[[id number]],FOLLOWUP:[2 weeks],ESTABLISHEDPATIENT:[T]] PROVIDER:[TOKEN:[[id number]],SCHEDULEDAPPT:[09/06/2022],SCHEDULEDAPPTTIME:[12:00 PM]],PROVIDER:[[id number]],FOLLOWUP:[2 weeks],ESTABLISHEDPATIENT:[T]],PROVIDER:[TOKEN:[[id number]],FOLLOWUP:[1 week]]

## 2022-09-01 NOTE — DISCHARGE NOTE PROVIDER - CARE PROVIDER_API CALL
Christopher Hassan)  Surgery; Vascular Surgery  [address], [address] [place] [address]  Phone: [phone]  Fax: [phone]  Scheduled Appointment: 09/06/2022 12:00 PM    Sharif Yusuf (DANIELM)  Podiatric Medicine and Surgery  [address] [facility], [place] [address]  Phone: [phone]  Fax: [phone]  Follow Up Time: 1 week    Roman Lerma)  Internal Medicine; Nephrology  [address] [address]  Phone: [phone]  Fax: [phone]  Follow Up Time: 2 weeks   Christopher Hassan)  Surgery; Vascular Surgery  [address], [address] [place] [address]  Phone: [phone]  Fax: [phone]  Scheduled Appointment: 09/06/2022 12:00 PM    Sharif Yusuf (AILYN)  Podiatric Medicine and Surgery  [address] [facility] [address]  [place] [address]  Phone: [phone]  Fax: [phone]  Established Patient  Follow Up Time: 1 week    Roman Lerma)  Internal Medicine; Nephrology  [address] [address]  Phone: [phone]  Fax: [phone]  Established Patient  Follow Up Time: 2 weeks   Christopher Hassan)  Surgery; Vascular Surgery  [address], [address], [place] [address]  Phone: [phone]  Fax: [phone]  Scheduled Appointment: 09/06/2022 12:00 PM    Roman Lerma)  Internal Medicine; Nephrology  [address] [address]  Phone: [phone]  Fax: [phone]  Established Patient  Follow Up Time: 2 weeks    Bin Brumfield)  Orthopaedic Surgery Surgery  [address] [place] [address]  Phone: [phone]  Fax: [phone]  Follow Up Time: 1 week

## 2022-09-01 NOTE — PROGRESS NOTE ADULT - SUBJECTIVE AND OBJECTIVE BOX
O/N: wound vac delivered to bedside.                                         ---------------------------------------------------------------------------  PLEASE CHECK WHEN PRESENT:     [ x ]Heart Failure     [  ] Acute     [  ] Acute on Chronic     [ x ] Chronic  -------------------------------------------------------------------     [  xDiastolic [HFpEF]     [x ]Systolic [HFrEF]     [  ]Combined [HFpEF & HFrEF]  .................................................................................     [  ]Other:     [ ] Pulmonary Hypertension     [ ] Afib     [x ] Hypertensive Heart Disease  -------------------------------------------------------------------  [ ] Respiratory failure  [ ] Acute cor pulmonale  [ ] Asthma/COPD Exacerbation  [ ] Pleural effusion  [ ] Aspiration pneumonia  [ ] Obstructive Sleep Apnea  -------------------------------------------------------------------  [  ]KYMBERLY     [  ]ATN     [  ]Reneal Medullary Necrosis     [  ]Renal Cortical Necrosis     [  ]Other Pathological Lesions:    [  ]CKD 1  [  ]CKD 2  [  ]CKD 3  [  ]CKD 4  [ x ]CKD 5  [  ]Other  -------------------------------------------------------------------  [  x]Diabetes  [  ] Diabetic PVD Ulcer  [  ] Neuropathic ulcer to DM  [  x] Diabetes with Nephropathy  [  ] Osteomyelitis due to diabetes  [  ] Hyperglycemia   [  ]hypoglycemia   --------------------------------------------------------------------  [  ]Malnutrition: See Nutrition Note  [  ]Cachexia  [  ]Other:   [  ]Supplement Ordered:  [  ]Morbid Obesity (BMI >=40]  ---------------------------------------------------------------------  [ ] Sepsis/severe sepsis/septic shock  [ ] Noninfectious SIRS  [ ] UTI  [ ] Pneumonia  -----------------------------------------------------------------------  [ ] Acidosis/alkalosis  [ ] Fluid overload  [ ] Hypokalemia  [ ] Hyperkalemia  [ ] Hypomagnesemia  [ ] Hypophosphatemia  [ ] Hyperphosphatemia  [x ] hyponatremia   ------------------------------------------------------------------------  [ ] Acute blood loss anemia  [ ] Post op blood loss anemia  [ ] Iron deficiency anemia  [x ] Anemia due to chronic disease  [ ] Hypercoagulable state  [ ] Thrombocytopenia  ----------------------------------------------------------------------  [ ] Cerebral infarction  [ ] Transient ischemia attack  [ ] Encephalopathy - Toxic or Metabolic      A/P: 89y YO Female with hx of CKD 5, DM, HFpEF (EF 70% 2021, grade 1 diastolic dysfunction), HTN, HLD, CAD, hypothyroidism, PAD presents to ED with rest pain. 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 toe gangrene - s/p R 3 and 4 toe amp 8/30 done by podiatry  -Continue Unasyn   -ASA and plavix    HTN/HLD:  -Continue home coreg 25, Nifedipine 60  -will continue to monitor for HTN   -continue home statin       CAD/CHF:   -last echo 7/21 with EF 65-70% diastolic dysfunction noted      DM:  -not on any home meds   -A1c 5.7    CKD:   -Cr stable 3.5  -Holding home Torsemide     Anemia:   Known anemia of chronic disease  Hgb stable    Hypothyroidism:   -c/w synthroid    Diet:   Consistant carb/renal    Activity:   as tolerated    DVTPPx:   HSQ    Dispo:   -awaiting PT recs  O/N: wound vac delivered to bedside.     Subjective: resting in bed, denies pain in Right toe amputation sites. Denies right foot coldness, admits to numbness in right distal foot but this is baseline per patient   denies CP, SOB     ROS:   Denies Headache, blurred vision, Chest Pain, SOB, Abdominal pain, nausea or vomiting     Social   ampicillin/sulbactam  IVPB 3  ampicillin/sulbactam  IVPB 3  aspirin  chewable 81  carvedilol 25  clopidogrel Tablet 75  heparin   Injectable 5000  NIFEdipine XL 60      Allergies    No Known Allergies    Intolerances        Vital Signs Last 24 Hrs  T(C): 36.6 (01 Sep 2022 06:33), Max: 36.7 (31 Aug 2022 08:38)  T(F): 97.8 (01 Sep 2022 06:33), Max: 98 (31 Aug 2022 08:38)  HR: 61 (01 Sep 2022 07:22) (60 - 70)  BP: 162/69 (01 Sep 2022 07:22) (128/60 - 176/72)  BP(mean): --  RR: 18 (01 Sep 2022 07:22) (16 - 18)  SpO2: 98% (01 Sep 2022 07:22) (98% - 100%)    Parameters below as of 01 Sep 2022 07:22  Patient On (Oxygen Delivery Method): room air      I&O's Summary    31 Aug 2022 07:01  -  01 Sep 2022 07:00  --------------------------------------------------------  IN: 420 mL / OUT: 0 mL / NET: 420 mL        Physical Exam:  General: NAD  Pulmonary: b/l breath sounds   Cardiovascular: s1s2 REg  Abdominal: soft NT/ND   Extremities: VAC to right foot   right foot flexion intact     LABS:                        8.1    5.14  )-----------( 132      ( 31 Aug 2022 06:01 )             24.8     08-31    139  |  102  |  61<H>  ----------------------------<  94  4.0   |  23  |  3.54<H>    Ca    10.7<H>      31 Aug 2022 06:01  Phos  4.1     08-31  Mg     2.1     08-31          Radiology and Additional Studies:        ---------------------------------------------------------------------------  PLEASE CHECK WHEN PRESENT:     [ x ]Heart Failure     [  ] Acute     [  ] Acute on Chronic     [ x ] Chronic  -------------------------------------------------------------------     [  xDiastolic [HFpEF]     [x ]Systolic [HFrEF]     [  ]Combined [HFpEF & HFrEF]  .................................................................................     [  ]Other:     [ ] Pulmonary Hypertension     [ ] Afib     [x ] Hypertensive Heart Disease  -------------------------------------------------------------------  [ ] Respiratory failure  [ ] Acute cor pulmonale  [ ] Asthma/COPD Exacerbation  [ ] Pleural effusion  [ ] Aspiration pneumonia  [ ] Obstructive Sleep Apnea  -------------------------------------------------------------------  [  ]KYMBERLY     [  ]ATN     [  ]Reneal Medullary Necrosis     [  ]Renal Cortical Necrosis     [  ]Other Pathological Lesions:    [  ]CKD 1  [  ]CKD 2  [  ]CKD 3  [  ]CKD 4  [ x ]CKD 5  [  ]Other  -------------------------------------------------------------------  [  x]Diabetes  [  ] Diabetic PVD Ulcer  [  ] Neuropathic ulcer to DM  [  x] Diabetes with Nephropathy  [  ] Osteomyelitis due to diabetes  [  ] Hyperglycemia   [  ]hypoglycemia   --------------------------------------------------------------------  [  ]Malnutrition: See Nutrition Note  [  ]Cachexia  [  ]Other:   [  ]Supplement Ordered:  [  ]Morbid Obesity (BMI >=40]  ---------------------------------------------------------------------  [ ] Sepsis/severe sepsis/septic shock  [ ] Noninfectious SIRS  [ ] UTI  [ ] Pneumonia  -----------------------------------------------------------------------  [ ] Acidosis/alkalosis  [ ] Fluid overload  [ ] Hypokalemia  [ ] Hyperkalemia  [ ] Hypomagnesemia  [ ] Hypophosphatemia  [ ] Hyperphosphatemia  [x ] hyponatremia   ------------------------------------------------------------------------  [ ] Acute blood loss anemia  [ ] Post op blood loss anemia  [ ] Iron deficiency anemia  [x ] Anemia due to chronic disease  [ ] Hypercoagulable state  [ ] Thrombocytopenia  ----------------------------------------------------------------------  [ ] Cerebral infarction  [ ] Transient ischemia attack  [ ] Encephalopathy - Toxic or Metabolic      A/P: 89y YO Female with hx of CKD 5, DM, HFpEF (EF 70% 2021, grade 1 diastolic dysfunction), HTN, HLD, CAD, hypothyroidism, PAD presents to ED with rest pain. 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 - s/p R 3 and 4 toe amp 8/30 done by podiatry  -Wound VAC in place , home VAC at bedside   -Continue Unasyn  plan to d/c  home on Augmentin   -ASA and plavix    HTN/HLD:  -Continue home coreg 25, Nifedipine 60  -will continue to monitor for HTN   -continue home statin       CAD/CHF:   -last echo 7/21 with EF 65-70% diastolic dysfunction noted      DM:  -not on any home meds   -A1c 5.7    CKD:   -Cr stable 3.5  -Holding home Torsemide     Anemia:   Known anemia of chronic disease  Hgb stable    Hypothyroidism:   -c/w synthroid    Diet:   Consistant carb/renal    Activity:   as tolerated    DVTPPx:   HSQ    Dispo:   -d/c home likely today on 10 day course of Augmentin     I (Alethea Ruby PA-C ) have personally seen and examined the patient. I have reviewed all pertinent imaging and laboratory findings.

## 2022-09-02 LAB
CULTURE RESULTS: SIGNIFICANT CHANGE UP
CULTURE RESULTS: SIGNIFICANT CHANGE UP
SPECIMEN SOURCE: SIGNIFICANT CHANGE UP
SPECIMEN SOURCE: SIGNIFICANT CHANGE UP

## 2022-09-02 RX ORDER — NIFEDIPINE 30 MG
1 TABLET, EXTENDED RELEASE 24 HR ORAL
Qty: 30 | Refills: 3
Start: 2022-09-02 | End: 2022-12-30

## 2022-09-02 RX ORDER — NIFEDIPINE 30 MG
1 TABLET, EXTENDED RELEASE 24 HR ORAL
Qty: 30 | Refills: 0
Start: 2022-09-02 | End: 2022-10-01

## 2022-09-03 ENCOUNTER — EMERGENCY (EMERGENCY)
Facility: HOSPITAL | Age: 87
LOS: 1 days | Discharge: HOME | End: 2022-09-03
Attending: EMERGENCY MEDICINE | Admitting: EMERGENCY MEDICINE
Payer: MEDICARE

## 2022-09-03 VITALS
DIASTOLIC BLOOD PRESSURE: 66 MMHG | HEART RATE: 56 BPM | WEIGHT: 192.9 LBS | HEIGHT: 61 IN | SYSTOLIC BLOOD PRESSURE: 152 MMHG | TEMPERATURE: 98 F | OXYGEN SATURATION: 100 % | RESPIRATION RATE: 18 BRPM

## 2022-09-03 DIAGNOSIS — M96.830 POSTPROCEDURAL HEMORRHAGE OF A MUSCULOSKELETAL STRUCTURE FOLLOWING A MUSCULOSKELETAL SYSTEM PROCEDURE: ICD-10-CM

## 2022-09-03 DIAGNOSIS — E03.9 HYPOTHYROIDISM, UNSPECIFIED: ICD-10-CM

## 2022-09-03 DIAGNOSIS — Z95.820 PERIPHERAL VASCULAR ANGIOPLASTY STATUS WITH IMPLANTS AND GRAFTS: Chronic | ICD-10-CM

## 2022-09-03 DIAGNOSIS — E11.51 TYPE 2 DIABETES MELLITUS WITH DIABETIC PERIPHERAL ANGIOPATHY WITHOUT GANGRENE: ICD-10-CM

## 2022-09-03 DIAGNOSIS — E11.22 TYPE 2 DIABETES MELLITUS WITH DIABETIC CHRONIC KIDNEY DISEASE: ICD-10-CM
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
## 2022-09-03 DIAGNOSIS — I25.10 ATHEROSCLEROTIC HEART DISEASE OF NATIVE CORONARY ARTERY WITHOUT ANGINA PECTORIS: ICD-10-CM

## 2022-09-03 DIAGNOSIS — Z95.0 PRESENCE OF CARDIAC PACEMAKER: Chronic | ICD-10-CM
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
## 2022-09-03 DIAGNOSIS — I50.30 UNSPECIFIED DIASTOLIC (CONGESTIVE) HEART FAILURE: ICD-10-CM

## 2022-09-03 DIAGNOSIS — N18.5 CHRONIC KIDNEY DISEASE, STAGE 5: ICD-10-CM

## 2022-09-03 DIAGNOSIS — I13.2 HYPERTENSIVE HEART AND CHRONIC KIDNEY DISEASE WITH HEART FAILURE AND WITH STAGE 5 CHRONIC KIDNEY DISEASE, OR END STAGE RENAL DISEASE: ICD-10-CM

## 2022-09-03 DIAGNOSIS — Z79.82 LONG TERM (CURRENT) USE OF ASPIRIN: ICD-10-CM

## 2022-09-03 DIAGNOSIS — Z79.02 LONG TERM (CURRENT) USE OF ANTITHROMBOTICS/ANTIPLATELETS: ICD-10-CM
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
## 2022-09-03 DIAGNOSIS — E78.5 HYPERLIPIDEMIA, UNSPECIFIED: ICD-10-CM

## 2022-09-03 LAB
ANION GAP SERPL CALC-SCNC: 13 MMOL/L — SIGNIFICANT CHANGE UP (ref 5–17)
APTT BLD: 30.9 SEC — SIGNIFICANT CHANGE UP (ref 27.5–35.5)
BLD GP AB SCN SERPL QL: POSITIVE — SIGNIFICANT CHANGE UP
BUN SERPL-MCNC: 59 MG/DL — HIGH (ref 7–23)
CALCIUM SERPL-MCNC: 10.1 MG/DL — SIGNIFICANT CHANGE UP (ref 8.4–10.5)
CHLORIDE SERPL-SCNC: 99 MMOL/L — SIGNIFICANT CHANGE UP (ref 96–108)
CO2 SERPL-SCNC: 20 MMOL/L — LOW (ref 22–31)
CREAT SERPL-MCNC: 3.42 MG/DL — HIGH (ref 0.5–1.3)
EGFR: 12 ML/MIN/1.73M2 — LOW
GLUCOSE SERPL-MCNC: 116 MG/DL — HIGH (ref 70–99)
HCT VFR BLD CALC: 21.7 % — LOW (ref 34.5–45)
HGB BLD-MCNC: 7 G/DL — CRITICAL LOW (ref 11.5–15.5)
INR BLD: 1.45 — HIGH (ref 0.88–1.16)
MCHC RBC-ENTMCNC: 31.1 PG — SIGNIFICANT CHANGE UP (ref 27–34)
MCHC RBC-ENTMCNC: 32.3 GM/DL — SIGNIFICANT CHANGE UP (ref 32–36)
MCV RBC AUTO: 96.4 FL — SIGNIFICANT CHANGE UP (ref 80–100)
PLATELET # BLD AUTO: 122 K/UL — LOW (ref 150–400)
POTASSIUM SERPL-MCNC: 4 MMOL/L — SIGNIFICANT CHANGE UP (ref 3.5–5.3)
POTASSIUM SERPL-SCNC: 4 MMOL/L — SIGNIFICANT CHANGE UP (ref 3.5–5.3)
PROTHROM AB SERPL-ACNC: 17.3 SEC — HIGH (ref 10.5–13.4)
RBC # BLD: 2.25 M/UL — LOW (ref 3.8–5.2)
RBC # FLD: 19.3 % — HIGH (ref 10.3–14.5)
RH IG SCN BLD-IMP: POSITIVE — SIGNIFICANT CHANGE UP
SARS-COV-2 RNA SPEC QL NAA+PROBE: NEGATIVE — SIGNIFICANT CHANGE UP
SODIUM SERPL-SCNC: 132 MMOL/L — LOW (ref 135–145)
WBC # BLD: 6.7 K/UL — SIGNIFICANT CHANGE UP (ref 3.8–10.5)
WBC # FLD AUTO: 6.7 K/UL — SIGNIFICANT CHANGE UP (ref 3.8–10.5)

## 2022-09-03 PROCEDURE — 86870 RBC ANTIBODY IDENTIFICATION: CPT

## 2022-09-03 PROCEDURE — 86901 BLOOD TYPING SEROLOGIC RH(D): CPT

## 2022-09-03 PROCEDURE — 99284 EMERGENCY DEPT VISIT MOD MDM: CPT

## 2022-09-03 PROCEDURE — 86850 RBC ANTIBODY SCREEN: CPT

## 2022-09-03 PROCEDURE — 86900 BLOOD TYPING SEROLOGIC ABO: CPT

## 2022-09-03 PROCEDURE — 99284 EMERGENCY DEPT VISIT MOD MDM: CPT | Mod: 25

## 2022-09-03 PROCEDURE — 85610 PROTHROMBIN TIME: CPT

## 2022-09-03 PROCEDURE — 36415 COLL VENOUS BLD VENIPUNCTURE: CPT

## 2022-09-03 PROCEDURE — 87635 SARS-COV-2 COVID-19 AMP PRB: CPT

## 2022-09-03 PROCEDURE — 85025 COMPLETE CBC W/AUTO DIFF WBC: CPT

## 2022-09-03 PROCEDURE — 36430 TRANSFUSION BLD/BLD COMPNT: CPT

## 2022-09-03 PROCEDURE — 85730 THROMBOPLASTIN TIME PARTIAL: CPT

## 2022-09-03 PROCEDURE — 80048 BASIC METABOLIC PNL TOTAL CA: CPT

## 2022-09-03 PROCEDURE — 86880 COOMBS TEST DIRECT: CPT

## 2022-09-03 NOTE — ED PROVIDER NOTE - NSFOLLOWUPINSTRUCTIONS_ED_ALL_ED_FT
You were evaluated by podiatry. There was no continued bleeding. The wound appears clean. Your wound vac was reapplied. Your blood work was stable    CONTINUE all your antibiotics and medications as prescribed.  CONTINUE your [facility] wound services    See Dr Brumfield next week for follow up visit      Wound Care, Adult      Taking care of your wound properly can help to prevent pain, infection, and scarring. It can also help your wound heal more quickly. Follow instructions from your health care provider about how to care for your wound.      Supplies needed:    •Soap and water.      •Wound cleanser, saline, or germ-free (sterile) water.      •Gauze.      •If needed, a clean bandage (dressing) or other type of wound dressing material to cover or place in the wound. Follow your health care provider's instructions about what dressing supplies to use.      •Cream or topical ointment to apply to the wound, if told by your health care provider.        How to care for your wound    Cleaning the wound     Ask your health care provider how to clean the wound. This may include:  •Using mild soap and water, a wound cleanser, saline, or sterile water.      •Using a clean gauze to pat the wound dry after cleaning it. Do not rub or scrub the wound.      Dressing care    •Wash your hands with soap and water for at least 20 seconds before and after you change the dressing. If soap and water are not available, use hand .    •Change your dressing as told by your health care provider. This may include:  •Cleaning or rinsing out (irrigating) the wound.      •Application of cream or topical ointment, if told by your health care provider.      •Placing a dressing over the wound or in the wound (packing).      •Covering the wound with an outer dressing.        •Leave stitches (sutures), staples, skin glue, or adhesive strips in place. These skin closures may need to stay in place for 2 weeks or longer. If adhesive strip edges start to loosen and curl up, you may trim the loose edges. Do not remove adhesive strips completely unless your health care provider tells you to do that.      •Ask your health care provider when you can leave the wound uncovered.        Checking for infection  Two stitched wounds. One is normal. The other is red with pus and infected.  Check your wound area every day for signs of infection. Check for:  •More redness, swelling, or pain.      •Fluid or blood.      •Warmth.      •Pus or a bad smell.        Follow these instructions at home    Medicines     •If you were prescribed an antibiotic medicine, cream, or ointment, take or apply it as told by your health care provider. Do not stop using the antibiotic even if your condition improves.      •If you were prescribed pain medicine, take it 30 minutes before you do any wound care or as told by your health care provider.      •Take over-the-counter and prescription medicines only as told by your health care provider.      Eating and drinking   •Eat a diet that includes protein, vitamin A, vitamin C, and other nutrient-rich foods to help the wound heal.  •Foods rich in protein include meat, fish, eggs, dairy, beans, and nuts.      •Foods rich in vitamin A include carrots and dark green, leafy vegetables.      •Foods rich in vitamin C include citrus fruits, tomatoes, broccoli, and peppers.        •Drink enough fluid to keep your urine pale yellow.      General instructions     • Do not take baths, swim, or use a hot tub until your health care provider approves. Ask your health care provider if you may take showers. You may only be allowed to take sponge baths.      • Do not scratch or pick at the wound. Keep it covered as told by your health care provider.      •Return to your normal activities as told by your health care provider. Ask your health care provider what activities are safe for you.      •Protect your wound from the sun when you are outside for the first 6 months, or for as long as told by your health care provider. Cover up the scar area or apply sunscreen that has an SPF of at least 30.      • Do not use any products that contain nicotine or tobacco. These products include cigarettes, chewing tobacco, and vaping devices, such as e-cigarettes. If you need help quitting, ask your health care provider.      •Keep all follow-up visits. This is important.        Contact a health care provider if:    •You received a tetanus shot and you have swelling, severe pain, redness, or bleeding at the injection site.      •Your pain is not controlled with medicine.    •You have any of these signs of infection:  •More redness, swelling, or pain around the wound.      •Fluid or blood coming from the wound.      •Warmth coming from the wound.      •A fever or chills.        •You are nauseous or you vomit.      •You are dizzy.      •You have a new rash or hardness around the wound.        Get help right away if:    •You have a red streak of skin near the area around your wound.      •Pus or a bad smell coming from the wound.      •Your wound has been closed with staples, sutures, skin glue, or adhesive strips and it begins to open up and separate.      •Your wound is bleeding, and the bleeding does not stop with gentle pressure.      These symptoms may represent a serious problem that is an emergency. Do not wait to see if the symptoms will go away. Get medical help right away. Call your local emergency services (911 in the [place] Do not drive yourself to the hospital.       Summary    •Always wash your hands with soap and water for at least 20 seconds before and after changing your dressing.      •Change your dressing as told by your health care provider.      •To help with healing, eat foods that are rich in protein, vitamin A, vitamin C, and other nutrients.      •Check your wound every day for signs of infection. Contact your health care provider if you think that your wound is infected.      This information is not intended to replace advice given to you by your health care provider. Make sure you discuss any questions you have with your health care provider.

## 2022-09-03 NOTE — ED ADULT NURSE NOTE - OBJECTIVE STATEMENT
Received via stretcher BIBEMS with chief complaints of bleeding to R foot surgical site. Per pt's daughter, visiting nurse came today to change dressing when noted bleeding to site needing evaluation. No active bleeding noted to site. Dressing clean, dry and intact.     Patient AOX3, speaking full sentences, family @  bedside.  +PERRLA.  Patient denies chest pain, shortness of breath, difficulty breathing and any form of distress not noted. Resps even and nonlabored. Moves all extremities. Patient oriented to ED area. All needs attended. POC reviewed. Fall risk precautions maintained. Purposeful proactive hourly rounding in progress.

## 2022-09-03 NOTE — ED PROVIDER NOTE - PATIENT PORTAL LINK FT
You can access the FollowMyHealth Patient Portal offered by [facility] by registering at the following website: [email or web address]. By joining [facility]’s FollowMyHealth portal, you will also be able to view your health information using other applications (apps) compatible with our system.

## 2022-09-03 NOTE — ED PROVIDER NOTE - CARE PROVIDER_API CALL
iBn Brumfield (DPM)  Orthopaedic Surgery Surgery  [address] [place] [address]  Phone: [phone]  Fax: [phone]  Follow Up Time:

## 2022-09-03 NOTE — ED ADULT NURSE NOTE - NSIMPLEMENTINTERV_GEN_ALL_ED
Implemented All Fall with Harm Risk Interventions:  [place] to call system. Call bell, personal items and telephone within reach. Instruct patient to call for assistance. Room bathroom lighting operational. Non-slip footwear when patient is off stretcher. Physically safe environment: no spills, clutter or unnecessary equipment. Stretcher in lowest position, wheels locked, appropriate side rails in place. Provide visual cue, wrist band, yellow gown, etc. Monitor gait and stability. Monitor for mental status changes and reorient to person, place, and time. Review medications for side effects contributing to fall risk. Reinforce activity limits and safety measures with patient and family. Provide visual clues: red socks.

## 2022-09-03 NOTE — ED ADULT NURSE NOTE - CHPI ED NUR SYMPTOMS NEG
noted upon rn assessment of dressing/no bleeding at site/no chills/no drainage/no fever/no inflammation/no pain/no purulent drainage/no rectal pain/no redness

## 2022-09-03 NOTE — ED PROVIDER NOTE - PHYSICAL EXAMINATION
Constitutional: Well appearing, awake, alert, oriented to person, place, time/situation and in no apparent distress.  ENMT: Airway patent.   Eyes: Clear bilaterally  Cardiac: Normal rate, regular rhythm.   Respiratory: No increased WOB, tachypnea, hypoxia, or accessory mm use. Pt speaks in full sentences.   Musculoskeletal: Range of motion is not limited  Neuro: Alert and oriented x 3, face symmetric and speech fluent. Nml gross motor movement, grossly non focal   Skin: Skin normal color for race, warm, dry. R foot 3rd and 4th MT head amputation wound site C/D/I. no active bleeding. no purulence nor malodor. no cellulitis. 2+ pedal pulse  Psych: Alert and oriented to person, place, time/situation. normal mood and affect. no apparent risk to self or others.

## 2022-09-03 NOTE — ED PROVIDER NOTE - NS ED ROS FT
Constitutional: No fever or chills.   Eyes: No pain, blurry vision, or discharge.  ENMT: No hearing changes, pain, discharge or infections. No neck pain or stiffness.  Cardiac: No chest pain, SOB or edema. No chest pain with exertion.  Respiratory: No cough or respiratory distress. No hemoptysis. No history of asthma or RAD.  GI: No nausea, vomiting, diarrhea or abdominal pain.  : No dysuria, frequency or burning.  MS: No myalgia, muscle weakness, joint pain or back pain.  Neuro: No headache or weakness. No LOC.  Skin: See HPI  Except as documented in the HPI, all other systems are negative.

## 2022-09-03 NOTE — ED PROVIDER NOTE - OBJECTIVE STATEMENT
Pt w/ PMHx CKD 5, DM, HFpEF (EF 70% 2021, grade 1 diastolic dysfunction), HTN, HLD, CAD, hypothyroidism, PAD who was recently admitted 8/24-8/26 with right lower extremity rest pain  she underwent  right lower extremity angiogram with SFA pop stent in stent stenosis angioplasty with drug coated balloon and re-stenting of the proximal and distal part of old sent,  showing one vessel runoff through peroneal  at the end of procedure.  She was re-admitted 8/28-9/1 worsening R 3rd toe discoloration, pain, and erythema.  She was stated on IV antibiotics. Podiatry was consulted for management of right toe gangrene. on 8/29 she was taken to OR for right 3/4th toe amputation. The amputation site appeared clean and healthy. on 8/31 a Wound VAC was placed to the amputation site. Patient was seen by PT and recommended for home. Home VAC was arranged and home nursing was arranged for 3 times weekly VAC changes. She will be discharged on a 10 days course of oral abx.   Pt comes to the ED today s/p VNS removed wound vac and had bleeding from the wound that could not be controlled for approx 30-40 min. No AC use. Pt is on ASA / Plavix. no f/c. Pain only when being touched

## 2022-09-03 NOTE — ED ADULT TRIAGE NOTE - CHIEF COMPLAINT QUOTE
Pt BIBEMS due to bleeding after amputation of several toes on right foot 9/1. unsuccessful attempt to cauterize in office, clean dry dressing in place with bleeding controlled in triage.

## 2022-09-03 NOTE — ED PROVIDER NOTE - CLINICAL SUMMARY MEDICAL DECISION MAKING FREE TEXT BOX
Pt presents for postoperative wound check. No active bleeding. Wound vac at bedside. Will consult podiatry. Dispo pending podiatry recommendations

## 2022-09-03 NOTE — ED ADULT NURSE NOTE - NSFALLRSKINDICTYPE_ED_ALL_ED
Written by Aggie Sinha, as dictated by Dr. Santos Lopez MD.    [facility]  Ida Campos is a 39 y.o. female. HPI  The patient presents today for a medication evaluation. She is taking 2 tabs Zoloft 100 mg daily which improves her anxiety. When she tries to wean off of Zoloft she has been struggling with her emotions. She also has restless leg when trying to sleep at night, which worsens when she tries to wean off of Zoloft. Patient reports that currently her restless leg is manageable. That patient requests a referral to psychiatry. Patient is c/o mass on the L side of her scalp. The mass has been present for several years. She feels it is getting larger and it is painful. She has R neck pain and BL shoulder pain. Patient goes for massages which provides relief for a short time. She regularly lifts her children. Patient exercises and takes apple cider vinegar, but admits she is not taking omega-3 or fish oil. The patient admits that she is not taking levothyroxine. Patient admits she stopped taking vitamin D. Patient Active Problem List   Diagnosis Code    PCOS (polycystic ovarian syndrome) E28.2    Vitamin D deficiency E55.9    Depression F32.9        Current Outpatient Medications on File Prior to Visit   Medication Sig Dispense Refill    sertraline (ZOLOFT) 100 mg tablet TAKE 1 TABLET BY MOUTH DAILY FOR 90 DAYS THEN 2 TABS DAILY 90 Tab 1    Lancets (ONE TOUCH ULTRASOFT LANCETS) Misc FOLLOW PACKAGE DIRECTIONS 888 Each 2    folic acid 660 mcg tablet Take 400 mcg by mouth daily.  OMEGA-3 FATTY ACIDS/FISH OIL (OMEGA 3 FISH OIL PO) Take  by mouth.  multivitamin (ONE A DAY) tablet Take 1 Tab by mouth daily.  cholecalciferol, vitamin D3, (VITAMIN D3) 2,000 unit Tab Take  by mouth. No current facility-administered medications on file prior to visit.         Past Medical History:   Diagnosis Date    Depression     Diabetes mellitus ([facility] [address] 6/16/2010    Hypercholesterolemia     PCOD (polycystic ovarian disease)     Thyroid disease        Past Surgical History:   Procedure Laterality Date    HX HEENT      tonsilectomy in 1998       Family History   Problem Relation Age of Onset    Diabetes Mother     Depression Mother     Heart Disease Father     Hypertension Father     Other Brother         sinus problem    Cancer Maternal Grandmother        Social History     Socioeconomic History    Marital status:      Spouse name: Not on file    Number of children: Not on file    Years of education: Not on file    Highest education level: Not on file   Occupational History    Not on file   Social Needs    Financial resource strain: Not on file    Food insecurity:     Worry: Not on file     Inability: Not on file    Transportation needs:     Medical: Not on file     Non-medical: Not on file   Tobacco Use    Smoking status: Never Smoker    Smokeless tobacco: Never Used   Substance and Sexual Activity    Alcohol use: No    Drug use: No    Sexual activity: Not Currently     Comment:    Lifestyle    Physical activity:     Days per week: Not on file     Minutes per session: Not on file    Stress: Not on file   Relationships    Social connections:     Talks on phone: Not on file     Gets together: Not on file     Attends [employer] service: Not on file     Active member of club or organization: Not on file     Attends meetings of clubs or organizations: Not on file     Relationship status: Not on file    Intimate partner violence:     Fear of current or ex partner: Not on file     Emotionally abused: Not on file     Physically abused: Not on file     Forced sexual activity: Not on file   Other Topics Concern    Not on file   Social History Narrative    Not on file       Review of Systems   Constitutional: Negative for malaise/fatigue. HENT: Negative for congestion. Eyes: Negative for blurred vision and pain. Respiratory: Negative for cough and shortness of breath. Cardiovascular: Negative for chest pain and palpitations. Gastrointestinal: Negative for abdominal pain and heartburn. Genitourinary: Negative for frequency and urgency. Musculoskeletal: Positive for joint pain (BL shoulder) and neck pain. Negative for myalgias. Skin:        +mass on L side of scalp   Neurological: Negative for dizziness, tingling, sensory change, weakness and headaches. Psychiatric/Behavioral: Positive for depression. Negative for memory loss and substance abuse. The patient is nervous/anxious. Visit Vitals  /86 (BP 1 Location: Left arm, BP Patient Position: Sitting)   Pulse (!) 101   Temp 98.1 °F (36.7 °C) (Oral)   Resp 16   Ht 5' 2\" (1.575 m)   Wt 146 lb 3.2 oz (66.3 kg)   SpO2 100%   BMI 26.74 kg/m²       Physical Exam   Constitutional: She is oriented to person, place, and time. She appears well-developed and well-nourished. No distress. HENT:   Right Ear: External ear normal.   Left Ear: External ear normal.   Eyes: Conjunctivae and EOM are normal. Right eye exhibits no discharge. Left eye exhibits no discharge. Neck: Normal range of motion. Neck supple. Cardiovascular: Normal rate and regular rhythm. Pulmonary/Chest: Effort normal and breath sounds normal. She has no wheezes. Abdominal: Soft. Bowel sounds are normal. There is no tenderness. Musculoskeletal:   Neck and both shoulders tender on exam   Lymphadenopathy:     She has no cervical adenopathy. Neurological: She is alert and oriented to person, place, and time. Skin: She is not diaphoretic. 2-3 cm mass on scalp, tender on palpation   Psychiatric: She has a normal mood and affect. Her behavior is normal.   Nursing note and vitals reviewed. ASSESSMENT and PLAN    ICD-10-CM ICD-9-CM    1. Pain, upper back M54.9 724.5 She can take 2 tabs OTC ibuprofen with food if needed. Advised to alternate with Tylenol if she needs more medication. 2. Restless legs syndrome (RLS) G25.81 333.94 Patient declines medication. Advised to try taking mustard. 3. Anxiety and depression F41.9 300.00 REFERRAL TO PSYCHIATRY    F32.9 311   Referred to psychiatry. Patient is taking 2 tabs Zoloft 100 mg daily. 4. Vitamin D deficiency E55.9 268.9 Advised to take a multivitamin which includes vitamin D and vitamin B12. She should take OTC 1,000 iu vitamin D daily. 5. Scalp mass R22.0 782.2 REFERRAL TO GENERAL SURGERY    Referred to general surgery. 6. Mixed hyperlipidemia E78.2 272.2 Patient should start taking OTC omega-3. She should continue apple cider        This plan was reviewed with the patient and patient agrees. All questions were answered. This scribe documentation was reviewed by me and accurately reflects the examination and decisions made by me. This note will not be viewable in [address] Need for Mobility Assisted Device

## 2022-09-04 VITALS
DIASTOLIC BLOOD PRESSURE: 91 MMHG | OXYGEN SATURATION: 98 % | HEART RATE: 58 BPM | RESPIRATION RATE: 16 BRPM | SYSTOLIC BLOOD PRESSURE: 144 MMHG | TEMPERATURE: 98 F

## 2022-09-04 LAB
ANISOCYTOSIS BLD QL: SIGNIFICANT CHANGE UP
BASOPHILS # BLD AUTO: 0 K/UL — SIGNIFICANT CHANGE UP (ref 0–0.2)
BASOPHILS NFR BLD AUTO: 0 % — SIGNIFICANT CHANGE UP (ref 0–2)
DACRYOCYTES BLD QL SMEAR: SLIGHT — SIGNIFICANT CHANGE UP
EOSINOPHIL # BLD AUTO: 0.23 K/UL — SIGNIFICANT CHANGE UP (ref 0–0.5)
EOSINOPHIL NFR BLD AUTO: 3.4 % — SIGNIFICANT CHANGE UP (ref 0–6)
GIANT PLATELETS BLD QL SMEAR: PRESENT — SIGNIFICANT CHANGE UP
LYMPHOCYTES # BLD AUTO: 1.66 K/UL — SIGNIFICANT CHANGE UP (ref 1–3.3)
LYMPHOCYTES # BLD AUTO: 24.8 % — SIGNIFICANT CHANGE UP (ref 13–44)
MANUAL SMEAR VERIFICATION: SIGNIFICANT CHANGE UP
MONOCYTES # BLD AUTO: 0.34 K/UL — SIGNIFICANT CHANGE UP (ref 0–0.9)
MONOCYTES NFR BLD AUTO: 5.1 % — SIGNIFICANT CHANGE UP (ref 2–14)
NEUTROPHILS # BLD AUTO: 4.41 K/UL — SIGNIFICANT CHANGE UP (ref 1.8–7.4)
NEUTROPHILS NFR BLD AUTO: 64.1 % — SIGNIFICANT CHANGE UP (ref 43–77)
NEUTS BAND # BLD: 1.7 % — SIGNIFICANT CHANGE UP (ref 0–8)
OVALOCYTES BLD QL SMEAR: SLIGHT — SIGNIFICANT CHANGE UP
PLAT MORPH BLD: NORMAL — SIGNIFICANT CHANGE UP
POIKILOCYTOSIS BLD QL AUTO: SLIGHT — SIGNIFICANT CHANGE UP
POLYCHROMASIA BLD QL SMEAR: SLIGHT — SIGNIFICANT CHANGE UP
RBC BLD AUTO: ABNORMAL
VARIANT LYMPHS # BLD: 0.9 % — SIGNIFICANT CHANGE UP (ref 0–6)

## 2022-09-04 PROCEDURE — 86077 PHYS BLOOD BANK SERV XMATCH: CPT

## 2022-09-04 NOTE — CONSULT NOTE ADULT - ASSESSMENT
89 year old female with PMHx CKD 5, DM, HFpEF (EF 70% 2021, grade 1 diastolic dysfunction), HTN, HLD, CAD, hypothyroidism, Pt was admitted 8/28-9/1 for worsening R 3rd toe discoloration, pain, and erythema.  She was stated on IV antibiotics. Podiatry was consulted for management of right toe gangrene on 8/29 she was taken to OR for right 3/4th toe amputation. The amputation site appeared clean and healthy. on 8/31 a Wound VAC was placed to the amputation site. Patient was seen by PT and recommended for home. Home VAC was arranged and home nursing was arranged for 3 times weekly VAC changes. She will be discharged on a 10 days course of oral abx. Pt comes to the ED today s/p VNS removed wound vac and had bleeding from the wound that could not be controlled. Bleeding was controlled in triage. Podiatry was consulted for re-evaluation of the wound and re-application of the wound vac. Wound looks healthy and is granulating in well. Wound vac was re-applied   Plan:   - Chart reviewed and patient evaluated  - Wound evaluated and cleaned.   - Wound vac applied to wound. Patient complained of pain but tolerated treatment well  - Dressing of kerlix and gauze applied   - Patient instructed to continue with regular wound vac changes     Plan discussed with attending. Pt discharged to home.     Pt should follow up with Dr. Brissa Brumfield, AILYN   [facility]  [address] [id number] [phone]    [address] [facility] [address]  [phone]    [address]   [place] [address]   [phone]

## 2022-09-04 NOTE — CONSULT NOTE ADULT - SUBJECTIVE AND OBJECTIVE BOX
Attending: Dr. Brumfield    Patient is a 89y old  Female who presents with a chief complaint of bleeding    HPI: 89 year old female with pmh of ckd, pad, dm, hfpef, htn, hld, and hypothyroidism presented to the ED for bleeding. Pt is 5 days s/p right 3rd and 4th digit amputation. Patient was discharged with a wound vac. The bleeding was noted today during a wound vac dressing change by VNS. The nurse doing the dressing change was unable to stop the bleeding for 30-40 minutes so the patient presented to the ED. The bleeding was controlled once the patient presented to the ED. Wound vac was bedside. Podiatry was consulted for application of wound vac and further evaluation of wound. Patient states she is currently still experiencing pain in the area of the wound vac. Patient has no other pedal complaints at this time. Patient denies n/v/f/c/sob.       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      Diabetes      Diabetes mellitus with diabetic neuropathy      Chronic diastolic congestive heart failure      Hypertension      Hyperlipidemia      CAD (coronary artery disease)      Hypothyroidism      Pacemaker      S/P peripheral artery angioplasty with stent placement  6/2021 - SFA to BK Pop stenting (Zilver PTX 6x80mm, Zilver 518 5x80, Zilver 518 5x80 (proximal to distal)); 2/2022 - balloon angioplasty for in-stent stenosis        Home Medications:  acetaminophen 325 mg oral tablet: 2 tab(s) orally every 6 hours, As needed, Mild Pain (1 - 3) (28 Aug 2022 14:56)  calcitriol 0.5 mcg oral capsule: 1 cap(s) orally once a day (28 Aug 2022 14:56)  Coreg 25 mg oral tablet: 1 tab(s) orally every 12 hours (28 Aug 2022 14:56)  cyclobenzaprine 10 mg oral tablet: 1 tab(s) orally 3 times a day, As Needed (28 Aug 2022 14:56)  folic acid 1 mg oral tablet: 1 tab(s) orally once a day (28 Aug 2022 14:56)  levothyroxine 100 mcg (0.1 mg) oral tablet: 1 tab(s) orally once a day (28 Aug 2022 14:56)  Lipitor 80 mg oral tablet: 1 tab(s) orally once a day (at bedtime) (28 Aug 2022 14:56)  zolpidem 5 mg oral tablet: 1 tab(s) orally once a day (at bedtime) (28 Aug 2022 14:56)    Allergies    No Known Allergies    Intolerances      FAMILY HISTORY:    Social History:       LABS                        7.0    6.70  )-----------( 122      ( 03 Sep 2022 22:20 )             21.7     09-03    132<L>  |  99  |  59<H>  ----------------------------<  116<H>  4.0   |  20<L>  |  3.42<H>    Ca    10.1      03 Sep 2022 22:20      PT/INR - ( 03 Sep 2022 22:20 )   PT: 17.3 sec;   INR: 1.45          PTT - ( 03 Sep 2022 22:20 )  PTT:30.9 sec    Vital Signs Last 24 Hrs  T(C): 36.7 (03 Sep 2022 20:38), Max: 36.7 (03 Sep 2022 20:38)  T(F): 98.1 (03 Sep 2022 20:38), Max: 98.1 (03 Sep 2022 20:38)  HR: 56 (03 Sep 2022 20:38) (56 - 56)  BP: 152/66 (03 Sep 2022 20:38) (152/66 - 152/66)  BP(mean): --  RR: 18 (03 Sep 2022 20:38) (18 - 18)  SpO2: 100% (03 Sep 2022 20:38) (100% - 100%)    Parameters below as of 03 Sep 2022 20:38  Patient On (Oxygen Delivery Method): room air        PHYSICAL EXAM  Right foot Lower Extremity Focused:  Vasc: PT 1/4, DP non-palpable, biphasic waveforms heard on doppler, CFT <3 x 3, Temp gradient warm to cool, no edema, mild erythema immediately surrounding wound  Derm: Distal wound at 3rd and 4th met head amputation site with 80% granular base and 20% fibrotic base, Negative PTB, No tracking, No tunneling, No malodor  Neuro: Protective sensation diminished  MSK: 3rd and 4th digit amputation with 3rd and 4th met head resection, TTP on amputation site

## 2022-09-06 ENCOUNTER — APPOINTMENT (OUTPATIENT)
Dept: VASCULAR SURGERY | Facility: CLINIC | Age: 87
End: 2022-09-06

## 2022-09-06 VITALS
HEIGHT: 60 IN | BODY MASS INDEX: 26.5 KG/M2 | SYSTOLIC BLOOD PRESSURE: 154 MMHG | HEART RATE: 71 BPM | DIASTOLIC BLOOD PRESSURE: 64 MMHG | WEIGHT: 135 LBS

## 2022-09-06 PROCEDURE — 93926 LOWER EXTREMITY STUDY: CPT

## 2022-09-06 PROCEDURE — 99213 OFFICE O/P EST LOW 20 MIN: CPT

## 2022-09-06 NOTE — PROGRESS NOTE ADULT - REASON FOR ADMISSION
RLE pain, direct admission from clinic
77
RLE pain, direct admission from clinic
RLE pain, direct admission from clinic

## 2022-09-08 DIAGNOSIS — Z95.0 PRESENCE OF CARDIAC PACEMAKER: ICD-10-CM

## 2022-09-08 DIAGNOSIS — N18.5 CHRONIC KIDNEY DISEASE, STAGE 5: ICD-10-CM

## 2022-09-08 DIAGNOSIS — I50.32 CHRONIC DIASTOLIC (CONGESTIVE) HEART FAILURE: ICD-10-CM

## 2022-09-08 DIAGNOSIS — G47.00 INSOMNIA, UNSPECIFIED: ICD-10-CM

## 2022-09-08 DIAGNOSIS — D62 ACUTE POSTHEMORRHAGIC ANEMIA: ICD-10-CM

## 2022-09-08 DIAGNOSIS — R23.0 CYANOSIS: ICD-10-CM

## 2022-09-08 DIAGNOSIS — I13.2 HYPERTENSIVE HEART AND CHRONIC KIDNEY DISEASE WITH HEART FAILURE AND WITH STAGE 5 CHRONIC KIDNEY DISEASE, OR END STAGE RENAL DISEASE: ICD-10-CM

## 2022-09-08 DIAGNOSIS — E11.52 TYPE 2 DIABETES MELLITUS WITH DIABETIC PERIPHERAL ANGIOPATHY WITH GANGRENE: ICD-10-CM

## 2022-09-08 DIAGNOSIS — E87.1 HYPO-OSMOLALITY AND HYPONATREMIA: ICD-10-CM

## 2022-09-08 DIAGNOSIS — Z79.82 LONG TERM (CURRENT) USE OF ASPIRIN: ICD-10-CM

## 2022-09-08 DIAGNOSIS — I70.221 ATHEROSCLEROSIS OF NATIVE ARTERIES OF EXTREMITIES WITH REST PAIN, RIGHT LEG: ICD-10-CM

## 2022-09-08 DIAGNOSIS — E78.5 HYPERLIPIDEMIA, UNSPECIFIED: ICD-10-CM

## 2022-09-08 DIAGNOSIS — E11.22 TYPE 2 DIABETES MELLITUS WITH DIABETIC CHRONIC KIDNEY DISEASE: ICD-10-CM

## 2022-09-08 DIAGNOSIS — I96 GANGRENE, NOT ELSEWHERE CLASSIFIED: ICD-10-CM

## 2022-09-08 DIAGNOSIS — Z79.890 HORMONE REPLACEMENT THERAPY: ICD-10-CM

## 2022-09-08 DIAGNOSIS — D63.1 ANEMIA IN CHRONIC KIDNEY DISEASE: ICD-10-CM

## 2022-09-08 DIAGNOSIS — Z95.820 PERIPHERAL VASCULAR ANGIOPLASTY STATUS WITH IMPLANTS AND GRAFTS: ICD-10-CM

## 2022-09-08 DIAGNOSIS — E11.21 TYPE 2 DIABETES MELLITUS WITH DIABETIC NEPHROPATHY: ICD-10-CM

## 2022-09-08 DIAGNOSIS — Z79.02 LONG TERM (CURRENT) USE OF ANTITHROMBOTICS/ANTIPLATELETS: ICD-10-CM

## 2022-09-08 DIAGNOSIS — E03.9 HYPOTHYROIDISM, UNSPECIFIED: ICD-10-CM

## 2022-09-08 DIAGNOSIS — I25.10 ATHEROSCLEROTIC HEART DISEASE OF NATIVE CORONARY ARTERY WITHOUT ANGINA PECTORIS: ICD-10-CM

## 2022-09-08 DIAGNOSIS — E11.42 TYPE 2 DIABETES MELLITUS WITH DIABETIC POLYNEUROPATHY: ICD-10-CM

## 2022-09-08 DIAGNOSIS — D69.6 THROMBOCYTOPENIA, UNSPECIFIED: ICD-10-CM

## 2022-09-09 NOTE — PHYSICAL EXAM
[Normal Thyroid] : the thyroid was normal [Normal Breath Sounds] : Normal breath sounds [Respiratory Effort] : normal respiratory effort [Normal Heart Sounds] : normal heart sounds [Normal Rate and Rhythm] : normal rate and rhythm [2+] : left 2+ [0] : left 0 [1+] : left 1+ [Ankle Swelling (On Exam)] : present [Ankle Swelling On The Right] : mild [No Rash or Lesion] : No rash or lesion [Skin Ulcer] : ulcer [Alert] : alert [Calm] : calm [JVD] : no jugular venous distention  [Varicose Veins Of Lower Extremities] : not present [] : not present [Purpura] : no purpura  [Petechiae] : no petechiae [Skin Induration] : no induration [de-identified] : Well-nourished, NAD [de-identified] : NC/AT, anicteric [FreeTextEntry1] : DID NOT evaluate pedal pulses in the RLE due to dressing in place [de-identified] : FROM throughout, strength 5/5x4 [de-identified] : KCI wound vac in place

## 2022-09-09 NOTE — REASON FOR VISIT
[de-identified] : R SFA PTA/stent [de-identified] : 08/25/2022 [de-identified] : 12 [de-identified] : 2 [de-identified] : 89yoF w/severe ischemic rest pain due to chronic PAD, also noted to have ischemic changes of the R 3/4th toes which were amputated by Dr. Brumfield after the angiogram.  Pt returns today for evaluation of the RLE angioplasty/stent; she reports no new issues w/the leg, KCI wound vac applied TIW by a visiting nurse.  Pt and aide deny fevers/chills/malaise.

## 2022-09-09 NOTE — PRE-OP CHECKLIST - NSBLOODTRANS_GEN_A_CORE_SIUH
Kapil Kingsley,     Part of routine screening in pregnancy is a maternal AFP level, which is a blood test that should be collected between 16-18 weeks of pregnancy, but can be collected up to 21weeks 6days  We have placed an order for this lab in your chart  If you can use the [facility]'s lab, you can report to the lab and they will be able to access the order  If you need to use an outside lab, please contact the office for a copy of the lab slip  Alpha-fetoprotein (AFP) is a protein produced in the liver of a developing fetus  During a baby's development, some AFP passes through the placenta and into the mother's blood  An AFP test measures the level of AFP in pregnant women during the second trimester of pregnancy  Abnormal levels of AFP in a mother's blood may be sign of a neural tube defect, a condition that causes abnormal development of a developing baby's brain and/or spine  Please contact the office at [phone] with any questions  no...

## 2022-09-09 NOTE — DISCUSSION/SUMMARY
[FreeTextEntry1] : 89yoF w/severe ischemic rest pain due to chronic PAD, also noted to have ischemic changes of the R 3/4th toes which were amputated by Dr. Brumfield after the angiogram.  Pt returns today for evaluation of the RLE angioplasty/stent; she reports no new issues w/the leg, KCI wound vac applied TIW by a visiting nurse.  Pt and aide deny fevers/chills/malaise.\par \par Wound vac in place on R foot, wound not evaluated today, but remaining toes w/o issue.  RLE well-perfused on exam, foot warm, and RLE arterial duplex performed to evaluate the R SFA for patency reveals widely patent R SFA stent, three-vessel runoff noted to the R foot.  Pt instructed to exercise leg however possible, and f/u w/Dr. Brumfield for evaluation and management of her toe amp sites.  She will RTO in 6mos for surveillance of her LE circulation.

## 2022-09-09 NOTE — ADDENDUM
[FreeTextEntry1] : This note was written by Jimmy Metz, acting as a scribe for Dr. Christopher Hassan.  I, Dr. Christopher Hassan, have read and attest that all the information, medical decision-making, and discharge instructions within are true and accurate.\par \par I, Dr. Christopher Hassan, personally performed the evaluation and management (E/M) services for this established patient who presents today with (an) existing condition(s).  That E/M includes conducting the examination, assessing all conditions, and (re)establishing/reinforcing a plan of care.  Today, my ACP, Jimmy Metz, was here to observe my evaluation and management services for this condition to be followed going forward.

## 2022-09-12 LAB — SURGICAL PATHOLOGY STUDY: SIGNIFICANT CHANGE UP

## 2022-09-15 PROCEDURE — 86901 BLOOD TYPING SEROLOGIC RH(D): CPT

## 2022-09-15 PROCEDURE — 86850 RBC ANTIBODY SCREEN: CPT

## 2022-09-15 PROCEDURE — 83690 ASSAY OF LIPASE: CPT

## 2022-09-15 PROCEDURE — 88305 TISSUE EXAM BY PATHOLOGIST: CPT

## 2022-09-15 PROCEDURE — 80053 COMPREHEN METABOLIC PANEL: CPT

## 2022-09-15 PROCEDURE — 85610 PROTHROMBIN TIME: CPT

## 2022-09-15 PROCEDURE — 36415 COLL VENOUS BLD VENIPUNCTURE: CPT

## 2022-09-15 PROCEDURE — 83935 ASSAY OF URINE OSMOLALITY: CPT

## 2022-09-15 PROCEDURE — 85347 COAGULATION TIME ACTIVATED: CPT

## 2022-09-15 PROCEDURE — 82962 GLUCOSE BLOOD TEST: CPT

## 2022-09-15 PROCEDURE — 83735 ASSAY OF MAGNESIUM: CPT

## 2022-09-15 PROCEDURE — C1874: CPT

## 2022-09-15 PROCEDURE — 86900 BLOOD TYPING SEROLOGIC ABO: CPT

## 2022-09-15 PROCEDURE — 84100 ASSAY OF PHOSPHORUS: CPT

## 2022-09-15 PROCEDURE — 85027 COMPLETE CBC AUTOMATED: CPT

## 2022-09-15 PROCEDURE — 85025 COMPLETE CBC W/AUTO DIFF WBC: CPT

## 2022-09-15 PROCEDURE — 87040 BLOOD CULTURE FOR BACTERIA: CPT

## 2022-09-15 PROCEDURE — 84484 ASSAY OF TROPONIN QUANT: CPT

## 2022-09-15 PROCEDURE — 85730 THROMBOPLASTIN TIME PARTIAL: CPT

## 2022-09-15 PROCEDURE — 83880 ASSAY OF NATRIURETIC PEPTIDE: CPT

## 2022-09-15 PROCEDURE — 73620 X-RAY EXAM OF FOOT: CPT

## 2022-09-15 PROCEDURE — 87086 URINE CULTURE/COLONY COUNT: CPT

## 2022-09-15 PROCEDURE — 99285 EMERGENCY DEPT VISIT HI MDM: CPT

## 2022-09-15 PROCEDURE — 86870 RBC ANTIBODY IDENTIFICATION: CPT

## 2022-09-15 PROCEDURE — 86922 COMPATIBILITY TEST ANTIGLOB: CPT

## 2022-09-15 PROCEDURE — C1887: CPT

## 2022-09-15 PROCEDURE — 86880 COOMBS TEST DIRECT: CPT

## 2022-09-15 PROCEDURE — 86902 BLOOD TYPE ANTIGEN DONOR EA: CPT

## 2022-09-15 PROCEDURE — 80048 BASIC METABOLIC PNL TOTAL CA: CPT

## 2022-09-15 PROCEDURE — C1725: CPT

## 2022-09-15 PROCEDURE — P9040: CPT

## 2022-09-15 PROCEDURE — C1894: CPT

## 2022-09-15 PROCEDURE — 84300 ASSAY OF URINE SODIUM: CPT

## 2022-09-15 PROCEDURE — 93005 ELECTROCARDIOGRAM TRACING: CPT

## 2022-09-15 PROCEDURE — 81001 URINALYSIS AUTO W/SCOPE: CPT

## 2022-09-15 PROCEDURE — 73630 X-RAY EXAM OF FOOT: CPT

## 2022-09-15 PROCEDURE — 87635 SARS-COV-2 COVID-19 AMP PRB: CPT

## 2022-09-15 PROCEDURE — C2623: CPT

## 2022-09-15 PROCEDURE — 83605 ASSAY OF LACTIC ACID: CPT

## 2022-09-15 PROCEDURE — 97161 PT EVAL LOW COMPLEX 20 MIN: CPT

## 2022-09-15 PROCEDURE — C1769: CPT

## 2022-09-15 PROCEDURE — 76000 FLUOROSCOPY <1 HR PHYS/QHP: CPT

## 2022-09-15 PROCEDURE — 36430 TRANSFUSION BLD/BLD COMPNT: CPT

## 2022-09-15 PROCEDURE — 71045 X-RAY EXAM CHEST 1 VIEW: CPT

## 2022-09-18 ENCOUNTER — EMERGENCY (EMERGENCY)
Facility: HOSPITAL | Age: 87
LOS: 1 days | Discharge: HOME | End: 2022-09-18
Attending: EMERGENCY MEDICINE | Admitting: EMERGENCY MEDICINE

## 2022-09-18 VITALS
HEART RATE: 59 BPM | OXYGEN SATURATION: 97 % | SYSTOLIC BLOOD PRESSURE: 176 MMHG | TEMPERATURE: 98 F | HEIGHT: 61 IN | DIASTOLIC BLOOD PRESSURE: 71 MMHG | RESPIRATION RATE: 18 BRPM

## 2022-09-18 DIAGNOSIS — Z95.820 PERIPHERAL VASCULAR ANGIOPLASTY STATUS WITH IMPLANTS AND GRAFTS: Chronic | ICD-10-CM

## 2022-09-18 DIAGNOSIS — Z95.0 PRESENCE OF CARDIAC PACEMAKER: Chronic | ICD-10-CM

## 2022-09-18 PROCEDURE — 99283 EMERGENCY DEPT VISIT LOW MDM: CPT

## 2022-09-18 NOTE — ED PROVIDER NOTE - CLINICAL SUMMARY MEDICAL DECISION MAKING FREE TEXT BOX
89yo F hx of DM2 and PAD s/p R toe amputation, now w wound vac, presents with wound vac dysfunction.  On exam afebrile, VSS, well appearing, with wound vac in place over R foot surgical site.  Spoke to Dr. Paris, podiatry chief resident, who called on behalf of pt's surgeon Dr. Brumfield who advised removing wound vac dressing and doing wet to dry dressing.  Wound dressing changed accordingly.  No e/o infection of wound.  Pt has appt w podiatry tomorrow.  Stable for MT.

## 2022-09-18 NOTE — ED PROVIDER NOTE - PATIENT PORTAL LINK FT
You can access the FollowMyHealth Patient Portal offered by [facility] by registering at the following website: [email or web address]. By joining [facility]’s FollowMyHealth portal, you will also be able to view your health information using other applications (apps) compatible with our system.

## 2022-09-18 NOTE — ED PROVIDER NOTE - OBJECTIVE STATEMENT
89yo F w DM2 and PAD s/p R 3rd and 4th toe amputations, now w wound vac, presents with wound vac dysfunction.  States her wound vac has been beeping and giving her a message that there is a dysfunction due to a blockage somewhere.  No increased pain in foot.  No fever or chills.  States she wants wound vac dressing to be removed.

## 2022-09-18 NOTE — ED ADULT NURSE NOTE - CHIEF COMPLAINT QUOTE
pt. with wound vac for the right foot s/p toe amputation c/o vac not draining since this afternoon, machine presenting with a "blockage" message

## 2022-09-18 NOTE — ED PROVIDER NOTE - PHYSICAL EXAMINATION
Constitutional: awake and alert, in no acute distress  HEENT: head normocephalic and atraumatic. moist mucous membranes  Eyes: extraocular movements intact, normal conjunctiva  Neck: supple, normal ROM  Cardiovascular: regular rate   Pulmonary: no respiratory distress  Gastrointestinal: abdomen flat and nondistended  Skin: warm, dry, normal for ethnicity  Musculoskeletal: R foot: s/p 3rd and 4th toe amputation with wound vac in place.    Neurological: oriented x4, no focal neurologic deficit.   Psychiatric: calm and cooperative

## 2022-09-18 NOTE — ED PROVIDER NOTE - NSFOLLOWUPINSTRUCTIONS_ED_ALL_ED_FT
Negative Pressure Wound Therapy Home Guide      Negative pressure wound therapy (NPWT) uses a sponge or foam-like material (dressing) placed on or inside the wound. The wound is then covered and sealed with a cover dressing that sticks to your skin (is adhesive) to keep air out. A tube is attached to the cover dressing, and this tube connects to a small pump. The pump removes drainage from the wound.    NPWT helps to increase blood flow to the wound and heal it from the inside. NPWT also helps pull the edges of the wound together and removes fluids and germs from the wound. NPWT may also be called wound vac.      What are the risks?    NPWT is usually safe to use. However, problems can occur, including:  •Skin irritation from the dressing adhesive.      •Bleeding.    •Infection. Signs of infection include:  •More redness, swelling, or pain.      •More fluid or blood.      •Warmth or hardness around the wound.      •Pus or a bad smell.      •Red streaks leading from the wound.        •Dehydration. Wounds with large amounts of drainage can cause excessive body fluid loss.      •Pain.        Supplies needed:    •Wound cleanser or salt-water solution (saline).      •Skin protectant. This may be a wipe, film, or spray.      •Clean or germ-free (sterile) scissors.      •New sponge or foam.      •Cover dressing.      •Gauze pad.      •Tape.    •Also have available:  •Soap and water, or hand .      •Disposable gloves.      •Eye protection.      •A disposable garbage bag.      •Protective clothing.          How to change your dressing    Change the dressing as scheduled, if the dressing loses suction, or the pump is off for 2 hours or more.      Prepare to change your dressing   A person wearing protective gloves and a gown.    1.Take pain medicine 30 minutes before changing the dressing if told by your health care provider.      2.Wash your hands with soap and water for at least 20 seconds before you change the dressing. If soap and water are not available, use hand       3.Dry your hands with a clean towel.      4.Set up a clean station for wound care and set a plastic bag on or near your work surface for trash.      5.Open the dressing package so that the sponge dressing remains on the inside of the package.      6.Wear gloves, protective clothing, and eye protection.        Remove the old dressing   Washing hands with soap and water at a sink.    1.Turn off the pump and disconnect the tubing from the dressing.      2.Carefully remove the adhesive cover dressing in the direction of your hair growth.      3.Remove the sponge dressing that is inside the wound. If the sponge sticks, use a wound cleanser or saline solution to wet the sponge and help it come off more easily.      4.Throw the old sponge and cover dressing supplies into the garbage bag.      5.Check the wound for signs of infection, including a bad smell, drainage (bleeding or pus), or new color changes (black, grey, yellow, or white) in the wound.      6.Remove your gloves by grabbing the cuff and turning the glove inside out. Place the gloves in the trash immediately.      7.Wash your hands with soap and water for at least 20 seconds. If soap and water are not available, use hand .      8.Dry your hands with a clean towel.      Clean your wound     Wear gloves, protective clothing, and eye protection. Follow your health care provider's instructions on how to clean your wound. You may be told to:  1.Clean the wound using a saline solution or a wound cleanser and a clean gauze pad.      2.Pat the wound dry with a gauze pad. Do not rub the wound.      3.Throw the gauze pad into the garbage bag.      4.Remove your gloves by grabbing the cuff and turning the glove inside out. Place the gloves in the trash immediately.      5.Wash your hands with soap and water for at least 20 seconds. If soap and water are not available, use hand       6.Dry your hands with a clean towel.      Apply the new dressing    1.Wear gloves, protective clothing, and eye protection.      2.If told by your health care provider, apply a skin protectant to any skin that will be exposed to adhesive. Let the skin protectant dry.      3.Cut a piece of new sponge dressing and put it on or in the wound.      4.Using clean scissors, cut a nickel-sized hole in the new cover dressing.      5.Apply the cover dressing.      6.Attach the suction tube over the hole in the cover dressing.      7.Take off your gloves. Put them in the plastic bag with the old dressing. Tie the bag shut and throw it away.      8.Wash your hands with soap and water for at least 20 seconds. If soap and water are not available, use hand .      9.Dry your hands with a clean towel.      10.Turn the pump back on. The sponge dressing should collapse. Do not change the settings on the machine without talking to a health care provider.      11.Replace the container in the pump that collects fluid if it is full. Replace the container per the 's instructions or at least once a week, even if it is not full.      Follow your health care provider'sinstructions on how often you need to change and apply the new NPWT dressing.      General tips and recommendations    If the alarm sounds:     •Stay calm and prepare to troubleshoot.      • Do not turn off the pump or do anything with the dressing.    •The alarm may go off for many reasons, including:  •The battery is low. Change the battery or plug the device into electrical power.      •The dressing has a leak. Find the leak and put tape over the leak.      •The fluid collection container is full. Change the fluid container.        •Call your health care provider right away if you cannot fix the problem.      •Explain to your health care provider what is happening. Follow his or her instructions.      General instructions     •Change the dressing as scheduled, if the dressing loses suction, or the pump is off for 2 hours or more.      • Do not turn off the pump unless told to do so by your health care provider.      • Do not turn off the pump for more than 2 hours. If the pump is off or you lose suction to the dressing for more than 2 hours, the dressing will need to be changed.      •Check the machine frequently to make sure that therapy is on and that all clamps are open.      • Do not use over-the-counter medicated or antiseptic creams, sprays, liquids, or dressings unless told by your health care provider.    • Do not take baths, swim, or use a hot tub until your health care provider approves. You may only be allowed to take sponge baths. Ask your health care provider if you may take showers. If your health care provider says it is okay to shower:  •Do not take the pump into the shower.      •Make sure the wound dressing is protected and sealed. The wound dressing must stay dry.          Contact a health care provider if:    •You have new pain.      •You develop irritation, a rash, or itching around the wound or dressing.      •You see new color changes (black, grey, yellow, or white) in the wound.      •The dressing changes are painful or cause bleeding.      •The pump has been off for more than 2 hours, and you do not know how to change the dressing.      •The pump alarm goes off, and you do not know what to do.        Get help right away if:    •You have a lot of bleeding.      •The wound breaks open.      •You have severe pain.    •You have signs of infection, such as:  •More redness, swelling, or pain.      •More fluid or blood.      •Warmth or hardness.      •Pus or a bad smell.      •Red streaks leading from the wound.      •A fever.        •You see a sudden change in the color or texture of the drainage.    •You have signs of dehydration, such as:  •Little or no tears, urine, or sweat.      •Muscle cramps.      •Very dry mouth.      •Headache.      •Dizziness or confusion.          Summary    •NPWT uses a sponge or dressing placed on or inside the wound.      •NPWT helps to increase blood flow to the wound and heal it from the inside.      •Follow your health care provider's instructions on how to clean your wound and how to change the dressing.      •Contact a health care provider if you have new pain, an irritation, or a rash, or if the alarm goes off and you do not know what to do.      •Get help right away if you have a lot of bleeding, your wound breaks open, or you have severe pain. Also, get help if you have signs of infection.      This information is not intended to replace advice given to you by your health care provider. Make sure you discuss any questions you have with your health care provider.

## 2022-09-19 VITALS
TEMPERATURE: 98 F | HEART RATE: 68 BPM | OXYGEN SATURATION: 95 % | DIASTOLIC BLOOD PRESSURE: 65 MMHG | SYSTOLIC BLOOD PRESSURE: 165 MMHG | RESPIRATION RATE: 18 BRPM
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
## 2022-09-21 DIAGNOSIS — T81.31XA DISRUPTION OF EXTERNAL OPERATION (SURGICAL) WOUND, NOT ELSEWHERE CLASSIFIED, INITIAL ENCOUNTER: ICD-10-CM

## 2022-09-21 DIAGNOSIS — E11.51 TYPE 2 DIABETES MELLITUS WITH DIABETIC PERIPHERAL ANGIOPATHY WITHOUT GANGRENE: ICD-10-CM
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
## 2022-09-21 DIAGNOSIS — Z89.421 ACQUIRED ABSENCE OF OTHER RIGHT TOE(S): ICD-10-CM

## 2022-09-22 ENCOUNTER — APPOINTMENT (OUTPATIENT)
Dept: VASCULAR SURGERY | Facility: CLINIC | Age: 87
End: 2022-09-22

## 2022-10-12 ENCOUNTER — EMERGENCY (EMERGENCY)
Facility: HOSPITAL | Age: 87
LOS: 1 days | Discharge: HOME | End: 2022-10-12
Attending: EMERGENCY MEDICINE
Payer: MEDICARE

## 2022-10-12 VITALS
SYSTOLIC BLOOD PRESSURE: 182 MMHG | RESPIRATION RATE: 18 BRPM | DIASTOLIC BLOOD PRESSURE: 73 MMHG | HEART RATE: 65 BPM | OXYGEN SATURATION: 100 % | TEMPERATURE: 98 F | HEIGHT: 61 IN

## 2022-10-12 DIAGNOSIS — Z95.820 PERIPHERAL VASCULAR ANGIOPLASTY STATUS WITH IMPLANTS AND GRAFTS: Chronic | ICD-10-CM

## 2022-10-12 DIAGNOSIS — S98.131A COMPLETE TRAUMATIC AMPUTATION OF ONE RIGHT LESSER TOE, INITIAL ENCOUNTER: Chronic | ICD-10-CM

## 2022-10-12 DIAGNOSIS — N18.5 CHRONIC KIDNEY DISEASE, STAGE 5: ICD-10-CM

## 2022-10-12 DIAGNOSIS — Z79.82 LONG TERM (CURRENT) USE OF ASPIRIN: ICD-10-CM

## 2022-10-12 DIAGNOSIS — E03.9 HYPOTHYROIDISM, UNSPECIFIED: ICD-10-CM

## 2022-10-12 DIAGNOSIS — Z20.822 CONTACT WITH AND (SUSPECTED) EXPOSURE TO COVID-19: ICD-10-CM

## 2022-10-12 DIAGNOSIS — I13.2 HYPERTENSIVE HEART AND CHRONIC KIDNEY DISEASE WITH HEART FAILURE AND WITH STAGE 5 CHRONIC KIDNEY DISEASE, OR END STAGE RENAL DISEASE: ICD-10-CM

## 2022-10-12 DIAGNOSIS — E78.5 HYPERLIPIDEMIA, UNSPECIFIED: ICD-10-CM

## 2022-10-12 DIAGNOSIS — E83.52 HYPERCALCEMIA: ICD-10-CM

## 2022-10-12 DIAGNOSIS — D63.1 ANEMIA IN CHRONIC KIDNEY DISEASE: ICD-10-CM

## 2022-10-12 DIAGNOSIS — E11.22 TYPE 2 DIABETES MELLITUS WITH DIABETIC CHRONIC KIDNEY DISEASE: ICD-10-CM

## 2022-10-12 DIAGNOSIS — I25.10 ATHEROSCLEROTIC HEART DISEASE OF NATIVE CORONARY ARTERY WITHOUT ANGINA PECTORIS: ICD-10-CM

## 2022-10-12 DIAGNOSIS — M79.671 PAIN IN RIGHT FOOT: ICD-10-CM

## 2022-10-12 DIAGNOSIS — Z95.5 PRESENCE OF CORONARY ANGIOPLASTY IMPLANT AND GRAFT: ICD-10-CM

## 2022-10-12 DIAGNOSIS — Z89.421 ACQUIRED ABSENCE OF OTHER RIGHT TOE(S): ICD-10-CM

## 2022-10-12 DIAGNOSIS — I50.32 CHRONIC DIASTOLIC (CONGESTIVE) HEART FAILURE: ICD-10-CM

## 2022-10-12 DIAGNOSIS — L03.115 CELLULITIS OF RIGHT LOWER LIMB: ICD-10-CM

## 2022-10-12 DIAGNOSIS — E11.40 TYPE 2 DIABETES MELLITUS WITH DIABETIC NEUROPATHY, UNSPECIFIED: ICD-10-CM

## 2022-10-12 DIAGNOSIS — I16.0 HYPERTENSIVE URGENCY: ICD-10-CM

## 2022-10-12 DIAGNOSIS — Z95.0 PRESENCE OF CARDIAC PACEMAKER: ICD-10-CM

## 2022-10-12 DIAGNOSIS — Z95.0 PRESENCE OF CARDIAC PACEMAKER: Chronic | ICD-10-CM

## 2022-10-12 LAB
ALBUMIN SERPL ELPH-MCNC: 3.8 G/DL — SIGNIFICANT CHANGE UP (ref 3.3–5)
ALP SERPL-CCNC: SIGNIFICANT CHANGE UP (ref 40–120)
ALT FLD-CCNC: SIGNIFICANT CHANGE UP (ref 10–45)
ANION GAP SERPL CALC-SCNC: 10 MMOL/L — SIGNIFICANT CHANGE UP (ref 5–17)
ANION GAP SERPL CALC-SCNC: 8 MMOL/L — SIGNIFICANT CHANGE UP (ref 5–17)
AST SERPL-CCNC: SIGNIFICANT CHANGE UP (ref 10–40)
BASE EXCESS BLDV CALC-SCNC: -6.2 MMOL/L — LOW (ref -2–3)
BASOPHILS # BLD AUTO: 0.04 K/UL — SIGNIFICANT CHANGE UP (ref 0–0.2)
BASOPHILS NFR BLD AUTO: 0.9 % — SIGNIFICANT CHANGE UP (ref 0–2)
BILIRUB SERPL-MCNC: 0.2 MG/DL — SIGNIFICANT CHANGE UP (ref 0.2–1.2)
BUN SERPL-MCNC: 47 MG/DL — HIGH (ref 7–23)
BUN SERPL-MCNC: 47 MG/DL — HIGH (ref 7–23)
CA-I SERPL-SCNC: 1.71 MMOL/L — CRITICAL HIGH (ref 1.15–1.33)
CALCIUM SERPL-MCNC: 11.6 MG/DL — HIGH (ref 8.4–10.5)
CALCIUM SERPL-MCNC: 12.6 MG/DL — HIGH (ref 8.4–10.5)
CHLORIDE SERPL-SCNC: 106 MMOL/L — SIGNIFICANT CHANGE UP (ref 96–108)
CHLORIDE SERPL-SCNC: 109 MMOL/L — HIGH (ref 96–108)
CO2 BLDV-SCNC: 22.1 MMOL/L — SIGNIFICANT CHANGE UP (ref 22–26)
CO2 SERPL-SCNC: 20 MMOL/L — LOW (ref 22–31)
CO2 SERPL-SCNC: 21 MMOL/L — LOW (ref 22–31)
CREAT SERPL-MCNC: 2.93 MG/DL — HIGH (ref 0.5–1.3)
CREAT SERPL-MCNC: 3.06 MG/DL — HIGH (ref 0.5–1.3)
EGFR: 14 ML/MIN/1.73M2 — LOW
EGFR: 15 ML/MIN/1.73M2 — LOW
EOSINOPHIL # BLD AUTO: 0.71 K/UL — HIGH (ref 0–0.5)
EOSINOPHIL NFR BLD AUTO: 15 % — HIGH (ref 0–6)
ERYTHROCYTE [SEDIMENTATION RATE] IN BLOOD: 50 MM/HR — HIGH
GAS PNL BLDV: 135 MMOL/L — LOW (ref 136–145)
GAS PNL BLDV: SIGNIFICANT CHANGE UP
GAS PNL BLDV: SIGNIFICANT CHANGE UP
GIANT PLATELETS BLD QL SMEAR: PRESENT — SIGNIFICANT CHANGE UP
GLUCOSE SERPL-MCNC: 115 MG/DL — HIGH (ref 70–99)
GLUCOSE SERPL-MCNC: 138 MG/DL — HIGH (ref 70–99)
HCO3 BLDV-SCNC: 21 MMOL/L — LOW (ref 22–29)
HCT VFR BLD CALC: 28.5 % — LOW (ref 34.5–45)
HGB BLD-MCNC: 9.5 G/DL — LOW (ref 11.5–15.5)
LYMPHOCYTES # BLD AUTO: 1.65 K/UL — SIGNIFICANT CHANGE UP (ref 1–3.3)
LYMPHOCYTES # BLD AUTO: 34.6 % — SIGNIFICANT CHANGE UP (ref 13–44)
MAGNESIUM SERPL-MCNC: 1.8 MG/DL — SIGNIFICANT CHANGE UP (ref 1.6–2.6)
MANUAL SMEAR VERIFICATION: SIGNIFICANT CHANGE UP
MCHC RBC-ENTMCNC: 31.3 PG — SIGNIFICANT CHANGE UP (ref 27–34)
MCHC RBC-ENTMCNC: 33.3 GM/DL — SIGNIFICANT CHANGE UP (ref 32–36)
MCV RBC AUTO: 93.8 FL — SIGNIFICANT CHANGE UP (ref 80–100)
MONOCYTES # BLD AUTO: 0.18 K/UL — SIGNIFICANT CHANGE UP (ref 0–0.9)
MONOCYTES NFR BLD AUTO: 3.7 % — SIGNIFICANT CHANGE UP (ref 2–14)
NEUTROPHILS # BLD AUTO: 2.14 K/UL — SIGNIFICANT CHANGE UP (ref 1.8–7.4)
NEUTROPHILS NFR BLD AUTO: 44.9 % — SIGNIFICANT CHANGE UP (ref 43–77)
NRBC # BLD: 1 /100 — HIGH (ref 0–0)
NRBC # BLD: SIGNIFICANT CHANGE UP /100 WBCS (ref 0–0)
OVALOCYTES BLD QL SMEAR: SLIGHT — SIGNIFICANT CHANGE UP
PCO2 BLDV: 45 MMHG — HIGH (ref 39–42)
PH BLDV: 7.27 — LOW (ref 7.32–7.43)
PHOSPHATE SERPL-MCNC: 4.1 MG/DL — SIGNIFICANT CHANGE UP (ref 2.5–4.5)
PLAT MORPH BLD: NORMAL — SIGNIFICANT CHANGE UP
PLATELET # BLD AUTO: 140 K/UL — LOW (ref 150–400)
PO2 BLDV: <33 MMHG — LOW (ref 25–45)
POTASSIUM BLDV-SCNC: 4.9 MMOL/L — SIGNIFICANT CHANGE UP (ref 3.5–5.1)
POTASSIUM SERPL-MCNC: 5 MMOL/L — SIGNIFICANT CHANGE UP (ref 3.5–5.3)
POTASSIUM SERPL-MCNC: SIGNIFICANT CHANGE UP (ref 3.5–5.3)
POTASSIUM SERPL-SCNC: 5 MMOL/L — SIGNIFICANT CHANGE UP (ref 3.5–5.3)
POTASSIUM SERPL-SCNC: SIGNIFICANT CHANGE UP (ref 3.5–5.3)
PROT SERPL-MCNC: 7.2 G/DL — SIGNIFICANT CHANGE UP (ref 6–8.3)
RBC # BLD: 3.04 M/UL — LOW (ref 3.8–5.2)
RBC # FLD: 18.8 % — HIGH (ref 10.3–14.5)
RBC BLD AUTO: ABNORMAL
SAO2 % BLDV: 29.3 % — LOW (ref 67–88)
SARS-COV-2 RNA SPEC QL NAA+PROBE: NEGATIVE — SIGNIFICANT CHANGE UP
SMUDGE CELLS # BLD: PRESENT — SIGNIFICANT CHANGE UP
SODIUM SERPL-SCNC: 136 MMOL/L — SIGNIFICANT CHANGE UP (ref 135–145)
SODIUM SERPL-SCNC: 138 MMOL/L — SIGNIFICANT CHANGE UP (ref 135–145)
VARIANT LYMPHS # BLD: 0.9 % — SIGNIFICANT CHANGE UP (ref 0–6)
WBC # BLD: 4.76 K/UL — SIGNIFICANT CHANGE UP (ref 3.8–10.5)
WBC # FLD AUTO: 4.76 K/UL — SIGNIFICANT CHANGE UP (ref 3.8–10.5)

## 2022-10-12 PROCEDURE — 73620 X-RAY EXAM OF FOOT: CPT | Mod: 26,RT

## 2022-10-12 PROCEDURE — 99285 EMERGENCY DEPT VISIT HI MDM: CPT | Mod: FS

## 2022-10-12 RX ORDER — CARVEDILOL PHOSPHATE 80 MG/1
25 CAPSULE, EXTENDED RELEASE ORAL EVERY 12 HOURS
Refills: 0 | Status: DISCONTINUED | OUTPATIENT
Start: 2022-10-12 | End: 2022-10-13

## 2022-10-12 RX ORDER — SODIUM CHLORIDE 9 MG/ML
1000 INJECTION INTRAMUSCULAR; INTRAVENOUS; SUBCUTANEOUS ONCE
Refills: 0 | Status: COMPLETED | OUTPATIENT
Start: 2022-10-12 | End: 2022-10-12

## 2022-10-12 RX ORDER — ACETAMINOPHEN 500 MG
650 TABLET ORAL ONCE
Refills: 0 | Status: COMPLETED | OUTPATIENT
Start: 2022-10-12 | End: 2022-10-12

## 2022-10-12 RX ORDER — NIFEDIPINE 30 MG
90 TABLET, EXTENDED RELEASE 24 HR ORAL EVERY 24 HOURS
Refills: 0 | Status: DISCONTINUED | OUTPATIENT
Start: 2022-10-12 | End: 2022-10-13

## 2022-10-12 RX ADMIN — SODIUM CHLORIDE 1000 MILLILITER(S): 9 INJECTION INTRAMUSCULAR; INTRAVENOUS; SUBCUTANEOUS at 21:07

## 2022-10-12 NOTE — ED ADULT NURSE NOTE - NSFALLRSKASSESSTYPE_ED_ALL_ED
Sent 4/5 for #90 with 1, informed pharmacy.  Sydnie Stanton, TANI  Message handled by Nurse Triage.     Initial (On Arrival)

## 2022-10-12 NOTE — ED ADULT NURSE NOTE - OBJECTIVE STATEMENT
89F hx of right 3rd toe amputation on 9/28, presenting today with right foot swelling +1, redness and tenderness x3 days. pt reports she was seen by her visiting nurse today who called the surgeon who referred her to ED for evaluation. pt states she has been unable to tolerate standing on foot due to the pain. denies fevers, n/v/d, CP/SOB. pt with subjective chills at home. RR easy, even, un-labored on room air

## 2022-10-12 NOTE — ED ADULT NURSE REASSESSMENT NOTE - NS ED NURSE REASSESS COMMENT FT1
Dr. Espinoza aware pt HR 60bpm on continuos cardiac monitor and EKG. OK to administer ordered cardiac medications despite parameters. still OK for patient to go to regional floor after medication administration.

## 2022-10-12 NOTE — ED PROVIDER NOTE - MUSCULOSKELETAL, MLM
S/p 3rd and 4th toe amputation. +Tenderness, edema, erythema, and warmth of RLE, R foot, and R toes. +Wound w/ purulent discharge lateral to R 2nd toe. DP/PT pulses not palpable on R foot. R foot: S/p 3rd and 4th toe amputation. +Tenderness, edema, erythema, and warmth of RLE, R foot, and R toes. +Wound w/ purulent discharge lateral to R 2nd toe. DP/PT pulses not palpable on R foot.

## 2022-10-12 NOTE — CONSULT NOTE ADULT - ASSESSMENT
89 y/o F w/ a PMHx of CKD 5, DM, HFpEF (EF 70% 2021, grade 1 diastolic dysfunction), HTN, HLD, CAD, hypothyroidism, and PAD s/p R 3rd and 4th toe amputations on 08/30/22 w/ Dr. Brumfield who is presenting with R foot pain. Podiatry consulted for evaluation of right foot amputation site. VSS, wbc 4.76, on exam erythema and edema noted to amputation site and right lower leg, no purulence noted. X-rays pending    *****INCOMPLETE*******      Plan:   F/U x rays   Wound cleansed with normal saline and dressed with WTD dressing   NWB to RLE   Rest of plan pending X ray results and discussion with attending   89 y/o F w/ a PMHx of CKD 5, DM, HFpEF (EF 70% 2021, grade 1 diastolic dysfunction), HTN, HLD, CAD, hypothyroidism, and PAD s/p R 3rd and 4th toe amputations on 08/30/22 w/ Dr. Brumfield who is presenting with R foot pain. Podiatry consulted for evaluation of right foot amputation site. VSS, wbc 4.76, on exam erythema and edema noted to amputation site and right lower leg.  X-rays pending.    Plan:   F/U x rays   Aseptic excisional debridement of fibrotic ulceration of right 2nd digit via sterile #15 blade down to and including the level of bleeding dermis.   Rec admit to medicine   Rec IV doxycyline for cellulitic right 2nd digit  Will re-assess cellulitis in the AM to dictate weather surgical intervention is warranted    Wound cleansed with normal saline and dressed with WTD dressing   NWB to RLE     89 y/o F w/ a PMHx of CKD 5, DM, HFpEF (EF 70% 2021, grade 1 diastolic dysfunction), HTN, HLD, CAD, hypothyroidism, and PAD s/p R 3rd and 4th toe amputations on 08/30/22 w/ Dr. Brumfield who is presenting with R foot pain. Podiatry consulted for evaluation of right foot amputation site. VSS, wbc 4.76, on exam erythema and edema noted to amputation site and right 2nd digit most likely cellulitis. No gas noted on X rays.     Plan:   X rays reviewed   Aseptic excisional debridement of fibrotic ulceration of right 2nd digit via sterile #15 blade down to and including the level of bleeding dermis.   Rec admit to medicine   Rec IV doxycyline for cellulitic right 2nd digit  Will re-assess cellulitis in the AM to dictate weather surgical intervention is warranted    Wound cleansed with normal saline and dressed with WTD dressing   NWB to RLE

## 2022-10-12 NOTE — CONSULT NOTE ADULT - SUBJECTIVE AND OBJECTIVE BOX
Attending: Dr. Brumfield     Patient is a 89y old  Female who presents with a chief complaint of right foot pain and swelling    HPI:  89 y/o F w/ a PMHx of CKD 5, DM, HFpEF (EF 70% 2021, grade 1 diastolic dysfunction), HTN, HLD, CAD, hypothyroidism, and PAD s/p R 3rd and 4th toe amputations on 08/30/22 w/ Dr. Brumfield who is presenting with R foot pain. Patient reports onset of R foot pain and swelling last night involving her 3 remaining toes, her right foot, and her right lower extremity. She was seen by a home nurse today for a routine dressing change, who noticed that the operative site seemed infected, so the nurse called Dr. Brumfield who prescribed doxycycline and told the patient to come to the ED if the pain worsens. Patient has taken 1 dose of doxycycline at 1300 today. Patient denies any fever, chills, SOB, chest pain, abdominal pain, nausea/vomiting, diarrhea, or urinary symptoms.     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      Diabetes      Diabetes mellitus with diabetic neuropathy      Chronic diastolic congestive heart failure      Hypertension      Hyperlipidemia      CAD (coronary artery disease)      Hypothyroidism      Pacemaker      S/P peripheral artery angioplasty with stent placement  6/2021 - SFA to BK Pop stenting (Zilver PTX 6x80mm, Zilver 518 5x80, Zilver 518 5x80 (proximal to distal)); 2/2022 - balloon angioplasty for in-stent stenosis      Amputation of toe of right foot  8/30/22 3rd and 4th toe w/ Dr. Brumfield        Home Medications:  acetaminophen 325 mg oral tablet: 2 tab(s) orally every 6 hours, As needed, Mild Pain (1 - 3) (28 Aug 2022 14:56)  calcitriol 0.5 mcg oral capsule: 1 cap(s) orally once a day (28 Aug 2022 14:56)  Coreg 25 mg oral tablet: 1 tab(s) orally every 12 hours (28 Aug 2022 14:56)  cyclobenzaprine 10 mg oral tablet: 1 tab(s) orally 3 times a day, As Needed (28 Aug 2022 14:56)  folic acid 1 mg oral tablet: 1 tab(s) orally once a day (28 Aug 2022 14:56)  levothyroxine 100 mcg (0.1 mg) oral tablet: 1 tab(s) orally once a day (28 Aug 2022 14:56)  Lipitor 80 mg oral tablet: 1 tab(s) orally once a day (at bedtime) (28 Aug 2022 14:56)  zolpidem 5 mg oral tablet: 1 tab(s) orally once a day (at bedtime) (28 Aug 2022 14:56)    Allergies    No Known Allergies    Intolerances      FAMILY HISTORY:    Social History:       LABS                        9.5    4.76  )-----------( 140      ( 12 Oct 2022 21:06 )             28.5     10-12    136  |  106  |  47<H>  ----------------------------<  138<H>  See Note   |  20<L>  |  3.06<H>    Ca    12.6<H>      12 Oct 2022 21:06  Phos  4.1     10-12  Mg     1.8     10-12    TPro  7.2  /  Alb  3.8  /  TBili  0.2  /  DBili  x   /  AST  See Note  /  ALT  See Note  /  AlkPhos  See Note  10-12        Vital Signs Last 24 Hrs  T(C): 36.6 (12 Oct 2022 19:58), Max: 36.6 (12 Oct 2022 19:58)  T(F): 97.8 (12 Oct 2022 19:58), Max: 97.8 (12 Oct 2022 19:58)  HR: 65 (12 Oct 2022 19:58) (65 - 65)  BP: 182/73 (12 Oct 2022 19:58) (182/73 - 182/73)  BP(mean): --  RR: 18 (12 Oct 2022 19:58) (18 - 18)  SpO2: 100% (12 Oct 2022 19:58) (100% - 100%)    Parameters below as of 12 Oct 2022 19:58  Patient On (Oxygen Delivery Method): room air        PHYSICAL EXAM  General: NAD, AA0x2-3  RIGHT Lower Extremity Focused:  Vasc: DP/PT non-palpable, biphasic on doppler, CFT <3 x 3 digits, Temp gradient warm to warm, +1 pitting edema to right dorsal foot and pretibial area  Derm: Distal wound at 3rd and 4th digit amputation site, wound base 90% granular base and 10% fibrotic base, Negative PTB, No tracking, No tunneling, No malodor, no purulence slight serous drainage, periwound erythema to forefoot and immediately surrounding wound, Erythema to right 2nd digit, 0.5x0.5cm wound to 2nd digit dorsal PIPJ fibrotic base.   Neuro: Protective sensation diminished  MSK: 3rd and 4th digit amputation with 3rd and 4th met head resection, TTP to amputation site     RADIOLOGY: Right foot X rays: pending                        Attending: Dr. Brumfield     Patient is a 89y old  Female who presents with a chief complaint of right foot pain and swelling    HPI:  89 y/o F w/ a PMHx of CKD 5, DM, HFpEF (EF 70% 2021, grade 1 diastolic dysfunction), HTN, HLD, CAD, hypothyroidism, and PAD s/p R 3rd and 4th toe amputations on 08/30/22 w/ Dr. Brumfield who is presenting with R foot pain. Patient reports onset of R foot pain and swelling last night involving her 3 remaining toes, her right foot, and her right lower extremity. She was seen by a home nurse today for a routine dressing change, who noticed that the operative site seemed infected, so the nurse called Dr. Brumfield who prescribed doxycycline and told the patient to come to the ED if the pain worsens. Patient has taken 1 dose of doxycycline at 1300 today. Patient denies any fever, chills, SOB, chest pain, abdominal pain, nausea/vomiting, diarrhea, or urinary symptoms.     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      Diabetes      Diabetes mellitus with diabetic neuropathy      Chronic diastolic congestive heart failure      Hypertension      Hyperlipidemia      CAD (coronary artery disease)      Hypothyroidism      Pacemaker      S/P peripheral artery angioplasty with stent placement  6/2021 - SFA to BK Pop stenting (Zilver PTX 6x80mm, Zilver 518 5x80, Zilver 518 5x80 (proximal to distal)); 2/2022 - balloon angioplasty for in-stent stenosis      Amputation of toe of right foot  8/30/22 3rd and 4th toe w/ Dr. Brumfield        Home Medications:  acetaminophen 325 mg oral tablet: 2 tab(s) orally every 6 hours, As needed, Mild Pain (1 - 3) (28 Aug 2022 14:56)  calcitriol 0.5 mcg oral capsule: 1 cap(s) orally once a day (28 Aug 2022 14:56)  Coreg 25 mg oral tablet: 1 tab(s) orally every 12 hours (28 Aug 2022 14:56)  cyclobenzaprine 10 mg oral tablet: 1 tab(s) orally 3 times a day, As Needed (28 Aug 2022 14:56)  folic acid 1 mg oral tablet: 1 tab(s) orally once a day (28 Aug 2022 14:56)  levothyroxine 100 mcg (0.1 mg) oral tablet: 1 tab(s) orally once a day (28 Aug 2022 14:56)  Lipitor 80 mg oral tablet: 1 tab(s) orally once a day (at bedtime) (28 Aug 2022 14:56)  zolpidem 5 mg oral tablet: 1 tab(s) orally once a day (at bedtime) (28 Aug 2022 14:56)    Allergies    No Known Allergies    Intolerances      FAMILY HISTORY:    Social History:       LABS                        9.5    4.76  )-----------( 140      ( 12 Oct 2022 21:06 )             28.5     10-12    136  |  106  |  47<H>  ----------------------------<  138<H>  See Note   |  20<L>  |  3.06<H>    Ca    12.6<H>      12 Oct 2022 21:06  Phos  4.1     10-12  Mg     1.8     10-12    TPro  7.2  /  Alb  3.8  /  TBili  0.2  /  DBili  x   /  AST  See Note  /  ALT  See Note  /  AlkPhos  See Note  10-12        Vital Signs Last 24 Hrs  T(C): 36.6 (12 Oct 2022 19:58), Max: 36.6 (12 Oct 2022 19:58)  T(F): 97.8 (12 Oct 2022 19:58), Max: 97.8 (12 Oct 2022 19:58)  HR: 65 (12 Oct 2022 19:58) (65 - 65)  BP: 182/73 (12 Oct 2022 19:58) (182/73 - 182/73)  BP(mean): --  RR: 18 (12 Oct 2022 19:58) (18 - 18)  SpO2: 100% (12 Oct 2022 19:58) (100% - 100%)    Parameters below as of 12 Oct 2022 19:58  Patient On (Oxygen Delivery Method): room air        PHYSICAL EXAM  General: NAD, AA0x2-3  RIGHT Lower Extremity Focused:  Vasc: DP/PT non-palpable, biphasic on doppler, CFT <3 x 3 digits, Temp gradient warm to warm, +1 pitting edema to right dorsal foot and pretibial area  Derm: Distal wound at 3rd and 4th digit amputation site, wound base 90% granular base and 10% fibrotic base, Negative PTB, No tracking, No tunneling, No malodor, no purulence slight serous drainage, periwound erythema to forefoot and immediately surrounding wound, Erythema to right 2nd digit, 0.5x0.5cm wound to 2nd digit dorsal PIPJ fibrotic base, bone exposed.   Neuro: Protective sensation diminished  MSK: 3rd and 4th digit amputation with 3rd and 4th met head resection, TTP to amputation site     RADIOLOGY: Right foot X rays: status post amputation of the third and fourth digits at the level of the metatarsal necks, no soft tissue gas noted

## 2022-10-12 NOTE — ED PROVIDER NOTE - MUSCULOSKELETAL NEGATIVE STATEMENT, MLM
+RLE, R foot, and R toe pain, swelling, and redness. No back pain, no gout, no neck pain, and no weakness.

## 2022-10-12 NOTE — ED PROVIDER NOTE - NSICDXPASTSURGICALHX_GEN_ALL_CORE_FT
PAST SURGICAL HISTORY:  Amputation of toe of right foot 8/30/22 3rd and 4th toe w/ Dr. Brumfield    Pacemaker     S/P peripheral artery angioplasty with stent placement 6/2021 - SFA to BK Pop stenting (Zilver PTX 6x80mm, Zilver 518 5x80, Zilver 518 5x80 (proximal to distal)); 2/2022 - balloon angioplasty for in-stent stenosis

## 2022-10-12 NOTE — ED PROVIDER NOTE - OBJECTIVE STATEMENT
Patient is a 89 y/o F w/ a PMHx of CKD 5, DM, HFpEF (EF 70% 2021, grade 1 diastolic dysfunction), HTN, HLD, CAD, hypothyroidism, and PAD s/p R 3rd and 4th toe amputations on 08/30/22 w/ Dr. Brumfield who is presenting with R foot pain. Patient reports onset of R foot pain and swelling last night involving her 3 remaining toes, her right foot, and her right lower extremity. She was seen by a home nurse today for a routine dressing change, who noticed that the operative site seemed infected, so the nurse called Dr. Brumfield who prescribed doxycycline and told the patient to come to the ED if the pain worsens. Patient has taken 1 dose of doxycycline at 1300 today. Patient denies any fever, chills, SOB, chest pain, abdominal pain, nausea/vomiting, diarrhea, or urinary symptoms.

## 2022-10-12 NOTE — ED PROVIDER NOTE - PROGRESS NOTE DETAILS
[spring] podiatry reccs new ulcer/erythema abutting recent postop site. NO surgical intervention at this time. reccs for admission to [facility] for iv doxy.

## 2022-10-12 NOTE — ED PROVIDER NOTE - CLINICAL SUMMARY MEDICAL DECISION MAKING FREE TEXT BOX
Patient is a 89 y/o F w/ a PMHx of CKD5, DM, HFpEF (EF 70% 2021, grade 1 diastolic dysfunction), HTN, HLD, CAD, hypothyroidism, and PAD s/p R 3rd and 4th toe amputations on 08/30/22 w/ Dr. Brumfield who is presenting with RLE, R foot, and R toe pain and swelling for 1 day. Took 1 dose of doxycycline earlier today. Vital signs stable. Exam w/ RLE, R foot, and R toe tenderness, swelling, erythema, and warmth, surgical site w/ mild purulent discharge, and R DP/PT pulses not palpable.    DDx: cellulitis vs. abscess vs. less likely osteomyelitis    Plan:  - Podiatry consulted, will see patient  - Labs, BCx  - Xray R foot  - IVF Bolus  - Pain medication

## 2022-10-12 NOTE — ED PROVIDER NOTE - NS ED ATTENDING STATEMENT MOD
I have seen and examined this patient and fully participated in the care of this patient as the teaching attending.  The service was shared with the RENNY.  I reviewed and verified the documentation and independently performed the documented:

## 2022-10-12 NOTE — ED ADULT TRIAGE NOTE - CHIEF COMPLAINT QUOTE
Pt s/p amputation of two toes 1 month ago to the right foot. PT reports for the past three days she has had redness, swelling, pain not relieved by tylenol to residual toes. Pt denies fevers/chills.

## 2022-10-12 NOTE — ED PROVIDER NOTE - ATTENDING CONTRIBUTION TO CARE
agree with above    Patient is a 89 y/o F w/ a PMHx of CKD 5, DM, HFpEF (EF 70% 2021, grade 1 diastolic dysfunction), HTN, HLD, CAD, hypothyroidism, and PAD s/p R 3rd and 4th toe amputations on 08/30/22 w/ Dr. Brumfield who is presenting with R foot pain. was prescribed doxycycline and told to come to ED by Dr. Brumfield's office.    PE:  VS: /73 VS otherwise wnl  GENERAL: Well appearing, well nourished, awake, alert and in NAD  ENMT: Airway patent.  EYES: conjunctiva clear,  CARDIAC: Regular rate, regular rhythm.  Heart sounds S1, S2, no S3, S4. No murmurs, rubs or gallops.  RESPIRATORY: Breath sounds clear and equal in bilateral anterior lung fields, no wheezes/ronchi/stridor; pt breathing and speaking comfortably with no increased WOB, no accessory mm. use, no intercostal retractions, no nasal flaring  GI: abdomen soft, non-distended, non-tender, no rebound or guarding.   BlE: 5/5 motor strength bilat hip flexors, knee flexors and extensors, plantar and dorsiflexors, hallux flexors and extensors. sensation intact to light touch bilat L3-S1; 2+ DP pulses bilat; compartment soft, skin warm and dry  R foot: dressings are c/d/i. s/p 3rd and 4th toe amputation. +Tenderness, edema, erythema, and warmth of RLE, R foot, and R toes. 2+ DP pulses bilat no crepitus. SILT bliat L3-S1. limited rom of R foot toes 2/2 pain     Patient is a 89 y/o F w/ a PMHx of CKD 5, DM, HFpEF (EF 70% 2021, grade 1 diastolic dysfunction), HTN, HLD, CAD, hypothyroidism, and PAD s/p R 3rd and 4th toe amputations on 08/30/22 w/ Dr. Brumfield who is presenting with R foot pain. was prescribed doxycycline and told to come to ED by Dr. Brumfield's office. On exam, BP elevated VS otherwise wnl, erythema of R foot and toes concerning for possible ddx: cellulitis vs DM foot ulcer vs osteomyelitis vs gangrene.    Plan:  - labs, esr  - XR foot  - blood cultures  - covid test  - IVF  - podiatry is consulted and will evaluate pt in ED

## 2022-10-13 ENCOUNTER — TRANSCRIPTION ENCOUNTER (OUTPATIENT)
Age: 87
End: 2022-10-13

## 2022-10-13 VITALS
DIASTOLIC BLOOD PRESSURE: 66 MMHG | HEART RATE: 90 BPM | SYSTOLIC BLOOD PRESSURE: 137 MMHG | RESPIRATION RATE: 18 BRPM | TEMPERATURE: 98 F | OXYGEN SATURATION: 99 %

## 2022-10-13 DIAGNOSIS — E11.9 TYPE 2 DIABETES MELLITUS WITHOUT COMPLICATIONS: ICD-10-CM

## 2022-10-13 DIAGNOSIS — I16.0 HYPERTENSIVE URGENCY: ICD-10-CM

## 2022-10-13 DIAGNOSIS — E78.5 HYPERLIPIDEMIA, UNSPECIFIED: ICD-10-CM

## 2022-10-13 DIAGNOSIS — N18.5 CHRONIC KIDNEY DISEASE, STAGE 5: ICD-10-CM

## 2022-10-13 DIAGNOSIS — E83.52 HYPERCALCEMIA: ICD-10-CM

## 2022-10-13 DIAGNOSIS — M79.671 PAIN IN RIGHT FOOT: ICD-10-CM

## 2022-10-13 DIAGNOSIS — D63.8 ANEMIA IN OTHER CHRONIC DISEASES CLASSIFIED ELSEWHERE: ICD-10-CM

## 2022-10-13 DIAGNOSIS — D70.9 NEUTROPENIA, UNSPECIFIED: ICD-10-CM

## 2022-10-13 DIAGNOSIS — R89.8 OTHER ABNORMAL FINDINGS IN SPECIMENS FROM OTHER ORGANS, SYSTEMS AND TISSUES: ICD-10-CM

## 2022-10-13 DIAGNOSIS — Z29.9 ENCOUNTER FOR PROPHYLACTIC MEASURES, UNSPECIFIED: ICD-10-CM

## 2022-10-13 LAB
A1C WITH ESTIMATED AVERAGE GLUCOSE RESULT: 5.6 % — SIGNIFICANT CHANGE UP (ref 4–5.6)
ANION GAP SERPL CALC-SCNC: 9 MMOL/L — SIGNIFICANT CHANGE UP (ref 5–17)
ANION GAP SERPL CALC-SCNC: 9 MMOL/L — SIGNIFICANT CHANGE UP (ref 5–17)
ANISOCYTOSIS BLD QL: SLIGHT — SIGNIFICANT CHANGE UP
BASOPHILS # BLD AUTO: 0.06 K/UL — SIGNIFICANT CHANGE UP (ref 0–0.2)
BASOPHILS NFR BLD AUTO: 1.3 % — SIGNIFICANT CHANGE UP (ref 0–2)
BASOPHILS NFR BLD AUTO: 4 % — HIGH (ref 0–2)
BLD GP AB SCN SERPL QL: POSITIVE — SIGNIFICANT CHANGE UP
BUN SERPL-MCNC: 47 MG/DL — HIGH (ref 7–23)
BUN SERPL-MCNC: 48 MG/DL — HIGH (ref 7–23)
BURR CELLS BLD QL SMEAR: PRESENT — SIGNIFICANT CHANGE UP
CALCIUM SERPL-MCNC: 11.7 MG/DL — HIGH (ref 8.4–10.5)
CALCIUM SERPL-MCNC: 11.9 MG/DL — HIGH (ref 8.4–10.5)
CHLORIDE SERPL-SCNC: 108 MMOL/L — SIGNIFICANT CHANGE UP (ref 96–108)
CHLORIDE SERPL-SCNC: 108 MMOL/L — SIGNIFICANT CHANGE UP (ref 96–108)
CO2 SERPL-SCNC: 19 MMOL/L — LOW (ref 22–31)
CO2 SERPL-SCNC: 19 MMOL/L — LOW (ref 22–31)
CREAT SERPL-MCNC: 2.95 MG/DL — HIGH (ref 0.5–1.3)
CREAT SERPL-MCNC: 2.98 MG/DL — HIGH (ref 0.5–1.3)
EGFR: 14 ML/MIN/1.73M2 — LOW
EGFR: 15 ML/MIN/1.73M2 — LOW
EOSINOPHIL # BLD AUTO: 0.78 K/UL — HIGH (ref 0–0.5)
EOSINOPHIL NFR BLD AUTO: 17 % — HIGH (ref 0–6)
EOSINOPHIL NFR BLD AUTO: 19 % — HIGH (ref 0–6)
ESTIMATED AVERAGE GLUCOSE: 114 MG/DL — SIGNIFICANT CHANGE UP (ref 68–114)
GLUCOSE BLDC GLUCOMTR-MCNC: 109 MG/DL — HIGH (ref 70–99)
GLUCOSE BLDC GLUCOMTR-MCNC: 180 MG/DL — HIGH (ref 70–99)
GLUCOSE SERPL-MCNC: 123 MG/DL — HIGH (ref 70–99)
GLUCOSE SERPL-MCNC: 96 MG/DL — SIGNIFICANT CHANGE UP (ref 70–99)
HCT VFR BLD CALC: 26.8 % — LOW (ref 34.5–45)
HGB BLD-MCNC: 8.5 G/DL — LOW (ref 11.5–15.5)
HYPOCHROMIA BLD QL: SLIGHT — SIGNIFICANT CHANGE UP
IMM GRANULOCYTES NFR BLD AUTO: 6.8 % — HIGH (ref 0–0.9)
LYMPHOCYTES # BLD AUTO: 1.51 K/UL — SIGNIFICANT CHANGE UP (ref 1–3.3)
LYMPHOCYTES # BLD AUTO: 32.9 % — SIGNIFICANT CHANGE UP (ref 13–44)
LYMPHOCYTES # BLD AUTO: 36 % — SIGNIFICANT CHANGE UP (ref 13–44)
MACROCYTES BLD QL: SLIGHT — SIGNIFICANT CHANGE UP
MAGNESIUM SERPL-MCNC: 1.7 MG/DL — SIGNIFICANT CHANGE UP (ref 1.6–2.6)
MANUAL SMEAR VERIFICATION: SIGNIFICANT CHANGE UP
MCHC RBC-ENTMCNC: 29.9 PG — SIGNIFICANT CHANGE UP (ref 27–34)
MCHC RBC-ENTMCNC: 31.7 GM/DL — LOW (ref 32–36)
MCV RBC AUTO: 94.4 FL — SIGNIFICANT CHANGE UP (ref 80–100)
MICROCYTES BLD QL: SLIGHT — SIGNIFICANT CHANGE UP
MONOCYTES # BLD AUTO: 0.47 K/UL — SIGNIFICANT CHANGE UP (ref 0–0.9)
MONOCYTES NFR BLD AUTO: 10.2 % — SIGNIFICANT CHANGE UP (ref 2–14)
MONOCYTES NFR BLD AUTO: 9 % — SIGNIFICANT CHANGE UP (ref 2–14)
NEUTROPHILS # BLD AUTO: 1.46 K/UL — LOW (ref 1.8–7.4)
NEUTROPHILS NFR BLD AUTO: 30 % — LOW (ref 43–77)
NEUTROPHILS NFR BLD AUTO: 31.8 % — LOW (ref 43–77)
NEUTS BAND # BLD: 2 % — SIGNIFICANT CHANGE UP
NRBC # BLD: 0 /100 WBCS — SIGNIFICANT CHANGE UP (ref 0–0)
OVALOCYTES BLD QL SMEAR: SLIGHT — SIGNIFICANT CHANGE UP
PHOSPHATE SERPL-MCNC: 3.5 MG/DL — SIGNIFICANT CHANGE UP (ref 2.5–4.5)
PLAT MORPH BLD: ABNORMAL
PLATELET # BLD AUTO: 102 K/UL — LOW (ref 150–400)
POIKILOCYTOSIS BLD QL AUTO: SLIGHT — SIGNIFICANT CHANGE UP
POTASSIUM SERPL-MCNC: 4.9 MMOL/L — SIGNIFICANT CHANGE UP (ref 3.5–5.3)
POTASSIUM SERPL-MCNC: 5.3 MMOL/L — SIGNIFICANT CHANGE UP (ref 3.5–5.3)
POTASSIUM SERPL-SCNC: 4.9 MMOL/L — SIGNIFICANT CHANGE UP (ref 3.5–5.3)
POTASSIUM SERPL-SCNC: 5.3 MMOL/L — SIGNIFICANT CHANGE UP (ref 3.5–5.3)
RBC # BLD: 2.84 M/UL — LOW (ref 3.8–5.2)
RBC # FLD: 18.6 % — HIGH (ref 10.3–14.5)
RBC BLD AUTO: ABNORMAL
RH IG SCN BLD-IMP: POSITIVE — SIGNIFICANT CHANGE UP
SMUDGE CELLS # BLD: PRESENT — SIGNIFICANT CHANGE UP
SODIUM SERPL-SCNC: 136 MMOL/L — SIGNIFICANT CHANGE UP (ref 135–145)
SODIUM SERPL-SCNC: 136 MMOL/L — SIGNIFICANT CHANGE UP (ref 135–145)
WBC # BLD: 4.59 K/UL — SIGNIFICANT CHANGE UP (ref 3.8–10.5)
WBC # FLD AUTO: 4.59 K/UL — SIGNIFICANT CHANGE UP (ref 3.8–10.5)

## 2022-10-13 PROCEDURE — 36415 COLL VENOUS BLD VENIPUNCTURE: CPT

## 2022-10-13 PROCEDURE — 73620 X-RAY EXAM OF FOOT: CPT

## 2022-10-13 PROCEDURE — 99285 EMERGENCY DEPT VISIT HI MDM: CPT | Mod: 25

## 2022-10-13 PROCEDURE — 83036 HEMOGLOBIN GLYCOSYLATED A1C: CPT

## 2022-10-13 PROCEDURE — 84295 ASSAY OF SERUM SODIUM: CPT

## 2022-10-13 PROCEDURE — 82330 ASSAY OF CALCIUM: CPT

## 2022-10-13 PROCEDURE — 86880 COOMBS TEST DIRECT: CPT

## 2022-10-13 PROCEDURE — 86850 RBC ANTIBODY SCREEN: CPT

## 2022-10-13 PROCEDURE — 93005 ELECTROCARDIOGRAM TRACING: CPT

## 2022-10-13 PROCEDURE — 87040 BLOOD CULTURE FOR BACTERIA: CPT

## 2022-10-13 PROCEDURE — 96374 THER/PROPH/DIAG INJ IV PUSH: CPT

## 2022-10-13 PROCEDURE — 86901 BLOOD TYPING SEROLOGIC RH(D): CPT

## 2022-10-13 PROCEDURE — 83735 ASSAY OF MAGNESIUM: CPT

## 2022-10-13 PROCEDURE — 80048 BASIC METABOLIC PNL TOTAL CA: CPT

## 2022-10-13 PROCEDURE — 84100 ASSAY OF PHOSPHORUS: CPT

## 2022-10-13 PROCEDURE — 86077 PHYS BLOOD BANK SERV XMATCH: CPT

## 2022-10-13 PROCEDURE — 87635 SARS-COV-2 COVID-19 AMP PRB: CPT

## 2022-10-13 PROCEDURE — 82803 BLOOD GASES ANY COMBINATION: CPT

## 2022-10-13 PROCEDURE — 82962 GLUCOSE BLOOD TEST: CPT

## 2022-10-13 PROCEDURE — 86038 ANTINUCLEAR ANTIBODIES: CPT

## 2022-10-13 PROCEDURE — 85007 BL SMEAR W/DIFF WBC COUNT: CPT

## 2022-10-13 PROCEDURE — 86870 RBC ANTIBODY IDENTIFICATION: CPT

## 2022-10-13 PROCEDURE — 85025 COMPLETE CBC W/AUTO DIFF WBC: CPT

## 2022-10-13 PROCEDURE — 85652 RBC SED RATE AUTOMATED: CPT

## 2022-10-13 PROCEDURE — G0378: CPT

## 2022-10-13 PROCEDURE — 84132 ASSAY OF SERUM POTASSIUM: CPT

## 2022-10-13 PROCEDURE — 82785 ASSAY OF IGE: CPT

## 2022-10-13 PROCEDURE — 86682 HELMINTH ANTIBODY: CPT

## 2022-10-13 PROCEDURE — 80053 COMPREHEN METABOLIC PANEL: CPT

## 2022-10-13 PROCEDURE — 86900 BLOOD TYPING SEROLOGIC ABO: CPT

## 2022-10-13 RX ORDER — CALCITRIOL 0.5 UG/1
1 CAPSULE ORAL
Qty: 0 | Refills: 0 | DISCHARGE

## 2022-10-13 RX ORDER — SODIUM CHLORIDE 9 MG/ML
1000 INJECTION, SOLUTION INTRAVENOUS
Refills: 0 | Status: DISCONTINUED | OUTPATIENT
Start: 2022-10-13 | End: 2022-10-13

## 2022-10-13 RX ORDER — DEXTROSE 50 % IN WATER 50 %
15 SYRINGE (ML) INTRAVENOUS ONCE
Refills: 0 | Status: DISCONTINUED | OUTPATIENT
Start: 2022-10-13 | End: 2022-10-13

## 2022-10-13 RX ORDER — SODIUM CHLORIDE 9 MG/ML
1000 INJECTION INTRAMUSCULAR; INTRAVENOUS; SUBCUTANEOUS
Refills: 0 | Status: DISCONTINUED | OUTPATIENT
Start: 2022-10-13 | End: 2022-10-13

## 2022-10-13 RX ORDER — ASPIRIN/CALCIUM CARB/MAGNESIUM 324 MG
81 TABLET ORAL DAILY
Refills: 0 | Status: DISCONTINUED | OUTPATIENT
Start: 2022-10-13 | End: 2022-10-13

## 2022-10-13 RX ORDER — ATORVASTATIN CALCIUM 80 MG/1
80 TABLET, FILM COATED ORAL AT BEDTIME
Refills: 0 | Status: DISCONTINUED | OUTPATIENT
Start: 2022-10-13 | End: 2022-10-13

## 2022-10-13 RX ORDER — SEVELAMER CARBONATE 2400 MG/1
800 POWDER, FOR SUSPENSION ORAL
Refills: 0 | Status: DISCONTINUED | OUTPATIENT
Start: 2022-10-13 | End: 2022-10-13

## 2022-10-13 RX ORDER — NIFEDIPINE 30 MG
1 TABLET, EXTENDED RELEASE 24 HR ORAL
Qty: 0 | Refills: 0 | DISCHARGE
Start: 2022-10-13

## 2022-10-13 RX ORDER — DEXTROSE 50 % IN WATER 50 %
25 SYRINGE (ML) INTRAVENOUS ONCE
Refills: 0 | Status: DISCONTINUED | OUTPATIENT
Start: 2022-10-13 | End: 2022-10-13

## 2022-10-13 RX ORDER — ZOLPIDEM TARTRATE 10 MG/1
1 TABLET ORAL
Qty: 0 | Refills: 0 | DISCHARGE

## 2022-10-13 RX ORDER — SODIUM CHLORIDE 9 MG/ML
1000 INJECTION INTRAMUSCULAR; INTRAVENOUS; SUBCUTANEOUS ONCE
Refills: 0 | Status: COMPLETED | OUTPATIENT
Start: 2022-10-13 | End: 2022-10-13

## 2022-10-13 RX ORDER — HYDRALAZINE HCL 50 MG
10 TABLET ORAL ONCE
Refills: 0 | Status: COMPLETED | OUTPATIENT
Start: 2022-10-13 | End: 2022-10-13

## 2022-10-13 RX ORDER — CYCLOBENZAPRINE HYDROCHLORIDE 10 MG/1
10 TABLET, FILM COATED ORAL AT BEDTIME
Refills: 0 | Status: DISCONTINUED | OUTPATIENT
Start: 2022-10-13 | End: 2022-10-13

## 2022-10-13 RX ORDER — AMOXICILLIN 250 MG/5ML
1 SUSPENSION, RECONSTITUTED, ORAL (ML) ORAL
Qty: 30 | Refills: 0
Start: 2022-10-13 | End: 2022-10-22

## 2022-10-13 RX ORDER — ALLOPURINOL 300 MG
50 TABLET ORAL
Qty: 0 | Refills: 0 | DISCHARGE
Start: 2022-10-13

## 2022-10-13 RX ORDER — ALLOPURINOL 300 MG
50 TABLET ORAL DAILY
Refills: 0 | Status: DISCONTINUED | OUTPATIENT
Start: 2022-10-13 | End: 2022-10-13

## 2022-10-13 RX ORDER — LEVOTHYROXINE SODIUM 125 MCG
1 TABLET ORAL
Qty: 0 | Refills: 0 | DISCHARGE
Start: 2022-10-13

## 2022-10-13 RX ORDER — CARVEDILOL PHOSPHATE 80 MG/1
1 CAPSULE, EXTENDED RELEASE ORAL
Qty: 0 | Refills: 0 | DISCHARGE
Start: 2022-10-13

## 2022-10-13 RX ORDER — INFLUENZA VIRUS VACCINE 15; 15; 15; 15 UG/.5ML; UG/.5ML; UG/.5ML; UG/.5ML
0.7 SUSPENSION INTRAMUSCULAR ONCE
Refills: 0 | Status: DISCONTINUED | OUTPATIENT
Start: 2022-10-13 | End: 2022-10-13

## 2022-10-13 RX ORDER — LANOLIN ALCOHOL/MO/W.PET/CERES
1 CREAM (GRAM) TOPICAL
Qty: 0 | Refills: 0 | DISCHARGE
Start: 2022-10-13

## 2022-10-13 RX ORDER — HEPARIN SODIUM 5000 [USP'U]/ML
5000 INJECTION INTRAVENOUS; SUBCUTANEOUS EVERY 8 HOURS
Refills: 0 | Status: DISCONTINUED | OUTPATIENT
Start: 2022-10-13 | End: 2022-10-13

## 2022-10-13 RX ORDER — INSULIN LISPRO 100/ML
VIAL (ML) SUBCUTANEOUS
Refills: 0 | Status: DISCONTINUED | OUTPATIENT
Start: 2022-10-13 | End: 2022-10-13

## 2022-10-13 RX ORDER — CLOPIDOGREL BISULFATE 75 MG/1
75 TABLET, FILM COATED ORAL DAILY
Refills: 0 | Status: DISCONTINUED | OUTPATIENT
Start: 2022-10-13 | End: 2022-10-13

## 2022-10-13 RX ORDER — GLUCAGON INJECTION, SOLUTION 0.5 MG/.1ML
1 INJECTION, SOLUTION SUBCUTANEOUS ONCE
Refills: 0 | Status: DISCONTINUED | OUTPATIENT
Start: 2022-10-13 | End: 2022-10-13

## 2022-10-13 RX ORDER — PANTOPRAZOLE SODIUM 20 MG/1
40 TABLET, DELAYED RELEASE ORAL
Refills: 0 | Status: DISCONTINUED | OUTPATIENT
Start: 2022-10-13 | End: 2022-10-13

## 2022-10-13 RX ORDER — DEXTROSE 50 % IN WATER 50 %
12.5 SYRINGE (ML) INTRAVENOUS ONCE
Refills: 0 | Status: DISCONTINUED | OUTPATIENT
Start: 2022-10-13 | End: 2022-10-13

## 2022-10-13 RX ORDER — FOLIC ACID 0.8 MG
1 TABLET ORAL
Qty: 0 | Refills: 0 | DISCHARGE

## 2022-10-13 RX ORDER — LANOLIN ALCOHOL/MO/W.PET/CERES
5 CREAM (GRAM) TOPICAL AT BEDTIME
Refills: 0 | Status: DISCONTINUED | OUTPATIENT
Start: 2022-10-13 | End: 2022-10-13

## 2022-10-13 RX ORDER — LEVOTHYROXINE SODIUM 125 MCG
100 TABLET ORAL DAILY
Refills: 0 | Status: DISCONTINUED | OUTPATIENT
Start: 2022-10-13 | End: 2022-10-13

## 2022-10-13 RX ORDER — CYCLOBENZAPRINE HYDROCHLORIDE 10 MG/1
1 TABLET, FILM COATED ORAL
Qty: 0 | Refills: 0 | DISCHARGE

## 2022-10-13 RX ADMIN — CLOPIDOGREL BISULFATE 75 MILLIGRAM(S): 75 TABLET, FILM COATED ORAL at 16:40

## 2022-10-13 RX ADMIN — SODIUM CHLORIDE 45 MILLILITER(S): 9 INJECTION INTRAMUSCULAR; INTRAVENOUS; SUBCUTANEOUS at 02:33

## 2022-10-13 RX ADMIN — HEPARIN SODIUM 5000 UNIT(S): 5000 INJECTION INTRAVENOUS; SUBCUTANEOUS at 06:38

## 2022-10-13 RX ADMIN — Medication 100 MILLIGRAM(S): at 06:38

## 2022-10-13 RX ADMIN — CARVEDILOL PHOSPHATE 25 MILLIGRAM(S): 80 CAPSULE, EXTENDED RELEASE ORAL at 06:39

## 2022-10-13 RX ADMIN — Medication 90 MILLIGRAM(S): at 00:26

## 2022-10-13 RX ADMIN — Medication 81 MILLIGRAM(S): at 16:40

## 2022-10-13 RX ADMIN — Medication 100 MICROGRAM(S): at 16:39

## 2022-10-13 RX ADMIN — CARVEDILOL PHOSPHATE 25 MILLIGRAM(S): 80 CAPSULE, EXTENDED RELEASE ORAL at 00:28

## 2022-10-13 RX ADMIN — CLOPIDOGREL BISULFATE 75 MILLIGRAM(S): 75 TABLET, FILM COATED ORAL at 01:23

## 2022-10-13 RX ADMIN — PANTOPRAZOLE SODIUM 40 MILLIGRAM(S): 20 TABLET, DELAYED RELEASE ORAL at 06:38

## 2022-10-13 RX ADMIN — SEVELAMER CARBONATE 800 MILLIGRAM(S): 2400 POWDER, FOR SUSPENSION ORAL at 16:40

## 2022-10-13 RX ADMIN — Medication 110 MILLIGRAM(S): at 00:28

## 2022-10-13 RX ADMIN — SODIUM CHLORIDE 1000 MILLILITER(S): 9 INJECTION INTRAMUSCULAR; INTRAVENOUS; SUBCUTANEOUS at 10:07

## 2022-10-13 RX ADMIN — Medication 1: at 13:23

## 2022-10-13 RX ADMIN — SODIUM CHLORIDE 45 MILLILITER(S): 9 INJECTION INTRAMUSCULAR; INTRAVENOUS; SUBCUTANEOUS at 02:26

## 2022-10-13 NOTE — H&P ADULT - PROBLEM SELECTOR PLAN 6
patient with hx of diabetes  not on insulin  on metformin and trigenta at home  -monitor fingersticks   - use sliding scale

## 2022-10-13 NOTE — DISCHARGE NOTE PROVIDER - NSDCMRMEDTOKEN_GEN_ALL_CORE_FT
acetaminophen 325 mg oral tablet: 2 tab(s) orally every 6 hours, As needed, Mild Pain (1 - 3)  amoxicillin-clavulanate 500 mg-125 mg oral tablet: 1 tab(s) orally once a day   Aspirin Low Dose 81 mg oral tablet, chewable: 1 tab(s) orally once a day  calcitriol 0.5 mcg oral capsule: 1 cap(s) orally once a day  clopidogrel 75 mg oral tablet: 1 tab(s) orally once a day  Coreg 25 mg oral tablet: 1 tab(s) orally every 12 hours  Coreg 25 mg oral tablet: 1 tab(s) orally every 12 hours MDD:2 tablets  Coreg 25 mg oral tablet: 1 tab(s) orally every 12 hours MDD:2 tablets  cyclobenzaprine 10 mg oral tablet: 1 tab(s) orally 3 times a day, As Needed  folic acid 1 mg oral tablet: 1 tab(s) orally once a day  levothyroxine 100 mcg (0.1 mg) oral tablet: 1 tab(s) orally once a day  Lipitor 80 mg oral tablet: 1 tab(s) orally once a day (at bedtime)  NIFEdipine 90 mg oral tablet, extended release: 1 tab(s) orally once a day MDD:1 tablet  NIFEdipine 90 mg oral tablet, extended release: 1 tab(s) orally once a day  oxyCODONE 5 mg oral tablet: 1 tab(s) orally once a day -for severe pain MDD:1 tablet  sevelamer carbonate 800 mg oral tablet: 1 tab(s) orally every 8 hours  zolpidem 5 mg oral tablet: 1 tab(s) orally once a day (at bedtime)   allopurinol: 50 milligram(s) orally once a day  amoxicillin 500 mg oral capsule: 1 cap(s) orally 3 times a day   Aspirin Low Dose 81 mg oral tablet, chewable: 1 tab(s) orally once a day  carvedilol 25 mg oral tablet: 1 tab(s) orally every 12 hours  clopidogrel 75 mg oral tablet: 1 tab(s) orally once a day  cyclobenzaprine 10 mg oral tablet: 1 tab(s) orally once a day, As Needed  doxycycline monohydrate 50 mg oral capsule: 2 cap(s) orally every 12 hours  levothyroxine 100 mcg (0.1 mg) oral tablet: 1 tab(s) orally once a day  Lipitor 80 mg oral tablet: 1 tab(s) orally once a day (at bedtime)  melatonin 5 mg oral tablet: 1 tab(s) orally once a day (at bedtime), As needed, Insomnia  NIFEdipine 90 mg oral tablet, extended release: 1 tab(s) orally every 24 hours  sevelamer carbonate 800 mg oral tablet: 1 tab(s) orally every 8 hours

## 2022-10-13 NOTE — H&P ADULT - NSHPPHYSICALEXAM_GEN_ALL_CORE
PHYSICAL EXAM:    GENERAL: Comfortable, no acute distress   HEAD:  Normocephalic, atraumatic  EYES: EOMI, PERRLA  HEENT: Moist mucous membranes  NECK: Supple, No JVD  NERVOUS SYSTEM:  Alert & Oriented X3, Motor Strength 5/5 B/L upper and lower extremities  CHEST/LUNG: Clear to auscultation bilaterally  HEART: Regular rate and rhythm  ABDOMEN: Soft, non tender, Nondistended, Bowel sounds present  GENITOURINARY: Voiding, no palpable bladder  EXTREMITIES:   No clubbing, cyanosis, or edema  MUSCULOSKELETAL- No muscle tenderness, no joint tenderness  SKIN-RLE wrapped

## 2022-10-13 NOTE — H&P ADULT - PROBLEM SELECTOR PLAN 3
patient with hypertensive urgency in /100  takes nifedipine 90 and coreg 25 BID at home per pt  given home medications  asymptomatic  -monitor repeat blood pressure medication  -c/w home bp meds

## 2022-10-13 NOTE — PROGRESS NOTE ADULT - ASSESSMENT
89 y/o F w/ a PMHx of CKD 5, DM, HFpEF (EF 70% 2021, grade 1 diastolic dysfunction), HTN, HLD, CAD, hypothyroidism, and PAD s/p R 3rd and 4th toe amputations on 08/30/22 w/ Dr. Brumfield who is presenting with R foot pain. Podiatry consulted for evaluation of right foot amputation site. VSS, wbc 4.76, on exam erythema and edema noted to amputation site and right 2nd digit most likely cellulitis. No gas noted on X rays.     Plan:   X rays reviewed   ABX per primary team  Cellulitis shows improvement w/ ABX   Wound cleansed with normal saline and dressed with WTD dressing   NWB to RLE   Rest of care per primary    Podiatry following. Plan d/w attending.

## 2022-10-13 NOTE — DISCHARGE NOTE NURSING/CASE MANAGEMENT/SOCIAL WORK - PATIENT PORTAL LINK FT
You can access the FollowMyHealth Patient Portal offered by [facility] by registering at the following website: [email or web address]. By joining [facility]’s FollowMyHealth portal, you will also be able to view your health information using other applications (apps) compatible with our system.

## 2022-10-13 NOTE — DISCHARGE NOTE PROVIDER - PROVIDER TOKENS
PROVIDER:[TOKEN:[[id number]],SCHEDULEDAPPT:[10/18/2022],SCHEDULEDAPPTTIME:[12:30 PM],ESTABLISHEDPATIENT:[T]],PROVIDER:[TOKEN:[[id number]],ESTABLISHEDPATIENT:[T]]

## 2022-10-13 NOTE — PATIENT PROFILE ADULT - FUNCTIONAL ASSESSMENT - BASIC MOBILITY 6.
2-calculated by average/Not able to assess (calculate score using [facility] averaging method)

## 2022-10-13 NOTE — H&P ADULT - ASSESSMENT
89 y/o F w/ a PMHx of CKD 5, DM, HFpEF (EF 70% 2021, grade 1 diastolic dysfunction), HTN, HLD, CAD, hypothyroidism, and PAD s/p R 3rd and 4th toe amputations on 08/30/22 w/ Dr. Brumfield who is presenting with R foot pain admitted for possible podiatry intervention.

## 2022-10-13 NOTE — H&P ADULT - PROBLEM SELECTOR PLAN 1
patient presents with R foot pain  podiatry consulted - sent in by  Dr. Brumfield after being started on doxy  afebrile, wbc 4.7 erythema and edema noted by podiatry - wrapped by team. no gas on xr  per podiatry c/w doxycycline at this time while decision made for OR or not  -f/u podiatry recs  -nwb to [facility]

## 2022-10-13 NOTE — DISCHARGE NOTE PROVIDER - NSDCFUADDAPPT_GEN_ALL_CORE_FT
Please call Dr Taylor's office to schedule a follow up appointment within 2 weeks from your discharge.

## 2022-10-13 NOTE — PATIENT PROFILE ADULT - FALL HARM RISK - HARM RISK INTERVENTIONS
Assistance with ambulation/Assistance OOB with selected safe patient handling equipment/Communicate Risk of Fall with Harm to all staff/Discuss with provider need for PT consult/Monitor gait and stability/Provide patient with walking aids - walker, cane, crutches/Reinforce activity limits and safety measures with patient and family/Sit up slowly, dangle for a short time, stand at bedside before walking/Tailored Fall Risk Interventions/Use of alarms - bed, chair and/or voice tab/Visual Cue: Yellow wristband and red socks/Bed in lowest position, wheels locked, appropriate side rails in place/Call bell, personal items and telephone in reach/Instruct patient to call for assistance before getting out of bed or chair/Non-slip footwear when patient is out of bed/[place] to call system/Physically safe environment - no spills, clutter or unnecessary equipment/Purposeful Proactive Rounding/Room/bathroom lighting operational, light cord in reach

## 2022-10-13 NOTE — DISCHARGE NOTE PROVIDER - NSDCHHCONTACT_GEN_ALL_CORE_FT
Discharged As certified below, I, or a nurse practitioner or physician assistant working with me, had a face-to-face encounter that meets the physician face-to-face encounter requirements.

## 2022-10-13 NOTE — H&P ADULT - HISTORY OF PRESENT ILLNESS
89 y/o F w/ a PMHx of CKD 5, DM, HFpEF (EF 70% 2021, grade 1 diastolic dysfunction), HTN, HLD, CAD, hypothyroidism, and PAD s/p R 3rd and 4th toe amputations on 08/30/22 w/ Dr. Brumfield who is presenting with R foot pain. Patient reports onset of R foot pain and swelling last night involving her 3 remaining toes, her right foot, and her right lower extremity. She was seen by a home nurse today for a routine dressing change, who noticed that the operative site seemed infected, so the nurse called Dr. Brumfield who prescribed doxycycline and told the patient to come to the ED if the pain worsens. Patient has taken 1 dose of doxycycline at 1300 today. Patient denies any fever, chills, SOB, chest pain, abdominal pain, nausea/vomiting, diarrhea, or urinary symptoms. All other ROS negative.     In the ED:  Vitals: Temp 97.8 HR 65 /73 O2 Sat 100%   Labs: ESR 50 WBC 4.76 Hg 9.5 Plt 140 Na 136 CBicarb 20 BUN 47 Cr 3.06 Glucose 138   Imaging: XR Right foot   Intervention: Doxycycline 100 mg, ofirmev 650 Ns 1 L

## 2022-10-13 NOTE — H&P ADULT - ATTENDING COMMENTS
#RLE cellulitis: hx of PAD and R-3rd/4th toe gangrene s/p amputation returns to ED w/ pain/erythema, found to have erythema and edema noted to amputation site and right 2nd digit most likely cellulitis. No gas noted on X rays. Low c/f for OM. Per Podiatry, c/w IV doxycycline; will reassed in AM AM to dictate weather surgical intervention is warranted. Pulses present b/l LE. c/w wound care, consider vascular Dr. Pagan consult      #Hypercalcemia: in setting of calcitriol use. Euvolemic on exam. No EKG changes. Improved s/p fluids, c/w maintence fluids. Euvolemic on exam. Consider hypercalcemia w/up if continued elevation. Holding calcitriol.      #HTN urgency:.did not take home meds. no evidence of EOD.  BP improved s/p home nifedipine and coreg. Continue to monitor. Avoid overcorrection.

## 2022-10-13 NOTE — H&P ADULT - PROBLEM SELECTOR PLAN 8
F: s/p 1 L  E: replete with caution  N: consistent carb  DVT ppx: heparin subq patient with elevated eosinophils  likely not 2/2 possible foot infection  initial work up sent off  -f/u strongyloides Ab, stools for ova and parasite,  peripheral blood smear, fam, b12

## 2022-10-13 NOTE — PROGRESS NOTE ADULT - SUBJECTIVE AND OBJECTIVE BOX
Patient is a 89y old  Female who presents with a chief complaint of podiatry eval (13 Oct 2022 00:23)      INTERVAL HPI/ OVERNIGHT EVENTS: Pt evaluated at bedside. Notes some pain to digits.       LABS                        8.5    4.59  )-----------( 102      ( 13 Oct 2022 06:10 )             26.8     10-13    136  |  108  |  48<H>  ----------------------------<  96  4.9   |  19<L>  |  2.98<H>    Ca    11.9<H>      13 Oct 2022 06:10  Phos  3.5     10-13  Mg     1.7     10-13    TPro  7.2  /  Alb  3.8  /  TBili  0.2  /  DBili  x   /  AST  See Note  /  ALT  See Note  /  AlkPhos  See Note  10-12      ESR: 50  CRP: --  10-12 @ 21:06    ICU Vital Signs Last 24 Hrs  T(C): 36.8 (13 Oct 2022 06:39), Max: 36.8 (13 Oct 2022 06:39)  T(F): 98.2 (13 Oct 2022 06:39), Max: 98.2 (13 Oct 2022 06:39)  HR: 66 (13 Oct 2022 06:39) (60 - 66)  BP: 149/70 (13 Oct 2022 06:39) (149/70 - 210/83)  BP(mean): --  ABP: --  ABP(mean): --  RR: 18 (13 Oct 2022 06:39) (17 - 18)  SpO2: 96% (13 Oct 2022 06:39) (96% - 100%)    O2 Parameters below as of 13 Oct 2022 06:39  Patient On (Oxygen Delivery Method): room air            RADIOLOGY    MICROBIOLOGY    PHYSICAL EXAM  General: NAD, AA0x2-3  RIGHT Lower Extremity Focused:  Vasc: DP/PT non-palpable, biphasic on doppler, CFT <3 x 3 digits, Temp gradient warm to warm, +1 pitting edema to right dorsal foot and pretibial area  Derm: Distal wound at 3rd and 4th digit amputation site, wound base 90% granular base and 10% fibrotic base, Negative PTB, No tracking, No tunneling, No malodor, no purulence slight serous drainage, periwound erythema to forefoot and immediately surrounding wound, Erythema to right 2nd digit, 0.5x0.5cm wound to 2nd digit dorsal PIPJ fibrotic base, bone exposed.   Neuro: Protective sensation diminished  MSK: 3rd and 4th digit amputation with 3rd and 4th met head resection, TTP to amputation site

## 2022-10-13 NOTE — H&P ADULT - PROBLEM SELECTOR PLAN 2
patient presents hypercalcemic to 12  per emr on calictiol at home  s/p 1 L fluids  now hyerptensive urgency so holding fluids until bp resolved  qtc WNL   -start maintnence fluids with improvement of BP  -hold calcitriol

## 2022-10-13 NOTE — DISCHARGE NOTE PROVIDER - HOSPITAL COURSE
#Discharge: DO NOT DELETE    HOSPITAL COURSE:    89 yo F with PMHx CKD5, T2DM, HFpEF (LVEF 70% in 2021), HTN, HLD, CAD, hypothyroidism, PAD (s/p R 3rd/4th toe amputation, 2022) px to [facility] on 10/12 with R foot pain admitted for possible podiatry intervention. Podiatry consulted on admission, recommending XR, showing _______. In    Problem List/Main Diagnoses:        New Medications: #Discharge: DO NOT DELETE    HOSPITAL COURSE:    89 yo F with PMHx CKD5, T2DM, HFpEF (LVEF 70% in 2021), HTN, HLD, CAD, hypothyroidism, PAD (s/p R 3rd/4th toe amputation, 2022) px to [facility] on 10/12 with R foot pain admitted for possible podiatry intervention.    Problem List/Main Diagnoses:    #RLE cellulitis   Hx of PAD and R-3rd/4th toe gangrene s/p amputation returns to ED w/ pain/erythema, found to have erythema and edema noted to amputation site and right 2nd digit most likely cellulitis. No gas noted on X rays. Low c/f for OM. Podiatry consulted - c/w IV doxycycline Pulses present b/l LE - no surgical intervention indicated  - 10 day course of PO amoxicillin and doxycycline  - f/u w/ PCP  - f/u w/ podiatry    #Hypercalcemia  Likely in the setting of calcitriol use. Euvolemic on exam. No EKG changes. Improved s/p fluids.  - Hold calcitriol  - f/u w/ PCP - recheck Ca2+     #HTN urgency:  Likely in the setting of missing home medication did not take home meds. BP improved s/p home nifedipine and coreg.  - cont home BP medication      New Medications:  Doxycycline 100mg  BID for 10 days  Amoxicillin 500mg TID for 10 days    Medication stopped:  - Calcitriol

## 2022-10-13 NOTE — H&P ADULT - NSHPLABSRESULTS_GEN_ALL_CORE
.  LABS:                         9.5    4.76  )-----------( 140      ( 12 Oct 2022 21:06 )             28.5     10-12    138  |  109<H>  |  47<H>  ----------------------------<  115<H>  5.0   |  21<L>  |  2.93<H>    Ca    11.6<H>      12 Oct 2022 23:09  Phos  4.1     10-12  Mg     1.8     10-12    TPro  7.2  /  Alb  3.8  /  TBili  0.2  /  DBili  x   /  AST  See Note  /  ALT  See Note  /  AlkPhos  See Note  10-12              RADIOLOGY, EKG & ADDITIONAL TESTS: Reviewed.

## 2022-10-13 NOTE — DISCHARGE NOTE PROVIDER - NSDCCPCAREPLAN_GEN_ALL_CORE_FT
PRINCIPAL DISCHARGE DIAGNOSIS  Diagnosis: Right foot infection  Assessment and Plan of Treatment: Cellulitis is an infection of the skin. This infection can cause redness, pain, and swelling. Cellulitis tends to affect deep layers of skin and sometimes the fat under the skin. This condition can happen when germs get into the skin. Normally, different types of germs live on a person's skin, and mostly these germs do not cause any problems. But if a person gets a cut or break in the skin, the germs can penetrate and cause an infection. Certain conditions can increase a person's chance of getting cellulitis. These include: having a cut (even a tiny one), having another type of skin infection or a long-term skin condition, swelling of the skin or swelling in the body, and being overweight.

## 2022-10-13 NOTE — PATIENT PROFILE ADULT - OVER THE PAST TWO WEEKS, HAVE YOU FELT LITTLE INTEREST OR PLEASURE IN DOING THINGS?
Problem: Goal Outcome Summary  Goal: Goal Outcome Summary  PT 4A: Cancel- Patient transferring to 6B, then to OR this PM. Will reschedule per PT POC.       no

## 2022-10-13 NOTE — DISCHARGE NOTE NURSING/CASE MANAGEMENT/SOCIAL WORK - NSDCPEFALRISK_GEN_ALL_CORE
For information on Fall & Injury Prevention, visit: [email or web address] OR  [email or web address] OR  https://www.cdc.gov/steadi/patient.html

## 2022-10-15 LAB — IGE SERPL-ACNC: 641 KU/L — HIGH

## 2022-10-17 LAB
ANA PAT FLD IF-IMP: ABNORMAL
ANA TITR SER: ABNORMAL
CULTURE RESULTS: SIGNIFICANT CHANGE UP
CULTURE RESULTS: SIGNIFICANT CHANGE UP
SPECIMEN SOURCE: SIGNIFICANT CHANGE UP
SPECIMEN SOURCE: SIGNIFICANT CHANGE UP

## 2022-10-18 LAB — STRONGYLOIDES AB SER-ACNC: NEGATIVE — SIGNIFICANT CHANGE UP

## 2022-11-02 NOTE — DISCHARGE NOTE NURSING/CASE MANAGEMENT/SOCIAL WORK - NSTRANSFERBELONGINGSRESP_GEN_A_NUR
Bi-Rhombic Flap Text: The defect edges were debeveled with a #15 scalpel blade.  Given the location of the defect and the proximity to free margins a bi-rhombic flap was deemed most appropriate.  Using a sterile surgical marker, an appropriate rhombic flap was drawn incorporating the defect. The area thus outlined was incised deep to adipose tissue with a #15 scalpel blade.  The skin margins were undermined to an appropriate distance in all directions utilizing iris scissors. Show Assistants Variable: Yes Skin Substitute Units (Will Override Primary Defect Units If Greater Than 0): 0 Quadrant Reporting?: no Mustarde Flap Text: The defect edges were debeveled with a #15 scalpel blade.  Given the size, depth and location of the defect and the proximity to free margins a Mustarde flap was deemed most appropriate.  Using a sterile surgical marker, an appropriate flap was drawn incorporating the defect. The area thus outlined was incised with a #15 scalpel blade.  The skin margins were undermined to an appropriate distance in all directions utilizing iris scissors. Area L Indication Text: Tumors in this location are included in Area L (trunk and extremities).  Mohs surgery is indicated for larger tumors, or tumors with aggressive histologic features, in these anatomic locations. Helical Rim Text: The closure involved the helical rim. Hatchet Flap Text: The defect edges were debeveled with a #15 scalpel blade.  Given the location of the defect, shape of the defect and the proximity to free margins a hatchet flap was deemed most appropriate.  Using a sterile surgical marker, an appropriate hatchet flap was drawn incorporating the defect and placing the expected incisions within the relaxed skin tension lines where possible.    The area thus outlined was incised deep to adipose tissue with a #15 scalpel blade.  The skin margins were undermined to an appropriate distance in all directions utilizing iris scissors. Dressing: pressure dressing with telfa Double O-Z Flap Text: The defect edges were debeveled with a #15 scalpel blade.  Given the location of the defect, shape of the defect and the proximity to free margins a Double O-Z flap was deemed most appropriate.  Using a sterile surgical marker, an appropriate transposition flap was drawn incorporating the defect and placing the expected incisions within the relaxed skin tension lines where possible. The area thus outlined was incised deep to adipose tissue with a #15 scalpel blade.  The skin margins were undermined to an appropriate distance in all directions utilizing iris scissors. Inflammation Suggestive Of Cancer Camouflage Histology Text: There was a dense lymphocytic infiltrate which prevented adequate histologic evaluation of adjacent structures. Asc Procedure Text (F): After obtaining clear surgical margins the patient was sent to an ASC for surgical repair.  The patient understands they will receive post-surgical care and follow-up from the ASC physician. Post-Care Instructions: I reviewed with the patient in detail post-care instructions. Patient is not to engage in any heavy lifting, exercise, or swimming for the next 7 days. Should the patient develop any fevers, chills, bleeding, severe pain patient will contact the office immediately. Stage 11: Additional Anesthesia Type: 1% lidocaine with epinephrine Surgeon/Pathologist Verbiage (Will Incorporate Name Of Surgeon From Intro If Not Blank): operated in two distinct and integrated capacities as the surgeon and pathologist. Where Do You Want The Question To Include Opioid Counseling Located?: Case Summary Tab Anesthesia Type: 0.5% lidocaine with 1:200,000 epinephrine and a 1:10 solution of 8.4% sodium bicarbonate Hemostasis: Electrocautery Otolaryngologist Procedure Text (D): After obtaining clear surgical margins the patient was sent to otolaryngology for surgical repair.  The patient understands they will receive post-surgical care and follow-up from the referring physician's office. no Keystone Flap Text: The defect edges were debeveled with a #15 scalpel blade.  Given the location of the defect, shape of the defect a keystone flap was deemed most appropriate.  Using a sterile surgical marker, an appropriate keystone flap was drawn incorporating the defect, outlining the appropriate donor tissue and placing the expected incisions within the relaxed skin tension lines where possible. The area thus outlined was incised deep to adipose tissue with a #15 scalpel blade.  The skin margins were undermined to an appropriate distance in all directions around the primary defect and laterally outward around the flap utilizing iris scissors. Primary Defect Length In Cm (Final Defect Size - Required For Flaps/Grafts): 1.7 Mixed Superficial And Nodular Bcc Histology Text: Arising from the epidermis and superficial hair follicles are small, superficial, multicentric buds of basaloid keratinocytes. The cells have scant cytoplasm and round dark nuclei. Mitotic figures and apoptotic bodies are evident. The nuclei at the periphery of the islands have a palisaded arrangement. The islands are associated with a fibromyxoid stroma and there is cleft formation between some of the islands and stroma.  Emanating from the epidermis and infiltrating the dermis are irregularly shaped islands of basaloid keratinocytes. The cells have scant cytoplasm and round dark nuclei. Mitotic figures and apoptotic bodies are evident. The nuclei at the periphery of the islands have a palisaded arrangement. The islands are associated with a fibromyxoid stroma and there is a cleft formation between some of the islands and stroma. Trilobed Flap Text: The defect edges were debeveled with a #15 scalpel blade.  Given the location of the defect and the proximity to free margins a trilobed flap was deemed most appropriate.  Using a sterile surgical marker, an appropriate trilobed flap drawn around the defect.    The area thus outlined was incised deep to adipose tissue with a #15 scalpel blade.  The skin margins were undermined to an appropriate distance in all directions utilizing iris scissors. Bcc Micronodular Histology Text: Emanating from the epidermis and infiltrating the dermis are irregularly shaped islands of basaloid keratinocytes. The cells have scant cytoplasm and dark round nuclei. Mitotic figures and apoptotic bodies are evident. The nuclei at the periphery of the islands have a palisaded arrangement. The islands are associated with a fibromyxoid stroma and there is cleft formation between some of the islands and stroma. In areas the tumor breaks up into a small, micronodular, infiltrative growth pattern with deeper extension into the dermis. Plastic Surgeon Procedure Text (F): After obtaining clear surgical margins the patient was sent to plastics for surgical repair.  The patient understands they will receive post-surgical care and follow-up from the referring physician's office. Skin Substitute Text: The defect edges were debeveled with a #15 scalpel blade.  Given the location of the defect, shape of the defect and the proximity to free margins a skin substitute graft was deemed most appropriate.  The graft material was trimmed to fit the size of the defect. The graft was then placed in the primary defect and oriented appropriately. Undermining Type: Entire Wound Donor Site Anesthesia Type: same as repair anesthesia Oculoplastic Surgeon Procedure Text (A): After obtaining clear surgical margins the patient was sent to oculoplastics for surgical repair.  The patient understands they will receive post-surgical care and follow-up from the referring physician's office. Information: Selecting Yes will display possible errors in your note based on the variables you have selected. This validation is only offered as a suggestion for you. PLEASE NOTE THAT THE VALIDATION TEXT WILL BE REMOVED WHEN YOU FINALIZE YOUR NOTE. IF YOU WANT TO FAX A PRELIMINARY NOTE YOU WILL NEED TO TOGGLE THIS TO 'NO' IF YOU DO NOT WANT IT IN YOUR FAXED NOTE. Abbe Flap (Lower To Upper Lip) Text: The defect of the upper lip was assessed and measured.  Given the location and size of the defect, an Abbe flap was deemed most appropriate.  Using a sterile surgical marker, an appropriate Abbe flap was measured and drawn on the lower lip. Local anesthesia was then infiltrated. A scalpel was then used to incise the upper lip through and through the skin, vermilion, muscle and mucosa, leaving the flap pedicled on the opposite side.  The flap was then rotated and transferred to the lower lip defect.  The flap was then sutured into place with a three layer technique, closing the orbicularis oris muscle layer with subcutaneous buried sutures, followed by a mucosal layer and an epidermal layer. Cheek-To-Nose Interpolation Flap Text: A decision was made to reconstruct the defect utilizing an interpolation axial flap and a staged reconstruction.  A telfa template was made of the defect.  This telfa template was then used to outline the Cheek-To-Nose Interpolation flap.  The donor area for the pedicle flap was then injected with anesthesia.  The flap was excised through the skin and subcutaneous tissue down to the layer of the underlying musculature.  The interpolation flap was carefully excised within this deep plane to maintain its blood supply.  The edges of the donor site were undermined.   The donor site was closed in a primary fashion.  The pedicle was then rotated into position and sutured.  Once the tube was sutured into place, adequate blood supply was confirmed with blanching and refill.  The pedicle was then wrapped with xeroform gauze and dressed appropriately with a telfa and gauze bandage to ensure continued blood supply and protect the attached pedicle. Special Stains Stage 5 - Results: Base On Clearance Noted Above Referred To Mid-Level For Closure Text (Leave Blank If You Do Not Want): After obtaining clear surgical margins the patient was sent to a mid-level provider for surgical repair.  The patient understands they will receive post-surgical care and follow-up from the mid-level provider. Medical Necessity Statement: Based on my medical judgement, Mohs surgery is the most appropriate treatment for this cancer compared to other treatments. Mohs Case Number:  Scc Well Differentiated Histology Text: Arising from the epidermis is a keratin-producing proliferation of atypical keratinocytes with invasion into the underlying dermis. Mitoses and atypical forms are evident. Intercellular bridge and keratin [place] formation are evident. Consent (Spinal Accessory)/Introductory Paragraph: The rationale for Mohs was explained to the patient and consent was obtained. The risks, benefits and alternatives to therapy were discussed in detail. Specifically, the risks of damage to the spinal accessory nerve, infection, scarring, bleeding, prolonged wound healing, incomplete removal, allergy to anesthesia, and recurrence were addressed. Prior to the procedure, the treatment site was clearly identified and confirmed by the patient. All components of Universal Protocol/PAUSE Rule completed. Graft Cartilage Fenestration Text: The cartilage was fenestrated with a 2mm punch biopsy to help facilitate graft survival and healing. Mixed Nodular And Infiltrative Bcc Histology Text: Emanating from the epidermis and infiltrating the dermis are irregularly shaped islands of basaloid keratinocytes. The cells have scant cytoplasm and round dark nuclei. Mitotic figures and apoptotic bodies are evident. The nuclei at the periphery of the islands have a palisaded arrangement. The islands are associated with a fibromyxoid stroma and there is a cleft formation between some of the islands and stroma.  Irregularly shaped cords and strands of basaloid keratinocytes infiltrate the dermis with a spiky growth pattern. The cells have scant cytoplasm and round dark nuclei. Mitotic figures and apoptotic bodies are evident. The nuclei at the periphery of the islands have a palisaded arrangement. The islands are associated with a fibromyxoid stroma and there is a cleft formation between some of the islands and stroma. Localized Dermabrasion With Wire Brush Text: The patient was draped in routine manner.  Localized dermabrasion using 3 x 17 mm wire brush was performed in routine manner to papillary dermis. This spot dermabrasion is being performed to complete skin cancer reconstruction. It also will eliminate the other sun damaged precancerous cells that are known to be part of the regional effect of a lifetime's worth of sun exposure. This localized dermabrasion is therapeutic and should not be considered cosmetic in any regard. M-Plasty Complex Repair Preamble Text (Leave Blank If You Do Not Want): Extensive wide undermining was performed. Mart-1 - Positive Histology Text: MART-1 staining demonstrates areas of higher density and clustering of melanocytes with Pagetoid spread upwards within the epidermis. The surgical margins are positive for tumor cells. Provider Procedure Text (C): After obtaining clear surgical margins the defect was repaired by another provider. Postop Diagnosis: same Double Island Pedicle Flap Text: The defect edges were debeveled with a #15 scalpel blade.  Given the location of the defect, shape of the defect and the proximity to free margins a double island pedicle advancement flap was deemed most appropriate.  Using a sterile surgical marker, an appropriate advancement flap was drawn incorporating the defect, outlining the appropriate donor tissue and placing the expected incisions within the relaxed skin tension lines where possible.    The area thus outlined was incised deep to adipose tissue with a #15 scalpel blade.  The skin margins were undermined to an appropriate distance in all directions around the primary defect and laterally outward around the island pedicle utilizing iris scissors.  There was minimal undermining beneath the pedicle flap. Bcc Superficial Histology Text: Arising from the epidermis and superficial hair follicles are small, superficial, multicentric buds of basaloid keratinocytes. The cells have scant cytoplasm and round dark nuclei. Mitotic figures and apoptotic bodies are evident. The nuclei at the periphery of the islands have a palisaded arrangement. The islands are associated with a fibromyxoid stroma and there is cleft formation between some of the islands and stroma. Bilobed Flap Text: The defect edges were debeveled with a #15 scalpel blade.  Given the location of the defect and the proximity to free margins a bilobe flap was deemed most appropriate.  Using a sterile surgical marker, an appropriate bilobe flap drawn around the defect.    The area thus outlined was incised deep to adipose tissue with a #15 scalpel blade.  The skin margins were undermined to an appropriate distance in all directions utilizing iris scissors. Basosquamous Cell Carcinoma Histology Text: Multiple islands of malignant cells throughout the dermis, exhibiting both basaloid and squamous differentiation. The areas had Basal Cell Carcinoma, with large and rounded basaloid cells, and Squamous Cell Carcinoma, with polygonal squamoid cells with abundant eosinophilic cytoplasm, large nuclei, and prominent nucleoli. Rhombic Flap Text: The defect edges were debeveled with a #15 scalpel blade.  Given the location of the defect and the proximity to free margins a rhombic flap was deemed most appropriate.  Using a sterile surgical marker, an appropriate rhombic flap was drawn incorporating the defect.    The area thus outlined was incised deep to adipose tissue with a #15 scalpel blade.  The skin margins were undermined to an appropriate distance in all directions utilizing iris scissors. Transposition Flap Text: The defect edges were debeveled with a #15 scalpel blade.  Given the location of the defect and the proximity to free margins a superior based transposition flap was deemed most appropriate.  Using a sterile surgical marker, an appropriate superior based transposition flap was drawn incorporating the defect.    The area thus outlined was incised deep to adipose tissue with a #15 scalpel blade.  The skin margins were undermined to an appropriate distance in all directions utilizing iris scissors. Double O-Z Plasty Text: The defect edges were debeveled with a #15 scalpel blade.  Given the location of the defect, shape of the defect and the proximity to free margins a Double O-Z plasty (double transposition flap) was deemed most appropriate.  Using a sterile surgical marker, the appropriate transposition flaps were drawn incorporating the defect and placing the expected incisions within the relaxed skin tension lines where possible. The area thus outlined was incised deep to adipose tissue with a #15 scalpel blade.  The skin margins were undermined to an appropriate distance in all directions utilizing iris scissors.  Hemostasis was achieved with electrocautery.  The flaps were then transposed into place, one clockwise and the other counterclockwise, and anchored with interrupted buried subcutaneous sutures. Eye Protection Verbiage: Before proceeding with the stage, a plastic scleral shield was inserted. The globe was anesthetized with a few drops of 1% lidocaine with 1:100,000 epinephrine. Then, an appropriate sized scleral shield was chosen and coated with lacrilube ointment. The shield was gently inserted and left in place for the duration of each stage. After the stage was completed, the shield was gently removed. Area H Indication Text: Tumors in this location are included in Area H (eyelids, eyebrows, nose, lips, chin, ear, pre-auricular, post-auricular, temple, genitalia, hands, feet, ankles and areola).  Tissue conservation is critical in these anatomic locations. Double M-Plasty Intermediate Repair Preamble Text (Leave Blank If You Do Not Want): Undermining was performed with blunt dissection. Partial Purse String (Simple) Text: Given the location of the defect and the characteristics of the surrounding skin a simple purse string closure was deemed most appropriate.  Undermining was performed circumfirentially around the surgical defect.  A purse string suture was then placed and tightened. Wound tension only allowed a partial closure of the circular defect. Epidermal Autograft Text: The defect edges were debeveled with a #15 scalpel blade.  Given the location of the defect, shape of the defect and the proximity to free margins an epidermal autograft was deemed most appropriate.  Using a sterile surgical marker, the primary defect shape was transferred to the donor site. The epidermal graft was then harvested.  The skin graft was then placed in the primary defect and oriented appropriately. Consent (Scalp)/Introductory Paragraph: The rationale for Mohs was explained to the patient and consent was obtained. The risks, benefits and alternatives to therapy were discussed in detail. Specifically, the risks of changes in hair growth pattern secondary to repair, infection, scarring, bleeding, prolonged wound healing, incomplete removal, allergy to anesthesia, nerve injury and recurrence were addressed. Prior to the procedure, the treatment site was clearly identified and confirmed by the patient. All components of Universal Protocol/PAUSE Rule completed. Mucosal Advancement Flap Text: Given the location of the defect, shape of the defect and the proximity to free margins a mucosal advancement flap was deemed most appropriate. Incisions were made with a 15 blade scalpel in the appropriate fashion along the cutaneous vermillion border and the mucosal lip. The remaining actinically damaged mucosal tissue was excised.  The mucosal advancement flap was then elevated to the gingival sulcus with care taken to preserve the neurovascular structures and advanced into the primary defect. Care was taken to ensure that precise realignment of the vermillion border was achieved. Same Histology In Subsequent Stages Text: The pattern and morphology of the tumor is as described in the first stage. Pain Refusal Text: I offered to prescribe pain medication but the patient refused to take this medication. O-L Flap Text: The defect edges were debeveled with a #15 scalpel blade.  Given the location of the defect, shape of the defect and the proximity to free margins an O-L flap was deemed most appropriate.  Using a sterile surgical marker, an appropriate advancement flap was drawn incorporating the defect and placing the expected incisions within the relaxed skin tension lines where possible.    The area thus outlined was incised deep to adipose tissue with a #15 scalpel blade.  The skin margins were undermined to an appropriate distance in all directions utilizing iris scissors. Hemigard Postcare Instructions: The HEMIGARD strips are to remain completely dry for at least 5-7 days. Width Of Defect Perpendicular To Closure In Cm (Required): 1.3 Burow's Advancement Flap Text: The defect edges were debeveled with a #15 scalpel blade.  Given the location of the defect and the proximity to free margins a Burow's advancement flap was deemed most appropriate.  Using a sterile surgical marker, the appropriate advancement flap was drawn incorporating the defect and placing the expected incisions within the relaxed skin tension lines where possible.    The area thus outlined was incised deep to adipose tissue with a #15 scalpel blade.  The skin margins were undermined to an appropriate distance in all directions utilizing iris scissors. Location Indication Override (Is Already Calculated Based On Selected Body Location): Area H Island Pedicle Flap With Canthal Suspension Text: The defect edges were debeveled with a #15 scalpel blade.  Given the location of the defect, shape of the defect and the proximity to free margins an island pedicle advancement flap was deemed most appropriate.  Using a sterile surgical marker, an appropriate advancement flap was drawn incorporating the defect, outlining the appropriate donor tissue and placing the expected incisions within the relaxed skin tension lines where possible. The area thus outlined was incised deep to adipose tissue with a #15 scalpel blade.  The skin margins were undermined to an appropriate distance in all directions around the primary defect and laterally outward around the island pedicle utilizing iris scissors.  There was minimal undermining beneath the pedicle flap. A suspension suture was placed in the canthal tendon to prevent tension and prevent ectropion. Body Location Override (Optional - Billing Will Still Be Based On Selected Body Map Location If Applicable): left lateral forehead Bcc  Nodulocystic Histology Text: Nests of basaloid cells with peripheral palisading associated with a fibromyxoid stroma. Consent (Temporal Branch)/Introductory Paragraph: The rationale for Mohs was explained to the patient and consent was obtained. The risks, benefits and alternatives to therapy were discussed in detail. Specifically, the risks of damage to the temporal branch of the facial nerve, infection, scarring, bleeding, prolonged wound healing, incomplete removal, allergy to anesthesia, and recurrence were addressed. Prior to the procedure, the treatment site was clearly identified and confirmed by the patient. All components of Universal Protocol/PAUSE Rule completed. Ear Wedge Repair Text: A wedge excision was completed by carrying down an excision through the full thickness of the ear and cartilage with an inward facing Burow's triangle. The wound was then closed in a layered fashion. Unique Flap 1 Text: Given the location of the defect, shape of the defect and the proximity to free margins a Edward rotation flap was deemed most appropriate.  Using a sterile surgical marker, an appropriate rotation flap was drawn incorporating the defect and placing the expected incisions within the nasal cosmetic unit and extending in a curvilinear fashion along the nasofacial junction and continuing to the superior nasoglabellar junction.  Finishing with a tagential orientation of the incision.   The area thus outlined was incised deep to muscle and undermined in the submuscular plane of tissue with a #10 scalpel blade.  The skin margins were undermined to an appropriate distance in all directions utilizing iris scissors. The flap was rotated into the defect.  A small tricone was removed at the distal tip of the flap. Paramedian Forehead Flap Text: A decision was made to reconstruct the defect utilizing an interpolation axial flap and a staged reconstruction.  A telfa template was made of the defect.  This telfa template was then used to outline the paramedian forehead pedicle flap.  The donor area for the pedicle flap was then injected with anesthesia.  The flap was excised through the skin and subcutaneous tissue down to the layer of the underlying musculature.  The pedicle flap was carefully excised within this deep plane to maintain its blood supply.  The edges of the donor site were undermined.   The donor site was closed in a primary fashion.  The pedicle was then rotated into position and sutured.  Once the tube was sutured into place, adequate blood supply was confirmed with blanching and refill.  The pedicle was then wrapped with xeroform gauze and dressed appropriately with a telfa and gauze bandage to ensure continued blood supply and protect the attached pedicle. Zygomaticofacial Flap Text: Given the location of the defect, shape of the defect and the proximity to free margins a zygomaticofacial flap was deemed most appropriate for repair.  Using a sterile surgical marker, the appropriate flap was drawn incorporating the defect and placing the expected incisions within the relaxed skin tension lines where possible. The area thus outlined was incised deep to adipose tissue with a #15 scalpel blade with preservation of a vascular pedicle.  The skin margins were undermined to an appropriate distance in all directions utilizing iris scissors.  The flap was then placed into the defect and anchored with interrupted buried subcutaneous sutures. Depth Of Tumor Invasion (For Histology): dermis Retention Suture Text: Retention sutures were placed to support the closure and prevent dehiscence. O-T Plasty Text: The defect edges were debeveled with a #15 scalpel blade.  Given the location of the defect, shape of the defect and the proximity to free margins an O-T plasty was deemed most appropriate.  Using a sterile surgical marker, an appropriate O-T plasty was drawn incorporating the defect and placing the expected incisions within the relaxed skin tension lines where possible.    The area thus outlined was incised deep to adipose tissue with a #15 scalpel blade.  The skin margins were undermined to an appropriate distance in all directions utilizing iris scissors. Stage 1: Number Of Blocks?: 4 Scc In Situ Histology Text: Within the epidermis is a full-thickness proliferation of atypical keratinocytes with mitoses. The overlying stratum corneum demonstrates parakeratosis. No invasion is noted. Consent Type: Consent 1 (Standard) A-T Advancement Flap Text: The defect edges were debeveled with a #15 scalpel blade.  Given the location of the defect, shape of the defect and the proximity to free margins an A-T advancement flap was deemed most appropriate.  Using a sterile surgical marker, an appropriate advancement flap was drawn incorporating the defect and placing the expected incisions within the relaxed skin tension lines where possible.    The area thus outlined was incised deep to adipose tissue with a #15 scalpel blade.  The skin margins were undermined to an appropriate distance in all directions utilizing iris scissors. Advancement Flap (Single) Text: The defect edges were debeveled with a #15 scalpel blade.  Given the location of the defect and the proximity to free margins a single advancement flap was deemed most appropriate.  Using a sterile surgical marker, an appropriate advancement flap was drawn incorporating the defect and placing the expected incisions within the relaxed skin tension lines where possible.    The area thus outlined was incised deep to adipose tissue with a #15 scalpel blade.  The skin margins were undermined to an appropriate distance in all directions utilizing iris scissors. Surgeon Performing Repair (Optional): Addison Leon MD Complex Repair And Graft Additional Text (Will Appearing After The Standard Complex Repair Text): The complex repair was not sufficient to completely close the primary defect. The remaining additional defect was repaired with the graft mentioned below. Wound Care (No Sutures): Petrolatum Ear Star Wedge Flap Text: The defect edges were debeveled with a #15 blade scalpel.  Given the location of the defect and the proximity to free margins (helical rim) an ear star wedge flap was deemed most appropriate.  Using a sterile surgical marker, the appropriate flap was drawn incorporating the defect and placing the expected incisions between the helical rim and antihelix where possible.  The area thus outlined was incised through and through with a #15 scalpel blade. Orbicularis Oris Muscle Flap Text: The defect edges were debeveled with a #15 scalpel blade.  Given that the defect affected the competency of the oral sphincter an obicularis oris muscle flap was deemed most appropriate to restore this competency and normal muscle function.  Using a sterile surgical marker, an appropriate flap was drawn incorporating the defect. The area thus outlined was incised with a #15 scalpel blade. Staging Info: By selecting yes to the question above you will include information on AJCC 8 tumor staging in your Mohs note. Information on tumor staging will be automatically added for SCCs on the head and neck. AJCC 8 includes tumor size, tumor depth, perineural involvement and bone invasion. Bcc Infiltrative Histology Text: Irregularly shaped cords and strands of basaloid keratinocytes infiltrate the dermis with a spiky growth pattern. The cells have scant cytoplasm and round dark nuclei. Mitotic figures and apoptotic bodies are evident. The nuclei at the periphery of the islands have a palisaded arrangement. The islands are associated with a fibromyxoid stroma and there is a cleft formation between some of the islands and stroma. Stage 2: Number Of Blocks?: 3 Staged Advancement Flap Text: The defect edges were debeveled with a #15 scalpel blade.  Given the location of the defect, shape of the defect and the proximity to free margins a staged advancement flap was deemed most appropriate.  Using a sterile surgical marker, an appropriate advancement flap was drawn incorporating the defect and placing the expected incisions within the relaxed skin tension lines where possible. The area thus outlined was incised deep to adipose tissue with a #15 scalpel blade.  The skin margins were undermined to an appropriate distance in all directions utilizing iris scissors. Graft Donor Site Bandage (Optional-Leave Blank If You Don't Want In Note): Steri-strips and a pressure bandage were applied to the donor site. Mercedes Flap Text: The defect edges were debeveled with a #15 scalpel blade.  Given the location of the defect, shape of the defect and the proximity to free margins a Mercedes flap was deemed most appropriate.  Using a sterile surgical marker, an appropriate advancement flap was drawn incorporating the defect and placing the expected incisions within the relaxed skin tension lines where possible. The area thus outlined was incised deep to adipose tissue with a #15 scalpel blade.  The skin margins were undermined to an appropriate distance in all directions utilizing iris scissors. W Plasty Text: The lesion was extirpated to the level of the fat with a #15 scalpel blade.  Given the location of the defect, shape of the defect and the proximity to free margins a W-plasty was deemed most appropriate for repair.  Using a sterile surgical marker, the appropriate transposition arms of the W-plasty were drawn incorporating the defect and placing the expected incisions within the relaxed skin tension lines where possible.    The area thus outlined was incised deep to adipose tissue with a #15 scalpel blade.  The skin margins were undermined to an appropriate distance in all directions utilizing iris scissors.  The opposing transposition arms were then transposed into place in opposite direction and anchored with interrupted buried subcutaneous sutures. Consent 2/Introductory Paragraph: Mohs surgery was explained to the patient and consent was obtained. The risks, benefits and alternatives to therapy were discussed in detail. Specifically, the risks of infection, scarring, bleeding, prolonged wound healing, incomplete removal, allergy to anesthesia, nerve injury and recurrence were addressed. Prior to the procedure, the treatment site was clearly identified and confirmed by the patient. All components of Universal Protocol/PAUSE Rule completed. Consent (Nose)/Introductory Paragraph: The rationale for Mohs was explained to the patient and consent was obtained. The risks, benefits and alternatives to therapy were discussed in detail. Specifically, the risks of nasal deformity, changes in the flow of air through the nose, infection, scarring, bleeding, prolonged wound healing, incomplete removal, allergy to anesthesia, nerve injury and recurrence were addressed. Prior to the procedure, the treatment site was clearly identified and confirmed by the patient. All components of Universal Protocol/PAUSE Rule completed. Repair Anesthesia Method: local infiltration Mohs Histo Method Verbiage: The tissue was placed in petri dishes with epidermis superior to achieve precise orientation. Horizontal sectioning of the base and contiguous peripheral margins was then carried out and the prepared microscopic sections were examined by Dr. Leon. Hemigard Intro: Due to skin fragility and wound tension, it was decided to use HEMIGARD adhesive retention suture devices to permit a linear closure. The skin was cleaned and dried for a 6cm distance away from the wound. Excessive hair, if present, was removed to allow for adhesion. Purse String (Simple) Text: Given the location of the defect and the characteristics of the surrounding skin a pursestring closure was deemed most appropriate.  Undermining was performed circumfirentially around the surgical defect.  A purstring suture was then placed and tightened. Wound Care: Antibiotic ointment Cartilage Graft Text: The defect edges were debeveled with a #15 scalpel blade.  Given the location of the defect, shape of the defect, the fact the defect involved a full thickness cartilage defect a cartilage graft was deemed most appropriate.  An appropriate donor site was identified, cleansed, and anesthetized. The cartilage graft was then harvested and transferred to the recipient site, oriented appropriately and then sutured into place.  The secondary defect was then repaired using a primary closure. Cheiloplasty (Less Than 50%) Text: A decision was made to reconstruct the defect with a  cheiloplasty.  The defect was undermined extensively.  Additional obicularis oris muscle was excised with a 15 blade scalpel.  The defect was converted into a full thickness wedge, of less than 50% of the vertical height of the lip, to facilite a better cosmetic result.  Small vessels were then tied off with 5-0 monocyrl. The obicularis oris, superficial fascia, adipose and dermis were then reapproximated.  After the deeper layers were approximated the epidermis was reapproximated with particular care given to realign the vermillion border. Mastoid Interpolation Flap Text: A decision was made to reconstruct the defect utilizing an interpolation axial flap and a staged reconstruction.  A telfa template was made of the defect.  This telfa template was then used to outline the mastoid interpolation flap.  The donor area for the pedicle flap was then injected with anesthesia.  The flap was excised through the skin and subcutaneous tissue down to the layer of the underlying musculature.  The pedicle flap was carefully excised within this deep plane to maintain its blood supply.  The edges of the donor site were undermined.   The donor site was closed in a primary fashion.  The pedicle was then rotated into position and sutured.  Once the tube was sutured into place, adequate blood supply was confirmed with blanching and refill.  The pedicle was then wrapped with xeroform gauze and dressed appropriately with a telfa and gauze bandage to ensure continued blood supply and protect the attached pedicle. Rotation Flap Text: The defect edges were debeveled with a #15 scalpel blade.  Given the location of the defect, shape of the defect and the proximity to free margins a rotation flap was deemed most appropriate.  Using a sterile surgical marker, an appropriate rotation flap was drawn incorporating the defect and placing the expected incisions within the relaxed skin tension lines where possible.    The area thus outlined was incised deep to adipose tissue with a #15 scalpel blade.  The skin margins were undermined to an appropriate distance in all directions utilizing iris scissors. V-Y Flap Text: The defect edges were debeveled with a #15 scalpel blade.  Given the location of the defect, shape of the defect and the proximity to free margins a V-Y flap was deemed most appropriate.  Using a sterile surgical marker, an appropriate advancement flap was drawn incorporating the defect and placing the expected incisions within the relaxed skin tension lines where possible.    The area thus outlined was incised deep to adipose tissue with a #15 scalpel blade.  The skin margins were undermined to an appropriate distance in all directions utilizing iris scissors. Stage 2: Additional Anesthesia Volume In Cc: 2 Mart-1 - Negative Histology Text: MART-1 staining demonstrates a normal density and pattern of melanocytes along the dermal-epidermal junction. The surgical margins are negative for tumor cells. Debridement Text: The wound edges were debrided prior to proceeding with the closure to facilitate wound healing. Dorsal Nasal Flap Text: The defect edges were debeveled with a #15 scalpel blade.  Given the location of the defect and the proximity to free margins a dorsal nasal flap was deemed most appropriate.  Using a sterile surgical marker, an appropriate dorsal nasal flap was drawn around the defect.    The area thus outlined was incised deep to adipose tissue with a #15 scalpel blade.  The skin margins were undermined to an appropriate distance in all directions utilizing iris scissors. Bcc  Morpheaform/Sclerosing Histology Text: Irregularly shaped cords and strands of basaloid keratinocytes are present within the dermis. In areas the cells assume a single cell strand formation with a highly-infiltrative growth pattern. The cells have scant cytoplasm and round dark nuclei. There are nodular aggregates of darkly staining basophilic cells exhibiting peripheral palisading and stromal retraction. The islands are associated with a dense morpheic stroma. Helical Rim Advancement Flap Text: The defect edges were debeveled with a #15 blade scalpel.  Given the location of the defect and the proximity to free margins (helical rim) a double helical rim advancement flap was deemed most appropriate.  Using a sterile surgical marker, the appropriate advancement flaps were drawn incorporating the defect and placing the expected incisions between the helical rim and antihelix where possible.  The area thus outlined was incised through and through with a #15 scalpel blade.  With a skin hook and iris scissors, the flaps were gently and sharply undermined and freed up. Nasal Turnover Hinge Flap Text: The defect edges were debeveled with a #15 scalpel blade.  Given the size, depth, location of the defect and the defect being full thickness a nasal turnover hinge flap was deemed most appropriate.  Using a sterile surgical marker, an appropriate hinge flap was drawn incorporating the defect. The area thus outlined was incised with a #15 scalpel blade. The flap was designed to recreate the nasal mucosal lining and the alar rim. The skin margins were undermined to an appropriate distance in all directions utilizing iris scissors. Dressing (No Sutures): dry sterile dressing Bcc Histology Text: There were numerous aggregates of basaloid cells. Split-Thickness Skin Graft Text: The defect edges were debeveled with a #15 scalpel blade.  Given the location of the defect, shape of the defect and the proximity to free margins a split thickness skin graft was deemed most appropriate.  Using a sterile surgical marker, the primary defect shape was transferred to the donor site. The split thickness graft was then harvested.  The skin graft was then placed in the primary defect and oriented appropriately. Unna Boot Text: An Unna boot was placed to help immobilize the limb and facilitate more rapid healing. Suturegard Body: The suture ends were repeatedly re-tightened and re-clamped to achieve the desired tissue expansion. Estlander Flap (Upper To Lower Lip) Text: The defect of the lower lip was assessed and measured.  Given the location and size of the defect, an Estlander flap was deemed most appropriate.  Using a sterile surgical marker, an appropriate Estlander flap was measured and drawn on the upper lip. Local anesthesia was then infiltrated. A scalpel was then used to incise the lateral aspect of the flap, through skin, muscle and mucosa, leaving the flap pedicled medially.  The flap was then rotated and positioned to fill the lower lip defect.  The flap was then sutured into place with a three layer technique, closing the orbicularis oris muscle layer with subcutaneous buried sutures, followed by a mucosal layer and an epidermal layer. Suture Removal: 7 days Interpolation Flap Text: A decision was made to reconstruct the defect utilizing an interpolation axial flap and a staged reconstruction.  A telfa template was made of the defect.  This telfa template was then used to outline the interpolation flap.  The donor area for the pedicle flap was then injected with anesthesia.  The flap was excised through the skin and subcutaneous tissue down to the layer of the underlying musculature.  The interpolation flap was carefully excised within this deep plane to maintain its blood supply.  The edges of the donor site were undermined.   The donor site was closed in a primary fashion.  The pedicle was then rotated into position and sutured.  Once the tube was sutured into place, adequate blood supply was confirmed with blanching and refill.  The pedicle was then wrapped with xeroform gauze and dressed appropriately with a telfa and gauze bandage to ensure continued blood supply and protect the attached pedicle. V-Y Plasty Text: The defect edges were debeveled with a #15 scalpel blade.  Given the location of the defect, shape of the defect and the proximity to free margins an V-Y advancement flap was deemed most appropriate.  Using a sterile surgical marker, an appropriate advancement flap was drawn incorporating the defect and placing the expected incisions within the relaxed skin tension lines where possible.    The area thus outlined was incised deep to adipose tissue with a #15 scalpel blade.  The skin margins were undermined to an appropriate distance in all directions utilizing iris scissors. Scc Moderately Differentiated Histology Text: Arising from the epidermis is a proliferation of atypical keratinocytes with mitoses. Invasion into the dermis is noted. In areas the tumor is keratin producing and in other areas the tumor is less well differentiated. Secondary Intention Text (Leave Blank If You Do Not Want): The defect will heal with secondary intention. Mixed Nodular And Micronodular Bcc Histology Text: Emanating from the epidermis and infiltrating the dermis are irregularly shaped islands of basaloid keratinocytes. The cells have scant cytoplasm and round dark nuclei. Mitotic figures and apoptotic bodies are evident. The nuclei at the periphery of the islands have a palisaded arrangement. The islands are associated with a fibromyxoid stroma and there is a cleft formation between some of the islands and stroma.  Emanating from the epidermis and infiltrating the dermis are irregularly shaped islands of basaloid keratinocytes. The cells have scant cytoplasm and dark round nuclei. Mitotic figures and apoptotic bodies are evident. The nuclei at the periphery of the islands have a palisaded arrangement. The islands are associated with a fibromyxoid stroma and there is cleft formation between some of the islands and stroma. In areas the tumor breaks up into a small, micronodular, infiltrative growth pattern with deeper extension into the dermis. Tarsorrhaphy Text: A tarsorrhaphy was performed using Frost sutures. Tissue Cultured Epidermal Autograft Text: The defect edges were debeveled with a #15 scalpel blade.  Given the location of the defect, shape of the defect and the proximity to free margins a tissue cultured epidermal autograft was deemed most appropriate.  The graft was then trimmed to fit the size of the defect.  The graft was then placed in the primary defect and oriented appropriately. Alternatives Discussed Intro (Do Not Add Period): I discussed alternative treatments to Mohs surgery and specifically discussed the risks and benefits of Subsequent Stages Histo Method Verbiage: Using a similar technique to that described above, a thin layer of tissue was removed from all areas where tumor was visible on the previous stage.  The tissue was again oriented, mapped, dyed, and processed as above. Date Of Previous Biopsy (Optional): 10/12/22 Consent (Near Eyelid Margin)/Introductory Paragraph: The rationale for Mohs was explained to the patient and consent was obtained. The risks, benefits and alternatives to therapy were discussed in detail. Specifically, the risks of ectropion or eyelid deformity, infection, scarring, bleeding, prolonged wound healing, incomplete removal, allergy to anesthesia, nerve injury and recurrence were addressed. Prior to the procedure, the treatment site was clearly identified and confirmed by the patient. All components of Universal Protocol/PAUSE Rule completed. Bilobed Transposition Flap Text: The defect edges were debeveled with a #15 scalpel blade.  Given the location of the defect and the proximity to free margins a bilobed transposition flap was deemed most appropriate.  Using a sterile surgical marker, an appropriate bilobe flap drawn around the defect.    The area thus outlined was incised deep to adipose tissue with a #15 scalpel blade.  The skin margins were undermined to an appropriate distance in all directions utilizing iris scissors. Area M Indication Text: Tumors in this location are included in Area M (cheek, forehead, scalp, neck, jawline and pretibial skin).  Mohs surgery is indicated for tumors in these anatomic locations. Rhomboid Transposition Flap Text: The defect edges were debeveled with a #15 scalpel blade.  Given the location of the defect and the proximity to free margins a rhomboid transposition flap was deemed most appropriate.  Using a sterile surgical marker, an appropriate rhomboid flap was drawn incorporating the defect.    The area thus outlined was incised deep to adipose tissue with a #15 scalpel blade.  The skin margins were undermined to an appropriate distance in all directions utilizing iris scissors. Muscle Hinge Flap Text: The defect edges were debeveled with a #15 scalpel blade.  Given the size, depth and location of the defect and the proximity to free margins a muscle hinge flap was deemed most appropriate.  Using a sterile surgical marker, an appropriate hinge flap was drawn incorporating the defect. The area thus outlined was incised with a #15 scalpel blade.  The skin margins were undermined to an appropriate distance in all directions utilizing iris scissors. No Residual Tumor Seen Histology Text: There were no malignant cells seen in the sections examined. Repair Type: Complex Repair Peng Advancement Flap Text: The defect edges were debeveled with a #15 scalpel blade.  Given the location of the defect, shape of the defect and the proximity to free margins a Peng advancement flap was deemed most appropriate.  Using a sterile surgical marker, an appropriate advancement flap was drawn incorporating the defect and placing the expected incisions within the relaxed skin tension lines where possible. The area thus outlined was incised deep to adipose tissue with a #15 scalpel blade.  The skin margins were undermined to an appropriate distance in all directions utilizing iris scissors. Presence Of Scar Tissue (For Histology): absent Mohs Method Verbiage: An incision at a 45 degree angle following the standard Mohs approach was done and the specimen was harvested as a microscopic controlled layer. Island Pedicle Flap-Requiring Vessel Identification Text: The defect edges were debeveled with a #15 scalpel blade.  Given the location of the defect, shape of the defect and the proximity to free margins an island pedicle advancement flap was deemed most appropriate.  Using a sterile surgical marker, an appropriate advancement flap was drawn, based on the axial vessel mentioned above, incorporating the defect, outlining the appropriate donor tissue and placing the expected incisions within the relaxed skin tension lines where possible.    The area thus outlined was incised deep to adipose tissue with a #15 scalpel blade.  The skin margins were undermined to an appropriate distance in all directions around the primary defect and laterally outward around the island pedicle utilizing iris scissors.  There was minimal undermining beneath the pedicle flap. Deep Sutures: 5-0 Polysorb Partial Purse String (Intermediate) Text: Given the location of the defect and the characteristics of the surrounding skin an intermediate purse string closure was deemed most appropriate.  Undermining was performed circumfirentially around the surgical defect.  A purse string suture was then placed and tightened. Wound tension only allowed a partial closure of the circular defect. Epidermal Sutures: 6-0 Monosof Dermal Autograft Text: The defect edges were debeveled with a #15 scalpel blade.  Given the location of the defect, shape of the defect and the proximity to free margins a dermal autograft was deemed most appropriate.  Using a sterile surgical marker, the primary defect shape was transferred to the donor site. The area thus outlined was incised deep to adipose tissue with a #15 scalpel blade.  The harvested graft was then trimmed of adipose and epidermal tissue until only dermis was left.  The skin graft was then placed in the primary defect and oriented appropriately. Closure 2 Information: This tab is for additional flaps and grafts, including complex repair and grafts and complex repair and flaps. You can also specify a different location for the additional defect, if the location is the same you do not need to select a new one. We will insert the automated text for the repair you select below just as we do for solitary flaps and grafts. Please note that at this time if you select a location with a different insurance zone you will need to override the ICD10 and CPT if appropriate. Full Thickness Lip Wedge Repair (Flap) Text: Given the location of the defect and the proximity to free margins a full thickness wedge repair was deemed most appropriate.  Using a sterile surgical marker, the appropriate repair was drawn incorporating the defect and placing the expected incisions perpendicular to the vermillion border.  The vermillion border was also meticulously outlined to ensure appropriate reapproximation during the repair.  The area thus outlined was incised through and through with a #15 scalpel blade.  The muscularis and dermis were reaproximated with deep sutures following hemostasis. Care was taken to realign the vermillion border before proceeding with the superficial closure.  Once the vermillion was realigned the superfical and mucosal closure was finished. Manual Repair Warning Statement: We plan on removing the manually selected variable below in favor of our much easier automatic structured text blocks found in the previous tab. We decided to do this to help make the flow better and give you the full power of structured data. Manual selection is never going to be ideal in our platform and I would encourage you to avoid using manual selection from this point on, especially since I will be sunsetting this feature. It is important that you do one of two things with the customized text below. First, you can save all of the text in a word file so you can have it for future reference. Second, transfer the text to the appropriate area in the Library tab. Lastly, if there is a flap or graft type which we do not have you need to let us know right away so I can add it in before the variable is hidden. No need to panic, we plan to give you roughly 6 months to make the change. Chonodrocutaneous Helical Advancement Flap Text: The defect edges were debeveled with a #15 scalpel blade.  Given the location of the defect and the proximity to free margins a chondrocutaneous helical advancement flap was deemed most appropriate.  Using a sterile surgical marker, the appropriate advancement flap was drawn incorporating the defect and placing the expected incisions within the relaxed skin tension lines where possible.    The area thus outlined was incised deep to adipose tissue with a #15 scalpel blade.  The skin margins were undermined to an appropriate distance in all directions utilizing iris scissors. Epidermal Closure: simple interrupted Abbe Flap (Upper To Lower Lip) Text: The defect of the lower lip was assessed and measured.  Given the location and size of the defect, an Abbe flap was deemed most appropriate.  Using a sterile surgical marker, an appropriate Abbe flap was measured and drawn on the upper lip. Local anesthesia was then infiltrated.  A scalpel was then used to incise the upper lip through and through the skin, vermilion, muscle and mucosa, leaving the flap pedicled on the opposite side.  The flap was then rotated and transferred to the lower lip defect.  The flap was then sutured into place with a three layer technique, closing the orbicularis oris muscle layer with subcutaneous buried sutures, followed by a mucosal layer and an epidermal layer. O-Z Flap Text: The defect edges were debeveled with a #15 scalpel blade.  Given the location of the defect, shape of the defect and the proximity to free margins an O-Z flap was deemed most appropriate.  Using a sterile surgical marker, an appropriate transposition flap was drawn incorporating the defect and placing the expected incisions within the relaxed skin tension lines where possible. The area thus outlined was incised deep to adipose tissue with a #15 scalpel blade.  The skin margins were undermined to an appropriate distance in all directions utilizing iris scissors. Lentigo Maligna Histology Text: There is an intraepidermal melanocytic proliferation arranged as nests as well as individual cells. The individual cells are crowded along the dermal-epidermal junction with well-developed confluent growth. Pagetoid migration to the upper levels of the epidermis is noted. The individual melanocytes are enlarged and hyperchromatic with irregular nuclear contours and coarse chromatin. Within the dermis there are rare nests of similar appearing melanocytes that are associated with fibroplasia, mild chronic inflammation, and solar elastosis. Patient Name (Optional- Will Render 'the Patient' If Blank): Fredy Minor Mauc Instructions: By selecting yes to the question below the MAUC number will be added into the note.  This will be calculated automatically based on the diagnosis chosen, the size entered, the body zone selected (H,M,L) and the specific indications you chose. You will also have the option to override the Mohs AUC if you disagree with the automatically calculated number and this option is found in the Case Summary tab. Consent (Marginal Mandibular)/Introductory Paragraph: The rationale for Mohs was explained to the patient and consent was obtained. The risks, benefits and alternatives to therapy were discussed in detail. Specifically, the risks of damage to the marginal mandibular branch of the facial nerve, infection, scarring, bleeding, prolonged wound healing, incomplete removal, allergy to anesthesia, and recurrence were addressed. Prior to the procedure, the treatment site was clearly identified and confirmed by the patient. All components of Universal Protocol/PAUSE Rule completed. Detail Level: Detailed Scc Ka Subtype Histology Text: There is a symmetric, crateriform nodule with a central keratinous core. Buttress formation is noted at the peripheries of the nodule. The keratinocytes are enlarged with glassy, eosinophilic cytoplasm. The atypical keratinocytes infiltrate the dermis in an irregular fashion and are associated with inflammation including lymphomononuclear cells and eosinophils. Suturegard Intro: Intraoperative tissue expansion was performed, utilizing the SUTUREGARD device, in order to reduce wound tension. Non-Graft Cartilage Fenestration Text: The cartilage was fenestrated with a 2mm punch biopsy to help facilitate healing. Distance Of Undermining In Cm (Required): 1.5 Estimated Blood Loss (Cc): minimal Alar Island Pedicle Flap Text: The defect edges were debeveled with a #15 scalpel blade.  Given the location of the defect, shape of the defect and the proximity to the alar rim an island pedicle advancement flap was deemed most appropriate.  Using a sterile surgical marker, an appropriate advancement flap was drawn incorporating the defect, outlining the appropriate donor tissue and placing the expected incisions within the nasal ala running parallel to the alar rim. The area thus outlined was incised with a #15 scalpel blade.  The skin margins were undermined minimally to an appropriate distance in all directions around the primary defect and laterally outward around the island pedicle utilizing iris scissors.  There was minimal undermining beneath the pedicle flap. Include Tumor Staging In Mohs Note?: Please Select the Appropriate Response Banner Transposition Flap Text: The defect edges were debeveled with a #15 scalpel blade.  Given the location of the defect and the proximity to free margins a Banner transposition flap was deemed most appropriate.  Using a sterile surgical marker, an appropriate flap drawn around the defect. The area thus outlined was incised deep to adipose tissue with a #15 scalpel blade.  The skin margins were undermined to an appropriate distance in all directions utilizing iris scissors. Afx Histology Text: Present within the dermis is a highly cellular neoplasm composed of pleomorphic spindled and epithelioid cells. The cells are arranged in a haphazard fashion and associated with mitotic figures, including atypical forms. Melolabial Transposition Flap Text: The defect edges were debeveled with a #15 scalpel blade.  Given the location of the defect and the proximity to free margins a melolabial flap was deemed most appropriate.  Using a sterile surgical marker, an appropriate melolabial transposition flap was drawn incorporating the defect.    The area thus outlined was incised deep to adipose tissue with a #15 scalpel blade.  The skin margins were undermined to an appropriate distance in all directions utilizing iris scissors. Cheek Interpolation Flap Text: A decision was made to reconstruct the defect utilizing an interpolation axial flap and a staged reconstruction.  A telfa template was made of the defect.  This telfa template was then used to outline the Cheek Interpolation flap.  The donor area for the pedicle flap was then injected with anesthesia.  The flap was excised through the skin and subcutaneous tissue down to the layer of the underlying musculature.  The interpolation flap was carefully excised within this deep plane to maintain its blood supply.  The edges of the donor site were undermined.   The donor site was closed in a primary fashion.  The pedicle was then rotated into position and sutured.  Once the tube was sutured into place, adequate blood supply was confirmed with blanching and refill.  The pedicle was then wrapped with xeroform gauze and dressed appropriately with a telfa and gauze bandage to ensure continued blood supply and protect the attached pedicle. O-Z Plasty Text: The defect edges were debeveled with a #15 scalpel blade.  Given the location of the defect, shape of the defect and the proximity to free margins an O-Z plasty (double transposition flap) was deemed most appropriate.  Using a sterile surgical marker, the appropriate transposition flaps were drawn incorporating the defect and placing the expected incisions within the relaxed skin tension lines where possible.    The area thus outlined was incised deep to adipose tissue with a #15 scalpel blade.  The skin margins were undermined to an appropriate distance in all directions utilizing iris scissors.  Hemostasis was achieved with electrocautery.  The flaps were then transposed into place, one clockwise and the other counterclockwise, and anchored with interrupted buried subcutaneous sutures. Tumor Depth: Less than 6mm from granular layer and no invasion beyond the subcutaneous fat Hemigard Retention Suture: 2-0 Nylon Nostril Rim Text: The closure involved the nostril rim. Unique Flap 1 Name: Edward Bueno Initial Size Of Lesion: 1.1 Purse String (Intermediate) Text: Given the location of the defect and the characteristics of the surrounding skin a pursestring intermediate closure was deemed most appropriate.  Undermining was performed circumfirentially around the surgical defect.  A purstring suture was then placed and tightened. Number Of Hemigard Strips Per Side: 1 Star Wedge Flap Text: The defect edges were debeveled with a #15 scalpel blade.  Given the location of the defect, shape of the defect and the proximity to free margins a star wedge flap was deemed most appropriate.  Using a sterile surgical marker, an appropriate rotation flap was drawn incorporating the defect and placing the expected incisions within the relaxed skin tension lines where possible. The area thus outlined was incised deep to adipose tissue with a #15 scalpel blade.  The skin margins were undermined to an appropriate distance in all directions utilizing iris scissors. Undermining Location (Optional): at the junction of the superficial and deep fat Closure 4 Information: This tab is for additional flaps and grafts above and beyond our usual structured repairs.  Please note if you enter information here it will not currently bill and you will need to add the billing information manually. Brow Lift Text: A midfrontal incision was made medially to the defect to allow access to the tissues just superior to the left eyebrow. Following careful dissection inferiorly in a supraperiosteal plane to the level of the left eyebrow, several 3-0 monocryl sutures were used to resuspend the eyebrow orbicularis oculi muscular unit to the superior frontal bone periosteum. This resulted in an appropriate reapproximation of static eyebrow symmetry and correction of the left brow ptosis. Modified Advancement Flap Text: The defect edges were debeveled with a #15 scalpel blade.  Given the location of the defect, shape of the defect and the proximity to free margins a modified advancement flap was deemed most appropriate.  Using a sterile surgical marker, an appropriate advancement flap was drawn incorporating the defect and placing the expected incisions within the relaxed skin tension lines where possible.    The area thus outlined was incised deep to adipose tissue with a #15 scalpel blade.  The skin margins were undermined to an appropriate distance in all directions utilizing iris scissors. O-T Advancement Flap Text: The defect edges were debeveled with a #10 scalpel blade.  Given the location of the defect, shape of the defect and the proximity to free margins an O-T advancement flap was deemed most appropriate.  Using a sterile surgical marker, an appropriate advancement flap was drawn incorporating the defect and placing the expected incisions within the relaxed skin tension lines where possible.    The area thus outlined was incised deep to adipose tissue with a #10 scalpel blade.  The skin margins were undermined to an appropriate distance in all directions utilizing iris scissors. Scc In Situ With Follicular Extension Histology Text: Within the epidermis is a full thickness proliferation of atypical keratinocytes with mitoses. The overlying stratum corneum demonstrates parakeratosis. No invasion is noted. The atypical cells extend down hair follicle structures. Advancement Flap (Double) Text: The defect edges were debeveled with a #15 scalpel blade.  Given the location of the defect and the proximity to free margins a double advancement flap was deemed most appropriate.  Using a sterile surgical marker, the appropriate advancement flaps were drawn incorporating the defect and placing the expected incisions within the relaxed skin tension lines where possible.    The area thus outlined was incised deep to adipose tissue with a #15 scalpel blade.  The skin margins were undermined to an appropriate distance in all directions utilizing iris scissors. Consent 3/Introductory Paragraph: I gave the patient a chance to ask questions they had about the procedure.  Following this I explained the Mohs procedure and consent was obtained. The risks, benefits and alternatives to therapy were discussed in detail. Specifically, the risks of infection, scarring, bleeding, prolonged wound healing, incomplete removal, allergy to anesthesia, nerve injury and recurrence were addressed. Prior to the procedure, the treatment site was clearly identified and confirmed by the patient. All components of Universal Protocol/PAUSE Rule completed. Consent (Lip)/Introductory Paragraph: The rationale for Mohs was explained to the patient and consent was obtained. The risks, benefits and alternatives to therapy were discussed in detail. Specifically, the risks of lip deformity, changes in the oral aperture, infection, scarring, bleeding, prolonged wound healing, incomplete removal, allergy to anesthesia, nerve injury and recurrence were addressed. Prior to the procedure, the treatment site was clearly identified and confirmed by the patient. All components of Universal Protocol/PAUSE Rule completed. Retention Suture Bite Size: 3 mm Composite Graft Text: The defect edges were debeveled with a #15 scalpel blade.  Given the location of the defect, shape of the defect, the proximity to free margins and the fact the defect was full thickness a composite graft was deemed most appropriate.  The defect was outline and then transferred to the donor site.  A full thickness graft was then excised from the donor site. The graft was then placed in the primary defect, oriented appropriately and then sutured into place.  The secondary defect was then repaired using a primary closure. Cheiloplasty (Complex) Text: A decision was made to reconstruct the defect with a  cheiloplasty.  The defect was undermined extensively.  Additional obicularis oris muscle was excised with a 15 blade scalpel.  The defect was converted into a full thickness wedge to facilite a better cosmetic result.  Small vessels were then tied off with 5-0 monocyrl. The obicularis oris, superficial fascia, adipose and dermis were then reapproximated.  After the deeper layers were approximated the epidermis was reapproximated with particular care given to realign the vermillion border. Simple / Intermediate / Complex Repair - Final Wound Length In Cm: 4.4 Posterior Auricular Interpolation Flap Text: A decision was made to reconstruct the defect utilizing an interpolation axial flap and a staged reconstruction.  A telfa template was made of the defect.  This telfa template was then used to outline the posterior auricular interpolation flap.  The donor area for the pedicle flap was then injected with anesthesia.  The flap was excised through the skin and subcutaneous tissue down to the layer of the underlying musculature.  The pedicle flap was carefully excised within this deep plane to maintain its blood supply.  The edges of the donor site were undermined.   The donor site was closed in a primary fashion.  The pedicle was then rotated into position and sutured.  Once the tube was sutured into place, adequate blood supply was confirmed with blanching and refill.  The pedicle was then wrapped with xeroform gauze and dressed appropriately with a telfa and gauze bandage to ensure continued blood supply and protect the attached pedicle. Z Plasty Text: The lesion was extirpated to the level of the fat with a #15 scalpel blade.  Given the location of the defect, shape of the defect and the proximity to free margins a Z-plasty was deemed most appropriate for repair.  Using a sterile surgical marker, the appropriate transposition arms of the Z-plasty were drawn incorporating the defect and placing the expected incisions within the relaxed skin tension lines where possible.    The area thus outlined was incised deep to adipose tissue with a #15 scalpel blade.  The skin margins were undermined to an appropriate distance in all directions utilizing iris scissors.  The opposing transposition arms were then transposed into place in opposite direction and anchored with interrupted buried subcutaneous sutures. Advancement-Rotation Flap Text: The defect edges were debeveled with a #15 scalpel blade.  Given the location of the defect, shape of the defect and the proximity to free margins an advancement-rotation flap was deemed most appropriate.  Using a sterile surgical marker, an appropriate flap was drawn incorporating the defect and placing the expected incisions within the relaxed skin tension lines where possible. The area thus outlined was incised deep to adipose tissue with a #15 scalpel blade.  The skin margins were undermined to an appropriate distance in all directions utilizing iris scissors. Crescentic Advancement Flap Text: The defect edges were debeveled with a #15 scalpel blade.  Given the location of the defect and the proximity to free margins a crescentic advancement flap was deemed most appropriate.  Using a sterile surgical marker, the appropriate advancement flap was drawn incorporating the defect and placing the expected incisions within the relaxed skin tension lines where possible.    The area thus outlined was incised deep to adipose tissue with a #15 scalpel blade.  The skin margins were undermined to an appropriate distance in all directions utilizing iris scissors. Scc Poorly Differentiated Histology Text: Arising from the epidermis is a pleomorphic proliferation of atypical keratinocytes with mitoses. Invasion into the dermis is noted. Adjacent Tissue Transfer Text: The defect edges were debeveled with a #15 scalpel blade.  Given the location of the defect and the proximity to free margins an adjacent tissue transfer was deemed most appropriate.  Using a sterile surgical marker, an appropriate flap was drawn incorporating the defect and placing the expected incisions within the relaxed skin tension lines where possible.    The area thus outlined was incised deep to adipose tissue with a #15 scalpel blade.  The skin margins were undermined to an appropriate distance in all directions utilizing iris scissors. No Repair - Repaired With Adjacent Surgical Defect Text (Leave Blank If You Do Not Want): After obtaining clear surgical margins the defect was repaired concurrently with another surgical defect which was in close approximation. Island Pedicle Flap Text: The defect edges were debeveled with a #15 scalpel blade.  Given the location of the defect, shape of the defect and the proximity to free margins an island pedicle advancement flap was deemed most appropriate.  Using a sterile surgical marker, an appropriate advancement flap was drawn incorporating the defect, outlining the appropriate donor tissue and placing the expected incisions within the relaxed skin tension lines where possible.    The area thus outlined was incised deep to adipose tissue with a #15 scalpel blade.  The skin margins were undermined to an appropriate distance in all directions around the primary defect and laterally outward around the island pedicle utilizing iris scissors.  There was minimal undermining beneath the pedicle flap. Bilateral Helical Rim Advancement Flap Text: The defect edges were debeveled with a #15 blade scalpel.  Given the location of the defect and the proximity to free margins (helical rim) a bilateral helical rim advancement flap was deemed most appropriate.  Using a sterile surgical marker, the appropriate advancement flaps were drawn incorporating the defect and placing the expected incisions between the helical rim and antihelix where possible.  The area thus outlined was incised through and through with a #15 scalpel blade.  With a skin hook and iris scissors, the flaps were gently and sharply undermined and freed up. Bcc  Nodular Histology Text: Emanating from the epidermis and infiltrating the dermis are irregularly shaped islands of basaloid keratinocytes. The cells have scant cytoplasm and round dark nuclei. Mitotic figures and apoptotic bodies are evident. The nuclei at the periphery of the islands have a palisaded arrangement. The islands are associated with a fibromyxoid stroma and there is a cleft formation between some of the islands and stroma. Nasalis-Muscle-Based Myocutaneous Island Pedicle Flap Text: Using a #15 blade, an incision was made around the donor flap to the level of the nasalis muscle. Wide lateral undermining was then performed in both the subcutaneous plane above the nasalis muscle, and in a submuscular plane just above periosteum. This allowed the formation of a free nasalis muscle axial pedicle (based on the angular artery) which was still attached to the actual cutaneous flap, increasing its mobility and vascular viability. Hemostasis was obtained with pinpoint electrocoagulation. The flap was mobilized into position and the pivotal anchor points positioned and stabilized with buried interrupted sutures. Subcutaneous and dermal tissues were closed in a multilayered fashion with sutures. Tissue redundancies were excised, and the epidermal edges were apposed without significant tension and sutured with sutures. Vermilion Border Text: The closure involved the vermilion border. Referring Physician (Optional): Lanai Cooksey PA-C Spiral Flap Text: The defect edges were debeveled with a #15 scalpel blade.  Given the location of the defect, shape of the defect and the proximity to free margins a spiral flap was deemed most appropriate.  Using a sterile surgical marker, an appropriate rotation flap was drawn incorporating the defect and placing the expected incisions within the relaxed skin tension lines where possible. The area thus outlined was incised deep to adipose tissue with a #15 scalpel blade.  The skin margins were undermined to an appropriate distance in all directions utilizing iris scissors. Length To Time In Minutes Device Was In Place: 10 Previous Accession (Optional): [id number] Melolabial Interpolation Flap Text: A decision was made to reconstruct the defect utilizing an interpolation axial flap and a staged reconstruction.  A telfa template was made of the defect.  This telfa template was then used to outline the melolabial interpolation flap.  The donor area for the pedicle flap was then injected with anesthesia.  The flap was excised through the skin and subcutaneous tissue down to the layer of the underlying musculature.  The pedicle flap was carefully excised within this deep plane to maintain its blood supply.  The edges of the donor site were undermined.   The donor site was closed in a primary fashion.  The pedicle was then rotated into position and sutured.  Once the tube was sutured into place, adequate blood supply was confirmed with blanching and refill.  The pedicle was then wrapped with xeroform gauze and dressed appropriately with a telfa and gauze bandage to ensure continued blood supply and protect the attached pedicle. Consent (Ear)/Introductory Paragraph: The rationale for Mohs was explained to the patient and consent was obtained. The risks, benefits and alternatives to therapy were discussed in detail. Specifically, the risks of ear deformity, infection, scarring, bleeding, prolonged wound healing, incomplete removal, allergy to anesthesia, nerve injury and recurrence were addressed. Prior to the procedure, the treatment site was clearly identified and confirmed by the patient. All components of Universal Protocol/PAUSE Rule completed. H Plasty Text: Given the location of the defect, shape of the defect and the proximity to free margins a H-plasty was deemed most appropriate for repair.  Using a sterile surgical marker, the appropriate advancement arms of the H-plasty were drawn incorporating the defect and placing the expected incisions within the relaxed skin tension lines where possible. The area thus outlined was incised deep to adipose tissue with a #15 scalpel blade. The skin margins were undermined to an appropriate distance in all directions utilizing iris scissors.  The opposing advancement arms were then advanced into place in opposite direction and anchored with interrupted buried subcutaneous sutures. Complex Repair And Flap Additional Text (Will Appearing After The Standard Complex Repair Text): The complex repair was not sufficient to completely close the primary defect. The remaining additional defect was repaired with the flap mentioned below. Xenograft Text: The defect edges were debeveled with a #15 scalpel blade.  Given the location of the defect, shape of the defect and the proximity to free margins a xenograft was deemed most appropriate.  The graft was then trimmed to fit the size of the defect.  The graft was then placed in the primary defect and oriented appropriately. Mohs Rapid Report Verbiage: The area of clinically evident tumor was marked with skin marking ink and appropriately hatched.  The initial incision was made following the Mohs approach through the skin.  The specimen was taken to the lab, divided into the necessary number of pieces, chromacoded and processed according to the Mohs protocol.  This was repeated in successive stages until a tumor free defect was achieved. Burow's Graft Text: The defect edges were debeveled with a #15 scalpel blade.  Given the location of the defect, shape of the defect, the proximity to free margins and the presence of a standing cone deformity a Burow's skin graft was deemed most appropriate. The standing cone was removed and this tissue was then trimmed to the shape of the primary defect. The adipose tissue was also removed until only dermis and epidermis were left.  The skin margins of the secondary defect were undermined to an appropriate distance in all directions utilizing iris scissors.  The secondary defect was closed with interrupted buried subcutaneous sutures.  The skin edges were then re-apposed with running  sutures.  The skin graft was then placed in the primary defect and oriented appropriately. Home Suture Removal Text: Patient was provided instructions on removing sutures and will remove their sutures at home.  If they have any questions or difficulties they will call the office. Consent 1/Introductory Paragraph: The rationale for Mohs was explained to the patient and consent was obtained. The risks, benefits and alternatives to therapy were discussed in detail. Specifically, the risks of infection, scarring, bleeding, prolonged wound healing, incomplete removal, allergy to anesthesia, nerve injury and recurrence were addressed. Prior to the procedure, the treatment site was clearly identified and confirmed by the patient. All components of Universal Protocol/PAUSE Rule completed. Number Of Epidermal Sutures (Optional): 9

## 2022-11-10 NOTE — ED ADULT NURSE NOTE - CHPI ED NUR SYMPTOMS NEG
Incoming fax from [facility] requesting review and signature for Therapy.     Message routed to Dr Willian Alonzo  [facility]: 11/23/20 with Dr Aretha Leiva placed on Dr Ciara Ocasio desk at [facility] no chills/no decreased eating/drinking/no dizziness/no nausea/no vomiting

## 2022-11-28 ENCOUNTER — APPOINTMENT (OUTPATIENT)
Dept: VASCULAR SURGERY | Facility: CLINIC | Age: 87
End: 2022-11-28

## 2022-11-28 VITALS
SYSTOLIC BLOOD PRESSURE: 155 MMHG | DIASTOLIC BLOOD PRESSURE: 75 MMHG | WEIGHT: 135 LBS | HEIGHT: 60 IN | BODY MASS INDEX: 26.5 KG/M2 | HEART RATE: 76 BPM

## 2022-11-28 DIAGNOSIS — M79.89 OTHER SPECIFIED SOFT TISSUE DISORDERS: ICD-10-CM

## 2022-11-28 PROCEDURE — 93971 EXTREMITY STUDY: CPT

## 2022-11-28 PROCEDURE — 99214 OFFICE O/P EST MOD 30 MIN: CPT

## 2022-11-28 NOTE — PHYSICAL EXAM
[Normal Thyroid] : the thyroid was normal [Normal Breath Sounds] : Normal breath sounds [Respiratory Effort] : normal respiratory effort [Normal Heart Sounds] : normal heart sounds [Normal Rate and Rhythm] : normal rate and rhythm [2+] : left 2+ [1+] : left 1+ [No Rash or Lesion] : No rash or lesion [Skin Ulcer] : ulcer [Alert] : alert [Calm] : calm [JVD] : no jugular venous distention  [0] : right 0 [Ankle Swelling (On Exam)] : not present [Varicose Veins Of Lower Extremities] : not present [] : not present [Abdomen Masses] : No abdominal masses [Abdomen Tenderness] : ~T ~M No abdominal tenderness [Purpura] : no purpura  [Petechiae] : no petechiae [Skin Induration] : no induration [de-identified] : Well-nourished, NAD [de-identified] : NC/AT, anicteric [de-identified] : FROM throughout, strength 5/5x4 [de-identified] : Healed R 3/4th toe amputation sites w/callus at operative site, removed today

## 2022-11-28 NOTE — PROCEDURE
[FreeTextEntry1] : RLE venous duplex to evaluate source of pt's swelling reveals no evidence of DVT/SVT, +fluid noted at the R medial knee w/edema of the calf/ankle

## 2022-11-28 NOTE — ASSESSMENT
[FreeTextEntry1] : 89yoF w/severe ischemic rest pain due to chronic PAD, also noted to have ischemic changes of the R 3/4th toes which were amputated by Dr. Brumfield after the angiogram, s/p R YANET PTA/stent, returns today for evaluation of "R lower leg swelling in the calf that started 2wks prior".  Pt denies fall/trauma/infection in the RLE, but states that her swelling occurred suddenly and w/o explanation; swelling does not present w/discoloration, pain, or a palpable mass in the leg.  She denies recent fevers/chills/malaise, and states that all wounds/surgical sites in her foot have healed.\par \par RLE thin w/o evidence of ischemic changes or tense edema, appears atraumatic.  RLE venous duplex to evaluate source of pt's swelling reveals no evidence of DVT/SVT, +fluid noted at the R medial knee w/edema of the calf/ankle.  Reassured pt that there is no evidence of arterial insufficiency or venous obstruction, instructed her to treat the RLE as if it sustained a direct trauma-recommend elevation, warm compresses to the soft tissue swelling, ice to the knee if it is painful, and f/u w/her PCP.  Will repeat arterial duplex study in March 2023 for surveillance of her YANET stent.

## 2022-11-28 NOTE — HISTORY OF PRESENT ILLNESS
[FreeTextEntry1] : 89yoF w/severe ischemic rest pain due to chronic PAD, also noted to have ischemic changes of the R 3/4th toes which were amputated by Dr. Brumfield after the angiogram, s/p R YANET PTA/stent, returns today for evaluation of "R lower leg swelling in the calf that started 2wks prior".  Pt denies fall/trauma/infection in the RLE, but states that her swelling occurred suddenly and w/o explanation; swelling does not present w/discoloration, pain, or a palpable mass in the leg.  She denies recent fevers/chills/malaise, and states that all wounds/surgical sites in her foot have healed.

## 2022-12-19 ENCOUNTER — RX RENEWAL (OUTPATIENT)
Age: 87
End: 2022-12-19

## 2023-01-04 ENCOUNTER — APPOINTMENT (OUTPATIENT)
Dept: VASCULAR SURGERY | Facility: CLINIC | Age: 88
End: 2023-01-04
Payer: MEDICARE

## 2023-01-04 VITALS
WEIGHT: 135 LBS | BODY MASS INDEX: 26.5 KG/M2 | HEIGHT: 60 IN | SYSTOLIC BLOOD PRESSURE: 128 MMHG | HEART RATE: 67 BPM | DIASTOLIC BLOOD PRESSURE: 64 MMHG

## 2023-01-04 DIAGNOSIS — M79.604 PAIN IN RIGHT LEG: ICD-10-CM

## 2023-01-04 PROCEDURE — 99214 OFFICE O/P EST MOD 30 MIN: CPT

## 2023-01-06 PROBLEM — M79.604 PAIN OF RIGHT LOWER EXTREMITY: Status: ACTIVE | Noted: 2023-01-06

## 2023-01-06 NOTE — PHYSICAL EXAM
[Normal Thyroid] : the thyroid was normal [Normal Breath Sounds] : Normal breath sounds [Respiratory Effort] : normal respiratory effort [Normal Heart Sounds] : normal heart sounds [Normal Rate and Rhythm] : normal rate and rhythm [2+] : left 2+ [0] : right 0 [1+] : left 1+ [No Rash or Lesion] : No rash or lesion [Skin Ulcer] : ulcer [Alert] : alert [Calm] : calm [JVD] : no jugular venous distention  [Ankle Swelling (On Exam)] : not present [Varicose Veins Of Lower Extremities] : not present [] : not present [Abdomen Masses] : No abdominal masses [Abdomen Tenderness] : ~T ~M No abdominal tenderness [Purpura] : no purpura  [Petechiae] : no petechiae [Skin Induration] : no induration [de-identified] : Well-nourished, NAD [de-identified] : NC/AT, anicteric [FreeTextEntry1] : Signal noted w/handheld doppler at R DP/PT/peroneal arteries [de-identified] : FROM throughout, strength 5/5x4 [de-identified] : Healed R 3/4th toe amputation sites w/callus at operative site, removed today

## 2023-01-06 NOTE — HISTORY OF PRESENT ILLNESS
[FreeTextEntry1] : 89yoF w/h/o severe ischemic rest pain due to chronic PAD, also noted to have ischemic changes of the R 3/4th toes which were amputated by Dr. Brumfield after the angiogram, s/p R YANET PTA/stent, returns today for evaluation of "R lower leg pain in the calf and foot that started a few weeks prior".  Pt denies fall/trauma/infection in the RLE, but states that her swelling occurred suddenly and w/o explanation; swelling does not present w/discoloration, pain, or a palpable mass in the leg.  She denies recent fevers/chills/malaise, and states that all wounds/surgical sites in her foot have healed.

## 2023-01-06 NOTE — ASSESSMENT
[FreeTextEntry1] : 89yoF w/h/o severe ischemic rest pain due to chronic PAD, also noted to have ischemic changes of the R 3/4th toes which were amputated by Dr. Brumfield after the angiogram, s/p R YANET PTA/stent, returns today for evaluation of "R lower leg pain in the calf and foot that started a few weeks prior".  Pt denies fall/trauma/infection in the RLE, but states that her swelling occurred suddenly and w/o explanation; swelling does not present w/discoloration, pain, or a palpable mass in the leg.  She denies recent fevers/chills/malaise, and states that all wounds/surgical sites in her foot have healed.\par \par LEs well-perfused and warm w/o any new ulcers or evidence of ischemia.  Signal noted w/handheld doppler at R DP/PT/peroneal arteries.  Reassured pt that her leg is well-perfused and is not threatened at this time.  She was instructed to continue regular activity and RTO in March/April 2023 for surveillance of her R SFA stent.

## 2023-03-07 ENCOUNTER — APPOINTMENT (OUTPATIENT)
Dept: VASCULAR SURGERY | Facility: CLINIC | Age: 88
End: 2023-03-07
Payer: MEDICARE

## 2023-03-07 DIAGNOSIS — B35.3 TINEA PEDIS: ICD-10-CM

## 2023-03-07 DIAGNOSIS — I73.9 PERIPHERAL VASCULAR DISEASE, UNSPECIFIED: ICD-10-CM

## 2023-03-07 PROCEDURE — 99213 OFFICE O/P EST LOW 20 MIN: CPT

## 2023-03-07 PROCEDURE — 93926 LOWER EXTREMITY STUDY: CPT

## 2023-03-07 RX ORDER — KETOCONAZOLE 20 MG/G
2 CREAM TOPICAL DAILY
Qty: 1 | Refills: 2 | Status: ACTIVE | COMMUNITY
Start: 2023-03-07 | End: 1900-01-01

## 2023-03-07 RX ORDER — FLUCONAZOLE 150 MG/1
150 TABLET ORAL DAILY
Qty: 3 | Refills: 0 | Status: ACTIVE | COMMUNITY
Start: 2023-03-07 | End: 1900-01-01

## 2023-03-07 NOTE — ED ADULT NURSE NOTE - PERIPHERAL PULSES
Immunization chart prep completed.  Immunization records verified.  Kobi Roger due for Covid and Influenza     strong bilaterally weak bilaterally

## 2023-03-14 NOTE — ASSESSMENT
[FreeTextEntry1] : 89yoF w/h/o severe ischemic rest pain due to chronic PAD, also noted to have ischemic changes of the R 3/4th toes which were amputated by Dr. Brumfield after the angiogram, s/p R YANET PTA/stent, returns today for evaluation of chronic "R lower leg pain in the calf and foot that started a few weeks prior".  Pt denies fall/trauma/infection in the RLE, but states that her swelling occurred suddenly and w/o explanation; swelling does not present w/discoloration, pain, or a palpable mass in the leg.  She denies recent fevers/chills/malaise, and states that all wounds/surgical sites in her foot have healed.\par \par LEs well-perfused and warm w/o any new ulcers or evidence of ischemia.  RLE arterial duplex performed today to assess LE perfusion reveals patent dSFA/pop stent w/>50% in-stent restenosis at the level of the pop, two-vessel runoff noted via the peroneal a/PTA.  Reassured pt that her leg is well-perfused and is not threatened at this time.  She was instructed to continue regular activity and RTO in 6mos for surveillance.

## 2023-03-14 NOTE — PROCEDURE
[FreeTextEntry1] : RLE arterial duplex performed today to assess LE perfusion reveals patent dSFA/pop stent w/>50% in-stent restenosis at the level of the pop, two-vessel runoff noted via the peroneal a/PTA.

## 2023-03-14 NOTE — ADDENDUM
[FreeTextEntry1] : This note was written by Jimmy Metz, acting as a scribe for Dr. Christopher Hassan.  I, Dr. Christopher Hassan, have read and attest that all the information, medical decision-making, and discharge instructions within are true and accurate.\par \par I, Dr. Christopher Hassna, personally performed the evaluation and management (E/M) services for this established patient who presents today with (an) existing condition(s).  That E/M includes conducting the examination, assessing all conditions, and (re)establishing/reinforcing a plan of care.  Today, my ACP, Jimmy Metz, was here to observe my evaluation and management services for this condition to be followed going forward.

## 2023-03-14 NOTE — PHYSICAL EXAM
[Normal Thyroid] : the thyroid was normal [Normal Breath Sounds] : Normal breath sounds [Respiratory Effort] : normal respiratory effort [Normal Heart Sounds] : normal heart sounds [Normal Rate and Rhythm] : normal rate and rhythm [2+] : left 2+ [0] : right 0 [1+] : left 1+ [No Rash or Lesion] : No rash or lesion [Skin Ulcer] : ulcer [Alert] : alert [Calm] : calm [JVD] : no jugular venous distention  [Ankle Swelling (On Exam)] : not present [Varicose Veins Of Lower Extremities] : not present [] : not present [Abdomen Masses] : No abdominal masses [Abdomen Tenderness] : ~T ~M No abdominal tenderness [Purpura] : no purpura  [Petechiae] : no petechiae [Skin Induration] : no induration [de-identified] : Well-nourished, NAD [de-identified] : NC/AT, anicteric [FreeTextEntry1] : Signal noted w/handheld doppler at R DP/PT/peroneal arteries [de-identified] : FROM throughout, strength 5/5x4 [de-identified] : Healed R 3/4th toe amputation sites w/callus at operative site, removed today

## 2023-03-14 NOTE — HISTORY OF PRESENT ILLNESS
[FreeTextEntry1] : 89yoF w/h/o severe ischemic rest pain due to chronic PAD, also noted to have ischemic changes of the R 3/4th toes which were amputated by Dr. Brumfield after the angiogram, s/p R YANET PTA/stent, returns today for evaluation of chronic "R lower leg pain in the calf and foot that started a few weeks prior".  Pt denies fall/trauma/infection in the RLE, but states that her swelling occurred suddenly and w/o explanation; swelling does not present w/discoloration, pain, or a palpable mass in the leg.  She denies recent fevers/chills/malaise, and states that all wounds/surgical sites in her foot have healed.

## 2023-03-21 ENCOUNTER — APPOINTMENT (OUTPATIENT)
Dept: VASCULAR SURGERY | Facility: CLINIC | Age: 88
End: 2023-03-21
Payer: MEDICARE

## 2023-03-21 DIAGNOSIS — I99.8 OTHER DISORDER OF CIRCULATORY SYSTEM: ICD-10-CM

## 2023-03-21 PROCEDURE — 99213 OFFICE O/P EST LOW 20 MIN: CPT

## 2023-03-22 PROBLEM — I99.8 ISCHEMIC FOOT: Status: ACTIVE | Noted: 2022-08-24

## 2023-03-27 NOTE — ASSESSMENT
[FreeTextEntry1] : 89yoF w/h/o severe ischemic rest pain due to chronic PAD, also noted to have ischemic changes of the R 3/4th toes which were amputated by Dr. Brumfield after the angiogram, s/p R YANET PTA/stent, returns today for evaluation of her residual foot ulcers.  She reports no new issues at this time, and states that all wounds/surgical sites in her foot have healed.\par \par LEs well-perfused and warm w/o any new ulcers or evidence of ischemia.  RLE arterial duplex performed today to assess LE perfusion reveals patent dSFA/pop stent w/>50% in-stent restenosis at the level of the pop, two-vessel runoff noted via the peroneal a/PTA.  Reassured pt that her leg is well-perfused and is not threatened at this time.  She was instructed to continue regular activity and RTO in 6mos for surveillance.

## 2023-03-27 NOTE — PHYSICAL EXAM
[Normal Thyroid] : the thyroid was normal [Normal Breath Sounds] : Normal breath sounds [Respiratory Effort] : normal respiratory effort [Normal Heart Sounds] : normal heart sounds [Normal Rate and Rhythm] : normal rate and rhythm [2+] : left 2+ [0] : right 0 [1+] : left 1+ [No Rash or Lesion] : No rash or lesion [Skin Ulcer] : ulcer [Alert] : alert [Calm] : calm [JVD] : no jugular venous distention  [Ankle Swelling (On Exam)] : not present [Varicose Veins Of Lower Extremities] : not present [] : not present [Abdomen Masses] : No abdominal masses [Abdomen Tenderness] : ~T ~M No abdominal tenderness [Purpura] : no purpura  [Petechiae] : no petechiae [Skin Induration] : no induration [de-identified] : Well-nourished, NAD [de-identified] : NC/AT, anicteric [FreeTextEntry1] : Signal noted w/handheld doppler at R DP/PT/peroneal arteries [de-identified] : FROM throughout, strength 5/5x4 [de-identified] : Healed R 3/4th toe amputation sites w/callus at operative site

## 2023-03-27 NOTE — HISTORY OF PRESENT ILLNESS
[FreeTextEntry1] : 89yoF w/h/o severe ischemic rest pain due to chronic PAD, also noted to have ischemic changes of the R 3/4th toes which were amputated by Dr. Brumfield after the angiogram, s/p R YANET PTA/stent, returns today for evaluation of her residual foot ulcers.  She reports no new issues at this time, and states that all wounds/surgical sites in her foot have healed.

## 2023-03-28 ENCOUNTER — RX RENEWAL (OUTPATIENT)
Age: 88
End: 2023-03-28

## 2023-04-14 ENCOUNTER — RX RENEWAL (OUTPATIENT)
Age: 88
End: 2023-04-14

## 2023-06-06 ENCOUNTER — APPOINTMENT (OUTPATIENT)
Dept: VASCULAR SURGERY | Facility: CLINIC | Age: 88
End: 2023-06-06
Payer: MEDICARE

## 2023-06-06 DIAGNOSIS — L30.9 DERMATITIS, UNSPECIFIED: ICD-10-CM

## 2023-06-06 PROCEDURE — 99213 OFFICE O/P EST LOW 20 MIN: CPT

## 2023-06-06 PROCEDURE — 93926 LOWER EXTREMITY STUDY: CPT

## 2023-06-06 RX ORDER — FLUOCINONIDE 0.5 MG/G
0.05 CREAM TOPICAL TWICE DAILY
Qty: 30 | Refills: 0 | Status: ACTIVE | COMMUNITY
Start: 2023-06-06 | End: 1900-01-01

## 2023-06-08 RX ORDER — BETAMETHASONE DIPROPIONATE 0.5 MG/G
0.05 CREAM TOPICAL DAILY
Qty: 1 | Refills: 0 | Status: ACTIVE | COMMUNITY
Start: 2023-06-08 | End: 1900-01-01

## 2023-06-09 NOTE — REVIEW OF SYSTEMS
[Lower Ext Edema] : lower extremity edema [Limb Swelling] : limb swelling [As Noted in HPI] : as noted in HPI [Negative] : Heme/Lymph [Fever] : no fever [Chills] : no chills [Leg Claudication] : no intermittent leg claudication [Limb Pain] : no limb pain [Skin Wound] : no skin wound

## 2023-06-09 NOTE — PROCEDURE
[FreeTextEntry1] : RLE arterial duplex was done in the office demonstrating patent SFA/popliteal artery  stent

## 2023-06-09 NOTE — PHYSICAL EXAM
[Normal Thyroid] : the thyroid was normal [Normal Breath Sounds] : Normal breath sounds [Respiratory Effort] : normal respiratory effort [Normal Heart Sounds] : normal heart sounds [Normal Rate and Rhythm] : normal rate and rhythm [2+] : left 2+ [0] : right 0 [1+] : left 1+ [Ankle Swelling (On Exam)] : present [Ankle Swelling On The Right] : of the right ankle [Ankle Swelling On The Left] : moderate [No Rash or Lesion] : No rash or lesion [Skin Ulcer] : ulcer [Alert] : alert [Calm] : calm [JVD] : no jugular venous distention  [Varicose Veins Of Lower Extremities] : not present [] : not present [Abdomen Masses] : No abdominal masses [Abdomen Tenderness] : ~T ~M No abdominal tenderness [Purpura] : no purpura  [Petechiae] : no petechiae [Skin Induration] : no induration [de-identified] : Well-nourished, NAD [de-identified] : NC/AT, anicteric [de-identified] : FROM throughout, strength 5/5x4 [de-identified] : Right lower leg edema from knee down, + rash (dermatitis) over the dorsum of the foot. Warm to touch. Healed R 3/4th toe amputation sites w/callus at operative site

## 2023-06-09 NOTE — ASSESSMENT
[Arterial/Venous Disease] : arterial/venous disease [Ulcer Care] : ulcer care [FreeTextEntry1] : 89yoF w/h/o  chronic PAD, s/p ischemic changes of the R 3/4th toes which were amputated by Dr. Brumfield after the angiogram, s/p R YANET PTA/stent, returns today for a follow up. New RLE edema and rash over dorsum of the foot suggestive of dermatitis, leg is well perfused, warm, no skin breakdown. \par RLE arterial duplex was done in the office demonstrating patent SFA/popliteal artery  stent  \par Reassured pt that her leg is well-perfused and is not threatened at this time.  We recommend to try Lidex for 1 week and referred pt to a dermatologist, Dr. Esperanza Kc.\par She will f/u in 6 months or sooner if necessary.

## 2023-06-09 NOTE — HISTORY OF PRESENT ILLNESS
[FreeTextEntry1] : 89yoF w/h/o  chronic PAD, s/p ischemic changes of the R 3/4th toes which were amputated by Dr. Brumfield after the angiogram, s/p R YANET PTA/stent, returns today for a follow up. She noticed worsening right lower leg edema and rash over the dorsum of right foot. She denies rest pain, skin breakdown, fever, chills.

## 2023-06-28 ENCOUNTER — RX RENEWAL (OUTPATIENT)
Age: 88
End: 2023-06-28

## 2023-07-18 NOTE — OCCUPATIONAL THERAPY INITIAL EVALUATION ADULT - LIGHT TOUCH SENSATION, LLE, REHAB EVAL
within normal limits Encounter for deep vein thrombosis (DVT) prophylaxis Abnormal urinalysis Leg swelling Thrombocytopenia History of alcohol use disorder

## 2023-08-03 ENCOUNTER — APPOINTMENT (OUTPATIENT)
Dept: DERMATOLOGY | Facility: CLINIC | Age: 88
End: 2023-08-03
Payer: MEDICARE

## 2023-08-03 DIAGNOSIS — L30.9 DERMATITIS, UNSPECIFIED: ICD-10-CM

## 2023-08-03 DIAGNOSIS — L25.8 UNSPECIFIED CONTACT DERMATITIS DUE TO OTHER AGENTS: ICD-10-CM

## 2023-08-03 PROCEDURE — 99214 OFFICE O/P EST MOD 30 MIN: CPT

## 2023-08-03 RX ORDER — HALOBETASOL PROPIONATE 0.5 MG/G
0.05 OINTMENT TOPICAL
Qty: 1 | Refills: 2 | Status: ACTIVE | COMMUNITY
Start: 2023-08-03 | End: 1900-01-01

## 2023-08-03 RX ORDER — MUPIROCIN 20 MG/G
2 OINTMENT TOPICAL
Qty: 1 | Refills: 11 | Status: ACTIVE | COMMUNITY
Start: 2023-08-03 | End: 1900-01-01

## 2023-08-03 NOTE — PHYSICAL EXAM
[Alert] : alert [Oriented x 3] : ~L oriented x 3 [Well Nourished] : well nourished [Conjunctiva Non-injected] : conjunctiva non-injected [No Visual Lymphadenopathy] : no visual  lymphadenopathy [No Clubbing] : no clubbing [No Edema] : no edema [No Bromhidrosis] : no bromhidrosis [No Chromhidrosis] : no chromhidrosis [FreeTextEntry3] : white skin excoriated eczematous papules and plaques right shin, right shoulder, lower back

## 2023-08-03 NOTE — HISTORY OF PRESENT ILLNESS
[FreeTextEntry1] : Rash [de-identified] : NPA hx PAD, ischemia toes s/p amputation ref here by vascular who saw patient in June, had arterial duplex showing stent and good perfusion started on right leg 3 mos ago now all over body x 2 weeks itchy uses lidex helps only a couple hours, using bid every day also using occasional neosporin no lotion  once a week bathes with dove soap  blood transfusion every 2-3 weeks for low hemoglobin, hx leukemia  feels worse after  here with home attendant Eva Ruby   [place]  [id number]

## 2023-09-26 NOTE — PATIENT PROFILE ADULT - BILL PAYMENT
Followed protocol
Pt in office for flu and Hep B immunizations. Pt tolerated well, VIS given. No further questions or concerns from pt or parent at this time.    
no

## 2023-10-05 ENCOUNTER — TRANSCRIPTION ENCOUNTER (OUTPATIENT)
Age: 88
End: 2023-10-05

## 2023-10-05 ENCOUNTER — RX RENEWAL (OUTPATIENT)
Age: 88
End: 2023-10-05

## 2023-10-05 RX ORDER — SEVELAMER CARBONATE 800 MG/1
800 TABLET, FILM COATED ORAL 3 TIMES DAILY
Qty: 270 | Refills: 0 | Status: ACTIVE | COMMUNITY
Start: 2022-08-24 | End: 1900-01-01

## 2023-10-15 ENCOUNTER — TRANSCRIPTION ENCOUNTER (OUTPATIENT)
Age: 88
End: 2023-10-15

## 2023-10-15 NOTE — PROVIDER CONTACT NOTE (CRITICAL VALUE NOTIFICATION) - PERSON GIVING RESULT:
Unclear if this is 2/2 infectious source or reaction to worsening hepatic failure, respiratory failure requiring ETT   SIRS criteria met 10/14 with tachypnea, tachycardia, LA 3.5 -- Sepsis alert called   Admission UA 10/2: large leuks, positive nitrites, innum bacteria   10/13 BC x2 -- neg x24H   Urine culture pending   10/14 -- LA 3.5, procal 0.38  Will not given further crystalloid IVF 2/2 concern for volume overload on clinical exam and lack of hypotension, will continue instead with scheduled albumin   Afebrile, no leukocytosis, HD stable     Plan:   Trend UA, LA, procal   Given allergies, will treat with Aztreonam for now after conferring with pharmacy   Abx D2: Aztreonam D2  Lactic improving, however noting it will be slow to clear given degree of hepatic impairment Lab [facility]

## 2023-10-17 ENCOUNTER — EMERGENCY (EMERGENCY)
Facility: HOSPITAL | Age: 88
LOS: 1 days | Discharge: HOME | End: 2023-10-17
Attending: EMERGENCY MEDICINE | Admitting: EMERGENCY MEDICINE
Payer: MEDICARE

## 2023-10-17 VITALS
WEIGHT: 119.93 LBS | TEMPERATURE: 97 F | OXYGEN SATURATION: 97 % | RESPIRATION RATE: 16 BRPM | SYSTOLIC BLOOD PRESSURE: 117 MMHG | HEART RATE: 65 BPM | DIASTOLIC BLOOD PRESSURE: 55 MMHG

## 2023-10-17 VITALS
DIASTOLIC BLOOD PRESSURE: 60 MMHG | HEART RATE: 65 BPM | RESPIRATION RATE: 20 BRPM | OXYGEN SATURATION: 98 % | SYSTOLIC BLOOD PRESSURE: 120 MMHG

## 2023-10-17 DIAGNOSIS — Z95.0 PRESENCE OF CARDIAC PACEMAKER: Chronic | ICD-10-CM

## 2023-10-17 DIAGNOSIS — S98.131A COMPLETE TRAUMATIC AMPUTATION OF ONE RIGHT LESSER TOE, INITIAL ENCOUNTER: Chronic | ICD-10-CM

## 2023-10-17 DIAGNOSIS — Z95.820 PERIPHERAL VASCULAR ANGIOPLASTY STATUS WITH IMPLANTS AND GRAFTS: Chronic | ICD-10-CM

## 2023-10-17 LAB
ALBUMIN SERPL ELPH-MCNC: 2.9 G/DL — LOW (ref 3.3–5)
ALBUMIN SERPL ELPH-MCNC: 2.9 G/DL — LOW (ref 3.3–5)
ALBUMIN SERPL ELPH-MCNC: 3 G/DL — LOW (ref 3.3–5)
ALBUMIN SERPL ELPH-MCNC: 3 G/DL — LOW (ref 3.3–5)
ALP SERPL-CCNC: 79 U/L — SIGNIFICANT CHANGE UP (ref 40–120)
ALP SERPL-CCNC: 79 U/L — SIGNIFICANT CHANGE UP (ref 40–120)
ALP SERPL-CCNC: 80 U/L — SIGNIFICANT CHANGE UP (ref 40–120)
ALP SERPL-CCNC: 80 U/L — SIGNIFICANT CHANGE UP (ref 40–120)
ALT FLD-CCNC: 60 U/L — HIGH (ref 10–45)
ALT FLD-CCNC: 60 U/L — HIGH (ref 10–45)
ALT FLD-CCNC: SIGNIFICANT CHANGE UP (ref 10–45)
ALT FLD-CCNC: SIGNIFICANT CHANGE UP (ref 10–45)
ANION GAP SERPL CALC-SCNC: 16 MMOL/L — SIGNIFICANT CHANGE UP (ref 5–17)
ANISOCYTOSIS BLD QL: SLIGHT — SIGNIFICANT CHANGE UP
ANISOCYTOSIS BLD QL: SLIGHT — SIGNIFICANT CHANGE UP
APTT BLD: 32.3 SEC — SIGNIFICANT CHANGE UP (ref 24.5–35.6)
APTT BLD: 32.3 SEC — SIGNIFICANT CHANGE UP (ref 24.5–35.6)
AST SERPL-CCNC: 27 U/L — SIGNIFICANT CHANGE UP (ref 10–40)
AST SERPL-CCNC: 27 U/L — SIGNIFICANT CHANGE UP (ref 10–40)
AST SERPL-CCNC: SIGNIFICANT CHANGE UP (ref 10–40)
AST SERPL-CCNC: SIGNIFICANT CHANGE UP (ref 10–40)
BASOPHILS # BLD AUTO: 0.05 K/UL — SIGNIFICANT CHANGE UP (ref 0–0.2)
BASOPHILS # BLD AUTO: 0.05 K/UL — SIGNIFICANT CHANGE UP (ref 0–0.2)
BASOPHILS NFR BLD AUTO: 0.9 % — SIGNIFICANT CHANGE UP (ref 0–2)
BASOPHILS NFR BLD AUTO: 0.9 % — SIGNIFICANT CHANGE UP (ref 0–2)
BILIRUB SERPL-MCNC: 0.3 MG/DL — SIGNIFICANT CHANGE UP (ref 0.2–1.2)
BILIRUB SERPL-MCNC: 0.3 MG/DL — SIGNIFICANT CHANGE UP (ref 0.2–1.2)
BILIRUB SERPL-MCNC: 0.4 MG/DL — SIGNIFICANT CHANGE UP (ref 0.2–1.2)
BILIRUB SERPL-MCNC: 0.4 MG/DL — SIGNIFICANT CHANGE UP (ref 0.2–1.2)
BLD GP AB SCN SERPL QL: POSITIVE — SIGNIFICANT CHANGE UP
BLD GP AB SCN SERPL QL: POSITIVE — SIGNIFICANT CHANGE UP
BUN SERPL-MCNC: 123 MG/DL — HIGH (ref 7–23)
BUN SERPL-MCNC: 123 MG/DL — HIGH (ref 7–23)
BUN SERPL-MCNC: 124 MG/DL — HIGH (ref 7–23)
BUN SERPL-MCNC: 124 MG/DL — HIGH (ref 7–23)
BURR CELLS BLD QL SMEAR: PRESENT — SIGNIFICANT CHANGE UP
BURR CELLS BLD QL SMEAR: PRESENT — SIGNIFICANT CHANGE UP
CALCIUM SERPL-MCNC: 7 MG/DL — LOW (ref 8.4–10.5)
CHLORIDE SERPL-SCNC: 100 MMOL/L — SIGNIFICANT CHANGE UP (ref 96–108)
CO2 SERPL-SCNC: 14 MMOL/L — LOW (ref 22–31)
CO2 SERPL-SCNC: 14 MMOL/L — LOW (ref 22–31)
CO2 SERPL-SCNC: 17 MMOL/L — LOW (ref 22–31)
CO2 SERPL-SCNC: 17 MMOL/L — LOW (ref 22–31)
CREAT SERPL-MCNC: 5.63 MG/DL — HIGH (ref 0.5–1.3)
CREAT SERPL-MCNC: 5.63 MG/DL — HIGH (ref 0.5–1.3)
CREAT SERPL-MCNC: 5.78 MG/DL — HIGH (ref 0.5–1.3)
CREAT SERPL-MCNC: 5.78 MG/DL — HIGH (ref 0.5–1.3)
EGFR: 6 ML/MIN/1.73M2 — LOW
EGFR: 6 ML/MIN/1.73M2 — LOW
EGFR: 7 ML/MIN/1.73M2 — LOW
EGFR: 7 ML/MIN/1.73M2 — LOW
EOSINOPHIL # BLD AUTO: 1.03 K/UL — HIGH (ref 0–0.5)
EOSINOPHIL # BLD AUTO: 1.03 K/UL — HIGH (ref 0–0.5)
EOSINOPHIL NFR BLD AUTO: 20.5 % — HIGH (ref 0–6)
EOSINOPHIL NFR BLD AUTO: 20.5 % — HIGH (ref 0–6)
GIANT PLATELETS BLD QL SMEAR: PRESENT — SIGNIFICANT CHANGE UP
GIANT PLATELETS BLD QL SMEAR: PRESENT — SIGNIFICANT CHANGE UP
GLUCOSE SERPL-MCNC: 121 MG/DL — HIGH (ref 70–99)
GLUCOSE SERPL-MCNC: 121 MG/DL — HIGH (ref 70–99)
GLUCOSE SERPL-MCNC: 123 MG/DL — HIGH (ref 70–99)
GLUCOSE SERPL-MCNC: 123 MG/DL — HIGH (ref 70–99)
HCT VFR BLD CALC: 25.2 % — LOW (ref 34.5–45)
HCT VFR BLD CALC: 25.2 % — LOW (ref 34.5–45)
HGB BLD-MCNC: 8.1 G/DL — LOW (ref 11.5–15.5)
HGB BLD-MCNC: 8.1 G/DL — LOW (ref 11.5–15.5)
INR BLD: 1.39 — HIGH (ref 0.85–1.18)
INR BLD: 1.39 — HIGH (ref 0.85–1.18)
LYMPHOCYTES # BLD AUTO: 0.68 K/UL — LOW (ref 1–3.3)
LYMPHOCYTES # BLD AUTO: 0.68 K/UL — LOW (ref 1–3.3)
LYMPHOCYTES # BLD AUTO: 13.4 % — SIGNIFICANT CHANGE UP (ref 13–44)
LYMPHOCYTES # BLD AUTO: 13.4 % — SIGNIFICANT CHANGE UP (ref 13–44)
MACROCYTES BLD QL: SLIGHT — SIGNIFICANT CHANGE UP
MACROCYTES BLD QL: SLIGHT — SIGNIFICANT CHANGE UP
MANUAL SMEAR VERIFICATION: SIGNIFICANT CHANGE UP
MANUAL SMEAR VERIFICATION: SIGNIFICANT CHANGE UP
MCHC RBC-ENTMCNC: 28.7 PG — SIGNIFICANT CHANGE UP (ref 27–34)
MCHC RBC-ENTMCNC: 28.7 PG — SIGNIFICANT CHANGE UP (ref 27–34)
MCHC RBC-ENTMCNC: 32.1 GM/DL — SIGNIFICANT CHANGE UP (ref 32–36)
MCHC RBC-ENTMCNC: 32.1 GM/DL — SIGNIFICANT CHANGE UP (ref 32–36)
MCV RBC AUTO: 89.4 FL — SIGNIFICANT CHANGE UP (ref 80–100)
MCV RBC AUTO: 89.4 FL — SIGNIFICANT CHANGE UP (ref 80–100)
MICROCYTES BLD QL: SLIGHT — SIGNIFICANT CHANGE UP
MICROCYTES BLD QL: SLIGHT — SIGNIFICANT CHANGE UP
MONOCYTES # BLD AUTO: 0.18 K/UL — SIGNIFICANT CHANGE UP (ref 0–0.9)
MONOCYTES # BLD AUTO: 0.18 K/UL — SIGNIFICANT CHANGE UP (ref 0–0.9)
MONOCYTES NFR BLD AUTO: 3.6 % — SIGNIFICANT CHANGE UP (ref 2–14)
MONOCYTES NFR BLD AUTO: 3.6 % — SIGNIFICANT CHANGE UP (ref 2–14)
NEUTROPHILS # BLD AUTO: 3.1 K/UL — SIGNIFICANT CHANGE UP (ref 1.8–7.4)
NEUTROPHILS # BLD AUTO: 3.1 K/UL — SIGNIFICANT CHANGE UP (ref 1.8–7.4)
NEUTROPHILS NFR BLD AUTO: 61.6 % — SIGNIFICANT CHANGE UP (ref 43–77)
NEUTROPHILS NFR BLD AUTO: 61.6 % — SIGNIFICANT CHANGE UP (ref 43–77)
OVALOCYTES BLD QL SMEAR: SLIGHT — SIGNIFICANT CHANGE UP
OVALOCYTES BLD QL SMEAR: SLIGHT — SIGNIFICANT CHANGE UP
PLAT MORPH BLD: ABNORMAL
PLAT MORPH BLD: ABNORMAL
PLATELET # BLD AUTO: 108 K/UL — LOW (ref 150–400)
PLATELET # BLD AUTO: 108 K/UL — LOW (ref 150–400)
POIKILOCYTOSIS BLD QL AUTO: SLIGHT — SIGNIFICANT CHANGE UP
POIKILOCYTOSIS BLD QL AUTO: SLIGHT — SIGNIFICANT CHANGE UP
POTASSIUM SERPL-MCNC: 5.1 MMOL/L — SIGNIFICANT CHANGE UP (ref 3.5–5.3)
POTASSIUM SERPL-MCNC: 5.1 MMOL/L — SIGNIFICANT CHANGE UP (ref 3.5–5.3)
POTASSIUM SERPL-MCNC: SIGNIFICANT CHANGE UP (ref 3.5–5.3)
POTASSIUM SERPL-MCNC: SIGNIFICANT CHANGE UP (ref 3.5–5.3)
POTASSIUM SERPL-SCNC: 5.1 MMOL/L — SIGNIFICANT CHANGE UP (ref 3.5–5.3)
POTASSIUM SERPL-SCNC: 5.1 MMOL/L — SIGNIFICANT CHANGE UP (ref 3.5–5.3)
POTASSIUM SERPL-SCNC: SIGNIFICANT CHANGE UP (ref 3.5–5.3)
POTASSIUM SERPL-SCNC: SIGNIFICANT CHANGE UP (ref 3.5–5.3)
PROT SERPL-MCNC: 6 G/DL — SIGNIFICANT CHANGE UP (ref 6–8.3)
PROT SERPL-MCNC: 6 G/DL — SIGNIFICANT CHANGE UP (ref 6–8.3)
PROT SERPL-MCNC: 6.5 G/DL — SIGNIFICANT CHANGE UP (ref 6–8.3)
PROT SERPL-MCNC: 6.5 G/DL — SIGNIFICANT CHANGE UP (ref 6–8.3)
PROTHROM AB SERPL-ACNC: 15.7 SEC — HIGH (ref 9.5–13)
PROTHROM AB SERPL-ACNC: 15.7 SEC — HIGH (ref 9.5–13)
RBC # BLD: 2.82 M/UL — LOW (ref 3.8–5.2)
RBC # BLD: 2.82 M/UL — LOW (ref 3.8–5.2)
RBC # FLD: 18.2 % — HIGH (ref 10.3–14.5)
RBC # FLD: 18.2 % — HIGH (ref 10.3–14.5)
RBC BLD AUTO: ABNORMAL
RBC BLD AUTO: ABNORMAL
RH IG SCN BLD-IMP: POSITIVE — SIGNIFICANT CHANGE UP
RH IG SCN BLD-IMP: POSITIVE — SIGNIFICANT CHANGE UP
SMUDGE CELLS # BLD: PRESENT — SIGNIFICANT CHANGE UP
SMUDGE CELLS # BLD: PRESENT — SIGNIFICANT CHANGE UP
SODIUM SERPL-SCNC: 130 MMOL/L — LOW (ref 135–145)
SODIUM SERPL-SCNC: 130 MMOL/L — LOW (ref 135–145)
SODIUM SERPL-SCNC: 133 MMOL/L — LOW (ref 135–145)
SODIUM SERPL-SCNC: 133 MMOL/L — LOW (ref 135–145)
WBC # BLD: 5.04 K/UL — SIGNIFICANT CHANGE UP (ref 3.8–10.5)
WBC # BLD: 5.04 K/UL — SIGNIFICANT CHANGE UP (ref 3.8–10.5)
WBC # FLD AUTO: 5.04 K/UL — SIGNIFICANT CHANGE UP (ref 3.8–10.5)
WBC # FLD AUTO: 5.04 K/UL — SIGNIFICANT CHANGE UP (ref 3.8–10.5)

## 2023-10-17 PROCEDURE — 36415 COLL VENOUS BLD VENIPUNCTURE: CPT

## 2023-10-17 PROCEDURE — 93005 ELECTROCARDIOGRAM TRACING: CPT

## 2023-10-17 PROCEDURE — 86900 BLOOD TYPING SEROLOGIC ABO: CPT

## 2023-10-17 PROCEDURE — 93010 ELECTROCARDIOGRAM REPORT: CPT

## 2023-10-17 PROCEDURE — 86850 RBC ANTIBODY SCREEN: CPT

## 2023-10-17 PROCEDURE — 86870 RBC ANTIBODY IDENTIFICATION: CPT

## 2023-10-17 PROCEDURE — 71045 X-RAY EXAM CHEST 1 VIEW: CPT | Mod: 26

## 2023-10-17 PROCEDURE — 86077 PHYS BLOOD BANK SERV XMATCH: CPT

## 2023-10-17 PROCEDURE — 80053 COMPREHEN METABOLIC PANEL: CPT

## 2023-10-17 PROCEDURE — 99283 EMERGENCY DEPT VISIT LOW MDM: CPT

## 2023-10-17 PROCEDURE — 99285 EMERGENCY DEPT VISIT HI MDM: CPT | Mod: FS

## 2023-10-17 PROCEDURE — 71045 X-RAY EXAM CHEST 1 VIEW: CPT

## 2023-10-17 PROCEDURE — 86880 COOMBS TEST DIRECT: CPT

## 2023-10-17 PROCEDURE — 85730 THROMBOPLASTIN TIME PARTIAL: CPT

## 2023-10-17 PROCEDURE — 85610 PROTHROMBIN TIME: CPT

## 2023-10-17 PROCEDURE — 86901 BLOOD TYPING SEROLOGIC RH(D): CPT

## 2023-10-17 PROCEDURE — 85025 COMPLETE CBC W/AUTO DIFF WBC: CPT

## 2023-10-17 PROCEDURE — 99285 EMERGENCY DEPT VISIT HI MDM: CPT | Mod: 25

## 2023-10-17 NOTE — CONSULT NOTE ADULT - ATTENDING COMMENTS
CKD 5 -- likely mild uremic sx (pruritis) and c.o lead urine output and more edema with lower diuretics (lowered for worsened renal failure) -- offered HD but refusing   will increase diuretics again and dc nifedipine to see if helps edema(and BP low side)  to f/u in office - advised come back to hospital if feels unwell  seen with son

## 2023-10-17 NOTE — CONSULT NOTE ADULT - SUBJECTIVE AND OBJECTIVE BOX
NEPHROLOGY CONSULTATION NOTE    89 y/o F w/ a PMHx of CKD 5, DM, HFpEF, HTN, HLD, CAD, hypothyroidism, and PAD. She was seen by a home nurse today for a routine dressing change, who noticed that the operative site seemed infected, so the nurse called Dr. Brumfield who prescribed doxycycline and told the patient to come to the ED if the pain worsens. Nephrology consulted for CKD5.     PAST MEDICAL & SURGICAL HISTORY:  Anemia of chronic disease  Diabetes  Diabetes mellitus with diabetic neuropathy  Chronic diastolic congestive heart failure  Hypertension  Hyperlipidemia  CAD (coronary artery disease)  Hypothyroidism  Pacemaker  S/P peripheral artery angioplasty with stent placement  6/2021 - SFA to BK Pop stenting (Zilver PTX 6x80mm, Zilver 518 5x80, Zilver 518 5x80 (proximal to distal)); 2/2022 - balloon angioplasty for in-stent stenosis  Amputation of toe of right foot  8/30/22 3rd and 4th toe w/ Dr. Brumfield      Allergies:  No Known Allergies    Home Medications Reviewed  Hospital Medications:   MEDICATIONS  (STANDING):    SOCIAL HISTORY:  Denies ETOH,Smoking,   FAMILY HISTORY:      REVIEW OF SYSTEMS:  All other review of systems is negative unless indicated above.    VITALS:  Vital Signs Last 24 Hrs  T(C): 36.4 (17 Oct 2023 13:31), Max: 36.4 (17 Oct 2023 13:31)  T(F): 97.5 (17 Oct 2023 13:31), Max: 97.5 (17 Oct 2023 13:31)  HR: 60 (17 Oct 2023 15:10) (60 - 65)  BP: 119/56 (17 Oct 2023 15:10) (110/65 - 119/56)  BP(mean): --  RR: 20 (17 Oct 2023 15:10) (16 - 20)  SpO2: 98% (17 Oct 2023 15:10) (95% - 98%)    Parameters below as of 17 Oct 2023 15:10  Patient On (Oxygen Delivery Method): room air      Weight (kg): 54.4 (10-17 @ 10:43)    PHYSICAL EXAM:  Constitutional: NAD  HEENT: anicteric sclera, oropharynx clear, MMM  Neck: No JVD  Respiratory: CTAB, no wheezes, rales or rhonchi  Cardiovascular: S1, S2, RRR  Gastrointestinal: BS+, soft, NT/ND  Extremities: +2 peripheral edema.  Neurological: A/O x 3, no focal deficits, no asterixis.   Vascular Access: none    LABS:  10-17    133<L>  |  100  |  123<H>  ----------------------------<  121<H>  5.1   |  17<L>  |  5.78<H>    Ca    7.0<L>      17 Oct 2023 12:37    TPro  6.0  /  Alb  3.0<L>  /  TBili  0.3  /  DBili      /  AST  27  /  ALT  60<H>  /  AlkPhos  79  10-17    Creatinine Trend: 5.78 <--, 5.63 <--                        8.1    5.04  )-----------( 108      ( 17 Oct 2023 11:33 )             25.2     Urine Studies:  Urinalysis Basic - ( 17 Oct 2023 12:37 )    Color:  / Appearance:  / SG:  / pH:   Gluc: 121 mg/dL / Ketone:   / Bili:  / Urobili:    Blood:  / Protein:  / Nitrite:    Leuk Esterase:  / RBC:  / WBC    Sq Epi:  / Non Sq Epi:  / Bacteria:                 RADIOLOGY & ADDITIONAL STUDIES:

## 2023-10-17 NOTE — ED PROVIDER NOTE - PHYSICAL EXAMINATION
VITAL SIGNS: I have reviewed nursing notes and confirm.  CONSTITUTIONAL: Well-developed; in no acute distress.   SKIN:  warm and dry, no acute rash.   HEAD:  normocephalic, atraumatic.  EYES: PERRL, EOM intact; conjunctiva and sclera clear.  ENT: No nasal discharge; airway clear.   NECK: Supple; non tender.  CARD: S1, S2 normal; no murmurs, gallops, or rubs. Regular rate and rhythm.   RESP:  Clear to auscultation b/l, no wheezes, rales or rhonchi.  ABD: Normal bowel sounds; soft; non-distended; non-tender; no guarding/ rebound. No external hemorrhoid noted, she has small amount of dried blood at anus.   EXT: Normal ROM. No clubbing, cyanosis or edema. 2+ pulses to b/l ue/le.  NEURO: Alert, oriented, grossly unremarkable  PSYCH: Cooperative, mood and affect appropriate.

## 2023-10-17 NOTE — ED PROVIDER NOTE - NS ED ROS FT
Constitutional: No fever. No chills.  Eyes: No redness. No discharge. No vision change.   ENT: No sore throat. No ear pain.  Cardiovascular: No chest pain. No leg swelling.  Respiratory: No cough. No shortness of breath.  GI: No abdominal pain. No vomiting. No diarrhea. +rectal bleeding.  MSK: No joint pain. No back pain.   Skin: No rash. No abrasions.   Neuro: No numbness. No weakness.   Psych: No known mental health issues.

## 2023-10-17 NOTE — ED PROVIDER NOTE - NSFOLLOWUPINSTRUCTIONS_ED_ALL_ED_FT
Please INCREASE torsemide to 20mg twice daily.    Please STOP nifedipine as your blood pressure was low today.    Please continue your routine labs to monitor your blood counts. You have been referred to a gastroenterologist for hemorrhoidal bleeding. See office information below. Please reach out to Aydee Dubose ([facility] ED clinical referral coordinator) to assist you with your follow-up appointment.     Monday - Friday 11am-7pm  [phone]  [email or web address]     Follow up with Dr. Lerma.    Return to the Emergency Department if you develop fever>100.4F, shortness of breath, chest pain, abdominal pain, increased rectal bleeding or any other concerns.

## 2023-10-17 NOTE — CONSULT NOTE ADULT - ASSESSMENT
89 y/o F w/ a PMHx of CKD 5, DM, HFpEF, HTN, HLD, CAD, hypothyroidism, and PAD. She was seen by a home nurse today for a routine dressing change, who noticed that the operative site seemed infected, so the nurse called Dr. Brumfield who prescribed doxycycline and told the patient to come to the ED if the pain worsens. Nephrology consulted for CKD5.     89 y/o F with ESRD, verbalizes she is urinating less than 2-3 cups per day approx. Pitting edema in LEs, complains about increased itchiness and rectal bleeding through her hemorrhoids.   Labs reviewed, showing CKD5/ESRD, offered HD to the pte, pre refusing, she is AOx3.     Plan:  Patient refusing HD as this point.   Increase Torsemide to 20 BID.   Will dc Nifedipine for now, mildly hypotensive.   F/u with GI or Colorectal for hemorrhoidal bleeding.   Cont. HCO3 tabs.   Nephro f/u op if decided to dc today.    89 y/o F w/ a PMHx of CKD 5, DM, HFpEF, HTN, HLD, CAD, hypothyroidism, and PAD. She was seen by a home nurse today for a routine dressing change, who noticed that the operative site seemed infected, so the nurse called Dr. Brumfield who prescribed doxycycline and told the patient to come to the ED if the pain worsens. Nephrology consulted for CKD5.     89 y/o F with ESRD, verbalizes she is urinating less than 2-3 cups per day approx. Pitting edema in LEs, complains about increased edema ,  itchiness and rectal bleeding through her hemorrhoids. -- urine output less because diureitcs lowered for worse renal failure   Labs reviewed, showing CKD5/ESRD, offered HD to the pte, pre refusing, she is AOx3.     Plan:  Patient refusing HD as this point.   Increase Torsemide to 20 BID.   Will dc Nifedipine for now, mildly hypotensive and may decrease edema  F/u with GI or Colorectal for hemorrhoidal bleeding.   Cont. HCO3 tabs.   Nephro f/u op if decided to dc today.

## 2023-10-17 NOTE — ED ADULT TRIAGE NOTE - CHIEF COMPLAINT QUOTE
Pt presents to the ED for elevated creatinine and rectal bleeding. Pt denies medical complaints at this time.

## 2023-10-17 NOTE — ED PROVIDER NOTE - OBJECTIVE STATEMENT
89yo female CKD 5, DM, HFpEF (EF 70% 2021, grade 1 diastolic dysfunction), HTN, HLD, CAD, hypothyroidism, and PAD s/p R 3rd and 4th toe amputations on 08/30/22 w/ Dr. 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 Brumfield presents with elevated Cr and rectal bleeding. Son provided history. States pt was instructed to come into ED by her nephrologist Dr. Lerma for uptrending Cr. Reports last lab draw was 10/12 with Cr 6.83. He states pt has been retaining more fluid than previously and is more winded with exertion. Also endorsing rectal bleeding from hemorrhoids x2 days. States this was a few drops of blood yesterday and increased bleeding today. He reports pt receives q2wk transfusions, last received 2 units on 10/12 (Hgb 9 after transfusion). Pt denies chest pain, shortness of breath at rest, abdominal pain, vomiting, syncope.

## 2023-10-17 NOTE — ED PROVIDER NOTE - CLINICAL SUMMARY MEDICAL DECISION MAKING FREE TEXT BOX
Pt afebrile and hemodynamically stable. She reports no complaints in the ED. ECG nsr without ischemic changes. Hgb 8 (Hgb 9 after transfusion on 10/12, son states this typically drops to Hgb 6 prior to q2wk transfusion). No Pt afebrile and hemodynamically stable. She reports no complaints in the ED. ECG nsr without ischemic changes. Hgb 8 (Hgb 9 after transfusion on 10/12, son states this typically drops to Hgb 6 prior to q2wk transfusion). She has Cr 5.8 which is improved from labs last week when Cr 6.8. BUN elevated at 125, but K+ wnl. No acute cardiopulmonary abnormalities on CXR. Discussed with Nephrology, pt offered by HD by Dr. Lerma which she declined. Will dc with close follow up. She has frequent blood work to monitor for acute drop in Hgb related to bleeding. Will refer to GI. Return precautions given. Pt afebrile and hemodynamically stable. She reports no complaints in the ED. ECG nsr without ischemic changes. Hgb 8 (Hgb 9 after transfusion on 10/12, son states this typically drops to Hgb 6 prior to q2wk transfusion). She has Cr 5.8 which is improved from labs last week when Cr 6.8. BUN elevated at 125, but K+ wnl. No acute cardiopulmonary abnormalities on CXR. Discussed with Nephrology, pt offered by HD by Dr. Lerma which she declined. Will dc with close follow up. Nephrology recommends the following: Increase Torsemide to 20 BID. Will dc Nifedipine for now as she is mildly hypotensive. She has frequent blood work to monitor for acute drop in Hgb related to bleeding. Will refer to GI. Return precautions given.

## 2023-10-17 NOTE — ED PROVIDER NOTE - NS ED ATTENDING STATEMENT MOD
This was a shared visit with the RENNY. I reviewed and verified the documentation and independently performed the documented:

## 2023-10-17 NOTE — ED PROVIDER NOTE - ATTENDING APP SHARED VISIT CONTRIBUTION OF CARE
89 F multiple medical problems, MDS q 2 week transfusion, now for eval at rec by nephrologist for rising Cr and increased bl LE edema and increased ROSE.  Additionally, pt has had 2 days BRBPR, drops now larger.  Tx last 10/12.  HD stable, well appearing, belly soft nontender, skin pale, 1+ pitting edema bl, symmetric, no calf ttp, clear lungs, nl heart.  External dried blood at anus but no active bleeding and no clear hemorrhoid.  VSS.  Labs, ekg, cxr, reassess. EKG nonischemic nsr, ? qt per computer but 1/2 r-r interval.  Awaiting labs, cxr, will consult Dr. Lerma who referred pt here.

## 2023-10-17 NOTE — ED PROVIDER NOTE - PATIENT PORTAL LINK FT
You can access the FollowMyHealth Patient Portal offered by [facility] by registering at the following website: [email or web address]. By joining [facility]’s FollowMyHealth portal, you will also be able to view your health information using other applications (apps) compatible with our system.

## 2023-10-17 NOTE — ED ADULT NURSE NOTE - OBJECTIVE STATEMENT
Pt is 89 y.o walked in accompanied by aide and son sent in by Dr. Roman Lerma nephrologist for increased creatinine and rectal bleeding. Pt A&Ox4, conversive in full sentences and ambulates with a walker, Vincentian speaking only son at bedside to translate. Per son, pt also received blood every 2 weeks last BT was Thursday. Denies abd pain, fever, chills, , sob, n/v/d. IV inserted and labs sent. Assessment ongoing. Pt is 89 y.o walked in accompanied by aide and son sent in by Dr. Roman Lerma nephrologist for increased creatinine and rectal bleeding. Pt A&Ox4, conversive in full sentences and ambulates with a walker, Palestinian speaking only son at bedside to translate. Per son, pt also received blood every 2 weeks last BT was Thursday and was told pt might get dialysis for CKD stage 5. Denies abd pain, fever, chills, , sob, n/v/d. IV inserted and labs sent. Assessment ongoing.

## 2023-10-18 LAB
ALBUMIN SERPL ELPH-MCNC: 3.2 G/DL
ALP BLD-CCNC: 67 U/L
ALT SERPL-CCNC: 55 U/L
ANION GAP SERPL CALC-SCNC: 21 MMOL/L
AST SERPL-CCNC: 27 U/L
BILIRUB SERPL-MCNC: 0.3 MG/DL
BUN SERPL-MCNC: 118 MG/DL
CALCIUM SERPL-MCNC: 7.4 MG/DL
CHLORIDE SERPL-SCNC: 101 MMOL/L
CO2 SERPL-SCNC: 11 MMOL/L
CREAT SERPL-MCNC: 6.25 MG/DL
EGFR: 6 ML/MIN/1.73M2
GLUCOSE SERPL-MCNC: 181 MG/DL
HBV SURFACE AG SER QL: NONREACTIVE
HCT VFR BLD CALC: 27.9 %
HGB BLD-MCNC: 9.1 G/DL
MCHC RBC-ENTMCNC: 29.1 PG
MCHC RBC-ENTMCNC: 32.6 GM/DL
MCV RBC AUTO: 89.1 FL
PHOSPHATE SERPL-MCNC: 8.3 MG/DL
PLATELET # BLD AUTO: 107 K/UL
POTASSIUM SERPL-SCNC: 3.9 MMOL/L
PROT SERPL-MCNC: 5.4 G/DL
RBC # BLD: 3.13 M/UL
RBC # FLD: 19.5 %
SODIUM SERPL-SCNC: 133 MMOL/L
WBC # FLD AUTO: 5.21 K/UL

## 2023-10-19 DIAGNOSIS — D63.1 ANEMIA IN CHRONIC KIDNEY DISEASE: ICD-10-CM

## 2023-10-19 DIAGNOSIS — E78.5 HYPERLIPIDEMIA, UNSPECIFIED: ICD-10-CM

## 2023-10-19 DIAGNOSIS — E03.9 HYPOTHYROIDISM, UNSPECIFIED: ICD-10-CM

## 2023-10-19 DIAGNOSIS — E11.40 TYPE 2 DIABETES MELLITUS WITH DIABETIC NEUROPATHY, UNSPECIFIED: ICD-10-CM

## 2023-10-19 DIAGNOSIS — Z89.421 ACQUIRED ABSENCE OF OTHER RIGHT TOE(S): ICD-10-CM

## 2023-10-19 DIAGNOSIS — K64.9 UNSPECIFIED HEMORRHOIDS: ICD-10-CM

## 2023-10-19 DIAGNOSIS — I50.32 CHRONIC DIASTOLIC (CONGESTIVE) HEART FAILURE: ICD-10-CM

## 2023-10-19 DIAGNOSIS — Z95.820 PERIPHERAL VASCULAR ANGIOPLASTY STATUS WITH IMPLANTS AND GRAFTS: ICD-10-CM

## 2023-10-19 DIAGNOSIS — D46.9 MYELODYSPLASTIC SYNDROME, UNSPECIFIED: ICD-10-CM

## 2023-10-19 DIAGNOSIS — R06.09 OTHER FORMS OF DYSPNEA: ICD-10-CM

## 2023-10-19 DIAGNOSIS — R60.0 LOCALIZED EDEMA: ICD-10-CM

## 2023-10-19 DIAGNOSIS — I25.10 ATHEROSCLEROTIC HEART DISEASE OF NATIVE CORONARY ARTERY WITHOUT ANGINA PECTORIS: ICD-10-CM

## 2023-10-19 DIAGNOSIS — I13.2 HYPERTENSIVE HEART AND CHRONIC KIDNEY DISEASE WITH HEART FAILURE AND WITH STAGE 5 CHRONIC KIDNEY DISEASE, OR END STAGE RENAL DISEASE: ICD-10-CM

## 2023-10-19 DIAGNOSIS — I95.9 HYPOTENSION, UNSPECIFIED: ICD-10-CM

## 2023-10-19 DIAGNOSIS — E11.22 TYPE 2 DIABETES MELLITUS WITH DIABETIC CHRONIC KIDNEY DISEASE: ICD-10-CM

## 2023-10-19 DIAGNOSIS — Z95.0 PRESENCE OF CARDIAC PACEMAKER: ICD-10-CM

## 2023-10-19 DIAGNOSIS — Z79.82 LONG TERM (CURRENT) USE OF ASPIRIN: ICD-10-CM

## 2023-10-19 DIAGNOSIS — Z79.02 LONG TERM (CURRENT) USE OF ANTITHROMBOTICS/ANTIPLATELETS: ICD-10-CM

## 2023-10-19 DIAGNOSIS — N18.5 CHRONIC KIDNEY DISEASE, STAGE 5: ICD-10-CM

## 2023-10-19 DIAGNOSIS — R79.89 OTHER SPECIFIED ABNORMAL FINDINGS OF BLOOD CHEMISTRY: ICD-10-CM

## 2023-11-16 NOTE — ED ADULT NURSE NOTE - NSFALLRSKASSESSTYPE_ED_ALL_ED
Not a Dr Arron Rucker patient.   Clarified with Junior Cardozo this was likely called to the wrong PCP and he is already gotten ok for care from Dr Brea Messer Initial (On Arrival)

## 2023-11-28 ENCOUNTER — APPOINTMENT (OUTPATIENT)
Dept: NEPHROLOGY | Facility: CLINIC | Age: 88
End: 2023-11-28

## 2023-12-01 ENCOUNTER — APPOINTMENT (OUTPATIENT)
Dept: VASCULAR SURGERY | Facility: CLINIC | Age: 88
End: 2023-12-01

## 2024-01-05 ENCOUNTER — APPOINTMENT (OUTPATIENT)
Dept: VASCULAR SURGERY | Facility: CLINIC | Age: 89
End: 2024-01-05

## 2024-02-10 NOTE — DIETITIAN INITIAL EVALUATION ADULT - OTHER CALCULATIONS
Patient's first and last name, , procedure, and correct site confirmed prior to the start of procedure.
%IBW: 123; IBW used to calculate estimated needs d/t pt being >120% IBW and varying wts in EMR. Adjusted for CHF, advanced age, CKD, DM, BMI/clinical judgment. Fluids per team.

## 2024-02-20 ENCOUNTER — RX RENEWAL (OUTPATIENT)
Age: 89
End: 2024-02-20

## 2024-02-20 RX ORDER — CLOPIDOGREL BISULFATE 75 MG/1
75 TABLET, FILM COATED ORAL DAILY
Qty: 90 | Refills: 2 | Status: ACTIVE | COMMUNITY
Start: 2022-11-28 | End: 1900-01-01

## 2024-06-14 NOTE — ED ADULT TRIAGE NOTE - SOURCE OF INFORMATION
Bed: 15  Expected date:   Expected time:   Means of arrival:   Comments:  [place]   Patient

## 2024-07-18 NOTE — PATIENT PROFILE ADULT - DATE/TIME OF ACCEPTANCE
DISCHARGE INSTRUCTIONS CYSTOSCOPY WITH BULKING AGENT INJECTION      For your surgery you had:  Monitored anesthesia care  You may experience dizziness, drowsiness, or lightheadedness for several hours following surgery.  Do not stay alone today or tonight.  Limit your activity for 24 hours.  You should not drive, operate machinery, drink alcohol, or sign legally binding documents for 24 hours or while you are taking pain medication.  Resume your diet slowly.  Follow any special dietary instructions you may have been given by your doctor.      NOTIFY YOUR DOCTOR IF YOU EXPERIENCE ANY OF THE FOLLOWING:  Temperature greater than 101 degrees Fahrenheit  Shaking Chills  Redness or excessive drainage from incision  Nausea, vomiting and/or pain that is not controlled by prescribed medications  Increase in bleeding or bleeding that is excessive  Unable to urinate in 6 hours after surgery  If unable to reach your doctor, please go to the closest Emergency Room   Following your cystoscopy exam, you may experience burning upon urination.    You may have blood in your urine for 24 hours after procedure.  Do not rush or strain to urinate. Relax your bladder and give it time to empty.  You may have a slow urinary stream for the first several days and is to be expected.  IF YOU ARE UNABLE TO URINATE, CALL YOUR DOCTOR OR RETURN TO ED.      Last dose of pain medication given at:  Tylenol (1000mg) last at 8:34am. Do not exceed 4000mg of tylenol in a 24 hour period.  May take tylenol next at 2:34pm if needed.      SPECIAL INSTRUCTIONS:  Follow any verbal instructions given by Dr. Melendez.      20-Jul-2022 13:50

## 2024-08-13 NOTE — PROGRESS NOTE ADULT - SUBJECTIVE AND OBJECTIVE BOX
Detail Level: Detailed Detail Level: Zone Patient is a 89y Female seen and evaluated at bedside. NAD, comfortable, stable CKD V. Start Nabicarb 650 BID. BP controlled.    Meds:    aspirin  chewable 81 daily  atorvastatin 20 at bedtime  carvedilol 25 every 12 hours  ceFAZolin   IVPB 1000 every 8 hours  clopidogrel Tablet 75 daily  dextrose 40% Gel 15 once  dextrose 5%. 1000 <Continuous>  dextrose 5%. 1000 <Continuous>  dextrose 50% Injectable 25 once  dextrose 50% Injectable 12.5 once  dextrose 50% Injectable 25 once  famotidine    Tablet 20 daily  glucagon  Injectable 1 once  insulin lispro (ADMELOG) corrective regimen sliding scale  Before meals and at bedtime  NIFEdipine XL 60 daily  repaglinide 0.5 two times a day before meals  senna 2 at bedtime      T(C): , Max: 36.9 (21 @ 05:00)  T(F): , Max: 98.4 (21 @ 05:00)  HR: 70 (21 @ 11:19)  BP: 138/60 (21 @ 11:19)  BP(mean): 84 (21 @ 00:15)  RR: 18 (21 @ 11:19)  SpO2: 98% (21 @ 11:19)  Wt(kg): --     @ 07:  -   @ 07:00  --------------------------------------------------------  IN: 403 mL / OUT: 1100 mL / NET: -697 mL     @ 07:01  -   @ 12:38  --------------------------------------------------------  IN: 180 mL / OUT: 0 mL / NET: 180 mL    Review of Systems:  RESPIRATORY: No shortness of breath  CARDIOVASCULAR: No chest pain  MUSCULOSKELETAL: No leg oedema    PHYSICAL EXAM:  GENERAL: well-developed, well nourished, alert, no acute distress at present on RA  CHEST/LUNG: Clear to auscultation bilaterally  HEART: normal S1S2, RRR  ABDOMEN: Soft, Nontender, non distended  EXTREMITIES: No oedema, RLE tenderness      LABS:                        6.7    2.46  )-----------( 69       ( 2021 09:01 )             21.7     -    135  |  105  |  89<H>  ----------------------------<  90  4.4   |  15<L>  |  4.28<H>    Ca    10.3      2021 07:47  Phos  7.5       Mg     2.4         TPro  7.3  /  Alb  3.6  /  TBili  0.3  /  DBili  x   /  AST  14  /  ALT  9<L>  /  AlkPhos  46  06-11      PT/INR - ( 2021 09:12 )   PT: 16.6 sec;   INR: 1.41          PTT - ( 2021 07:45 )  PTT:38.3 sec  Urinalysis Basic - ( 2021 02:48 )    Color: Yellow / Appearance: Clear / S.010 / pH: x  Gluc: x / Ketone: NEGATIVE  / Bili: Negative / Urobili: 0.2 E.U./dL   Blood: x / Protein: NEGATIVE mg/dL / Nitrite: NEGATIVE   Leuk Esterase: NEGATIVE / RBC: x / WBC x   Sq Epi: x / Non Sq Epi: x / Bacteria: x      Chloride, Random Urine: 22 mmol/L ( @ 02:47)  Sodium, Random Urine: 36 mmol/L ( @ 02:47)  Potassium, Random Urine: 26 mmol/L ( @ 02:47)  Osmolality, Random Urine: 385 mosm/kg ( @ 02:47)        RADIOLOGY & ADDITIONAL STUDIES:

## 2025-01-13 NOTE — ED ADULT TRIAGE NOTE - HISTORY OF COVID-19 VACCINATION
[FreeTextEntry1] : Exam Date:      11/05/24 Exam:         MRI CERVICAL SPINE   FINDINGS: The MRI exam demonstrates loss of the normal cervical lordosis which may be secondary to positioning. Reviewed by heights are maintained. There is mild disc space narrowing at C5-6.. There are edematous endplate degenerative changes at C5-C6.. No abnormal signal is identified within the cervical cord. The prevertebral soft tissues are within normal limits. The cerebellar tonsils are normal in position.   At the C2/3 level, there is no disc herniation. There is no central spinal canal stenosis or neural foramen narrowing.   At the C3/4 level, there is no disc herniation. There is no central spinal canal stenosis or neural foramen narrowing.   At the C4/5 level, there is a disc bulge without significant spinal canal stenosis or neural foraminal narrowing..   At the C5/6 level, there is a disc bulge resulting in mild spinal canal stenosis which has improved from prior exam. There is no significant neural foraminal narrowing.   At the C6/7 level, there is no disc herniation. There is no central spinal canal stenosis or neural foramen narrowing.   At the C7/T1 level, there is a right subarticular disc herniation with mild lateral recess narrowing stable from prior exam..   IMPRESSION:   C5-6 disc bulge with mild spinal canal stenosis which has improved from prior MRI 6/30/2022.   C7-T1 right subarticular disc herniation with mild lateral recess narrowing which is stable from prior exam.  Exam: MRI LUMBAR SPINE  There is minimal dextroscoliosis of the lumbar spine. There is normal curvature to the lumbar lordosis. The vertebral bodies are normal height and configuration. The intervertebral disc spaces are within normal limits. The conus terminates at the L1 level and demonstrates no evidence of abnormal signal changes.  Evaluation of the individual levels demonstrates at the L5/S1 level there is a very minimal disc bulge contacting the ventral thecal sac. The bilateral neuroforamen are mildly narrowed. The bilateral L5 foraminal exiting nerve roots are within normal limits. There is mild degenerative changes of the facets. There is no evidence of spinal canal stenosis.  At the L4/L5 level there is a very minimal diffuse disc bulge contacting the ventral thecal sac. There is mild bilateral foraminal narrowing. The bilateral L4 foraminal exiting nerve roots are within normal limits. There is mild facet and ligamentous hypertrophy. There is no evidence of spinal canal stenosis.  At the L3/L4 level there is a very minimal minimal disc bulge flattening the ventral thecal sac. There is moderate bilateral foraminal narrowing. The bilateral L3 foraminal exiting nerve roots are within normal limits. There is mild facet and ligamentous hypertrophy. There is a tiny amount of fluid seen in the facet joints. There is no evidence of spinal canal stenosis.  At the L1/L2 and L2/L3 levels there are no evidence of focal disc herniation or spinal canal stenosis. The bilateral neuroforamen are patent.  There are multiple nonobstructing stones seen within the gallbladder. There is an approximately 1.7 cm cystic lesion in the midpole of the right kidney. If there is further clinical concern, ultrasound may be obtained.   Impression:  Minimal dextroscoliosis. L5/S1, L4/L5 and L3/L4 very minimal disc bulges with moderate bilateral foraminal narrowing at L3/L4. No evidence of spinal canal stenosis.  Yes

## 2025-03-28 NOTE — PRE-OP CHECKLIST - IV STARTED
Home regimen: Metformin 750mg qd but pt does not take it daily.     - check A1c   - LOBO premeal and bedtime   - CC diet yes

## 2025-06-03 NOTE — ED ADULT NURSE NOTE - HISTORY OF COVID-19 VACCINATION
Personalized Preventive Plan for Gilda He - 6/3/2025  Medicare offers a range of preventive health benefits. Some of the tests and screenings are paid in full while other may be subject to a deductible, co-insurance, and/or copay.  Some of these benefits include a comprehensive review of your medical history including lifestyle, illnesses that may run in your family, and various assessments and screenings as appropriate.  After reviewing your medical record and screening and assessments performed today your provider may have ordered immunizations, labs, imaging, and/or referrals for you.  A list of these orders (if applicable) as well as your Preventive Care list are included within your After Visit Summary for your review.       Yes

## (undated) DEVICE — WARMING BLANKET UPPER ADULT

## (undated) DEVICE — DRSG XEROFORM 1 X 8"

## (undated) DEVICE — DRSG GAUZE MOISTURIZER 0.5 OZ 4X8

## (undated) DEVICE — DRSG KERLIX ROLL 4.5"

## (undated) DEVICE — MARKING PEN W RULER

## (undated) DEVICE — PACK ANGIOGRAM LNX SURGICOUNT

## (undated) DEVICE — IRR SYS BONE CLNNG INTERPULSE

## (undated) DEVICE — TORQUE DEVICE FOR GUIDEWIRE 0.0100.038"

## (undated) DEVICE — SUT VICRYL 2-0 27" CT-1 UNDYED

## (undated) DEVICE — SAW BLADE LINVATEC SAGITTAL MIC 9.5X25.5X0.4MM

## (undated) DEVICE — PACK ORTHO FOOT ANKLE

## (undated) DEVICE — SAW BLADE STRYKER PRECISION THIN SAGITTAL MEDIUM 9MM X 25MM

## (undated) DEVICE — VENODYNE/SCD SLEEVE CALF MEDIUM

## (undated) DEVICE — GLV 7.5 PROTEXIS (WHITE)